# Patient Record
Sex: FEMALE | Race: WHITE | NOT HISPANIC OR LATINO | Employment: OTHER | URBAN - METROPOLITAN AREA
[De-identification: names, ages, dates, MRNs, and addresses within clinical notes are randomized per-mention and may not be internally consistent; named-entity substitution may affect disease eponyms.]

---

## 2018-05-25 ENCOUNTER — OFFICE VISIT (OUTPATIENT)
Dept: NEUROLOGY | Facility: CLINIC | Age: 78
End: 2018-05-25

## 2018-05-25 VITALS
HEIGHT: 62 IN | SYSTOLIC BLOOD PRESSURE: 128 MMHG | WEIGHT: 125 LBS | BODY MASS INDEX: 23 KG/M2 | DIASTOLIC BLOOD PRESSURE: 60 MMHG

## 2018-05-25 DIAGNOSIS — G30.1 LATE ONSET ALZHEIMER'S DISEASE WITHOUT BEHAVIORAL DISTURBANCE (HCC): Primary | ICD-10-CM

## 2018-05-25 DIAGNOSIS — F02.80 LATE ONSET ALZHEIMER'S DISEASE WITHOUT BEHAVIORAL DISTURBANCE (HCC): Primary | ICD-10-CM

## 2018-05-25 PROCEDURE — 99205 OFFICE O/P NEW HI 60 MIN: CPT | Performed by: PSYCHIATRY & NEUROLOGY

## 2018-05-25 RX ORDER — DONEPEZIL HYDROCHLORIDE 5 MG/1
5 TABLET, FILM COATED ORAL NIGHTLY
COMMUNITY

## 2018-05-25 RX ORDER — AMOXICILLIN 500 MG/1
CAPSULE ORAL
COMMUNITY
Start: 2018-05-20 | End: 2018-06-28

## 2018-05-25 RX ORDER — MEMANTINE HYDROCHLORIDE 5 MG-10 MG
KIT ORAL
Qty: 1 PACKAGE | Refills: 0 | Status: SHIPPED | OUTPATIENT
Start: 2018-05-25 | End: 2018-06-28

## 2018-05-25 NOTE — PROGRESS NOTES
"Subjective     Ramonita Wallace is seen today in consultation at the request of Dr. Brumfield for memory impairment.    CC: Memory Loss (NP)      History of Present Illness   Ramonita Wallace is a 78 y.o. female who comes to clinic today for evaluation of cognitive impairment. Her family  has noted symptoms since at least 2014 marked initially by forgetfulness. This has gradually worsened  over time. Additional associated symptoms have included impairments in orientation  and executive function. She has had associated  symptoms of delusions. There are no identified modifying factors.    Prior evaluation has included screening blood work  and an MRI of the brain which were unremarkable . (I reviewed her MRI images from 12/14 personally.) She is currently taking donepezil. She was intolerant of donepezil at 10 mg daily but is tolerating 5 mg well. She was reportedly intolerant of memantine previously.    I have reviewed and confirmed the past family, social and medical history as accurate on 5/25/18.     PMH: AD  FH: reviewed and non-contributory    Review of Systems   Constitutional: Negative.    Respiratory: Negative.    Cardiovascular: Negative.    Gastrointestinal: Negative.    Genitourinary: Negative.    All other systems reviewed and are negative.      Objective   General appearance today is normal.   Peripheral pulses were present and symmetric.   The ophthalmoscopic exam today is unremarkable. The discs and posterior elements are unremarkable.    /60   Ht 157.5 cm (62\")   Wt 56.7 kg (125 lb)   BMI 22.86 kg/m²     Physical Exam   Neurological: She has normal strength. She has a normal Finger-Nose-Finger Test. Gait normal.   Reflex Scores:       Tricep reflexes are 2+ on the right side and 2+ on the left side.       Bicep reflexes are 2+ on the right side and 2+ on the left side.       Brachioradialis reflexes are 2+ on the right side and 2+ on the left side.       Patellar reflexes are 2+ on the right side " and 2+ on the left side.       Achilles reflexes are 2+ on the right side and 2+ on the left side.  Psychiatric: Her speech is normal.        Neurologic Exam     Mental Status   Oriented to person.   Disoriented to place.   Disoriented to time.   Registration: recalls 3 of 3 objects. Recall of objects at 5 minutes: 0/3. Follows 3 step commands.   Attention: normal.   Speech: speech is normal   Level of consciousness: alert  Knowledge: poor.   Able to name object. Able to read. Able to repeat. Able to write. Normal comprehension.     Cranial Nerves   Cranial nerves II through XII intact.     Motor Exam   Muscle bulk: normal  Overall muscle tone: normal    Strength   Strength 5/5 throughout.     Sensory Exam   Light touch normal.     Gait, Coordination, and Reflexes     Gait  Gait: normal    Coordination   Finger to nose coordination: normal    Reflexes   Right brachioradialis: 2+  Left brachioradialis: 2+  Right biceps: 2+  Left biceps: 2+  Right triceps: 2+  Left triceps: 2+  Right patellar: 2+  Left patellar: 2+  Right achilles: 2+  Left achilles: 2+      MMSE=19      Assessment/Plan   Ramonita was seen today for memory loss.    Diagnoses and all orders for this visit:    Late onset Alzheimer's disease without behavioral disturbance          DISCUSSION/SUMMARY    Ramonita Wallace comes to clinic today with a history of Alzheimer's Disease . Her history and examination, including bedside cognitive testing are consistent with this diagnosis, which was discussed. Her prior testing is complete, and so will not be repeated today. After discussing potential treatment options, it was elected to continue on  donepezil at 5 mg daily and add memantine. She will then follow up in 1 month, or sooner if needed.     As part of this visit I reviewed prior lab results, reviewed radiology results, reviewed radiology images and obtained additional history from the family which is incorporated in the HPI. Please see above for additional  details.

## 2018-06-28 ENCOUNTER — OFFICE VISIT (OUTPATIENT)
Dept: NEUROLOGY | Facility: CLINIC | Age: 78
End: 2018-06-28

## 2018-06-28 VITALS
HEIGHT: 62 IN | SYSTOLIC BLOOD PRESSURE: 114 MMHG | BODY MASS INDEX: 23 KG/M2 | DIASTOLIC BLOOD PRESSURE: 68 MMHG | WEIGHT: 125 LBS

## 2018-06-28 DIAGNOSIS — G30.1 LATE ONSET ALZHEIMER'S DISEASE WITHOUT BEHAVIORAL DISTURBANCE (HCC): Primary | ICD-10-CM

## 2018-06-28 DIAGNOSIS — F02.80 LATE ONSET ALZHEIMER'S DISEASE WITHOUT BEHAVIORAL DISTURBANCE (HCC): Primary | ICD-10-CM

## 2018-06-28 PROCEDURE — 99215 OFFICE O/P EST HI 40 MIN: CPT | Performed by: PHYSICIAN ASSISTANT

## 2018-06-28 RX ORDER — MEMANTINE HYDROCHLORIDE 21 MG/1
21 CAPSULE, EXTENDED RELEASE ORAL DAILY
Qty: 30 CAPSULE | Refills: 11 | Status: SHIPPED | OUTPATIENT
Start: 2018-06-28 | End: 2019-01-07

## 2018-06-28 NOTE — PROGRESS NOTES
"Subjective     Chief Complaint: memory loss      History of Present Illness   Ramonita Wallace is a 78 y.o. female who returns to clinic today for evaluation of cognitive impairment. Her family  has noted symptoms since at least 2014 marked initially by forgetfulness. This has gradually worsened  over time. Additional associated symptoms have included impairments in orientation  and executive function. She has had associated  symptoms of delusions. There are no identified modifying factors.     Prior evaluation has included screening blood work  and an MRI of the brain which were unremarkable. (I reviewed her MRI images from 12/14 personally.) She is currently taking donepezil. She was intolerant of donepezil at 10 mg daily but is tolerating 5 mg well. She was reportedly intolerant of memantine previously.    Today: Since her last visit in 5/25/18, she feels essentially unchanged cognitively and her family agrees. Her family noted that she began having leg cramps after increasing her Namenda XR to 28mg daily.       I have reviewed and confirmed the past family, social and medical history as accurate on 6/28/18.     Review of Systems   Constitutional: Negative.    HENT: Negative.    Eyes: Negative.    Respiratory: Negative.    Cardiovascular: Negative.    Gastrointestinal: Negative.    Endocrine: Negative.    Genitourinary: Negative.    Musculoskeletal: Negative.    Skin: Negative.    Allergic/Immunologic: Negative.    Neurological:        Memory loss      Hematological: Negative.    Psychiatric/Behavioral: Negative.        Objective     /68   Ht 157.5 cm (62.01\")   Wt 56.7 kg (125 lb)   BMI 22.86 kg/m²     General appearance today is normal.       Physical Exam   Neurological: She has normal strength. She has a normal Finger-Nose-Finger Test. Gait normal.   Psychiatric: Her speech is normal.        Neurologic Exam     Mental Status   Oriented to person.   Oriented to place.   Disoriented to time. "   Registration: recalls 3 of 3 objects. Recall at 5 minutes: recalls 1 of 3 objects. Follows 3 step commands.   Attention: normal.   Speech: speech is normal   Level of consciousness: alert  Able to name object. Able to read. Able to repeat. Able to write. Normal comprehension.     Cranial Nerves   Cranial nerves II through XII intact.     Motor Exam   Muscle bulk: normal  Overall muscle tone: normal    Strength   Strength 5/5 throughout.     Sensory Exam   Light touch normal.     Gait, Coordination, and Reflexes     Gait  Gait: normal    Coordination   Finger to nose coordination: normal    Tremor   Resting tremor: absent        Results  MMSE=22 (19 in 5/18)       Assessment/Plan   Ramonita was seen today for alzheimer's disease.    Diagnoses and all orders for this visit:    Late onset Alzheimer's disease without behavioral disturbance    Other orders  -     Memantine HCl ER (NAMENDA XR) 21 MG capsule sustained-release 24 hr; Take 21 mg by mouth Daily.          Discussion/Summary   Ramonita Wallace returns to clinic today for evaluation of Alzheimer's Disease . I again reviewed her current status and treatment options. After discussing potential treatment options, it was elected to continue on  donepezil unchanged and decrease her Namenda XR to 21mg daily due to possible side effects on 28mg daily. We also discussed cognitive rehabilitation, which she will consider. Additionally, I encouraged the family to contact Catherine Paez  as needed for potential resources and support. She will then follow up in 6 months, or sooner if needed.   I spent 30 minutes out of 40 minutes face to face with the patient and family and discussing evaluation, current status, treatment options and management as discussed above.       As part of this visit I obtained additional history from the family which is incorporated in the HPI.      Ryann Pham PA-C

## 2018-07-20 ENCOUNTER — TELEPHONE (OUTPATIENT)
Dept: NEUROLOGY | Facility: CLINIC | Age: 78
End: 2018-07-20

## 2018-07-20 NOTE — TELEPHONE ENCOUNTER
Left message with patient for  to call so I can introduce myself and assess social support needs. Mrs. Wallace said she is doing well and doesn't have any questions for Dr. Perry.

## 2019-01-07 ENCOUNTER — OFFICE VISIT (OUTPATIENT)
Dept: NEUROLOGY | Facility: CLINIC | Age: 79
End: 2019-01-07

## 2019-01-07 VITALS
HEIGHT: 62 IN | SYSTOLIC BLOOD PRESSURE: 141 MMHG | WEIGHT: 125 LBS | DIASTOLIC BLOOD PRESSURE: 69 MMHG | BODY MASS INDEX: 23 KG/M2

## 2019-01-07 DIAGNOSIS — F02.80 LATE ONSET ALZHEIMER'S DISEASE WITHOUT BEHAVIORAL DISTURBANCE (HCC): Primary | ICD-10-CM

## 2019-01-07 DIAGNOSIS — G30.1 LATE ONSET ALZHEIMER'S DISEASE WITHOUT BEHAVIORAL DISTURBANCE (HCC): Primary | ICD-10-CM

## 2019-01-07 PROCEDURE — 99214 OFFICE O/P EST MOD 30 MIN: CPT | Performed by: PSYCHIATRY & NEUROLOGY

## 2019-01-07 RX ORDER — MEMANTINE HYDROCHLORIDE 28 MG/1
28 CAPSULE, EXTENDED RELEASE ORAL DAILY
Qty: 30 CAPSULE | Refills: 11 | Status: SHIPPED | OUTPATIENT
Start: 2019-01-07 | End: 2020-01-23

## 2019-01-07 RX ORDER — RANITIDINE 150 MG/1
TABLET ORAL
Status: ON HOLD | COMMUNITY
Start: 2018-11-15 | End: 2022-04-14

## 2019-01-07 NOTE — PROGRESS NOTES
"Subjective     Chief Complaint: memory loss      History of Present Illness   Ramonita Wallace is a 78 y.o. female who returns to clinic today with a history of Alzheimer's DIsease. Her family  has noted symptoms since at least 2014 marked initially by forgetfulness. This has gradually worsened  over time. Additional associated symptoms have included impairments in orientation  and executive function. She has had associated  symptoms of delusions.      Prior evaluation has included MRI of the brain and screening blood work which were unremarkable. She is currently taking donepezil. She was intolerant of donepezil at 10 mg daily but is tolerating 5 mg well. She is also taking Namenda XR (but had cramps at 28 mg daily).    Since her last visit on 6/28/18, her family has noted worsening memory, along with delusions and increased aggression. She has become confused about her family and home.    I have reviewed and confirmed the past family, social and medical history as accurate on 1/7/19.     Review of Systems   Constitutional: Negative.    Respiratory: Negative.    Cardiovascular: Negative.    Gastrointestinal: Negative.    Genitourinary: Negative.    Psychiatric/Behavioral: Negative.        Objective     /69   Ht 157.5 cm (62.01\")   Wt 56.7 kg (125 lb)   BMI 22.86 kg/m²     General appearance today is normal.       Physical Exam   Neurological: She has normal strength.   Psychiatric: Her speech is normal.        Neurologic Exam     Mental Status   Oriented to person.   Disoriented to place.   Disoriented to time.   Registration: recalls 3 of 3 objects. Recall of objects at 5 minutes: 0/3. Follows 3 step commands.   Attention: normal.   Speech: speech is normal   Level of consciousness: alert  Able to name object. Able to read. Able to repeat. Able to write. Normal comprehension.     Cranial Nerves   Cranial nerves II through XII intact.     Motor Exam   Muscle bulk: normal  Overall muscle tone: " normal    Strength   Strength 5/5 throughout.         Results  MMSE=23 (19 in 5/18)       Assessment/Plan   Ramonita was seen today for alzheimer's disease.    Diagnoses and all orders for this visit:    Late onset Alzheimer's disease without behavioral disturbance    Other orders  -     memantine (NAMENDA XR) 28 MG capsule sustained-release 24 hr extended release capsule; Take 1 capsule by mouth Daily.          Discussion/Summary   Ramonita Wallace returns to clinic today with a history of Alzheimer's Disease . I again reviewed her current status and treatment options. After discussing potential treatment options, it was elected to continue on  donepezil and Namenda XR unchanged. I discussed multiple treatment options including switching to the Exelon patch, cognitive rehabilitation, clinical trials and a trial of sertraline. Her  also raised the possibility of increasing her Namenda XR again to 28 mg. For now it was elected to increase her Namenda XR to 28 mg daily. She will then follow up in 6 months, or sooner if needed.     I spent 30  minutes face to face with the patient and family. I spent 20 minutes counseling and discussing current status, treatment options, management and clinical trials.    As part of this visit I obtained additional history from the family which is incorporated in the HPI.      Pierre Perry MD

## 2019-01-08 ENCOUNTER — TELEPHONE (OUTPATIENT)
Dept: NEUROLOGY | Facility: CLINIC | Age: 79
End: 2019-01-08

## 2019-01-08 NOTE — TELEPHONE ENCOUNTER
----- Message from Pierre Perry MD sent at 1/7/2019  3:30 PM EST -----  Please call Mr. Wallace when you are able to discuss potential future plans. By exam his wife has mild AD, though by history it is more consistent with moderate AD. Thanks.

## 2019-01-08 NOTE — TELEPHONE ENCOUNTER
Tried calling , no answer. I will send email to the email provided in Epic to contact me via phone to assess social support needs.

## 2019-07-08 ENCOUNTER — OFFICE VISIT (OUTPATIENT)
Dept: NEUROLOGY | Facility: CLINIC | Age: 79
End: 2019-07-08

## 2019-07-08 VITALS
WEIGHT: 125 LBS | DIASTOLIC BLOOD PRESSURE: 64 MMHG | SYSTOLIC BLOOD PRESSURE: 134 MMHG | BODY MASS INDEX: 23 KG/M2 | HEIGHT: 62 IN

## 2019-07-08 DIAGNOSIS — F02.80 LATE ONSET ALZHEIMER'S DISEASE WITHOUT BEHAVIORAL DISTURBANCE (HCC): Primary | ICD-10-CM

## 2019-07-08 DIAGNOSIS — G30.1 LATE ONSET ALZHEIMER'S DISEASE WITHOUT BEHAVIORAL DISTURBANCE (HCC): Primary | ICD-10-CM

## 2019-07-08 PROCEDURE — 99214 OFFICE O/P EST MOD 30 MIN: CPT | Performed by: PHYSICIAN ASSISTANT

## 2019-07-08 RX ORDER — SERTRALINE HYDROCHLORIDE 25 MG/1
25 TABLET, FILM COATED ORAL DAILY
Qty: 30 TABLET | Refills: 11 | Status: SHIPPED | OUTPATIENT
Start: 2019-07-08 | End: 2019-10-11 | Stop reason: SDUPTHER

## 2019-07-08 NOTE — PROGRESS NOTES
"Subjective     Chief Complaint: memory loss      History of Present Illness   Ramonita Wallace is a 79 y.o. female who returns to clinic today with a history of Alzheimer's DIsease. Her family  has noted symptoms since at least 2014 marked initially by forgetfulness. This has gradually worsened  over time. Additional associated symptoms have included impairments in orientation  and executive function. She has had associated  symptoms of delusions.      Prior evaluation has included MRI of the brain and screening blood work which were unremarkable. She is currently taking donepezil. She was intolerant of donepezil at 10 mg daily but is tolerating 5 mg well. She is also taking Namenda XR (but had cramps at 28 mg daily).     Since her last visit on 1/19, she feels unchanged. Her family has noted worsening memory, along with delusions and increased anxiety. She has become confused about her family and home.      I have reviewed and confirmed the past family, social and medical history as accurate on 7/8/19.     Review of Systems   Constitutional: Negative.    HENT: Negative.    Eyes: Negative.    Respiratory: Negative.    Cardiovascular: Negative.    Gastrointestinal: Negative.    Endocrine: Negative.    Genitourinary: Negative.    Musculoskeletal: Negative.    Skin: Negative.    Allergic/Immunologic: Negative.    Neurological:        Memory loss     Hematological: Negative.    Psychiatric/Behavioral: The patient is nervous/anxious.        Objective     /64   Ht 157.5 cm (62.01\")   Wt 56.7 kg (125 lb)   BMI 22.86 kg/m²     General appearance today is normal.       Physical Exam   Neurological: She has normal strength. She has a normal Finger-Nose-Finger Test. Gait normal.   Psychiatric: Her speech is normal.        Neurologic Exam     Mental Status   Oriented to person.   Oriented to place.   Disoriented to time. Oriented to season.   Registration: recalls 3 of 3 objects. Follows 3 step commands.   Attention: " normal.   Speech: speech is normal   Level of consciousness: alert  Able to name object. Able to read. Able to repeat. Able to write. Normal comprehension.     Cranial Nerves   Cranial nerves II through XII intact.     Motor Exam   Muscle bulk: normal  Overall muscle tone: normal    Strength   Strength 5/5 throughout.     Sensory Exam   Light touch normal.     Gait, Coordination, and Reflexes     Gait  Gait: normal    Coordination   Finger to nose coordination: normal    Tremor   Resting tremor: absent        Results  MMSE=23      Assessment/Plan   Ramonita was seen today for memory loss.    Diagnoses and all orders for this visit:    Late onset Alzheimer's disease without behavioral disturbance  -     SLP Consult: Eval & Treat    Other orders  -     sertraline (ZOLOFT) 25 MG tablet; Take 1 tablet by mouth Daily.          Discussion/Summary   Ramonita Wallace returns to clinic today for evaluation of Alzheimer's Disease . I again reviewed her current status and treatment options. After discussing potential treatment options, it was elected to add sertraline in hopes of helping with her anxiety and continue on donepezil and Namenda XR unchanged. I have also made a referral to SLP for cognitive rehabilitation. She will then follow up in 3 months, or sooner if needed.   I spent 25 minutes face to face with the patient and family with 20 minutes spent on discussing diagnosis, evaluation, current status, treatment options and management as discussed above.       As part of this visit I obtained additional history from the family which is incorporated in the HPI.      Ryann Pham PA-C

## 2019-10-11 ENCOUNTER — OFFICE VISIT (OUTPATIENT)
Dept: NEUROLOGY | Facility: CLINIC | Age: 79
End: 2019-10-11

## 2019-10-11 VITALS — HEART RATE: 76 BPM | OXYGEN SATURATION: 96 % | HEIGHT: 62 IN | BODY MASS INDEX: 22.86 KG/M2

## 2019-10-11 DIAGNOSIS — F02.80 LATE ONSET ALZHEIMER'S DISEASE WITHOUT BEHAVIORAL DISTURBANCE (HCC): Primary | ICD-10-CM

## 2019-10-11 DIAGNOSIS — G30.1 LATE ONSET ALZHEIMER'S DISEASE WITHOUT BEHAVIORAL DISTURBANCE (HCC): Primary | ICD-10-CM

## 2019-10-11 PROCEDURE — 99214 OFFICE O/P EST MOD 30 MIN: CPT | Performed by: PSYCHIATRY & NEUROLOGY

## 2019-10-11 RX ORDER — METHYLPREDNISOLONE 4 MG/1
TABLET ORAL
Status: ON HOLD | COMMUNITY
Start: 2019-07-29 | End: 2022-04-14

## 2019-10-11 RX ORDER — FOLIC ACID 1 MG/1
TABLET ORAL
Refills: 3 | Status: ON HOLD | COMMUNITY
Start: 2019-08-29 | End: 2022-04-14

## 2019-10-11 RX ORDER — FOLIC ACID 1 MG/1
TABLET ORAL
Refills: 2 | Status: ON HOLD | COMMUNITY
Start: 2019-10-04 | End: 2022-04-14

## 2019-10-11 RX ORDER — TRIAMCINOLONE ACETONIDE 1 MG/G
CREAM TOPICAL SEE ADMIN INSTRUCTIONS
Refills: 3 | COMMUNITY
Start: 2019-08-22

## 2019-10-11 RX ORDER — PREDNISONE 10 MG/1
TABLET ORAL
Refills: 0 | Status: ON HOLD | COMMUNITY
Start: 2019-08-16 | End: 2022-04-14

## 2019-10-11 NOTE — PROGRESS NOTES
"Subjective     Chief Complaint: memory loss      History of Present Illness   Ramonita Wallace is a 79 y.o. female who returns to clinic today with a history of Alzheimer's DIsease. Her family  has noted symptoms since at least 2014 marked initially by forgetfulness. This has gradually worsened  over time. Additional associated symptoms have included impairments in orientation  and executive function. She has had associated  symptoms of delusions.      Prior evaluation has included MRI of the brain and screening blood work which were unremarkable. She is currently taking donepezil. She was intolerant of donepezil at 10 mg daily but is tolerating 5 mg well. She is also taking Namenda XR (but had cramps at 28 mg daily).     Since her last visit on 7/8/19 her memory has continued to worsen. Her  has also noted that she is \"creating her own reality.\" For example, she becomes increasingly disoriented in the evening. At times she has not recognized that she is .    I have reviewed and confirmed the past family, social and medical history as accurate on 10/11/19.     Review of Systems   Constitutional: Negative.        Objective     Pulse 76   Ht 157.5 cm (62\")   SpO2 96%   BMI 22.86 kg/m²     General appearance today is normal.       Physical Exam   Neurological: She has normal strength.   Psychiatric: Her speech is normal.        Neurologic Exam     Mental Status   Oriented to person.   Disoriented to place.   Disoriented to time.   Registration: recalls 3 of 3 objects. Recall at 5 minutes: recalls 1 of 3 objects. Follows 3 step commands.   Attention: normal.   Speech: speech is normal   Level of consciousness: alert  Able to name object. Able to read. Able to repeat. Able to write. Normal comprehension.     Cranial Nerves   Cranial nerves II through XII intact.     Motor Exam   Muscle bulk: normal  Overall muscle tone: normal    Strength   Strength 5/5 throughout.         Results  MMSE=23 " (unchanged)      Assessment/Plan   Ramonita was seen today for alzheimer's disease and memory loss.    Diagnoses and all orders for this visit:    Late onset Alzheimer's disease without behavioral disturbance (CMS/HCC)    Other orders  -     sertraline (ZOLOFT) 50 MG tablet; Take 1 tablet by mouth Daily.          Discussion/Summary   Ramonita Wallace returns to clinic today for evaluation of Alzheimer's Disease . I again reviewed her current status and treatment options. After discussing potential treatment options, it was elected to increase sertraline to 50 mg daily in hopes of helping with her anxiety and continue on donepezil and Namenda XR unchanged. I also discussed potential increases in sertraline or the addition of mirtazapine in the future. She will then follow up in 3 months, or sooner if needed.     As part of this visit I obtained additional history from the family which is incorporated in the HPI.      Pierre Perry MD

## 2020-01-23 RX ORDER — MEMANTINE HYDROCHLORIDE 28 MG/1
CAPSULE, EXTENDED RELEASE ORAL
Qty: 30 CAPSULE | Refills: 0 | Status: SHIPPED | OUTPATIENT
Start: 2020-01-23 | End: 2020-01-29

## 2020-01-29 ENCOUNTER — TELEPHONE (OUTPATIENT)
Dept: NEUROLOGY | Facility: CLINIC | Age: 80
End: 2020-01-29

## 2020-01-29 RX ORDER — MEMANTINE HYDROCHLORIDE 10 MG/1
10 TABLET ORAL 2 TIMES DAILY
Qty: 60 TABLET | Refills: 11 | Status: SHIPPED | OUTPATIENT
Start: 2020-01-29 | End: 2021-01-28

## 2020-04-09 ENCOUNTER — TELEPHONE (OUTPATIENT)
Dept: NEUROLOGY | Facility: CLINIC | Age: 80
End: 2020-04-09

## 2020-04-09 NOTE — TELEPHONE ENCOUNTER
Called and spoke to pt  Horace following-up to see if pt is needing refills or have any concerns. Horace stated pt is fine and informed to please call office if anything is needed. Thanks.

## 2021-01-01 ENCOUNTER — HOSPITAL ENCOUNTER (EMERGENCY)
Facility: HOSPITAL | Age: 81
Discharge: HOME | End: 2021-01-01
Attending: EMERGENCY MEDICINE | Admitting: EMERGENCY MEDICINE

## 2021-01-01 ENCOUNTER — APPOINTMENT (OUTPATIENT)
Dept: GENERAL RADIOLOGY | Facility: HOSPITAL | Age: 81
End: 2021-01-01

## 2021-01-01 ENCOUNTER — APPOINTMENT (OUTPATIENT)
Dept: CT IMAGING | Facility: HOSPITAL | Age: 81
End: 2021-01-01

## 2021-01-01 VITALS
BODY MASS INDEX: 20.61 KG/M2 | WEIGHT: 112 LBS | OXYGEN SATURATION: 100 % | SYSTOLIC BLOOD PRESSURE: 120 MMHG | HEIGHT: 62 IN | DIASTOLIC BLOOD PRESSURE: 101 MMHG | TEMPERATURE: 97.7 F | RESPIRATION RATE: 18 BRPM | HEART RATE: 88 BPM

## 2021-01-01 DIAGNOSIS — S42.292A HUMERAL HEAD FRACTURE, LEFT, CLOSED, INITIAL ENCOUNTER: Primary | ICD-10-CM

## 2021-01-01 DIAGNOSIS — S20.212A CHEST WALL CONTUSION, LEFT, INITIAL ENCOUNTER: ICD-10-CM

## 2021-01-01 PROCEDURE — 73090 X-RAY EXAM OF FOREARM: CPT

## 2021-01-01 PROCEDURE — 71250 CT THORAX DX C-: CPT

## 2021-01-01 PROCEDURE — 73060 X-RAY EXAM OF HUMERUS: CPT

## 2021-01-01 PROCEDURE — 99283 EMERGENCY DEPT VISIT LOW MDM: CPT

## 2021-01-01 RX ORDER — HYDROCODONE BITARTRATE AND ACETAMINOPHEN 5; 325 MG/1; MG/1
0.5 TABLET ORAL ONCE
Status: DISCONTINUED | OUTPATIENT
Start: 2021-01-01 | End: 2021-01-01

## 2021-01-01 RX ORDER — HYDROCODONE BITARTRATE AND ACETAMINOPHEN 5; 325 MG/1; MG/1
1 TABLET ORAL EVERY 6 HOURS PRN
Qty: 12 TABLET | Refills: 0 | Status: ON HOLD | OUTPATIENT
Start: 2021-01-01 | End: 2022-04-14

## 2021-01-01 RX ORDER — HYDROCODONE BITARTRATE AND ACETAMINOPHEN 5; 325 MG/1; MG/1
1 TABLET ORAL ONCE
Status: COMPLETED | OUTPATIENT
Start: 2021-01-01 | End: 2021-01-01

## 2021-01-01 RX ADMIN — HYDROCODONE BITARTRATE AND ACETAMINOPHEN 1 TABLET: 5; 325 TABLET ORAL at 13:18

## 2021-01-01 NOTE — ED PROVIDER NOTES
EMERGENCY DEPARTMENT ENCOUNTER    Room Number:  30/30  Date of encounter:  1/5/2021  PCP: Justin Brumfield MD  Historian: Patient's       HPI:  Chief Complaint: Left chest breast shoulder arm forearm and hand bruising, pain to the left shoulder with swelling to proximal humerus and shoulder area.        Context: Laura Wallace is a 80 y.o. female who presents to the ED c/o unwitnessed fall 9 days ago.  Patient's  states that around 3 in the morning, patient went to the restroom.  Her  heard her fall.  She has significant bruising on her chest shoulder and left arm.  Over the past 2 days swelling had spread from the mid upper arm to involve the fingertips.  Patient has a history of dementia and is a difficult historian herself.  However her  provides detailed information about the fall.  He believes she struck her chest on the vanity in the bathroom.  Patient is also complaining of left upper chest wall pain.  No fever chills or sweats, no hemoptysis.  No shortness of breath or dyspnea on exertion.  No neck pain or head pain.  She denies other symptoms at this time.      PAST MEDICAL HISTORY  Active Ambulatory Problems     Diagnosis Date Noted   • Late onset Alzheimer's disease without behavioral disturbance (CMS/[facility]AC, located within [facility]) 05/25/2018     Resolved Ambulatory Problems     Diagnosis Date Noted   • No Resolved Ambulatory Problems     No Additional Past Medical History         PAST SURGICAL HISTORY  History reviewed. No pertinent surgical history.      FAMILY HISTORY  History reviewed. No pertinent family history.      SOCIAL HISTORY  Social History     Socioeconomic History   • Marital status:      Spouse name: Not on file   • Number of children: Not on file   • Years of education: Not on file   • Highest education level: Not on file   Tobacco Use   • Smoking status: Never Smoker   • Smokeless tobacco: Never Used   Substance and Sexual Activity   • Alcohol use: No   • Drug use: No   •  Sexual activity: Defer         ALLERGIES  Codeine        REVIEW OF SYSTEMS  Review of Systems   Constitutional: Positive for activity change. Negative for appetite change, fatigue and fever.   HENT: Negative for congestion, rhinorrhea and sore throat.    Respiratory: Negative for cough, shortness of breath and wheezing.    Cardiovascular: Negative for chest pain, palpitations and leg swelling.   Gastrointestinal: Negative for abdominal pain, nausea and vomiting.   Musculoskeletal: Positive for joint swelling and myalgias.   Skin: Positive for color change (Bruising to left upper chest breast shoulder arm forearm hand and fingers.).        All systems reviewed and negative except for those discussed in HPI.       PHYSICAL EXAM    I have reviewed the triage vital signs and nursing notes.    ED Triage Vitals [01/01/21 0942]   Temp Heart Rate Resp BP SpO2   97.7 °F (36.5 °C) 88 18 (!) 120/101 100 %      Temp src Heart Rate Source Patient Position BP Location FiO2 (%)   Temporal Monitor Sitting Left arm --       Physical Exam  GENERAL:   Awake alert and oriented not toxic appearing hemodynamically stable.   HENT: Nares patent.  EYES: No scleral icterus.  CV: Regular rhythm, regular rate.  RESPIRATORY: Normal effort.  No audible wheezes, rales or rhonchi.  ABDOMEN: Soft, nontender.  MUSCULOSKELETAL: Tenderness to proximal humerus and humeral head aspect of left shoulder with swelling and bruising from the shoulder distally including the left anterior chest and left breast.  Range of motion of the upper extremity is minimal due to pain level and guarding, with tenderness to palpation to the left upper arm and shoulder.  She has bruising from the left upper shoulder to the fingertips.  Neurovascular status is intact distally.  NEURO: Alert, moves all extremities, follows commands.  SKIN: Warm, dry, significant bruising to left upper chest breast shoulder and entire left arm.  She has swelling to the proximal humeral area  with significant bruising extending all the way down her arm to include her fingertips.      LAB RESULTS  No results found for this or any previous visit (from the past 24 hour(s)).    If labs were ordered, I independently reviewed the results.        RADIOLOGY  No Radiology Exams Resulted Within Past 24 Hours    X-ray of the left humerus reveals multipart comminuted humeral head fracture through surgical neck with what appears to be subluxation of the glenohumeral head.    Noncontrasted CT of the chest reveals no fractures, no pneumo or hemothoraces.      PROCEDURES    Procedures    No orders to display       MEDICATIONS GIVEN IN ER    Medications   HYDROcodone-acetaminophen (NORCO) 5-325 MG per tablet 1 tablet (1 tablet Oral Given 1/1/21 1318)         PROGRESS, DATA ANALYSIS, CONSULTS, AND MEDICAL DECISION MAKING    All labs have been independently reviewed by me.  All radiology studies have been reviewed by me and the radiologist dictating the report.   EKG's have been independently viewed and interpreted by me.                   Patient was placed in a shoulder immobilizer.  Discussed with Dr. Combs, on-call for orthopedics.  He will see patient in outpatient follow-up.  We will discharge to home with hydrocodone for pain.  Return if any change or worsening of symptoms.  Patient's  verbalizes understanding and is agreeable to plan.      .MADAN query complete. Treatment plan to include limited course of prescribed  controlled substance. Risks including addiction, benefits, and alternatives presented to patient.     AS OF 21:24 EST VITALS:    BP - (!) 120/101  HR - 88  TEMP - 97.7 °F (36.5 °C) (Temporal)  O2 SATS - 100%        DIAGNOSIS  Final diagnoses:   Humeral head fracture, left, closed, initial encounter   Chest wall contusion, left, initial encounter         DISPOSITION  DISCHARGE    Patient discharged in stable condition.    Reviewed implications of results, diagnosis, meds, responsibility to  follow up, warning signs and symptoms of possible worsening, potential complications and reasons to return to ER.    Patient/Family voiced understanding of above instructions.    Discussed plan for discharge, as there is no emergent indication for admission.  Pt/family is agreeable and understands need for follow up and possible repeat testing.  Pt/family is aware that discharge does not mean that nothing is wrong but that it indicates no emergency is currently present that requires admission and they must continue care with follow-up as given below or with a physician of their choice.     FOLLOW-UP  Justin Brumfield MD  [address]  [address]  [place] [id number]  [phone]    Call   Call Monday to update    Raheem Combs MD  [address]  [address]  [place] [id number]  [phone]    Call   Call Monday to schedule orthopedic evaluation.         Medication List      New Prescriptions    HYDROcodone-acetaminophen 5-325 MG per tablet  Commonly known as: NORCO  Take 1 tablet by mouth Every 6 (Six) Hours As Needed for Severe Pain .           Where to Get Your Medications      These medications were sent to [address] - [facility] - [phone]  - [phone] [facility] [address]    Phone: [phone]   · HYDROcodone-acetaminophen 5-325 MG per tablet                  Reagan Jones PA-C  01/05/21 8151

## 2021-01-01 NOTE — DISCHARGE INSTRUCTIONS
Warm compresses to bruised areas every few hours for 20 to 30 minutes.  Follow-up with Dr. Combs on Monday for orthopedic evaluation.  Return to the emergency department immediately if any change or worsening of symptoms.    CONTROLLED SUBSTANCE(S) EDUCATION  Controlled Substances have been prescribed by your provider to treat your medical condition and associated symptoms. Although Controlled Substances can be effective in relieving your pain or other symptoms, they may also cause serious adverse effects. It is important that you understand how to safely and appropriately take these medications.  Proper Use  1. Carefully following instructions for use, including timing of doses, whether to take the  medication with or without food, and any foods or other medications to avoid while taking the medication;  2. If you have low or impaired vision you should wear glasses when taking the medication and not take the medication in the dark;  3. You should read the prescription container label each time to confirm the dosage;  4. You should never use the medication after the expiration date;  5. You must never share the medication with others;  6. You must not take the medication with alcohol or other sedatives;  7. You should not take the medication to help you sleep;  8. You should never break, crush or chew the medication;  9. If you have been prescribed a skin patch (transdermal), external heat, fever and exertion can increase the absorption of these products, leading to potentially fatal overdose;  10. You should immediately contact the physician’s office to report any adverse reaction and,  11. It is illegal to share, sell or give away Controlled Substances.  Driving and Work Safety  1. Controlled Substances may cause sleepiness, clouded thinking, decreased concentration, slower reflexes, or incoordination, all of which may create a danger to you and others when driving or operating certain type of machinery;  2.  Avoid, if possible, driving or engaging in other potentially dangerous work or other activities, for a specific period of time until the initial effects of the Controlled Substances no longer create such dangers; and,  3. Ingesting other substances, such as alcohol, benzodiazepines or some cold remedies, at the same time you are taking the Controlled Substances prescribed or dispensed may increase cognitive and motor impairment.  Pregnancy  If you are pregnant or nursing a baby, avoid using Controlled Substances, or use them on a minimal basis in strict accordance with your provider’s instructions.  Potential for Overdose and Response  1. The use of Controlled Substances creates a risk of respiratory depression, which may result in serious harm or death. You and others should be watchful for the following warning signs of overmedication:  ? intoxicated behavior, such as confusion, slurred speech, or stumbling;  ? feeling dizzy or faint;  ? acting very drowsy or groggy;  ? unusual snoring, gasping, or snorting during sleep;  ? and/or difficulty waking up from sleep or difficulty in staying awake.  2. Immediately call “911” or an emergency service upon you or your caregivers observing or experiencing any of the following conditions:  ? you cannot be aroused or waken, or are unable to talk after being awakened;  ? you have shortness of breath, slow or light breathing, or stopped breathing;  ? gurgling noises coming from your mouth or throat;  ? your body is limp, seems lifeless;  ? your face is pale or clammy;  ? your fingernails or lips are turning purple or blue; and/or  ? your heartbeat is slow, unusual or stopped  Safe Storage of Controlled Substances  1. If your Controlled Substances are not stored in a safe manner there is a potential that  partners, family members or others may improperly obtain your Controlled Substances;  2. Always keep the Controlled Substances in the original container;  3. Store Controlled  Substances in a locked cabinet or other secure storage unit, that is cool, dry and out of direct sunlight, such as:  ? an existing safe;  ? a cut-proof travel bag;  ? a portable lock box designed for travel; or,  ? a locking medical box.  4. Do not store Controlled Substances in:  ? an unlocked medicine cabinet;  ? in your car; or,  ? in a refrigerator or freezer unless specifically recommended by the prescriber or  pharmacist; and  5. Immediately notify your provider if any Controlled Substances prescribed or dispensed by the provider are stolen or improperly taken by another individual.  Proper Disposal  1. It is important to safely and appropriately dispose of unused Controlled Substances that had been prescribed or dispensed by your provider;  2. Promptly dispose of unused Controlled Substances after the expiration date of the  prescription or after you no longer require the Controlled Substances to treat your medical condition;  3. In order to safely dispose of Controlled Substances, you should turn in the unused Controlled Substances as part of an approved governmental drug take-back program. The [place] Office of Drug Control Policy has a listing of [place] Permanent Drug Disposal Locations at http://www.odcp.[place].gov - click on the [place] Prescriptions Drug Drop Map and Location on the left side of the page.  4. You should not flush Controlled Substances down the toilet; and,  5. You should personally remove any identifying information, including the prescription number, from an empty Controlled Substance container and then properly dispose of the empty container.  CONSENT FOR TREATMENT WITH CONTROLLED SUBSTANCE(S)  (This is for an initial prescription)  1. Controlled Substances  Controlled Substances are prescribed to treat a variety of conditions, including the relief  of chronic pain, to provide stimulation, promote weight loss, and treat mood disorders.  Pain relief is an important medical reason to  take Controlled Substances.  Controlled Substances are drugs or chemical substances whose possession and use are  regulated under the Controlled Substances Act. The law requires that patient are informed of the risks, benefits, and alternatives of taking Controlled Substances.  2. Adverse Effects  As with any medication, there are risks and adverse effects associated with the use of  Controlled Substances. Common adverse effects of pain medicines could include, but are not limited to: sedation or sleepiness, nausea, vomiting, constipation, pruritus (itching), confusion, respiratory depression, and urinary retention. Some of these effects may make it unsafe for you to drive a vehicle, operate heavy machinery, or perform other tasks that require concentration and coordination. Excessive use of these Controlled Substances can lead to profound sedation, respiratory depression, coma, and/or death. Regarding stimulants, adverse effects could include, but are not limited to: drug dependency, neuropsychiatric symptoms such as psychosis and benji, weight loss, cardiovascular events such as heart attack and stroke, insomnia, hypertension, and agitation. Any questions you have regarding the Controlled Substance(s) should be discussed with the prescribing provider.  3. Physical Dependence, Tolerance, and Addiction  Although uncommon when used for their clinical indications, both pain relievers and  stimulants can cause physical dependence, tolerance, and/or addiction when used for a  prolonged period. Maintenance therapy with these Controlled Substances can cause  physical dependence. This means that if these medications are abruptly stopped, or  decreased significantly over a short period of time, a patient may experience withdrawal  symptoms such as: nervousness, irritability, insomnia, sweating, abdominal cramping,  nausea, vomiting, and diarrhea. Tolerance occurs when the effects of these Controlled  Substances are  decreased over a period of prolonged use making it necessary to increase the dosage. Physical dependence and tolerance are different than addiction. Addiction is a complex disease characterized by compulsive craving or seeking and use of a substance despite its extreme negatives on a person. The risk of addiction may be increased in a patient with a history of alcoholism or other addiction.  4. Alternatives  Controlled Substances are routinely prescribed to treat moderate to severe pain or other  medical conditions. Other medicines are available to treat these conditions that are not  associated with tolerance or addiction, however, are associated with a lower level of pain  relief or stimulation. It may also be an alternative to not take any medicine to treat these  conditions, or to use alternative modalities, other than medicine to treat these conditions.  I voluntarily consent to the receipt of the above-named Controlled Substance(s) as prescribed by my provider. I have been informed of the benefits, risks, and alternatives to taking these medications. I acknowledge that I have read and understood all of the information above and I have had the opportunity to ask questions and have them answered to my satisfaction.

## 2021-11-09 ENCOUNTER — APPOINTMENT (OUTPATIENT)
Dept: GENERAL RADIOLOGY | Facility: HOSPITAL | Age: 81
End: 2021-11-09

## 2021-11-09 ENCOUNTER — HOSPITAL ENCOUNTER (EMERGENCY)
Facility: HOSPITAL | Age: 81
Discharge: HOME | End: 2021-11-10
Attending: EMERGENCY MEDICINE

## 2021-11-09 DIAGNOSIS — Z87.39 HISTORY OF RHEUMATOID ARTHRITIS: ICD-10-CM

## 2021-11-09 DIAGNOSIS — S80.02XA CONTUSION OF LEFT KNEE, INITIAL ENCOUNTER: ICD-10-CM

## 2021-11-09 DIAGNOSIS — S60.051A CONTUSION OF RIGHT LITTLE FINGER WITHOUT DAMAGE TO NAIL, INITIAL ENCOUNTER: ICD-10-CM
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
## 2021-11-09 DIAGNOSIS — Z86.59 HISTORY OF DEMENTIA: ICD-10-CM

## 2021-11-09 PROCEDURE — 99283 EMERGENCY DEPT VISIT LOW MDM: CPT

## 2021-11-09 RX ORDER — ACETAMINOPHEN 325 MG/1
650 TABLET ORAL ONCE
Status: COMPLETED | OUTPATIENT
Start: 2021-11-09 | End: 2021-11-09

## 2021-11-09 RX ADMIN — ACETAMINOPHEN 650 MG: 325 TABLET, FILM COATED ORAL at 23:36

## 2021-11-10 ENCOUNTER — APPOINTMENT (OUTPATIENT)
Dept: GENERAL RADIOLOGY | Facility: HOSPITAL | Age: 81
End: 2021-11-10

## 2021-11-10 VITALS
HEART RATE: 75 BPM | DIASTOLIC BLOOD PRESSURE: 83 MMHG | TEMPERATURE: 98.2 F | BODY MASS INDEX: 19.88 KG/M2 | OXYGEN SATURATION: 98 % | RESPIRATION RATE: 16 BRPM | WEIGHT: 108 LBS | SYSTOLIC BLOOD PRESSURE: 141 MMHG | HEIGHT: 62 IN

## 2021-11-10 PROCEDURE — 73560 X-RAY EXAM OF KNEE 1 OR 2: CPT

## 2021-11-10 PROCEDURE — 73130 X-RAY EXAM OF HAND: CPT

## 2021-11-10 NOTE — DISCHARGE INSTRUCTIONS
X-rays of the right fifth finger reveal arthritic changes but no acute fracture. X-ray of the left knee reveals no acute bony abnormality. We applied a finger splint to the right fifth finger. Recommend ice or cold compresses as needed for swelling. Recommend close PCP follow-up for recheck. Tylenol every 4-6 hours as needed for pain. Continue with all other current medical management. Follow-up closely with PCP for recheck within 48 hours. Return to the ER if worsening symptoms.

## 2021-11-10 NOTE — ED PROVIDER NOTES
Subjective   This is a 81-year-old female with longstanding history of dementia.  She arrives with her  who is the primary historian.  Patient's  says that they were at home in the [place] again was starting.  Patient says that he heard a thump in the other room and he went in and patient was on the ground.  She did not strike her head and there was no loss of consciousness.  Patient has bruising and soft tissue swelling to the right fifth finger and also pain to the left knee.  She ambulated after the fall.  She denies any neck, upper back, or lower back pain.  She denies any pain to the hips or pelvis.  Patient does not take any chronic anticoagulation.  Patient has past medical history significant for dementia, rheumatoid arthritis, and vitamin B12 deficiency.  No other concerns at this time.      History provided by:  Patient  Fall  Mechanism of injury: fall    Injury location:  Finger and leg  Finger injury location:  R little finger  Leg injury location:  L knee  Incident location:  Home  Time since incident:  1 hour  Arrived directly from scene: yes    Fall:     Fall occurred:  Standing (Fall was unwitnessed.  Patient has dementia and her  heard a thump.)    Impact surface:  Hard floor    Point of impact:  Hands and knees  Suspicion of alcohol use: no    Suspicion of drug use: no    Tetanus status:  Unknown  Prior to arrival data:     Bystander interventions:  None    Blood loss:  None    Responsiveness at scene:  Alert    Orientation at scene:  Person    Loss of consciousness: no      Amnesic to event: yes (History of dementia)    Associated symptoms: no abdominal pain, no back pain, no chest pain, no difficulty breathing, no headaches, no loss of consciousness, no nausea, no neck pain and no vomiting    Risk factors: no anticoagulation therapy        Review of Systems   Constitutional: Negative.  Negative for activity change, appetite change, chills, diaphoresis, fatigue and fever.   HENT:  Negative.  Negative for congestion, postnasal drip, sinus pressure, sinus pain and sore throat.    Respiratory: Negative.  Negative for cough and shortness of breath.    Cardiovascular: Negative.  Negative for chest pain, palpitations and leg swelling.   Gastrointestinal: Negative.  Negative for abdominal pain, constipation, diarrhea, nausea and vomiting.   Genitourinary: Negative.  Negative for dysuria, flank pain, frequency and urgency.   Musculoskeletal: Positive for arthralgias (Right fifth finger pain, as well as pain to the left knee.) and joint swelling (Swelling with bruising to the right fifth finger). Negative for back pain, gait problem and neck pain.        History of rheumatoid arthritis.   Skin: Positive for color change (Bruising with soft tissue swelling to the right fifth finger.  Small amount of bruising to the left knee.).   Neurological: Negative.  Negative for loss of consciousness, syncope, facial asymmetry, speech difficulty, weakness and headaches.        Longstanding history of dementia.   All other systems reviewed and are negative.      History reviewed. No pertinent past medical history.    Allergies   Allergen Reactions   • Codeine Nausea And Vomiting       History reviewed. No pertinent surgical history.    History reviewed. No pertinent family history.    Social History     Socioeconomic History   • Marital status:    Tobacco Use   • Smoking status: Never Smoker   • Smokeless tobacco: Never Used   Substance and Sexual Activity   • Alcohol use: No   • Drug use: No   • Sexual activity: Defer           Objective   Physical Exam  Vitals and nursing note reviewed.   Constitutional:       Appearance: Normal appearance.   HENT:      Head: Normocephalic and atraumatic. No abrasion, contusion or laceration.      Comments: No sign of injury to the scalp.  No bruising or evidence of scalp hematoma.     Right Ear: Tympanic membrane normal.      Left Ear: Tympanic membrane normal.      Nose:  Nose normal.      Mouth/Throat:      Mouth: Mucous membranes are moist.      Pharynx: Oropharynx is clear.   Eyes:      Extraocular Movements: Extraocular movements intact.      Right eye: Normal extraocular motion and no nystagmus.      Left eye: Normal extraocular motion and no nystagmus.      Conjunctiva/sclera: Conjunctivae normal.      Pupils: Pupils are equal, round, and reactive to light.   Neck:      Comments: No C-spine tenderness.  Full range of motion.  Cardiovascular:      Rate and Rhythm: Normal rate and regular rhythm.  No extrasystoles are present.     Pulses: Normal pulses.      Heart sounds: Normal heart sounds.      Comments: Regular rate and rhythm.  No ectopy.  No pedal edema to lower extremities.  Pulmonary:      Effort: Pulmonary effort is normal.      Breath sounds: Normal breath sounds.      Comments: Lungs are clear to auscultation bilaterally.  Chest:      Chest wall: No swelling, tenderness or crepitus.      Comments: No chest wall tenderness.  No bruising or sign of injury.  Abdominal:      General: Bowel sounds are normal. There is no distension.      Palpations: Abdomen is soft.      Tenderness: There is no abdominal tenderness. There is no right CVA tenderness, left CVA tenderness, guarding or rebound.      Comments: Abdomen soft and nontender.  No flank or CVA tenderness.   Musculoskeletal:         General: Normal range of motion.      Cervical back: Normal range of motion and neck supple. No spinous process tenderness or muscular tenderness.      Right lower leg: No edema.      Left lower leg: No edema.      Comments: No C, T, or LS spinal tenderness.  No pelvic or hip tenderness.  Point tenderness to the right fifth finger along the PIP joint.  Mild soft tissue swelling with bruising.  No deformity.  Fair range of motion, but pain elicited.  Patient also has point tenderness to the left knee with small amount of bruising to the lateral left knee.  No joint effusion.  Fair range of  motion.  Patient is ambulating without limp.  No other bony tenderness appreciated.   Skin:     General: Skin is warm and dry.      Findings: Bruising present.      Comments: Patient has some chronic skin pigmentation changes to the face.  She has bruising with mild soft tissue swelling to the right fifth finger at the PIP joint.  Patient also has small amount of bruising to the left lateral knee.  No joint effusion or deformity.   Neurological:      General: No focal deficit present.      Mental Status: She is alert. Mental status is at baseline. She is confused.      Cranial Nerves: Cranial nerves are intact.      Sensory: Sensation is intact.      Motor: Motor function is intact.      Coordination: Coordination is intact.      Comments: Pleasantly demented.  Confused at baseline.  No focal deficits.   Psychiatric:         Mood and Affect: Mood is anxious.         Speech: Speech normal.         Behavior: Behavior normal. Behavior is cooperative.         Cognition and Memory: Memory is impaired. She exhibits impaired recent memory and impaired remote memory.      Comments: Patient is anxious.  Impaired memory secondary to dementia.  Cooperative.         Procedures           ED Course  ED Course as of 11/10/21 0157   Wed Nov 10, 2021   0154 X-ray of the left knee reveal no acute bony abnormality. X-ray of the right  reveals subluxation of the fifth PIP joint that appears chronic. No acute fracture noted. Osteoarthritis seen at multiple joints. Patient has history of rheumatoid arthritis and takes methotrexate. Patient does have some soft tissue swelling and bruising to the right fifth finger. Recommend cool compresses as needed and we will apply a finger splint to help with stabilization in comfort. Recommend Tylenol every 4-6 hours as needed for pain. Recommend close PCP follow-up for recheck. Patient ready for discharge to home. [FC]      ED Course User Index  [FC] Yenny Sanders, BETHEL           No  "results found for this or any previous visit (from the past 24 hour(s)).  Note: In addition to lab results from this visit, the labs listed above may include labs taken at another facility or during a different encounter within the last 24 hours. Please correlate lab times with ED admission and discharge times for further clarification of the services performed during this visit.    XR Knee 1 or 2 View Left   Final Result   Negative left knee.      Signer Name: Graeme Tamayo MD    Signed: 11/10/2021 1:32 AM    Workstation Name: [facility]     Radiology [facility]      XR Hand 3+ View Right   Final Result   Subluxation of the fifth PIP joint that appears chronic. No acute fractures are noted. Osteoarthritis is seen at multiple joints.      Signer Name: Graeme Tamayo MD    Signed: 11/10/2021 1:28 AM    Workstation Name: [facility]     Radiology [facility]        Vitals:    11/09/21 2235   BP: (!) 169/134   BP Location: Right arm   Patient Position: Sitting   Pulse: 80   Resp: 18   Temp: 98.2 °F (36.8 °C)   TempSrc: Oral   SpO2: 98%   Weight: 49 kg (108 lb)   Height: 157.5 cm (62\")     Medications   acetaminophen (TYLENOL) tablet 650 mg (650 mg Oral Given 11/9/21 2336)     ECG/EMG Results (last 24 hours)     ** No results found for the last 24 hours. **        No orders to display                                  MDM    Final diagnoses:   Fall, initial encounter   Contusion of right little finger without damage to nail, initial encounter   Contusion of left knee, initial encounter   History of dementia   History of rheumatoid arthritis       ED Disposition  ED Disposition     ED Disposition Condition Comment    Discharge Stable           Justin Brumfield MD  [address] [id number] [phone]    Schedule an appointment as soon as possible for a visit in 2 days  Close PCP follow-up for [facility]  [address] [facility] [address] [phone]    If symptoms worsen         Medication List      No changes were made to your prescriptions during this visit.          Yenny Sanders PA-C  11/10/21 0157

## 2021-12-01 ENCOUNTER — TRANSCRIBE ORDERS (OUTPATIENT)
Dept: ADMINISTRATIVE | Facility: HOSPITAL | Age: 81
End: 2021-12-01

## 2021-12-01 ENCOUNTER — HOSPITAL ENCOUNTER (OUTPATIENT)
Dept: GENERAL RADIOLOGY | Facility: HOSPITAL | Age: 81
Discharge: HOME | End: 2021-12-01
Admitting: FAMILY MEDICINE

## 2021-12-01 DIAGNOSIS — S60.151A CONTUSION OF RIGHT LITTLE FINGER WITH DAMAGE TO NAIL, INITIAL ENCOUNTER: Primary | ICD-10-CM

## 2021-12-01 PROCEDURE — 73140 X-RAY EXAM OF FINGER(S): CPT

## 2022-04-13 ENCOUNTER — APPOINTMENT (OUTPATIENT)
Dept: CT IMAGING | Facility: HOSPITAL | Age: 82
End: 2022-04-13

## 2022-04-13 ENCOUNTER — HOSPITAL ENCOUNTER (INPATIENT)
Facility: HOSPITAL | Age: 82
LOS: 7 days | Discharge: NURSING, REHAB OR LONG-TERM CARE | End: 2022-04-23
Attending: EMERGENCY MEDICINE | Admitting: INTERNAL MEDICINE

## 2022-04-13 DIAGNOSIS — S32.82XA MULTIPLE CLOSED FRACTURES OF PELVIS WITHOUT DISRUPTION OF PELVIC RING, INITIAL ENCOUNTER: Primary | ICD-10-CM
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
## 2022-04-13 LAB
HOLD SPECIMEN: NORMAL
HOLD SPECIMEN: NORMAL
WHOLE BLOOD HOLD SPECIMEN: NORMAL
WHOLE BLOOD HOLD SPECIMEN: NORMAL

## 2022-04-13 PROCEDURE — 25010000002 ONDANSETRON PER 1 MG: Performed by: EMERGENCY MEDICINE

## 2022-04-13 PROCEDURE — 72192 CT PELVIS W/O DYE: CPT

## 2022-04-13 PROCEDURE — 80053 COMPREHEN METABOLIC PANEL: CPT | Performed by: EMERGENCY MEDICINE

## 2022-04-13 PROCEDURE — 85025 COMPLETE CBC W/AUTO DIFF WBC: CPT | Performed by: EMERGENCY MEDICINE

## 2022-04-13 PROCEDURE — 99284 EMERGENCY DEPT VISIT MOD MDM: CPT

## 2022-04-13 PROCEDURE — 70450 CT HEAD/BRAIN W/O DYE: CPT

## 2022-04-13 RX ORDER — SODIUM CHLORIDE 0.9 % (FLUSH) 0.9 %
10 SYRINGE (ML) INJECTION AS NEEDED
Status: DISCONTINUED | OUTPATIENT
Start: 2022-04-13 | End: 2022-04-23 | Stop reason: HOSPADM

## 2022-04-13 RX ORDER — ONDANSETRON 2 MG/ML
4 INJECTION INTRAMUSCULAR; INTRAVENOUS ONCE
Status: COMPLETED | OUTPATIENT
Start: 2022-04-13 | End: 2022-04-13

## 2022-04-13 RX ADMIN — ONDANSETRON 4 MG: 2 INJECTION INTRAMUSCULAR; INTRAVENOUS at 22:16

## 2022-04-14 ENCOUNTER — APPOINTMENT (OUTPATIENT)
Dept: GENERAL RADIOLOGY | Facility: HOSPITAL | Age: 82
End: 2022-04-14

## 2022-04-14 PROBLEM — S32.82XA MULTIPLE CLOSED FRACTURES OF PELVIS WITHOUT DISRUPTION OF PELVIC RING, INITIAL ENCOUNTER (HCC): Status: ACTIVE | Noted: 2022-04-14

## 2022-04-14 LAB
ALBUMIN SERPL-MCNC: 4.2 G/DL (ref 3.5–5.2)
ALBUMIN/GLOB SERPL: 1.6 G/DL
ALP SERPL-CCNC: 73 U/L (ref 39–117)
ALT SERPL W P-5'-P-CCNC: 23 U/L (ref 1–33)
ANION GAP SERPL CALCULATED.3IONS-SCNC: 11 MMOL/L (ref 5–15)
ANION GAP SERPL CALCULATED.3IONS-SCNC: 16 MMOL/L (ref 5–15)
AST SERPL-CCNC: 30 U/L (ref 1–32)
BACTERIA UR QL AUTO: NORMAL /HPF
BASOPHILS # BLD AUTO: 0.02 10*3/MM3 (ref 0–0.2)
BASOPHILS NFR BLD AUTO: 0.1 % (ref 0–1.5)
BILIRUB SERPL-MCNC: 0.4 MG/DL (ref 0–1.2)
BILIRUB UR QL STRIP: NEGATIVE
BUN BLDA-MCNC: 14 MG/DL (ref 8–26)
BUN SERPL-MCNC: 11 MG/DL (ref 8–23)
BUN SERPL-MCNC: 13 MG/DL (ref 8–23)
BUN/CREAT SERPL: 11.1 (ref 7–25)
BUN/CREAT SERPL: 13.3 (ref 7–25)
CA-I BLDA-SCNC: 1.19 MMOL/L (ref 1.2–1.32)
CALCIUM SPEC-SCNC: 8.7 MG/DL (ref 8.6–10.5)
CALCIUM SPEC-SCNC: 9.1 MG/DL (ref 8.6–10.5)
CHLORIDE BLDA-SCNC: 103 MMOL/L (ref 98–109)
CHLORIDE SERPL-SCNC: 104 MMOL/L (ref 98–107)
CHLORIDE SERPL-SCNC: 98 MMOL/L (ref 98–107)
CK SERPL-CCNC: 99 U/L (ref 20–180)
CLARITY UR: CLEAR
CO2 BLDA-SCNC: 23 MMOL/L (ref 24–29)
CO2 SERPL-SCNC: 22 MMOL/L (ref 22–29)
CO2 SERPL-SCNC: 24 MMOL/L (ref 22–29)
COLOR UR: YELLOW
CREAT BLDA-MCNC: 0.9 MG/DL (ref 0.6–1.3)
CREAT SERPL-MCNC: 0.98 MG/DL (ref 0.57–1)
CREAT SERPL-MCNC: 0.99 MG/DL (ref 0.57–1)
DEPRECATED RDW RBC AUTO: 41.3 FL (ref 37–54)
DEPRECATED RDW RBC AUTO: 42.1 FL (ref 37–54)
EGFRCR SERPLBLD CKD-EPI 2021: 57.8 ML/MIN/1.73
EGFRCR SERPLBLD CKD-EPI 2021: 58.5 ML/MIN/1.73
EGFRCR SERPLBLD CKD-EPI 2021: 64.8 ML/MIN/1.73
EOSINOPHIL # BLD AUTO: 0.04 10*3/MM3 (ref 0–0.4)
EOSINOPHIL NFR BLD AUTO: 0.3 % (ref 0.3–6.2)
ERYTHROCYTE [DISTWIDTH] IN BLOOD BY AUTOMATED COUNT: 12.7 % (ref 12.3–15.4)
ERYTHROCYTE [DISTWIDTH] IN BLOOD BY AUTOMATED COUNT: 12.8 % (ref 12.3–15.4)
FLUAV RNA RESP QL NAA+PROBE: NOT DETECTED
FLUBV RNA RESP QL NAA+PROBE: NOT DETECTED
GLOBULIN UR ELPH-MCNC: 2.6 GM/DL
GLUCOSE BLDC GLUCOMTR-MCNC: 123 MG/DL (ref 70–130)
GLUCOSE SERPL-MCNC: 115 MG/DL (ref 65–99)
GLUCOSE SERPL-MCNC: 119 MG/DL (ref 65–99)
GLUCOSE UR STRIP-MCNC: NEGATIVE MG/DL
HCT VFR BLD AUTO: 35.7 % (ref 34–46.6)
HCT VFR BLD AUTO: 35.8 % (ref 34–46.6)
HCT VFR BLDA CALC: 36 % (ref 38–51)
HGB BLD-MCNC: 12.1 G/DL (ref 12–15.9)
HGB BLD-MCNC: 12.6 G/DL (ref 12–15.9)
HGB BLDA-MCNC: 12.2 G/DL (ref 12–17)
HGB UR QL STRIP.AUTO: ABNORMAL
HOLD SPECIMEN: NORMAL
HYALINE CASTS UR QL AUTO: NORMAL /LPF
IMM GRANULOCYTES # BLD AUTO: 0.13 10*3/MM3 (ref 0–0.05)
IMM GRANULOCYTES NFR BLD AUTO: 0.8 % (ref 0–0.5)
KETONES UR QL STRIP: NEGATIVE
LEUKOCYTE ESTERASE UR QL STRIP.AUTO: NEGATIVE
LYMPHOCYTES # BLD AUTO: 1.14 10*3/MM3 (ref 0.7–3.1)
LYMPHOCYTES NFR BLD AUTO: 7.2 % (ref 19.6–45.3)
MCH RBC QN AUTO: 30.4 PG (ref 26.6–33)
MCH RBC QN AUTO: 31.4 PG (ref 26.6–33)
MCHC RBC AUTO-ENTMCNC: 33.8 G/DL (ref 31.5–35.7)
MCHC RBC AUTO-ENTMCNC: 35.3 G/DL (ref 31.5–35.7)
MCV RBC AUTO: 89 FL (ref 79–97)
MCV RBC AUTO: 89.9 FL (ref 79–97)
MONOCYTES # BLD AUTO: 0.65 10*3/MM3 (ref 0.1–0.9)
MONOCYTES NFR BLD AUTO: 4.1 % (ref 5–12)
NEUTROPHILS NFR BLD AUTO: 13.83 10*3/MM3 (ref 1.7–7)
NEUTROPHILS NFR BLD AUTO: 87.5 % (ref 42.7–76)
NITRITE UR QL STRIP: NEGATIVE
NRBC BLD AUTO-RTO: 0 /100 WBC (ref 0–0.2)
PH UR STRIP.AUTO: >=9 [PH] (ref 5–8)
PLATELET # BLD AUTO: 191 10*3/MM3 (ref 140–450)
PLATELET # BLD AUTO: 222 10*3/MM3 (ref 140–450)
PMV BLD AUTO: 9.3 FL (ref 6–12)
PMV BLD AUTO: 9.5 FL (ref 6–12)
POTASSIUM BLDA-SCNC: 3.2 MMOL/L (ref 3.5–4.9)
POTASSIUM SERPL-SCNC: 3.6 MMOL/L (ref 3.5–5.2)
POTASSIUM SERPL-SCNC: 3.8 MMOL/L (ref 3.5–5.2)
PROT SERPL-MCNC: 6.8 G/DL (ref 6–8.5)
PROT UR QL STRIP: NEGATIVE
RBC # BLD AUTO: 3.98 10*6/MM3 (ref 3.77–5.28)
RBC # BLD AUTO: 4.01 10*6/MM3 (ref 3.77–5.28)
RBC # UR STRIP: NORMAL /HPF
REF LAB TEST METHOD: NORMAL
SARS-COV-2 RNA RESP QL NAA+PROBE: NOT DETECTED
SODIUM BLD-SCNC: 139 MMOL/L (ref 138–146)
SODIUM SERPL-SCNC: 136 MMOL/L (ref 136–145)
SODIUM SERPL-SCNC: 139 MMOL/L (ref 136–145)
SP GR UR STRIP: 1.01 (ref 1–1.03)
SQUAMOUS #/AREA URNS HPF: NORMAL /HPF
UROBILINOGEN UR QL STRIP: ABNORMAL
WBC # UR STRIP: NORMAL /HPF
WBC NRBC COR # BLD: 10.25 10*3/MM3 (ref 3.4–10.8)
WBC NRBC COR # BLD: 15.81 10*3/MM3 (ref 3.4–10.8)

## 2022-04-14 PROCEDURE — 85014 HEMATOCRIT: CPT

## 2022-04-14 PROCEDURE — 80047 BASIC METABLC PNL IONIZED CA: CPT

## 2022-04-14 PROCEDURE — 85027 COMPLETE CBC AUTOMATED: CPT | Performed by: INTERNAL MEDICINE

## 2022-04-14 PROCEDURE — 80048 BASIC METABOLIC PNL TOTAL CA: CPT | Performed by: INTERNAL MEDICINE

## 2022-04-14 PROCEDURE — 71045 X-RAY EXAM CHEST 1 VIEW: CPT

## 2022-04-14 PROCEDURE — 82550 ASSAY OF CK (CPK): CPT | Performed by: INTERNAL MEDICINE

## 2022-04-14 PROCEDURE — 25010000002 HYDROMORPHONE PER 4 MG: Performed by: EMERGENCY MEDICINE

## 2022-04-14 PROCEDURE — 25010000002 ONDANSETRON PER 1 MG: Performed by: EMERGENCY MEDICINE

## 2022-04-14 PROCEDURE — 99223 1ST HOSP IP/OBS HIGH 75: CPT | Performed by: INTERNAL MEDICINE

## 2022-04-14 PROCEDURE — 87636 SARSCOV2 & INF A&B AMP PRB: CPT | Performed by: EMERGENCY MEDICINE

## 2022-04-14 PROCEDURE — 25010000002 HYDROMORPHONE PER 4 MG: Performed by: INTERNAL MEDICINE

## 2022-04-14 PROCEDURE — G0378 HOSPITAL OBSERVATION PER HR: HCPCS

## 2022-04-14 PROCEDURE — 81001 URINALYSIS AUTO W/SCOPE: CPT | Performed by: INTERNAL MEDICINE

## 2022-04-14 PROCEDURE — 25010000002 ONDANSETRON PER 1 MG: Performed by: INTERNAL MEDICINE

## 2022-04-14 RX ORDER — FOLIC ACID 1 MG/1
1 TABLET ORAL DAILY
Status: DISCONTINUED | OUTPATIENT
Start: 2022-04-14 | End: 2022-04-23 | Stop reason: HOSPADM

## 2022-04-14 RX ORDER — ONDANSETRON 2 MG/ML
4 INJECTION INTRAMUSCULAR; INTRAVENOUS ONCE
Status: COMPLETED | OUTPATIENT
Start: 2022-04-14 | End: 2022-04-14

## 2022-04-14 RX ORDER — NALOXONE HCL 0.4 MG/ML
0.4 VIAL (ML) INJECTION
Status: DISCONTINUED | OUTPATIENT
Start: 2022-04-14 | End: 2022-04-23 | Stop reason: HOSPADM

## 2022-04-14 RX ORDER — SODIUM CHLORIDE 0.9 % (FLUSH) 0.9 %
10 SYRINGE (ML) INJECTION AS NEEDED
Status: DISCONTINUED | OUTPATIENT
Start: 2022-04-14 | End: 2022-04-23 | Stop reason: HOSPADM

## 2022-04-14 RX ORDER — CHOLECALCIFEROL (VITAMIN D3) 125 MCG
5 CAPSULE ORAL NIGHTLY
COMMUNITY

## 2022-04-14 RX ORDER — FAMOTIDINE 20 MG/1
20 TABLET, FILM COATED ORAL 2 TIMES DAILY
COMMUNITY

## 2022-04-14 RX ORDER — ACETAMINOPHEN 325 MG/1
650 TABLET ORAL EVERY 4 HOURS PRN
Status: DISCONTINUED | OUTPATIENT
Start: 2022-04-14 | End: 2022-04-23 | Stop reason: HOSPADM

## 2022-04-14 RX ORDER — POLYETHYLENE GLYCOL 3350 17 G/17G
17 POWDER, FOR SOLUTION ORAL DAILY
Status: DISCONTINUED | OUTPATIENT
Start: 2022-04-14 | End: 2022-04-23 | Stop reason: HOSPADM

## 2022-04-14 RX ORDER — LORAZEPAM 0.5 MG/1
0.5 TABLET ORAL 2 TIMES DAILY
Status: DISCONTINUED | OUTPATIENT
Start: 2022-04-14 | End: 2022-04-14

## 2022-04-14 RX ORDER — LORAZEPAM 0.5 MG/1
0.25 TABLET ORAL 2 TIMES DAILY
Status: DISCONTINUED | OUTPATIENT
Start: 2022-04-14 | End: 2022-04-17

## 2022-04-14 RX ORDER — FLUOXETINE HYDROCHLORIDE 20 MG/1
40 CAPSULE ORAL DAILY
COMMUNITY

## 2022-04-14 RX ORDER — FAMOTIDINE 20 MG/1
20 TABLET, FILM COATED ORAL DAILY
Status: DISCONTINUED | OUTPATIENT
Start: 2022-04-14 | End: 2022-04-14

## 2022-04-14 RX ORDER — LORAZEPAM 0.5 MG/1
0.5 TABLET ORAL NIGHTLY
Status: DISCONTINUED | OUTPATIENT
Start: 2022-04-14 | End: 2022-04-14

## 2022-04-14 RX ORDER — SODIUM CHLORIDE 0.9 % (FLUSH) 0.9 %
10 SYRINGE (ML) INJECTION EVERY 12 HOURS SCHEDULED
Status: DISCONTINUED | OUTPATIENT
Start: 2022-04-14 | End: 2022-04-23 | Stop reason: HOSPADM

## 2022-04-14 RX ORDER — MULTIPLE VITAMINS W/ MINERALS TAB 9MG-400MCG
1 TAB ORAL DAILY
COMMUNITY

## 2022-04-14 RX ORDER — HYDROMORPHONE HYDROCHLORIDE 1 MG/ML
0.25 INJECTION, SOLUTION INTRAMUSCULAR; INTRAVENOUS; SUBCUTANEOUS ONCE
Status: COMPLETED | OUTPATIENT
Start: 2022-04-14 | End: 2022-04-14

## 2022-04-14 RX ORDER — ACETAMINOPHEN 650 MG/1
650 SUPPOSITORY RECTAL EVERY 4 HOURS PRN
Status: DISCONTINUED | OUTPATIENT
Start: 2022-04-14 | End: 2022-04-23 | Stop reason: HOSPADM

## 2022-04-14 RX ORDER — ONDANSETRON 4 MG/1
4 TABLET, FILM COATED ORAL EVERY 6 HOURS PRN
Status: DISCONTINUED | OUTPATIENT
Start: 2022-04-14 | End: 2022-04-23 | Stop reason: HOSPADM

## 2022-04-14 RX ORDER — HYDROMORPHONE HYDROCHLORIDE 1 MG/ML
0.5 INJECTION, SOLUTION INTRAMUSCULAR; INTRAVENOUS; SUBCUTANEOUS
Status: DISCONTINUED | OUTPATIENT
Start: 2022-04-14 | End: 2022-04-16

## 2022-04-14 RX ORDER — MULTIPLE VITAMINS W/ MINERALS TAB 9MG-400MCG
1 TAB ORAL DAILY
Status: DISCONTINUED | OUTPATIENT
Start: 2022-04-14 | End: 2022-04-23 | Stop reason: HOSPADM

## 2022-04-14 RX ORDER — ACETAMINOPHEN 160 MG/5ML
650 SOLUTION ORAL EVERY 4 HOURS PRN
Status: DISCONTINUED | OUTPATIENT
Start: 2022-04-14 | End: 2022-04-23 | Stop reason: HOSPADM

## 2022-04-14 RX ORDER — LORAZEPAM 0.5 MG/1
0.5 TABLET ORAL 2 TIMES DAILY
Status: ON HOLD | COMMUNITY
End: 2022-04-23 | Stop reason: SDUPTHER

## 2022-04-14 RX ORDER — CHOLECALCIFEROL (VITAMIN D3) 125 MCG
5 CAPSULE ORAL NIGHTLY
Status: DISCONTINUED | OUTPATIENT
Start: 2022-04-14 | End: 2022-04-23 | Stop reason: HOSPADM

## 2022-04-14 RX ORDER — ONDANSETRON 2 MG/ML
4 INJECTION INTRAMUSCULAR; INTRAVENOUS EVERY 6 HOURS PRN
Status: DISCONTINUED | OUTPATIENT
Start: 2022-04-14 | End: 2022-04-23 | Stop reason: HOSPADM

## 2022-04-14 RX ORDER — FAMOTIDINE 20 MG/1
20 TABLET, FILM COATED ORAL
Status: DISCONTINUED | OUTPATIENT
Start: 2022-04-14 | End: 2022-04-14

## 2022-04-14 RX ORDER — DONEPEZIL HYDROCHLORIDE 5 MG/1
5 TABLET, FILM COATED ORAL NIGHTLY
Status: DISCONTINUED | OUTPATIENT
Start: 2022-04-14 | End: 2022-04-23 | Stop reason: HOSPADM

## 2022-04-14 RX ORDER — FAMOTIDINE 20 MG/1
20 TABLET, FILM COATED ORAL 2 TIMES DAILY
Status: DISCONTINUED | OUTPATIENT
Start: 2022-04-14 | End: 2022-04-16

## 2022-04-14 RX ORDER — FLUOXETINE HYDROCHLORIDE 20 MG/1
40 CAPSULE ORAL DAILY
Status: DISCONTINUED | OUTPATIENT
Start: 2022-04-14 | End: 2022-04-23 | Stop reason: HOSPADM

## 2022-04-14 RX ADMIN — Medication 10 ML: at 09:32

## 2022-04-14 RX ADMIN — HYDROMORPHONE HYDROCHLORIDE 0.25 MG: 1 INJECTION, SOLUTION INTRAMUSCULAR; INTRAVENOUS; SUBCUTANEOUS at 01:51

## 2022-04-14 RX ADMIN — HYDROMORPHONE HYDROCHLORIDE 0.5 MG: 1 INJECTION, SOLUTION INTRAMUSCULAR; INTRAVENOUS; SUBCUTANEOUS at 23:27

## 2022-04-14 RX ADMIN — DONEPEZIL HYDROCHLORIDE 5 MG: 5 TABLET, FILM COATED ORAL at 20:04

## 2022-04-14 RX ADMIN — ONDANSETRON 4 MG: 2 INJECTION INTRAMUSCULAR; INTRAVENOUS at 23:21

## 2022-04-14 RX ADMIN — FAMOTIDINE 20 MG: 20 TABLET ORAL at 09:32

## 2022-04-14 RX ADMIN — Medication 5 MG: at 20:04

## 2022-04-14 RX ADMIN — HYDROMORPHONE HYDROCHLORIDE 0.5 MG: 1 INJECTION, SOLUTION INTRAMUSCULAR; INTRAVENOUS; SUBCUTANEOUS at 14:07

## 2022-04-14 RX ADMIN — Medication 1 TABLET: at 13:27

## 2022-04-14 RX ADMIN — ONDANSETRON 4 MG: 2 INJECTION INTRAMUSCULAR; INTRAVENOUS at 01:52

## 2022-04-14 RX ADMIN — FOLIC ACID 1 MG: 1 TABLET ORAL at 09:32

## 2022-04-14 RX ADMIN — FAMOTIDINE 20 MG: 20 TABLET ORAL at 09:33

## 2022-04-14 RX ADMIN — LORAZEPAM 0.5 MG: 0.5 TABLET ORAL at 01:52

## 2022-04-14 RX ADMIN — HYDROMORPHONE HYDROCHLORIDE 0.5 MG: 1 INJECTION, SOLUTION INTRAMUSCULAR; INTRAVENOUS; SUBCUTANEOUS at 03:39

## 2022-04-14 RX ADMIN — FLUOXETINE HYDROCHLORIDE 40 MG: 20 CAPSULE ORAL at 13:27

## 2022-04-14 RX ADMIN — Medication 10 ML: at 02:52

## 2022-04-14 RX ADMIN — LORAZEPAM 0.25 MG: 0.5 TABLET ORAL at 13:27

## 2022-04-14 RX ADMIN — Medication 10 ML: at 20:04

## 2022-04-14 RX ADMIN — HYDROMORPHONE HYDROCHLORIDE 0.5 MG: 1 INJECTION, SOLUTION INTRAMUSCULAR; INTRAVENOUS; SUBCUTANEOUS at 17:18

## 2022-04-14 RX ADMIN — LORAZEPAM 0.25 MG: 0.5 TABLET ORAL at 20:04

## 2022-04-14 RX ADMIN — FAMOTIDINE 20 MG: 20 TABLET ORAL at 20:04

## 2022-04-15 ENCOUNTER — APPOINTMENT (OUTPATIENT)
Dept: CARDIOLOGY | Facility: HOSPITAL | Age: 82
End: 2022-04-15

## 2022-04-15 LAB
ANION GAP SERPL CALCULATED.3IONS-SCNC: 10 MMOL/L (ref 5–15)
BH CV ECHO MEAS - AO MAX PG (FULL): 1.5 MMHG
BH CV ECHO MEAS - AO MAX PG: 5.7 MMHG
BH CV ECHO MEAS - AO MEAN PG (FULL): 0.63 MMHG
BH CV ECHO MEAS - AO MEAN PG: 2.7 MMHG
BH CV ECHO MEAS - AO ROOT AREA (BSA CORRECTED): 1.7
BH CV ECHO MEAS - AO ROOT AREA: 5.3 CM^2
BH CV ECHO MEAS - AO ROOT DIAM: 2.6 CM
BH CV ECHO MEAS - AO V2 MAX: 119.5 CM/SEC
BH CV ECHO MEAS - AO V2 MEAN: 74.6 CM/SEC
BH CV ECHO MEAS - AO V2 VTI: 23.1 CM
BH CV ECHO MEAS - ASC AORTA: 2.6 CM
BH CV ECHO MEAS - AVA(I,A): 2.1 CM^2
BH CV ECHO MEAS - AVA(I,D): 2.1 CM^2
BH CV ECHO MEAS - AVA(V,A): 2.2 CM^2
BH CV ECHO MEAS - AVA(V,D): 2.2 CM^2
BH CV ECHO MEAS - BSA(HAYCOCK): 1.5 M^2
BH CV ECHO MEAS - BSA: 1.5 M^2
BH CV ECHO MEAS - BZI_BMI: 22.3 KILOGRAMS/M^2
BH CV ECHO MEAS - BZI_METRIC_HEIGHT: 154.9 CM
BH CV ECHO MEAS - BZI_METRIC_WEIGHT: 53.5 KG
BH CV ECHO MEAS - EDV(CUBED): 40.8 ML
BH CV ECHO MEAS - EDV(TEICH): 48.9 ML
BH CV ECHO MEAS - EF(CUBED): 62.8 %
BH CV ECHO MEAS - EF(TEICH): 55.4 %
BH CV ECHO MEAS - ESV(CUBED): 15.2 ML
BH CV ECHO MEAS - ESV(TEICH): 21.8 ML
BH CV ECHO MEAS - FS: 28.1 %
BH CV ECHO MEAS - IVS/LVPW: 1
BH CV ECHO MEAS - IVSD: 1 CM
BH CV ECHO MEAS - LA DIMENSION: 3 CM
BH CV ECHO MEAS - LA/AO: 1.2
BH CV ECHO MEAS - LAT PEAK E' VEL: 7.7 CM/SEC
BH CV ECHO MEAS - LV IVRT: 0.08 SEC
BH CV ECHO MEAS - LV MASS(C)D: 100.1 GRAMS
BH CV ECHO MEAS - LV MASS(C)DI: 66.3 GRAMS/M^2
BH CV ECHO MEAS - LV MAX PG: 4.2 MMHG
BH CV ECHO MEAS - LV MEAN PG: 2.1 MMHG
BH CV ECHO MEAS - LV V1 MAX: 102.2 CM/SEC
BH CV ECHO MEAS - LV V1 MEAN: 65.3 CM/SEC
BH CV ECHO MEAS - LV V1 VTI: 19.3 CM
BH CV ECHO MEAS - LVIDD: 3.4 CM
BH CV ECHO MEAS - LVIDS: 2.5 CM
BH CV ECHO MEAS - LVOT AREA (M): 2.5 CM^2
BH CV ECHO MEAS - LVOT AREA: 2.5 CM^2
BH CV ECHO MEAS - LVOT DIAM: 1.8 CM
BH CV ECHO MEAS - LVPWD: 0.99 CM
BH CV ECHO MEAS - MED PEAK E' VEL: 7.3 CM/SEC
BH CV ECHO MEAS - MV MAX PG: 6.1 MMHG
BH CV ECHO MEAS - MV MEAN PG: 2.8 MMHG
BH CV ECHO MEAS - MV V2 MAX: 123.8 CM/SEC
BH CV ECHO MEAS - MV V2 MEAN: 78.9 CM/SEC
BH CV ECHO MEAS - MV V2 VTI: 37.8 CM
BH CV ECHO MEAS - MVA(VTI): 1.3 CM^2
BH CV ECHO MEAS - PA ACC SLOPE: 417.3 CM/SEC^2
BH CV ECHO MEAS - PA ACC TIME: 0.15 SEC
BH CV ECHO MEAS - PA MAX PG: 2.2 MMHG
BH CV ECHO MEAS - PA PR(ACCEL): 10.9 MMHG
BH CV ECHO MEAS - PA V2 MAX: 73.8 CM/SEC
BH CV ECHO MEAS - RAP SYSTOLE: 3 MMHG
BH CV ECHO MEAS - RVSP: 47 MMHG
BH CV ECHO MEAS - SI(AO): 80.6 ML/M^2
BH CV ECHO MEAS - SI(CUBED): 17 ML/M^2
BH CV ECHO MEAS - SI(LVOT): 32.2 ML/M^2
BH CV ECHO MEAS - SI(TEICH): 17.9 ML/M^2
BH CV ECHO MEAS - SV(AO): 121.6 ML
BH CV ECHO MEAS - SV(CUBED): 25.6 ML
BH CV ECHO MEAS - SV(LVOT): 48.6 ML
BH CV ECHO MEAS - SV(TEICH): 27.1 ML
BH CV ECHO MEAS - TR MAX PG: 44 MMHG
BH CV ECHO MEAS - TR MAX VEL: 328 CM/SEC
BH CV VAS BP LEFT ARM: NORMAL MMHG
BUN SERPL-MCNC: 14 MG/DL (ref 8–23)
BUN/CREAT SERPL: 15.6 (ref 7–25)
CALCIUM SPEC-SCNC: 8.7 MG/DL (ref 8.6–10.5)
CHLORIDE SERPL-SCNC: 101 MMOL/L (ref 98–107)
CO2 SERPL-SCNC: 22 MMOL/L (ref 22–29)
CREAT SERPL-MCNC: 0.9 MG/DL (ref 0.57–1)
EGFRCR SERPLBLD CKD-EPI 2021: 64.8 ML/MIN/1.73
GLUCOSE BLDC GLUCOMTR-MCNC: 146 MG/DL (ref 70–130)
GLUCOSE SERPL-MCNC: 137 MG/DL (ref 65–99)
LV EF 2D ECHO EST: 65 %
MAGNESIUM SERPL-MCNC: 1.9 MG/DL (ref 1.6–2.4)
MAXIMAL PREDICTED HEART RATE: 140 BPM
NT-PROBNP SERPL-MCNC: 1308 PG/ML (ref 0–1800)
POTASSIUM SERPL-SCNC: 3.9 MMOL/L (ref 3.5–5.2)
SODIUM SERPL-SCNC: 133 MMOL/L (ref 136–145)
STRESS TARGET HR: 119 BPM
TROPONIN T SERPL-MCNC: <0.01 NG/ML (ref 0–0.03)

## 2022-04-15 PROCEDURE — 25010000002 HYDROMORPHONE PER 4 MG: Performed by: INTERNAL MEDICINE

## 2022-04-15 PROCEDURE — 99222 1ST HOSP IP/OBS MODERATE 55: CPT | Performed by: INTERNAL MEDICINE

## 2022-04-15 PROCEDURE — 84484 ASSAY OF TROPONIN QUANT: CPT | Performed by: INTERNAL MEDICINE

## 2022-04-15 PROCEDURE — 93005 ELECTROCARDIOGRAM TRACING: CPT | Performed by: INTERNAL MEDICINE

## 2022-04-15 PROCEDURE — 93005 ELECTROCARDIOGRAM TRACING: CPT | Performed by: NURSE PRACTITIONER

## 2022-04-15 PROCEDURE — G0378 HOSPITAL OBSERVATION PER HR: HCPCS

## 2022-04-15 PROCEDURE — 83880 ASSAY OF NATRIURETIC PEPTIDE: CPT | Performed by: INTERNAL MEDICINE

## 2022-04-15 PROCEDURE — 93010 ELECTROCARDIOGRAM REPORT: CPT | Performed by: INTERNAL MEDICINE

## 2022-04-15 PROCEDURE — 93306 TTE W/DOPPLER COMPLETE: CPT

## 2022-04-15 PROCEDURE — 25010000002 AMIODARONE IN DEXTROSE 5% 360-4.14 MG/200ML-% SOLUTION: Performed by: INTERNAL MEDICINE

## 2022-04-15 PROCEDURE — 97530 THERAPEUTIC ACTIVITIES: CPT

## 2022-04-15 PROCEDURE — 83735 ASSAY OF MAGNESIUM: CPT | Performed by: INTERNAL MEDICINE

## 2022-04-15 PROCEDURE — 97162 PT EVAL MOD COMPLEX 30 MIN: CPT

## 2022-04-15 PROCEDURE — 80048 BASIC METABOLIC PNL TOTAL CA: CPT | Performed by: INTERNAL MEDICINE

## 2022-04-15 PROCEDURE — 97166 OT EVAL MOD COMPLEX 45 MIN: CPT

## 2022-04-15 PROCEDURE — 25010000002 ONDANSETRON PER 1 MG: Performed by: INTERNAL MEDICINE

## 2022-04-15 PROCEDURE — 25010000002 AMIODARONE IN DEXTROSE 5% 150-4.21 MG/100ML-% SOLUTION: Performed by: INTERNAL MEDICINE

## 2022-04-15 PROCEDURE — 82962 GLUCOSE BLOOD TEST: CPT

## 2022-04-15 PROCEDURE — 93306 TTE W/DOPPLER COMPLETE: CPT | Performed by: INTERNAL MEDICINE

## 2022-04-15 PROCEDURE — 99232 SBSQ HOSP IP/OBS MODERATE 35: CPT | Performed by: INTERNAL MEDICINE

## 2022-04-15 RX ORDER — METOPROLOL TARTRATE 5 MG/5ML
2.5 INJECTION INTRAVENOUS ONCE
Status: COMPLETED | OUTPATIENT
Start: 2022-04-15 | End: 2022-04-15

## 2022-04-15 RX ORDER — METOPROLOL TARTRATE 5 MG/5ML
2.5 INJECTION INTRAVENOUS ONCE
Status: DISCONTINUED | OUTPATIENT
Start: 2022-04-15 | End: 2022-04-15

## 2022-04-15 RX ORDER — SODIUM CHLORIDE 9 MG/ML
100 INJECTION, SOLUTION INTRAVENOUS CONTINUOUS
Status: ACTIVE | OUTPATIENT
Start: 2022-04-15 | End: 2022-04-15

## 2022-04-15 RX ADMIN — FAMOTIDINE 20 MG: 20 TABLET ORAL at 08:25

## 2022-04-15 RX ADMIN — HYDROMORPHONE HYDROCHLORIDE 0.5 MG: 1 INJECTION, SOLUTION INTRAMUSCULAR; INTRAVENOUS; SUBCUTANEOUS at 21:53

## 2022-04-15 RX ADMIN — AMIODARONE HYDROCHLORIDE 150 MG: 1.5 INJECTION, SOLUTION INTRAVENOUS at 11:55

## 2022-04-15 RX ADMIN — Medication 1 TABLET: at 08:25

## 2022-04-15 RX ADMIN — APIXABAN 2.5 MG: 2.5 TABLET, FILM COATED ORAL at 11:54

## 2022-04-15 RX ADMIN — HYDROMORPHONE HYDROCHLORIDE 0.5 MG: 1 INJECTION, SOLUTION INTRAMUSCULAR; INTRAVENOUS; SUBCUTANEOUS at 14:57

## 2022-04-15 RX ADMIN — FLUOXETINE HYDROCHLORIDE 40 MG: 20 CAPSULE ORAL at 08:25

## 2022-04-15 RX ADMIN — DONEPEZIL HYDROCHLORIDE 5 MG: 5 TABLET, FILM COATED ORAL at 20:16

## 2022-04-15 RX ADMIN — ONDANSETRON 4 MG: 2 INJECTION INTRAMUSCULAR; INTRAVENOUS at 11:23

## 2022-04-15 RX ADMIN — SODIUM CHLORIDE 100 ML/HR: 9 INJECTION, SOLUTION INTRAVENOUS at 11:24

## 2022-04-15 RX ADMIN — APIXABAN 2.5 MG: 2.5 TABLET, FILM COATED ORAL at 20:16

## 2022-04-15 RX ADMIN — AMIODARONE HYDROCHLORIDE 1 MG/MIN: 1.8 INJECTION, SOLUTION INTRAVENOUS at 19:06

## 2022-04-15 RX ADMIN — SODIUM CHLORIDE 500 ML: 9 INJECTION, SOLUTION INTRAVENOUS at 06:57

## 2022-04-15 RX ADMIN — METOPROLOL TARTRATE 25 MG: 25 TABLET, FILM COATED ORAL at 20:16

## 2022-04-15 RX ADMIN — Medication 10 ML: at 08:25

## 2022-04-15 RX ADMIN — ACETAMINOPHEN 650 MG: 325 TABLET ORAL at 11:54

## 2022-04-15 RX ADMIN — AMIODARONE HYDROCHLORIDE 1 MG/MIN: 1.8 INJECTION, SOLUTION INTRAVENOUS at 11:54

## 2022-04-15 RX ADMIN — POLYETHYLENE GLYCOL 3350 17 G: 17 POWDER, FOR SOLUTION ORAL at 08:25

## 2022-04-15 RX ADMIN — METOPROLOL TARTRATE 2.5 MG: 5 INJECTION INTRAVENOUS at 06:56

## 2022-04-15 RX ADMIN — METOPROLOL TARTRATE 25 MG: 25 TABLET, FILM COATED ORAL at 11:54

## 2022-04-15 RX ADMIN — Medication 5 MG: at 20:16

## 2022-04-15 RX ADMIN — FAMOTIDINE 20 MG: 20 TABLET ORAL at 20:16

## 2022-04-15 RX ADMIN — FOLIC ACID 1 MG: 1 TABLET ORAL at 08:25

## 2022-04-15 RX ADMIN — LORAZEPAM 0.25 MG: 0.5 TABLET ORAL at 20:16

## 2022-04-15 RX ADMIN — HYDROMORPHONE HYDROCHLORIDE 0.5 MG: 1 INJECTION, SOLUTION INTRAMUSCULAR; INTRAVENOUS; SUBCUTANEOUS at 06:37

## 2022-04-15 RX ADMIN — LORAZEPAM 0.25 MG: 0.5 TABLET ORAL at 08:25

## 2022-04-16 ENCOUNTER — APPOINTMENT (OUTPATIENT)
Dept: GENERAL RADIOLOGY | Facility: HOSPITAL | Age: 82
End: 2022-04-16

## 2022-04-16 LAB
ANION GAP SERPL CALCULATED.3IONS-SCNC: 11 MMOL/L (ref 5–15)
BASOPHILS # BLD AUTO: 0.01 10*3/MM3 (ref 0–0.2)
BASOPHILS NFR BLD AUTO: 0.1 % (ref 0–1.5)
BUN SERPL-MCNC: 14 MG/DL (ref 8–23)
BUN/CREAT SERPL: 18.7 (ref 7–25)
CALCIUM SPEC-SCNC: 8.8 MG/DL (ref 8.6–10.5)
CHLORIDE SERPL-SCNC: 98 MMOL/L (ref 98–107)
CO2 SERPL-SCNC: 25 MMOL/L (ref 22–29)
CREAT SERPL-MCNC: 0.75 MG/DL (ref 0.57–1)
D DIMER PPP FEU-MCNC: 1.12 MCGFEU/ML (ref 0.01–0.5)
DEPRECATED RDW RBC AUTO: 42.6 FL (ref 37–54)
EGFRCR SERPLBLD CKD-EPI 2021: 80.6 ML/MIN/1.73
EOSINOPHIL # BLD AUTO: 0.09 10*3/MM3 (ref 0–0.4)
EOSINOPHIL NFR BLD AUTO: 0.9 % (ref 0.3–6.2)
ERYTHROCYTE [DISTWIDTH] IN BLOOD BY AUTOMATED COUNT: 12.8 % (ref 12.3–15.4)
GLUCOSE SERPL-MCNC: 111 MG/DL (ref 65–99)
HCT VFR BLD AUTO: 32 % (ref 34–46.6)
HGB BLD-MCNC: 11 G/DL (ref 12–15.9)
IMM GRANULOCYTES # BLD AUTO: 0.06 10*3/MM3 (ref 0–0.05)
IMM GRANULOCYTES NFR BLD AUTO: 0.6 % (ref 0–0.5)
LYMPHOCYTES # BLD AUTO: 0.74 10*3/MM3 (ref 0.7–3.1)
LYMPHOCYTES NFR BLD AUTO: 7 % (ref 19.6–45.3)
MAGNESIUM SERPL-MCNC: 2 MG/DL (ref 1.6–2.4)
MCH RBC QN AUTO: 31.2 PG (ref 26.6–33)
MCHC RBC AUTO-ENTMCNC: 34.4 G/DL (ref 31.5–35.7)
MCV RBC AUTO: 90.7 FL (ref 79–97)
MONOCYTES # BLD AUTO: 0.95 10*3/MM3 (ref 0.1–0.9)
MONOCYTES NFR BLD AUTO: 9 % (ref 5–12)
NEUTROPHILS NFR BLD AUTO: 8.65 10*3/MM3 (ref 1.7–7)
NEUTROPHILS NFR BLD AUTO: 82.4 % (ref 42.7–76)
NRBC BLD AUTO-RTO: 0 /100 WBC (ref 0–0.2)
PLATELET # BLD AUTO: 166 10*3/MM3 (ref 140–450)
PMV BLD AUTO: 10.1 FL (ref 6–12)
POTASSIUM SERPL-SCNC: 4 MMOL/L (ref 3.5–5.2)
PROCALCITONIN SERPL-MCNC: 0.24 NG/ML (ref 0–0.25)
RBC # BLD AUTO: 3.53 10*6/MM3 (ref 3.77–5.28)
SODIUM SERPL-SCNC: 134 MMOL/L (ref 136–145)
WBC NRBC COR # BLD: 10.5 10*3/MM3 (ref 3.4–10.8)

## 2022-04-16 PROCEDURE — 99232 SBSQ HOSP IP/OBS MODERATE 35: CPT | Performed by: INTERNAL MEDICINE

## 2022-04-16 PROCEDURE — 80048 BASIC METABOLIC PNL TOTAL CA: CPT | Performed by: INTERNAL MEDICINE

## 2022-04-16 PROCEDURE — 83735 ASSAY OF MAGNESIUM: CPT | Performed by: INTERNAL MEDICINE

## 2022-04-16 PROCEDURE — 97110 THERAPEUTIC EXERCISES: CPT

## 2022-04-16 PROCEDURE — 87040 BLOOD CULTURE FOR BACTERIA: CPT | Performed by: INTERNAL MEDICINE

## 2022-04-16 PROCEDURE — 85379 FIBRIN DEGRADATION QUANT: CPT | Performed by: INTERNAL MEDICINE

## 2022-04-16 PROCEDURE — 71045 X-RAY EXAM CHEST 1 VIEW: CPT

## 2022-04-16 PROCEDURE — 93010 ELECTROCARDIOGRAM REPORT: CPT | Performed by: INTERNAL MEDICINE

## 2022-04-16 PROCEDURE — 25010000002 HYDROMORPHONE PER 4 MG: Performed by: INTERNAL MEDICINE

## 2022-04-16 PROCEDURE — 25010000002 MORPHINE PER 10 MG: Performed by: INTERNAL MEDICINE

## 2022-04-16 PROCEDURE — 93005 ELECTROCARDIOGRAM TRACING: CPT | Performed by: INTERNAL MEDICINE

## 2022-04-16 PROCEDURE — 84145 PROCALCITONIN (PCT): CPT | Performed by: INTERNAL MEDICINE

## 2022-04-16 PROCEDURE — 25010000002 CEFTRIAXONE PER 250 MG: Performed by: INTERNAL MEDICINE

## 2022-04-16 PROCEDURE — 25010000002 AMIODARONE IN DEXTROSE 5% 360-4.14 MG/200ML-% SOLUTION: Performed by: INTERNAL MEDICINE

## 2022-04-16 PROCEDURE — 85025 COMPLETE CBC W/AUTO DIFF WBC: CPT | Performed by: INTERNAL MEDICINE

## 2022-04-16 RX ORDER — HYDROCODONE BITARTRATE AND ACETAMINOPHEN 5; 325 MG/1; MG/1
1 TABLET ORAL EVERY 4 HOURS PRN
Status: DISCONTINUED | OUTPATIENT
Start: 2022-04-16 | End: 2022-04-18

## 2022-04-16 RX ORDER — AMIODARONE HYDROCHLORIDE 200 MG/1
400 TABLET ORAL EVERY 12 HOURS SCHEDULED
Status: DISCONTINUED | OUTPATIENT
Start: 2022-04-16 | End: 2022-04-17

## 2022-04-16 RX ORDER — FAMOTIDINE 20 MG/1
20 TABLET, FILM COATED ORAL DAILY
Status: DISCONTINUED | OUTPATIENT
Start: 2022-04-17 | End: 2022-04-23 | Stop reason: HOSPADM

## 2022-04-16 RX ORDER — TRAMADOL HYDROCHLORIDE 50 MG/1
25 TABLET ORAL EVERY 6 HOURS PRN
Status: DISCONTINUED | OUTPATIENT
Start: 2022-04-16 | End: 2022-04-16

## 2022-04-16 RX ORDER — DOXYCYCLINE 100 MG/1
100 CAPSULE ORAL EVERY 12 HOURS SCHEDULED
Status: COMPLETED | OUTPATIENT
Start: 2022-04-16 | End: 2022-04-20

## 2022-04-16 RX ORDER — MORPHINE SULFATE 2 MG/ML
1 INJECTION, SOLUTION INTRAMUSCULAR; INTRAVENOUS ONCE
Status: COMPLETED | OUTPATIENT
Start: 2022-04-16 | End: 2022-04-16

## 2022-04-16 RX ADMIN — HYDROMORPHONE HYDROCHLORIDE 0.5 MG: 1 INJECTION, SOLUTION INTRAMUSCULAR; INTRAVENOUS; SUBCUTANEOUS at 07:55

## 2022-04-16 RX ADMIN — LORAZEPAM 0.25 MG: 0.5 TABLET ORAL at 08:04

## 2022-04-16 RX ADMIN — HYDROMORPHONE HYDROCHLORIDE 0.5 MG: 1 INJECTION, SOLUTION INTRAMUSCULAR; INTRAVENOUS; SUBCUTANEOUS at 10:49

## 2022-04-16 RX ADMIN — FOLIC ACID 1 MG: 1 TABLET ORAL at 08:04

## 2022-04-16 RX ADMIN — MORPHINE SULFATE 1 MG: 2 INJECTION, SOLUTION INTRAMUSCULAR; INTRAVENOUS at 18:35

## 2022-04-16 RX ADMIN — DOXYCYCLINE 100 MG: 100 CAPSULE ORAL at 20:13

## 2022-04-16 RX ADMIN — SODIUM CHLORIDE 1 G: 900 INJECTION INTRAVENOUS at 15:45

## 2022-04-16 RX ADMIN — Medication 10 ML: at 08:04

## 2022-04-16 RX ADMIN — FLUOXETINE HYDROCHLORIDE 40 MG: 20 CAPSULE ORAL at 08:03

## 2022-04-16 RX ADMIN — AMIODARONE HYDROCHLORIDE 400 MG: 200 TABLET ORAL at 08:13

## 2022-04-16 RX ADMIN — POLYETHYLENE GLYCOL 3350 17 G: 17 POWDER, FOR SOLUTION ORAL at 08:04

## 2022-04-16 RX ADMIN — FAMOTIDINE 20 MG: 20 TABLET ORAL at 08:03

## 2022-04-16 RX ADMIN — DONEPEZIL HYDROCHLORIDE 5 MG: 5 TABLET, FILM COATED ORAL at 20:11

## 2022-04-16 RX ADMIN — METOPROLOL TARTRATE 25 MG: 25 TABLET, FILM COATED ORAL at 20:11

## 2022-04-16 RX ADMIN — HYDROMORPHONE HYDROCHLORIDE 0.5 MG: 1 INJECTION, SOLUTION INTRAMUSCULAR; INTRAVENOUS; SUBCUTANEOUS at 00:05

## 2022-04-16 RX ADMIN — Medication 5 MG: at 20:11

## 2022-04-16 RX ADMIN — Medication 10 ML: at 20:12

## 2022-04-16 RX ADMIN — APIXABAN 2.5 MG: 2.5 TABLET, FILM COATED ORAL at 20:11

## 2022-04-16 RX ADMIN — TRAMADOL HYDROCHLORIDE 25 MG: 50 TABLET, COATED ORAL at 14:26

## 2022-04-16 RX ADMIN — AMIODARONE HYDROCHLORIDE 1 MG/MIN: 1.8 INJECTION, SOLUTION INTRAVENOUS at 01:49

## 2022-04-16 RX ADMIN — Medication 1 TABLET: at 08:03

## 2022-04-16 RX ADMIN — HYDROCODONE BITARTRATE AND ACETAMINOPHEN 1 TABLET: 5; 325 TABLET ORAL at 20:11

## 2022-04-16 RX ADMIN — APIXABAN 2.5 MG: 2.5 TABLET, FILM COATED ORAL at 08:03

## 2022-04-16 RX ADMIN — METOPROLOL TARTRATE 25 MG: 25 TABLET, FILM COATED ORAL at 08:03

## 2022-04-16 RX ADMIN — DOXYCYCLINE 100 MG: 100 CAPSULE ORAL at 15:45

## 2022-04-16 RX ADMIN — LORAZEPAM 0.25 MG: 0.5 TABLET ORAL at 20:11

## 2022-04-16 RX ADMIN — AMIODARONE HYDROCHLORIDE 400 MG: 200 TABLET ORAL at 20:11

## 2022-04-17 LAB
ANION GAP SERPL CALCULATED.3IONS-SCNC: 9 MMOL/L (ref 5–15)
BASOPHILS # BLD AUTO: 0.01 10*3/MM3 (ref 0–0.2)
BASOPHILS NFR BLD AUTO: 0.1 % (ref 0–1.5)
BUN SERPL-MCNC: 15 MG/DL (ref 8–23)
BUN/CREAT SERPL: 20.3 (ref 7–25)
CALCIUM SPEC-SCNC: 8.6 MG/DL (ref 8.6–10.5)
CHLORIDE SERPL-SCNC: 99 MMOL/L (ref 98–107)
CO2 SERPL-SCNC: 24 MMOL/L (ref 22–29)
CREAT SERPL-MCNC: 0.74 MG/DL (ref 0.57–1)
DEPRECATED RDW RBC AUTO: 41.2 FL (ref 37–54)
EGFRCR SERPLBLD CKD-EPI 2021: 81.9 ML/MIN/1.73
EOSINOPHIL # BLD AUTO: 0.05 10*3/MM3 (ref 0–0.4)
EOSINOPHIL NFR BLD AUTO: 0.5 % (ref 0.3–6.2)
ERYTHROCYTE [DISTWIDTH] IN BLOOD BY AUTOMATED COUNT: 12.5 % (ref 12.3–15.4)
GLUCOSE SERPL-MCNC: 114 MG/DL (ref 65–99)
HCT VFR BLD AUTO: 32.1 % (ref 34–46.6)
HGB BLD-MCNC: 10.9 G/DL (ref 12–15.9)
IMM GRANULOCYTES # BLD AUTO: 0.05 10*3/MM3 (ref 0–0.05)
IMM GRANULOCYTES NFR BLD AUTO: 0.5 % (ref 0–0.5)
LYMPHOCYTES # BLD AUTO: 0.75 10*3/MM3 (ref 0.7–3.1)
LYMPHOCYTES NFR BLD AUTO: 8.2 % (ref 19.6–45.3)
MCH RBC QN AUTO: 30.4 PG (ref 26.6–33)
MCHC RBC AUTO-ENTMCNC: 34 G/DL (ref 31.5–35.7)
MCV RBC AUTO: 89.7 FL (ref 79–97)
MONOCYTES # BLD AUTO: 0.93 10*3/MM3 (ref 0.1–0.9)
MONOCYTES NFR BLD AUTO: 10.1 % (ref 5–12)
NEUTROPHILS NFR BLD AUTO: 7.41 10*3/MM3 (ref 1.7–7)
NEUTROPHILS NFR BLD AUTO: 80.6 % (ref 42.7–76)
NRBC BLD AUTO-RTO: 0 /100 WBC (ref 0–0.2)
PLATELET # BLD AUTO: 196 10*3/MM3 (ref 140–450)
PMV BLD AUTO: 9.7 FL (ref 6–12)
POTASSIUM SERPL-SCNC: 3.7 MMOL/L (ref 3.5–5.2)
RBC # BLD AUTO: 3.58 10*6/MM3 (ref 3.77–5.28)
SODIUM SERPL-SCNC: 132 MMOL/L (ref 136–145)
WBC NRBC COR # BLD: 9.2 10*3/MM3 (ref 3.4–10.8)

## 2022-04-17 PROCEDURE — 80048 BASIC METABOLIC PNL TOTAL CA: CPT | Performed by: INTERNAL MEDICINE

## 2022-04-17 PROCEDURE — 93005 ELECTROCARDIOGRAM TRACING: CPT | Performed by: INTERNAL MEDICINE

## 2022-04-17 PROCEDURE — 93010 ELECTROCARDIOGRAM REPORT: CPT | Performed by: INTERNAL MEDICINE

## 2022-04-17 PROCEDURE — 25010000002 HYDROMORPHONE PER 4 MG: Performed by: INTERNAL MEDICINE

## 2022-04-17 PROCEDURE — 25010000002 CEFTRIAXONE PER 250 MG: Performed by: INTERNAL MEDICINE

## 2022-04-17 PROCEDURE — 85025 COMPLETE CBC W/AUTO DIFF WBC: CPT | Performed by: INTERNAL MEDICINE

## 2022-04-17 PROCEDURE — 99232 SBSQ HOSP IP/OBS MODERATE 35: CPT | Performed by: INTERNAL MEDICINE

## 2022-04-17 PROCEDURE — 25010000002 LORAZEPAM PER 2 MG: Performed by: INTERNAL MEDICINE

## 2022-04-17 RX ORDER — HYDROMORPHONE HYDROCHLORIDE 1 MG/ML
0.5 INJECTION, SOLUTION INTRAMUSCULAR; INTRAVENOUS; SUBCUTANEOUS EVERY 4 HOURS PRN
Status: DISCONTINUED | OUTPATIENT
Start: 2022-04-17 | End: 2022-04-19

## 2022-04-17 RX ORDER — TRIAMCINOLONE ACETONIDE 1 MG/G
1 CREAM TOPICAL EVERY 8 HOURS PRN
Status: DISCONTINUED | OUTPATIENT
Start: 2022-04-17 | End: 2022-04-23 | Stop reason: HOSPADM

## 2022-04-17 RX ORDER — QUETIAPINE FUMARATE 25 MG/1
12.5 TABLET, FILM COATED ORAL ONCE
Status: COMPLETED | OUTPATIENT
Start: 2022-04-17 | End: 2022-04-17

## 2022-04-17 RX ORDER — METOPROLOL TARTRATE 5 MG/5ML
2.5 INJECTION INTRAVENOUS ONCE
Status: DISCONTINUED | OUTPATIENT
Start: 2022-04-17 | End: 2022-04-20

## 2022-04-17 RX ORDER — ZIPRASIDONE MESYLATE 20 MG/ML
10 INJECTION, POWDER, LYOPHILIZED, FOR SOLUTION INTRAMUSCULAR EVERY 6 HOURS PRN
Status: DISCONTINUED | OUTPATIENT
Start: 2022-04-17 | End: 2022-04-19

## 2022-04-17 RX ORDER — LORAZEPAM 2 MG/ML
0.5 INJECTION INTRAMUSCULAR EVERY 6 HOURS PRN
Status: DISCONTINUED | OUTPATIENT
Start: 2022-04-17 | End: 2022-04-21

## 2022-04-17 RX ORDER — AMIODARONE HYDROCHLORIDE 200 MG/1
200 TABLET ORAL EVERY 12 HOURS SCHEDULED
Status: DISCONTINUED | OUTPATIENT
Start: 2022-04-17 | End: 2022-04-23 | Stop reason: HOSPADM

## 2022-04-17 RX ADMIN — METOPROLOL TARTRATE 25 MG: 25 TABLET, FILM COATED ORAL at 09:19

## 2022-04-17 RX ADMIN — HYDROCODONE BITARTRATE AND ACETAMINOPHEN 1 TABLET: 5; 325 TABLET ORAL at 20:12

## 2022-04-17 RX ADMIN — APIXABAN 2.5 MG: 2.5 TABLET, FILM COATED ORAL at 09:20

## 2022-04-17 RX ADMIN — Medication 1 TABLET: at 09:20

## 2022-04-17 RX ADMIN — APIXABAN 2.5 MG: 2.5 TABLET, FILM COATED ORAL at 20:13

## 2022-04-17 RX ADMIN — AMIODARONE HYDROCHLORIDE 200 MG: 200 TABLET ORAL at 20:12

## 2022-04-17 RX ADMIN — Medication 10 ML: at 09:19

## 2022-04-17 RX ADMIN — LORAZEPAM 0.5 MG: 2 INJECTION INTRAMUSCULAR; INTRAVENOUS at 15:46

## 2022-04-17 RX ADMIN — FOLIC ACID 1 MG: 1 TABLET ORAL at 09:20

## 2022-04-17 RX ADMIN — FLUOXETINE HYDROCHLORIDE 40 MG: 20 CAPSULE ORAL at 09:19

## 2022-04-17 RX ADMIN — FAMOTIDINE 20 MG: 20 TABLET ORAL at 09:20

## 2022-04-17 RX ADMIN — Medication 10 ML: at 21:37

## 2022-04-17 RX ADMIN — SODIUM CHLORIDE 1 G: 900 INJECTION INTRAVENOUS at 12:36

## 2022-04-17 RX ADMIN — HYDROCODONE BITARTRATE AND ACETAMINOPHEN 1 TABLET: 5; 325 TABLET ORAL at 01:09

## 2022-04-17 RX ADMIN — HYDROMORPHONE HYDROCHLORIDE 0.5 MG: 1 INJECTION, SOLUTION INTRAMUSCULAR; INTRAVENOUS; SUBCUTANEOUS at 17:31

## 2022-04-17 RX ADMIN — DOXYCYCLINE 100 MG: 100 CAPSULE ORAL at 09:19

## 2022-04-17 RX ADMIN — METOPROLOL TARTRATE 25 MG: 25 TABLET, FILM COATED ORAL at 20:13

## 2022-04-17 RX ADMIN — HYDROCODONE BITARTRATE AND ACETAMINOPHEN 1 TABLET: 5; 325 TABLET ORAL at 09:19

## 2022-04-17 RX ADMIN — DONEPEZIL HYDROCHLORIDE 5 MG: 5 TABLET, FILM COATED ORAL at 20:13

## 2022-04-17 RX ADMIN — Medication 5 MG: at 20:13

## 2022-04-17 RX ADMIN — QUETIAPINE FUMARATE 12.5 MG: 25 TABLET ORAL at 05:57

## 2022-04-17 RX ADMIN — AMIODARONE HYDROCHLORIDE 200 MG: 200 TABLET ORAL at 09:20

## 2022-04-17 RX ADMIN — DOXYCYCLINE 100 MG: 100 CAPSULE ORAL at 20:13

## 2022-04-17 RX ADMIN — HYDROMORPHONE HYDROCHLORIDE 0.5 MG: 1 INJECTION, SOLUTION INTRAMUSCULAR; INTRAVENOUS; SUBCUTANEOUS at 12:50

## 2022-04-18 LAB
QT INTERVAL: 292 MS
QT INTERVAL: 370 MS
QT INTERVAL: 438 MS
QT INTERVAL: 464 MS
QT INTERVAL: 464 MS
QTC INTERVAL: 437 MS
QTC INTERVAL: 459 MS
QTC INTERVAL: 470 MS
QTC INTERVAL: 474 MS
QTC INTERVAL: 492 MS

## 2022-04-18 PROCEDURE — 99232 SBSQ HOSP IP/OBS MODERATE 35: CPT

## 2022-04-18 PROCEDURE — 93005 ELECTROCARDIOGRAM TRACING: CPT

## 2022-04-18 PROCEDURE — 25010000002 CEFTRIAXONE PER 250 MG: Performed by: INTERNAL MEDICINE

## 2022-04-18 PROCEDURE — 25010000002 LORAZEPAM PER 2 MG: Performed by: INTERNAL MEDICINE

## 2022-04-18 PROCEDURE — 93010 ELECTROCARDIOGRAM REPORT: CPT | Performed by: INTERNAL MEDICINE

## 2022-04-18 PROCEDURE — 99232 SBSQ HOSP IP/OBS MODERATE 35: CPT | Performed by: INTERNAL MEDICINE

## 2022-04-18 PROCEDURE — 25010000002 HYDROMORPHONE PER 4 MG: Performed by: INTERNAL MEDICINE

## 2022-04-18 PROCEDURE — 97530 THERAPEUTIC ACTIVITIES: CPT

## 2022-04-18 PROCEDURE — 97110 THERAPEUTIC EXERCISES: CPT

## 2022-04-18 RX ORDER — HYDROCODONE BITARTRATE AND ACETAMINOPHEN 5; 325 MG/1; MG/1
2 TABLET ORAL EVERY 4 HOURS PRN
Status: DISCONTINUED | OUTPATIENT
Start: 2022-04-18 | End: 2022-04-23 | Stop reason: HOSPADM

## 2022-04-18 RX ORDER — ASPIRIN 81 MG/1
81 TABLET ORAL DAILY
Status: DISCONTINUED | OUTPATIENT
Start: 2022-04-18 | End: 2022-04-23 | Stop reason: HOSPADM

## 2022-04-18 RX ADMIN — HYDROCODONE BITARTRATE AND ACETAMINOPHEN 2 TABLET: 5; 325 TABLET ORAL at 13:01

## 2022-04-18 RX ADMIN — DOXYCYCLINE 100 MG: 100 CAPSULE ORAL at 20:15

## 2022-04-18 RX ADMIN — FOLIC ACID 1 MG: 1 TABLET ORAL at 08:45

## 2022-04-18 RX ADMIN — Medication 10 ML: at 05:52

## 2022-04-18 RX ADMIN — APIXABAN 2.5 MG: 2.5 TABLET, FILM COATED ORAL at 08:45

## 2022-04-18 RX ADMIN — AMIODARONE HYDROCHLORIDE 200 MG: 200 TABLET ORAL at 20:15

## 2022-04-18 RX ADMIN — ASPIRIN 81 MG: 81 TABLET, COATED ORAL at 10:32

## 2022-04-18 RX ADMIN — LORAZEPAM 0.5 MG: 2 INJECTION INTRAMUSCULAR; INTRAVENOUS at 02:00

## 2022-04-18 RX ADMIN — HYDROMORPHONE HYDROCHLORIDE 0.5 MG: 1 INJECTION, SOLUTION INTRAMUSCULAR; INTRAVENOUS; SUBCUTANEOUS at 14:20

## 2022-04-18 RX ADMIN — HYDROMORPHONE HYDROCHLORIDE 0.5 MG: 1 INJECTION, SOLUTION INTRAMUSCULAR; INTRAVENOUS; SUBCUTANEOUS at 09:43

## 2022-04-18 RX ADMIN — DOXYCYCLINE 100 MG: 100 CAPSULE ORAL at 08:45

## 2022-04-18 RX ADMIN — HYDROCODONE BITARTRATE AND ACETAMINOPHEN 1 TABLET: 5; 325 TABLET ORAL at 04:54

## 2022-04-18 RX ADMIN — HYDROMORPHONE HYDROCHLORIDE 0.5 MG: 1 INJECTION, SOLUTION INTRAMUSCULAR; INTRAVENOUS; SUBCUTANEOUS at 01:02

## 2022-04-18 RX ADMIN — HYDROMORPHONE HYDROCHLORIDE 0.5 MG: 1 INJECTION, SOLUTION INTRAMUSCULAR; INTRAVENOUS; SUBCUTANEOUS at 05:52

## 2022-04-18 RX ADMIN — APIXABAN 2.5 MG: 2.5 TABLET, FILM COATED ORAL at 20:15

## 2022-04-18 RX ADMIN — POLYETHYLENE GLYCOL 3350 17 G: 17 POWDER, FOR SOLUTION ORAL at 08:45

## 2022-04-18 RX ADMIN — LORAZEPAM 0.5 MG: 2 INJECTION INTRAMUSCULAR; INTRAVENOUS at 13:10

## 2022-04-18 RX ADMIN — METOPROLOL TARTRATE 25 MG: 25 TABLET, FILM COATED ORAL at 08:45

## 2022-04-18 RX ADMIN — Medication 1 TABLET: at 08:45

## 2022-04-18 RX ADMIN — SODIUM CHLORIDE 1 G: 900 INJECTION INTRAVENOUS at 13:01

## 2022-04-18 RX ADMIN — LORAZEPAM 0.5 MG: 2 INJECTION INTRAMUSCULAR; INTRAVENOUS at 20:16

## 2022-04-18 RX ADMIN — Medication 10 ML: at 02:01

## 2022-04-18 RX ADMIN — FAMOTIDINE 20 MG: 20 TABLET ORAL at 08:45

## 2022-04-18 RX ADMIN — HYDROMORPHONE HYDROCHLORIDE 0.5 MG: 1 INJECTION, SOLUTION INTRAMUSCULAR; INTRAVENOUS; SUBCUTANEOUS at 17:59

## 2022-04-18 RX ADMIN — DONEPEZIL HYDROCHLORIDE 5 MG: 5 TABLET, FILM COATED ORAL at 20:15

## 2022-04-18 RX ADMIN — AMIODARONE HYDROCHLORIDE 200 MG: 200 TABLET ORAL at 08:45

## 2022-04-18 RX ADMIN — Medication 10 ML: at 08:46

## 2022-04-18 RX ADMIN — METOPROLOL TARTRATE 25 MG: 25 TABLET, FILM COATED ORAL at 20:15

## 2022-04-18 RX ADMIN — HYDROCODONE BITARTRATE AND ACETAMINOPHEN 2 TABLET: 5; 325 TABLET ORAL at 20:15

## 2022-04-18 RX ADMIN — Medication 5 MG: at 20:16

## 2022-04-18 RX ADMIN — FLUOXETINE HYDROCHLORIDE 40 MG: 20 CAPSULE ORAL at 08:45

## 2022-04-19 LAB
BASOPHILS # BLD AUTO: 0.02 10*3/MM3 (ref 0–0.2)
BASOPHILS NFR BLD AUTO: 0.2 % (ref 0–1.5)
DEPRECATED RDW RBC AUTO: 41.2 FL (ref 37–54)
EOSINOPHIL # BLD AUTO: 0.15 10*3/MM3 (ref 0–0.4)
EOSINOPHIL NFR BLD AUTO: 1.6 % (ref 0.3–6.2)
ERYTHROCYTE [DISTWIDTH] IN BLOOD BY AUTOMATED COUNT: 12.6 % (ref 12.3–15.4)
HCT VFR BLD AUTO: 35.7 % (ref 34–46.6)
HGB BLD-MCNC: 12.1 G/DL (ref 12–15.9)
IMM GRANULOCYTES # BLD AUTO: 0.06 10*3/MM3 (ref 0–0.05)
IMM GRANULOCYTES NFR BLD AUTO: 0.6 % (ref 0–0.5)
LYMPHOCYTES # BLD AUTO: 0.7 10*3/MM3 (ref 0.7–3.1)
LYMPHOCYTES NFR BLD AUTO: 7.6 % (ref 19.6–45.3)
MCH RBC QN AUTO: 30.3 PG (ref 26.6–33)
MCHC RBC AUTO-ENTMCNC: 33.9 G/DL (ref 31.5–35.7)
MCV RBC AUTO: 89.5 FL (ref 79–97)
MONOCYTES # BLD AUTO: 0.53 10*3/MM3 (ref 0.1–0.9)
MONOCYTES NFR BLD AUTO: 5.7 % (ref 5–12)
NEUTROPHILS NFR BLD AUTO: 7.81 10*3/MM3 (ref 1.7–7)
NEUTROPHILS NFR BLD AUTO: 84.3 % (ref 42.7–76)
NRBC BLD AUTO-RTO: 0 /100 WBC (ref 0–0.2)
PLATELET # BLD AUTO: 245 10*3/MM3 (ref 140–450)
PMV BLD AUTO: 9.1 FL (ref 6–12)
RBC # BLD AUTO: 3.99 10*6/MM3 (ref 3.77–5.28)
WBC NRBC COR # BLD: 9.27 10*3/MM3 (ref 3.4–10.8)

## 2022-04-19 PROCEDURE — 99232 SBSQ HOSP IP/OBS MODERATE 35: CPT | Performed by: INTERNAL MEDICINE

## 2022-04-19 PROCEDURE — 25010000002 ZIPRASIDONE MESYLATE PER 10 MG: Performed by: INTERNAL MEDICINE

## 2022-04-19 PROCEDURE — 25010000002 LORAZEPAM PER 2 MG: Performed by: INTERNAL MEDICINE

## 2022-04-19 PROCEDURE — 25010000002 CEFTRIAXONE PER 250 MG: Performed by: INTERNAL MEDICINE

## 2022-04-19 PROCEDURE — 97110 THERAPEUTIC EXERCISES: CPT

## 2022-04-19 PROCEDURE — 85025 COMPLETE CBC W/AUTO DIFF WBC: CPT

## 2022-04-19 PROCEDURE — 99232 SBSQ HOSP IP/OBS MODERATE 35: CPT

## 2022-04-19 PROCEDURE — 97530 THERAPEUTIC ACTIVITIES: CPT

## 2022-04-19 PROCEDURE — 25010000002 HYDROMORPHONE PER 4 MG: Performed by: INTERNAL MEDICINE

## 2022-04-19 RX ORDER — HYDROMORPHONE HYDROCHLORIDE 1 MG/ML
0.5 INJECTION, SOLUTION INTRAMUSCULAR; INTRAVENOUS; SUBCUTANEOUS
Status: DISCONTINUED | OUTPATIENT
Start: 2022-04-19 | End: 2022-04-21

## 2022-04-19 RX ORDER — ZIPRASIDONE MESYLATE 20 MG/ML
10 INJECTION, POWDER, LYOPHILIZED, FOR SOLUTION INTRAMUSCULAR EVERY 12 HOURS SCHEDULED
Status: DISCONTINUED | OUTPATIENT
Start: 2022-04-19 | End: 2022-04-20

## 2022-04-19 RX ADMIN — Medication 10 ML: at 21:31

## 2022-04-19 RX ADMIN — FLUOXETINE HYDROCHLORIDE 40 MG: 20 CAPSULE ORAL at 08:59

## 2022-04-19 RX ADMIN — SODIUM CHLORIDE 1 G: 900 INJECTION INTRAVENOUS at 13:16

## 2022-04-19 RX ADMIN — Medication 10 ML: at 08:58

## 2022-04-19 RX ADMIN — POLYETHYLENE GLYCOL 3350 17 G: 17 POWDER, FOR SOLUTION ORAL at 08:59

## 2022-04-19 RX ADMIN — DONEPEZIL HYDROCHLORIDE 5 MG: 5 TABLET, FILM COATED ORAL at 21:31

## 2022-04-19 RX ADMIN — ZIPRASIDONE MESYLATE 10 MG: 20 INJECTION, POWDER, LYOPHILIZED, FOR SOLUTION INTRAMUSCULAR at 08:58

## 2022-04-19 RX ADMIN — FAMOTIDINE 20 MG: 20 TABLET ORAL at 08:58

## 2022-04-19 RX ADMIN — LORAZEPAM 0.5 MG: 2 INJECTION INTRAMUSCULAR; INTRAVENOUS at 07:20

## 2022-04-19 RX ADMIN — HYDROMORPHONE HYDROCHLORIDE 0.5 MG: 1 INJECTION, SOLUTION INTRAMUSCULAR; INTRAVENOUS; SUBCUTANEOUS at 08:57

## 2022-04-19 RX ADMIN — ASPIRIN 81 MG: 81 TABLET, COATED ORAL at 08:58

## 2022-04-19 RX ADMIN — AMIODARONE HYDROCHLORIDE 200 MG: 200 TABLET ORAL at 21:30

## 2022-04-19 RX ADMIN — FOLIC ACID 1 MG: 1 TABLET ORAL at 08:59

## 2022-04-19 RX ADMIN — METOPROLOL TARTRATE 25 MG: 25 TABLET, FILM COATED ORAL at 21:31

## 2022-04-19 RX ADMIN — HYDROMORPHONE HYDROCHLORIDE 0.5 MG: 1 INJECTION, SOLUTION INTRAMUSCULAR; INTRAVENOUS; SUBCUTANEOUS at 01:20

## 2022-04-19 RX ADMIN — HYDROCODONE BITARTRATE AND ACETAMINOPHEN 2 TABLET: 5; 325 TABLET ORAL at 21:30

## 2022-04-19 RX ADMIN — ZIPRASIDONE MESYLATE 10 MG: 20 INJECTION, POWDER, LYOPHILIZED, FOR SOLUTION INTRAMUSCULAR at 21:27

## 2022-04-19 RX ADMIN — APIXABAN 2.5 MG: 2.5 TABLET, FILM COATED ORAL at 08:59

## 2022-04-19 RX ADMIN — HYDROMORPHONE HYDROCHLORIDE 0.5 MG: 1 INJECTION, SOLUTION INTRAMUSCULAR; INTRAVENOUS; SUBCUTANEOUS at 04:51

## 2022-04-19 RX ADMIN — DOXYCYCLINE 100 MG: 100 CAPSULE ORAL at 21:31

## 2022-04-19 RX ADMIN — Medication 1 TABLET: at 08:58

## 2022-04-19 RX ADMIN — DOXYCYCLINE 100 MG: 100 CAPSULE ORAL at 08:58

## 2022-04-19 RX ADMIN — APIXABAN 2.5 MG: 2.5 TABLET, FILM COATED ORAL at 21:30

## 2022-04-19 RX ADMIN — Medication 5 MG: at 21:30

## 2022-04-19 RX ADMIN — HYDROMORPHONE HYDROCHLORIDE 0.5 MG: 1 INJECTION, SOLUTION INTRAMUSCULAR; INTRAVENOUS; SUBCUTANEOUS at 18:37

## 2022-04-19 RX ADMIN — AMIODARONE HYDROCHLORIDE 200 MG: 200 TABLET ORAL at 08:59

## 2022-04-19 RX ADMIN — METOPROLOL TARTRATE 25 MG: 25 TABLET, FILM COATED ORAL at 08:58

## 2022-04-19 RX ADMIN — LORAZEPAM 0.5 MG: 2 INJECTION INTRAMUSCULAR; INTRAVENOUS at 15:34

## 2022-04-19 RX ADMIN — HYDROCODONE BITARTRATE AND ACETAMINOPHEN 2 TABLET: 5; 325 TABLET ORAL at 15:34

## 2022-04-20 PROCEDURE — 99232 SBSQ HOSP IP/OBS MODERATE 35: CPT | Performed by: INTERNAL MEDICINE

## 2022-04-20 PROCEDURE — 25010000002 ZIPRASIDONE MESYLATE PER 10 MG: Performed by: INTERNAL MEDICINE

## 2022-04-20 PROCEDURE — 25010000002 LORAZEPAM PER 2 MG: Performed by: INTERNAL MEDICINE

## 2022-04-20 PROCEDURE — 25010000002 CEFTRIAXONE PER 250 MG: Performed by: INTERNAL MEDICINE

## 2022-04-20 PROCEDURE — 97535 SELF CARE MNGMENT TRAINING: CPT

## 2022-04-20 RX ORDER — ZIPRASIDONE MESYLATE 20 MG/ML
5 INJECTION, POWDER, LYOPHILIZED, FOR SOLUTION INTRAMUSCULAR EVERY 6 HOURS PRN
Status: DISCONTINUED | OUTPATIENT
Start: 2022-04-20 | End: 2022-04-21

## 2022-04-20 RX ORDER — LABETALOL HYDROCHLORIDE 5 MG/ML
10 INJECTION, SOLUTION INTRAVENOUS ONCE
Status: COMPLETED | OUTPATIENT
Start: 2022-04-20 | End: 2022-04-20

## 2022-04-20 RX ORDER — AMLODIPINE BESYLATE 5 MG/1
5 TABLET ORAL
Status: DISCONTINUED | OUTPATIENT
Start: 2022-04-20 | End: 2022-04-20

## 2022-04-20 RX ORDER — AMLODIPINE BESYLATE 10 MG/1
10 TABLET ORAL
Status: DISCONTINUED | OUTPATIENT
Start: 2022-04-20 | End: 2022-04-23 | Stop reason: HOSPADM

## 2022-04-20 RX ORDER — RISPERIDONE 0.25 MG/1
0.25 TABLET ORAL EVERY 12 HOURS SCHEDULED
Status: DISCONTINUED | OUTPATIENT
Start: 2022-04-20 | End: 2022-04-21

## 2022-04-20 RX ADMIN — Medication 5 MG: at 20:00

## 2022-04-20 RX ADMIN — ACETAMINOPHEN ORAL SOLUTION 649.6 MG: 650 SOLUTION ORAL at 16:15

## 2022-04-20 RX ADMIN — LORAZEPAM 0.5 MG: 2 INJECTION INTRAMUSCULAR; INTRAVENOUS at 23:58

## 2022-04-20 RX ADMIN — DOXYCYCLINE 100 MG: 100 CAPSULE ORAL at 08:11

## 2022-04-20 RX ADMIN — RISPERIDONE 0.25 MG: 0.25 TABLET ORAL at 20:00

## 2022-04-20 RX ADMIN — DONEPEZIL HYDROCHLORIDE 5 MG: 5 TABLET, FILM COATED ORAL at 20:00

## 2022-04-20 RX ADMIN — FOLIC ACID 1 MG: 1 TABLET ORAL at 08:11

## 2022-04-20 RX ADMIN — SODIUM CHLORIDE 1 G: 900 INJECTION INTRAVENOUS at 13:30

## 2022-04-20 RX ADMIN — HYDROCODONE BITARTRATE AND ACETAMINOPHEN 2 TABLET: 5; 325 TABLET ORAL at 01:39

## 2022-04-20 RX ADMIN — ZIPRASIDONE MESYLATE 10 MG: 20 INJECTION, POWDER, LYOPHILIZED, FOR SOLUTION INTRAMUSCULAR at 08:10

## 2022-04-20 RX ADMIN — Medication 1 TABLET: at 08:11

## 2022-04-20 RX ADMIN — APIXABAN 2.5 MG: 2.5 TABLET, FILM COATED ORAL at 20:00

## 2022-04-20 RX ADMIN — AMIODARONE HYDROCHLORIDE 200 MG: 200 TABLET ORAL at 08:11

## 2022-04-20 RX ADMIN — AMIODARONE HYDROCHLORIDE 200 MG: 200 TABLET ORAL at 20:00

## 2022-04-20 RX ADMIN — METOPROLOL TARTRATE 12.5 MG: 25 TABLET, FILM COATED ORAL at 20:00

## 2022-04-20 RX ADMIN — FAMOTIDINE 20 MG: 20 TABLET ORAL at 08:11

## 2022-04-20 RX ADMIN — DOXYCYCLINE 100 MG: 100 CAPSULE ORAL at 20:00

## 2022-04-20 RX ADMIN — ASPIRIN 81 MG: 81 TABLET, COATED ORAL at 08:11

## 2022-04-20 RX ADMIN — APIXABAN 2.5 MG: 2.5 TABLET, FILM COATED ORAL at 08:11

## 2022-04-20 RX ADMIN — LABETALOL 20 MG/4 ML (5 MG/ML) INTRAVENOUS SYRINGE 10 MG: at 02:08

## 2022-04-20 RX ADMIN — Medication 10 ML: at 08:11

## 2022-04-20 RX ADMIN — POLYETHYLENE GLYCOL 3350 17 G: 17 POWDER, FOR SOLUTION ORAL at 08:11

## 2022-04-20 RX ADMIN — METOPROLOL TARTRATE 12.5 MG: 25 TABLET, FILM COATED ORAL at 08:11

## 2022-04-20 RX ADMIN — AMLODIPINE BESYLATE 10 MG: 10 TABLET ORAL at 08:11

## 2022-04-20 RX ADMIN — FLUOXETINE HYDROCHLORIDE 40 MG: 20 CAPSULE ORAL at 08:11

## 2022-04-20 RX ADMIN — Medication 10 ML: at 20:01

## 2022-04-21 LAB
BACTERIA SPEC AEROBE CULT: NORMAL
BACTERIA SPEC AEROBE CULT: NORMAL

## 2022-04-21 PROCEDURE — 93010 ELECTROCARDIOGRAM REPORT: CPT | Performed by: INTERNAL MEDICINE

## 2022-04-21 PROCEDURE — 93005 ELECTROCARDIOGRAM TRACING: CPT | Performed by: INTERNAL MEDICINE

## 2022-04-21 PROCEDURE — 99232 SBSQ HOSP IP/OBS MODERATE 35: CPT | Performed by: INTERNAL MEDICINE

## 2022-04-21 PROCEDURE — 97530 THERAPEUTIC ACTIVITIES: CPT

## 2022-04-21 PROCEDURE — 97110 THERAPEUTIC EXERCISES: CPT

## 2022-04-21 RX ORDER — RISPERIDONE 0.25 MG/1
0.5 TABLET ORAL NIGHTLY
Status: DISCONTINUED | OUTPATIENT
Start: 2022-04-21 | End: 2022-04-23 | Stop reason: HOSPADM

## 2022-04-21 RX ORDER — RISPERIDONE 0.25 MG/1
0.25 TABLET ORAL DAILY
Status: DISCONTINUED | OUTPATIENT
Start: 2022-04-22 | End: 2022-04-23 | Stop reason: HOSPADM

## 2022-04-21 RX ORDER — METOPROLOL TARTRATE 5 MG/5ML
5 INJECTION INTRAVENOUS ONCE
Status: COMPLETED | OUTPATIENT
Start: 2022-04-21 | End: 2022-04-21

## 2022-04-21 RX ORDER — LORAZEPAM 0.5 MG/1
0.5 TABLET ORAL EVERY 6 HOURS PRN
Status: DISCONTINUED | OUTPATIENT
Start: 2022-04-21 | End: 2022-04-23 | Stop reason: HOSPADM

## 2022-04-21 RX ADMIN — Medication 5 MG: at 19:30

## 2022-04-21 RX ADMIN — APIXABAN 2.5 MG: 2.5 TABLET, FILM COATED ORAL at 19:32

## 2022-04-21 RX ADMIN — AMIODARONE HYDROCHLORIDE 200 MG: 200 TABLET ORAL at 19:32

## 2022-04-21 RX ADMIN — Medication 10 ML: at 19:34

## 2022-04-21 RX ADMIN — RISPERIDONE 0.5 MG: 0.25 TABLET ORAL at 19:31

## 2022-04-21 RX ADMIN — LORAZEPAM 0.5 MG: 0.5 TABLET ORAL at 07:40

## 2022-04-21 RX ADMIN — METOPROLOL TARTRATE 12.5 MG: 25 TABLET, FILM COATED ORAL at 07:43

## 2022-04-21 RX ADMIN — LORAZEPAM 0.5 MG: 0.5 TABLET ORAL at 17:17

## 2022-04-21 RX ADMIN — RISPERIDONE 0.25 MG: 0.25 TABLET ORAL at 07:42

## 2022-04-21 RX ADMIN — FOLIC ACID 1 MG: 1 TABLET ORAL at 07:42

## 2022-04-21 RX ADMIN — FAMOTIDINE 20 MG: 20 TABLET ORAL at 07:40

## 2022-04-21 RX ADMIN — AMIODARONE HYDROCHLORIDE 200 MG: 200 TABLET ORAL at 07:43

## 2022-04-21 RX ADMIN — DONEPEZIL HYDROCHLORIDE 5 MG: 5 TABLET, FILM COATED ORAL at 19:31

## 2022-04-21 RX ADMIN — FLUOXETINE HYDROCHLORIDE 40 MG: 20 CAPSULE ORAL at 07:42

## 2022-04-21 RX ADMIN — AMLODIPINE BESYLATE 10 MG: 10 TABLET ORAL at 07:43

## 2022-04-21 RX ADMIN — METOPROLOL TARTRATE 5 MG: 5 INJECTION INTRAVENOUS at 18:04

## 2022-04-21 RX ADMIN — ASPIRIN 81 MG: 81 TABLET, COATED ORAL at 07:43

## 2022-04-21 RX ADMIN — APIXABAN 2.5 MG: 2.5 TABLET, FILM COATED ORAL at 07:42

## 2022-04-21 RX ADMIN — METOPROLOL TARTRATE 25 MG: 25 TABLET, FILM COATED ORAL at 19:31

## 2022-04-22 LAB — SARS-COV-2 RDRP RESP QL NAA+PROBE: NORMAL

## 2022-04-22 PROCEDURE — 97110 THERAPEUTIC EXERCISES: CPT

## 2022-04-22 PROCEDURE — 97530 THERAPEUTIC ACTIVITIES: CPT

## 2022-04-22 PROCEDURE — 87635 SARS-COV-2 COVID-19 AMP PRB: CPT | Performed by: INTERNAL MEDICINE

## 2022-04-22 PROCEDURE — 99232 SBSQ HOSP IP/OBS MODERATE 35: CPT | Performed by: INTERNAL MEDICINE

## 2022-04-22 PROCEDURE — 97535 SELF CARE MNGMENT TRAINING: CPT

## 2022-04-22 RX ADMIN — APIXABAN 2.5 MG: 2.5 TABLET, FILM COATED ORAL at 20:31

## 2022-04-22 RX ADMIN — AMIODARONE HYDROCHLORIDE 200 MG: 200 TABLET ORAL at 08:05

## 2022-04-22 RX ADMIN — AMIODARONE HYDROCHLORIDE 200 MG: 200 TABLET ORAL at 20:31

## 2022-04-22 RX ADMIN — Medication 5 MG: at 20:31

## 2022-04-22 RX ADMIN — METOPROLOL TARTRATE 25 MG: 25 TABLET, FILM COATED ORAL at 20:31

## 2022-04-22 RX ADMIN — ASPIRIN 81 MG: 81 TABLET, COATED ORAL at 08:05

## 2022-04-22 RX ADMIN — ACETAMINOPHEN ORAL SOLUTION 650 MG: 650 SOLUTION ORAL at 10:56

## 2022-04-22 RX ADMIN — POLYETHYLENE GLYCOL 3350 17 G: 17 POWDER, FOR SOLUTION ORAL at 08:05

## 2022-04-22 RX ADMIN — Medication 10 ML: at 20:31

## 2022-04-22 RX ADMIN — AMLODIPINE BESYLATE 10 MG: 10 TABLET ORAL at 08:05

## 2022-04-22 RX ADMIN — FLUOXETINE HYDROCHLORIDE 40 MG: 20 CAPSULE ORAL at 08:05

## 2022-04-22 RX ADMIN — APIXABAN 2.5 MG: 2.5 TABLET, FILM COATED ORAL at 08:05

## 2022-04-22 RX ADMIN — Medication 10 ML: at 08:05

## 2022-04-22 RX ADMIN — DONEPEZIL HYDROCHLORIDE 5 MG: 5 TABLET, FILM COATED ORAL at 20:30

## 2022-04-22 RX ADMIN — Medication 1 TABLET: at 08:05

## 2022-04-22 RX ADMIN — METOPROLOL TARTRATE 25 MG: 25 TABLET, FILM COATED ORAL at 08:05

## 2022-04-22 RX ADMIN — RISPERIDONE 0.5 MG: 0.25 TABLET ORAL at 20:31

## 2022-04-22 RX ADMIN — FOLIC ACID 1 MG: 1 TABLET ORAL at 08:05

## 2022-04-22 RX ADMIN — FAMOTIDINE 20 MG: 20 TABLET ORAL at 08:05

## 2022-04-22 RX ADMIN — RISPERIDONE 0.25 MG: 0.25 TABLET ORAL at 08:05

## 2022-04-23 VITALS
BODY MASS INDEX: 22.28 KG/M2 | RESPIRATION RATE: 15 BRPM | HEIGHT: 61 IN | TEMPERATURE: 97 F | WEIGHT: 118 LBS | OXYGEN SATURATION: 94 % | SYSTOLIC BLOOD PRESSURE: 137 MMHG | HEART RATE: 62 BPM | DIASTOLIC BLOOD PRESSURE: 71 MMHG

## 2022-04-23 PROBLEM — J18.9 PNEUMONIA: Status: ACTIVE | Noted: 2022-04-23

## 2022-04-23 PROBLEM — I48.0 PAROXYSMAL ATRIAL FIBRILLATION WITH RAPID VENTRICULAR RESPONSE (HCC): Status: ACTIVE | Noted: 2022-04-23

## 2022-04-23 PROCEDURE — 99239 HOSP IP/OBS DSCHRG MGMT >30: CPT | Performed by: NURSE PRACTITIONER

## 2022-04-23 RX ORDER — ACETAMINOPHEN 325 MG/1
650 TABLET ORAL EVERY 4 HOURS PRN
Start: 2022-04-23

## 2022-04-23 RX ORDER — FOLIC ACID 1 MG/1
1 TABLET ORAL DAILY
Status: ON HOLD
Start: 2022-04-24 | End: 2022-09-18

## 2022-04-23 RX ORDER — LORAZEPAM 0.5 MG/1
0.5 TABLET ORAL 2 TIMES DAILY
Qty: 7 TABLET | Refills: 0 | Status: SHIPPED | OUTPATIENT
Start: 2022-04-23

## 2022-04-23 RX ORDER — AMIODARONE HYDROCHLORIDE 200 MG/1
200 TABLET ORAL EVERY 12 HOURS SCHEDULED
Start: 2022-04-23 | End: 2022-09-28 | Stop reason: HOSPADM

## 2022-04-23 RX ORDER — ASPIRIN 81 MG/1
81 TABLET ORAL DAILY
Status: ON HOLD
Start: 2022-04-24 | End: 2022-09-18

## 2022-04-23 RX ORDER — POLYETHYLENE GLYCOL 3350 17 G/17G
17 POWDER, FOR SOLUTION ORAL DAILY
Start: 2022-04-23 | End: 2022-04-23 | Stop reason: HOSPADM

## 2022-04-23 RX ORDER — RISPERIDONE 0.25 MG/1
0.25 TABLET ORAL DAILY
Start: 2022-04-24

## 2022-04-23 RX ORDER — RISPERIDONE 0.5 MG/1
0.5 TABLET ORAL NIGHTLY
Start: 2022-04-23

## 2022-04-23 RX ORDER — AMLODIPINE BESYLATE 10 MG/1
10 TABLET ORAL
Start: 2022-04-24

## 2022-04-23 RX ADMIN — ACETAMINOPHEN 650 MG: 325 TABLET ORAL at 01:23

## 2022-04-23 RX ADMIN — ASPIRIN 81 MG: 81 TABLET, COATED ORAL at 08:36

## 2022-04-23 RX ADMIN — Medication 1 TABLET: at 08:36

## 2022-04-23 RX ADMIN — FOLIC ACID 1 MG: 1 TABLET ORAL at 08:36

## 2022-04-23 RX ADMIN — RISPERIDONE 0.25 MG: 0.25 TABLET ORAL at 08:36

## 2022-04-23 RX ADMIN — METOPROLOL TARTRATE 25 MG: 25 TABLET, FILM COATED ORAL at 08:36

## 2022-04-23 RX ADMIN — AMIODARONE HYDROCHLORIDE 200 MG: 200 TABLET ORAL at 08:35

## 2022-04-23 RX ADMIN — FAMOTIDINE 20 MG: 20 TABLET ORAL at 08:36

## 2022-04-23 RX ADMIN — APIXABAN 2.5 MG: 2.5 TABLET, FILM COATED ORAL at 08:36

## 2022-04-23 RX ADMIN — AMLODIPINE BESYLATE 10 MG: 10 TABLET ORAL at 08:36

## 2022-04-23 RX ADMIN — FLUOXETINE HYDROCHLORIDE 40 MG: 20 CAPSULE ORAL at 08:36

## 2022-04-25 LAB
QT INTERVAL: 378 MS
QTC INTERVAL: 511 MS

## 2022-07-15 ENCOUNTER — TRANSCRIBE ORDERS (OUTPATIENT)
Dept: ADMINISTRATIVE | Facility: HOSPITAL | Age: 82
End: 2022-07-15

## 2022-07-15 ENCOUNTER — HOSPITAL ENCOUNTER (OUTPATIENT)
Dept: GENERAL RADIOLOGY | Facility: HOSPITAL | Age: 82
Discharge: HOME | End: 2022-07-15
Admitting: INTERNAL MEDICINE

## 2022-07-15 DIAGNOSIS — R22.41 HIP MASS, RIGHT: Primary | ICD-10-CM

## 2022-07-15 PROCEDURE — 73502 X-RAY EXAM HIP UNI 2-3 VIEWS: CPT

## 2022-09-06 ENCOUNTER — TRANSCRIBE ORDERS (OUTPATIENT)
Dept: ADMINISTRATIVE | Facility: HOSPITAL | Age: 82
End: 2022-09-06

## 2022-09-06 ENCOUNTER — HOSPITAL ENCOUNTER (OUTPATIENT)
Dept: CARDIOLOGY | Facility: HOSPITAL | Age: 82
Discharge: HOME | End: 2022-09-06
Admitting: FAMILY MEDICINE

## 2022-09-06 VITALS — WEIGHT: 118 LBS | HEIGHT: 61 IN | BODY MASS INDEX: 22.28 KG/M2

## 2022-09-06 DIAGNOSIS — R22.42 LOCALIZED SWELLING, MASS AND LUMP, LOWER LIMB, LEFT: ICD-10-CM

## 2022-09-06 DIAGNOSIS — R22.42 LOCALIZED SWELLING, MASS AND LUMP, LOWER LIMB, LEFT: Primary | ICD-10-CM

## 2022-09-06 LAB
BH CV LOWER VASCULAR LEFT COMMON FEMORAL AUGMENT: NORMAL
BH CV LOWER VASCULAR LEFT COMMON FEMORAL COMPRESS: NORMAL
BH CV LOWER VASCULAR LEFT COMMON FEMORAL PHASIC: NORMAL
BH CV LOWER VASCULAR LEFT COMMON FEMORAL SPONT: NORMAL
BH CV LOWER VASCULAR LEFT DISTAL FEMORAL AUGMENT: NORMAL
BH CV LOWER VASCULAR LEFT DISTAL FEMORAL COMPRESS: NORMAL
BH CV LOWER VASCULAR LEFT DISTAL FEMORAL PHASIC: NORMAL
BH CV LOWER VASCULAR LEFT DISTAL FEMORAL SPONT: NORMAL
BH CV LOWER VASCULAR LEFT GASTRONEMIUS COMPRESS: NORMAL
BH CV LOWER VASCULAR LEFT GREATER SAPH AK COMPRESS: NORMAL
BH CV LOWER VASCULAR LEFT GREATER SAPH BK COMPRESS: NORMAL
BH CV LOWER VASCULAR LEFT LESSER SAPH COMPRESS: NORMAL
BH CV LOWER VASCULAR LEFT MID FEMORAL AUGMENT: NORMAL
BH CV LOWER VASCULAR LEFT MID FEMORAL COMPRESS: NORMAL
BH CV LOWER VASCULAR LEFT MID FEMORAL PHASIC: NORMAL
BH CV LOWER VASCULAR LEFT MID FEMORAL SPONT: NORMAL
BH CV LOWER VASCULAR LEFT PERONEAL COMPRESS: NORMAL
BH CV LOWER VASCULAR LEFT POPLITEAL AUGMENT: NORMAL
BH CV LOWER VASCULAR LEFT POPLITEAL COMPRESS: NORMAL
BH CV LOWER VASCULAR LEFT POPLITEAL PHASIC: NORMAL
BH CV LOWER VASCULAR LEFT POPLITEAL SPONT: NORMAL
BH CV LOWER VASCULAR LEFT POSTERIOR TIBIAL COMPRESS: NORMAL
BH CV LOWER VASCULAR LEFT PROFUNDA FEMORAL AUGMENT: NORMAL
BH CV LOWER VASCULAR LEFT PROFUNDA FEMORAL COMPRESS: NORMAL
BH CV LOWER VASCULAR LEFT PROFUNDA FEMORAL PHASIC: NORMAL
BH CV LOWER VASCULAR LEFT PROFUNDA FEMORAL SPONT: NORMAL
BH CV LOWER VASCULAR LEFT PROXIMAL FEMORAL AUGMENT: NORMAL
BH CV LOWER VASCULAR LEFT PROXIMAL FEMORAL COMPRESS: NORMAL
BH CV LOWER VASCULAR LEFT PROXIMAL FEMORAL PHASIC: NORMAL
BH CV LOWER VASCULAR LEFT PROXIMAL FEMORAL SPONT: NORMAL
BH CV LOWER VASCULAR LEFT SAPHENOFEMORAL JUNCTION AUGMENT: NORMAL
BH CV LOWER VASCULAR LEFT SAPHENOFEMORAL JUNCTION COMPRESS: NORMAL
BH CV LOWER VASCULAR LEFT SAPHENOFEMORAL JUNCTION PHASIC: NORMAL
BH CV LOWER VASCULAR LEFT SAPHENOFEMORAL JUNCTION SPONT: NORMAL
BH CV LOWER VASCULAR RIGHT COMMON FEMORAL AUGMENT: NORMAL
BH CV LOWER VASCULAR RIGHT COMMON FEMORAL COMPRESS: NORMAL
BH CV LOWER VASCULAR RIGHT COMMON FEMORAL PHASIC: NORMAL
BH CV LOWER VASCULAR RIGHT COMMON FEMORAL SPONT: NORMAL
MAXIMAL PREDICTED HEART RATE: 140 BPM
STRESS TARGET HR: 119 BPM

## 2022-09-06 PROCEDURE — 93971 EXTREMITY STUDY: CPT | Performed by: INTERNAL MEDICINE

## 2022-09-06 PROCEDURE — 93971 EXTREMITY STUDY: CPT

## 2022-09-18 ENCOUNTER — APPOINTMENT (OUTPATIENT)
Dept: CT IMAGING | Facility: HOSPITAL | Age: 82
End: 2022-09-18

## 2022-09-18 ENCOUNTER — HOSPITAL ENCOUNTER (OUTPATIENT)
Facility: HOSPITAL | Age: 82
Setting detail: OBSERVATION
LOS: 2 days | Discharge: HOME HEALTH | End: 2022-09-20
Attending: EMERGENCY MEDICINE | Admitting: INTERNAL MEDICINE

## 2022-09-18 DIAGNOSIS — J18.9 PNEUMONIA OF LEFT UPPER LOBE DUE TO INFECTIOUS ORGANISM: ICD-10-CM

## 2022-09-18 DIAGNOSIS — R22.0 FACIAL SWELLING: ICD-10-CM

## 2022-09-18 DIAGNOSIS — E87.6 HYPOKALEMIA: ICD-10-CM

## 2022-09-18 DIAGNOSIS — J96.01 ACUTE RESPIRATORY FAILURE WITH HYPOXIA: Primary | ICD-10-CM

## 2022-09-18 DIAGNOSIS — M79.89 SWELLING IN RIGHT ARMPIT: ICD-10-CM

## 2022-09-18 LAB
ALBUMIN SERPL-MCNC: 3.4 G/DL (ref 3.5–5.2)
ALBUMIN/GLOB SERPL: 1.3 G/DL
ALP SERPL-CCNC: 104 U/L (ref 39–117)
ALT SERPL W P-5'-P-CCNC: 49 U/L (ref 1–33)
ANION GAP SERPL CALCULATED.3IONS-SCNC: 10 MMOL/L (ref 5–15)
AST SERPL-CCNC: 50 U/L (ref 1–32)
BASOPHILS # BLD AUTO: 0.01 10*3/MM3 (ref 0–0.2)
BASOPHILS NFR BLD AUTO: 0.1 % (ref 0–1.5)
BILIRUB SERPL-MCNC: 0.7 MG/DL (ref 0–1.2)
BUN SERPL-MCNC: 10 MG/DL (ref 8–23)
BUN/CREAT SERPL: 11.5 (ref 7–25)
CALCIUM SPEC-SCNC: 8.7 MG/DL (ref 8.6–10.5)
CHLORIDE SERPL-SCNC: 102 MMOL/L (ref 98–107)
CO2 SERPL-SCNC: 28 MMOL/L (ref 22–29)
CREAT SERPL-MCNC: 0.87 MG/DL (ref 0.57–1)
DEPRECATED RDW RBC AUTO: 48.7 FL (ref 37–54)
EGFRCR SERPLBLD CKD-EPI 2021: 67.4 ML/MIN/1.73
EOSINOPHIL # BLD AUTO: 0.04 10*3/MM3 (ref 0–0.4)
EOSINOPHIL NFR BLD AUTO: 0.5 % (ref 0.3–6.2)
ERYTHROCYTE [DISTWIDTH] IN BLOOD BY AUTOMATED COUNT: 14.5 % (ref 12.3–15.4)
GLOBULIN UR ELPH-MCNC: 2.7 GM/DL
GLUCOSE SERPL-MCNC: 92 MG/DL (ref 65–99)
HCT VFR BLD AUTO: 35.9 % (ref 34–46.6)
HGB BLD-MCNC: 11.6 G/DL (ref 12–15.9)
IMM GRANULOCYTES # BLD AUTO: 0.03 10*3/MM3 (ref 0–0.05)
IMM GRANULOCYTES NFR BLD AUTO: 0.4 % (ref 0–0.5)
LYMPHOCYTES # BLD AUTO: 1.02 10*3/MM3 (ref 0.7–3.1)
LYMPHOCYTES NFR BLD AUTO: 12.2 % (ref 19.6–45.3)
MCH RBC QN AUTO: 29.4 PG (ref 26.6–33)
MCHC RBC AUTO-ENTMCNC: 32.3 G/DL (ref 31.5–35.7)
MCV RBC AUTO: 90.9 FL (ref 79–97)
MONOCYTES # BLD AUTO: 0.74 10*3/MM3 (ref 0.1–0.9)
MONOCYTES NFR BLD AUTO: 8.9 % (ref 5–12)
NEUTROPHILS NFR BLD AUTO: 6.51 10*3/MM3 (ref 1.7–7)
NEUTROPHILS NFR BLD AUTO: 77.9 % (ref 42.7–76)
NRBC BLD AUTO-RTO: 0 /100 WBC (ref 0–0.2)
NT-PROBNP SERPL-MCNC: 1264 PG/ML (ref 0–1800)
PLATELET # BLD AUTO: 239 10*3/MM3 (ref 140–450)
PMV BLD AUTO: 9.1 FL (ref 6–12)
POTASSIUM SERPL-SCNC: 3.1 MMOL/L (ref 3.5–5.2)
PROCALCITONIN SERPL-MCNC: 0.07 NG/ML (ref 0–0.25)
PROT SERPL-MCNC: 6.1 G/DL (ref 6–8.5)
RBC # BLD AUTO: 3.95 10*6/MM3 (ref 3.77–5.28)
SODIUM SERPL-SCNC: 140 MMOL/L (ref 136–145)
TROPONIN T SERPL-MCNC: 0.02 NG/ML (ref 0–0.03)
TSH SERPL DL<=0.05 MIU/L-ACNC: 3.77 UIU/ML (ref 0.27–4.2)
WBC NRBC COR # BLD: 8.35 10*3/MM3 (ref 3.4–10.8)

## 2022-09-18 PROCEDURE — 84484 ASSAY OF TROPONIN QUANT: CPT | Performed by: EMERGENCY MEDICINE

## 2022-09-18 PROCEDURE — 25010000002 IOPAMIDOL 61 % SOLUTION: Performed by: EMERGENCY MEDICINE

## 2022-09-18 PROCEDURE — 80053 COMPREHEN METABOLIC PANEL: CPT | Performed by: EMERGENCY MEDICINE

## 2022-09-18 PROCEDURE — 87040 BLOOD CULTURE FOR BACTERIA: CPT | Performed by: EMERGENCY MEDICINE

## 2022-09-18 PROCEDURE — G0378 HOSPITAL OBSERVATION PER HR: HCPCS

## 2022-09-18 PROCEDURE — 99285 EMERGENCY DEPT VISIT HI MDM: CPT

## 2022-09-18 PROCEDURE — 96365 THER/PROPH/DIAG IV INF INIT: CPT

## 2022-09-18 PROCEDURE — 25010000002 VANCOMYCIN PER 500 MG: Performed by: EMERGENCY MEDICINE

## 2022-09-18 PROCEDURE — 85025 COMPLETE CBC W/AUTO DIFF WBC: CPT | Performed by: EMERGENCY MEDICINE

## 2022-09-18 PROCEDURE — 71270 CT THORAX DX C-/C+: CPT

## 2022-09-18 PROCEDURE — 96367 TX/PROPH/DG ADDL SEQ IV INF: CPT

## 2022-09-18 PROCEDURE — 0 POTASSIUM CHLORIDE 10 MEQ/100ML SOLUTION: Performed by: EMERGENCY MEDICINE

## 2022-09-18 PROCEDURE — 96366 THER/PROPH/DIAG IV INF ADDON: CPT

## 2022-09-18 PROCEDURE — 25010000002 CEFTRIAXONE PER 250 MG: Performed by: INTERNAL MEDICINE

## 2022-09-18 PROCEDURE — 36415 COLL VENOUS BLD VENIPUNCTURE: CPT

## 2022-09-18 PROCEDURE — 25010000002 PIPERACILLIN SOD-TAZOBACTAM PER 1 G: Performed by: EMERGENCY MEDICINE

## 2022-09-18 PROCEDURE — 99220 PR INITIAL OBSERVATION CARE/DAY 70 MINUTES: CPT | Performed by: INTERNAL MEDICINE

## 2022-09-18 PROCEDURE — 84145 PROCALCITONIN (PCT): CPT | Performed by: EMERGENCY MEDICINE

## 2022-09-18 PROCEDURE — 84443 ASSAY THYROID STIM HORMONE: CPT | Performed by: EMERGENCY MEDICINE

## 2022-09-18 PROCEDURE — 96368 THER/DIAG CONCURRENT INF: CPT

## 2022-09-18 PROCEDURE — 83880 ASSAY OF NATRIURETIC PEPTIDE: CPT | Performed by: EMERGENCY MEDICINE

## 2022-09-18 RX ORDER — POTASSIUM CHLORIDE 7.45 MG/ML
10 INJECTION INTRAVENOUS
Status: DISCONTINUED | OUTPATIENT
Start: 2022-09-18 | End: 2022-09-20 | Stop reason: HOSPADM

## 2022-09-18 RX ORDER — POTASSIUM CHLORIDE 750 MG/1
40 CAPSULE, EXTENDED RELEASE ORAL ONCE
Status: DISCONTINUED | OUTPATIENT
Start: 2022-09-18 | End: 2022-09-19

## 2022-09-18 RX ORDER — DONEPEZIL HYDROCHLORIDE 5 MG/1
5 TABLET, FILM COATED ORAL NIGHTLY
Status: DISCONTINUED | OUTPATIENT
Start: 2022-09-18 | End: 2022-09-20 | Stop reason: HOSPADM

## 2022-09-18 RX ORDER — VANCOMYCIN HYDROCHLORIDE 1 G/200ML
20 INJECTION, SOLUTION INTRAVENOUS ONCE
Status: COMPLETED | OUTPATIENT
Start: 2022-09-18 | End: 2022-09-18

## 2022-09-18 RX ORDER — POTASSIUM CHLORIDE 750 MG/1
20 CAPSULE, EXTENDED RELEASE ORAL DAILY
Status: DISCONTINUED | OUTPATIENT
Start: 2022-09-18 | End: 2022-09-20 | Stop reason: HOSPADM

## 2022-09-18 RX ORDER — RISPERIDONE 0.25 MG/1
0.25 TABLET ORAL NIGHTLY
Status: DISCONTINUED | OUTPATIENT
Start: 2022-09-18 | End: 2022-09-18

## 2022-09-18 RX ORDER — RISPERIDONE 1 MG/1
0.5 TABLET ORAL NIGHTLY
Status: DISCONTINUED | OUTPATIENT
Start: 2022-09-18 | End: 2022-09-20 | Stop reason: HOSPADM

## 2022-09-18 RX ORDER — LORAZEPAM 0.5 MG/1
0.5 TABLET ORAL 2 TIMES DAILY
Status: DISCONTINUED | OUTPATIENT
Start: 2022-09-18 | End: 2022-09-20 | Stop reason: HOSPADM

## 2022-09-18 RX ORDER — SODIUM CHLORIDE 0.9 % (FLUSH) 0.9 %
10 SYRINGE (ML) INJECTION AS NEEDED
Status: DISCONTINUED | OUTPATIENT
Start: 2022-09-18 | End: 2022-09-20 | Stop reason: HOSPADM

## 2022-09-18 RX ORDER — RISPERIDONE 0.25 MG/1
0.25 TABLET ORAL DAILY
Status: DISCONTINUED | OUTPATIENT
Start: 2022-09-19 | End: 2022-09-20 | Stop reason: HOSPADM

## 2022-09-18 RX ORDER — FOLIC ACID 1 MG/1
1 TABLET ORAL DAILY
Status: DISCONTINUED | OUTPATIENT
Start: 2022-09-19 | End: 2022-09-20 | Stop reason: HOSPADM

## 2022-09-18 RX ORDER — SODIUM CHLORIDE 0.9 % (FLUSH) 0.9 %
10 SYRINGE (ML) INJECTION EVERY 12 HOURS SCHEDULED
Status: DISCONTINUED | OUTPATIENT
Start: 2022-09-18 | End: 2022-09-20 | Stop reason: HOSPADM

## 2022-09-18 RX ORDER — ACETAMINOPHEN 325 MG/1
650 TABLET ORAL EVERY 6 HOURS PRN
Status: DISCONTINUED | OUTPATIENT
Start: 2022-09-18 | End: 2022-09-20 | Stop reason: HOSPADM

## 2022-09-18 RX ORDER — ASPIRIN 81 MG/1
81 TABLET ORAL DAILY
Status: DISCONTINUED | OUTPATIENT
Start: 2022-09-19 | End: 2022-09-20 | Stop reason: HOSPADM

## 2022-09-18 RX ORDER — FLUOXETINE HYDROCHLORIDE 20 MG/1
40 CAPSULE ORAL DAILY
Status: DISCONTINUED | OUTPATIENT
Start: 2022-09-19 | End: 2022-09-20 | Stop reason: HOSPADM

## 2022-09-18 RX ORDER — AMIODARONE HYDROCHLORIDE 200 MG/1
200 TABLET ORAL EVERY 12 HOURS SCHEDULED
Status: DISCONTINUED | OUTPATIENT
Start: 2022-09-18 | End: 2022-09-20 | Stop reason: HOSPADM

## 2022-09-18 RX ORDER — FAMOTIDINE 20 MG/1
20 TABLET, FILM COATED ORAL 2 TIMES DAILY
Status: DISCONTINUED | OUTPATIENT
Start: 2022-09-18 | End: 2022-09-20 | Stop reason: HOSPADM

## 2022-09-18 RX ORDER — CHOLECALCIFEROL (VITAMIN D3) 125 MCG
5 CAPSULE ORAL NIGHTLY
Status: DISCONTINUED | OUTPATIENT
Start: 2022-09-18 | End: 2022-09-20 | Stop reason: HOSPADM

## 2022-09-18 RX ADMIN — DONEPEZIL HYDROCHLORIDE 5 MG: 5 TABLET, FILM COATED ORAL at 20:10

## 2022-09-18 RX ADMIN — Medication 5 MG: at 20:10

## 2022-09-18 RX ADMIN — SODIUM CHLORIDE 1 G: 900 INJECTION INTRAVENOUS at 22:05

## 2022-09-18 RX ADMIN — FAMOTIDINE 20 MG: 20 TABLET ORAL at 20:10

## 2022-09-18 RX ADMIN — TAZOBACTAM SODIUM AND PIPERACILLIN SODIUM 3.38 G: 375; 3 INJECTION, SOLUTION INTRAVENOUS at 17:23

## 2022-09-18 RX ADMIN — LORAZEPAM 0.5 MG: 0.5 TABLET ORAL at 20:10

## 2022-09-18 RX ADMIN — DOXYCYCLINE 100 MG: 100 INJECTION, POWDER, LYOPHILIZED, FOR SOLUTION INTRAVENOUS at 20:20

## 2022-09-18 RX ADMIN — POTASSIUM CHLORIDE 10 MEQ: 7.46 INJECTION, SOLUTION INTRAVENOUS at 23:22

## 2022-09-18 RX ADMIN — POTASSIUM CHLORIDE 10 MEQ: 7.46 INJECTION, SOLUTION INTRAVENOUS at 22:05

## 2022-09-18 RX ADMIN — POTASSIUM CHLORIDE 10 MEQ: 7.46 INJECTION, SOLUTION INTRAVENOUS at 20:09

## 2022-09-18 RX ADMIN — Medication 10 ML: at 20:11

## 2022-09-18 RX ADMIN — APIXABAN 2.5 MG: 2.5 TABLET, FILM COATED ORAL at 20:10

## 2022-09-18 RX ADMIN — RISPERIDONE 0.5 MG: 1 TABLET ORAL at 20:10

## 2022-09-18 RX ADMIN — VANCOMYCIN HYDROCHLORIDE 1000 MG: 1 INJECTION, SOLUTION INTRAVENOUS at 18:49

## 2022-09-18 RX ADMIN — METOPROLOL TARTRATE 25 MG: 25 TABLET, FILM COATED ORAL at 20:10

## 2022-09-18 RX ADMIN — IOPAMIDOL 75 ML: 612 INJECTION, SOLUTION INTRAVENOUS at 15:26

## 2022-09-18 RX ADMIN — AMIODARONE HYDROCHLORIDE 200 MG: 200 TABLET ORAL at 20:10

## 2022-09-18 RX ADMIN — POTASSIUM CHLORIDE 10 MEQ: 7.46 INJECTION, SOLUTION INTRAVENOUS at 17:23

## 2022-09-18 NOTE — ED PROVIDER NOTES
[place]    EMERGENCY DEPARTMENT ENCOUNTER      Pt Name: Laura Wallace  MRN: [id number]  YOB: 1940  Date of evaluation: 9/18/2022  Provider: Jose Mccollum DO    CHIEF COMPLAINT       Chief Complaint   Patient presents with   • Facial Swelling         HISTORY OF PRESENT ILLNESS  (Location/Symptom, Timing/Onset, Context/Setting, Quality, Duration, Modifying Factors, Severity.)   Laura Wallace is a 82 y.o. female who presents to the emergency department for evaluation with her significant other secondary to concern for facial, right upper extremity swelling which he noted when she awoke this morning.  He notes that she has a history of lower extremity weakness, is wheelchair-bound, she does have advanced dementia, Alzheimer's, is very limited with her transitions.  Has issues with a recurrent bedsores, and he notes she had a new eggcrate mattress which she used for the first time yesterday she did sleep on her right side which is abnormal as the patient usually sleeps on her back or left side.  He notes her swelling was significant in her bilateral eyes, upper lip, right upper extremity after she awoke.  Has not had any respiratory distress, no history of heart failure, has been worked up for lower extremity swelling recently with no acute abnormalities or DVT.  No history of heart failure.  He notes the swelling which is much more significant earlier has continued to subside throughout the afternoon.  He notes patient is very limited in her verbal responses at baseline, appears to be acting at her normal capacity.  Denies any redness, no fevers or chills, denies any other acute systemic complaints this time.      Nursing notes were reviewed.    REVIEW OF SYSTEMS    (2-9 systems for level 4, 10 or more for level 5)   ROS:  General:  No fevers, no chills, + generalized weakness  Cardiovascular:  No chest pain, no palpitations  Respiratory:  No shortness of breath, no cough  Gastrointestinal:   No pain, no nausea, no vomiting, no diarrhea  Musculoskeletal:  No muscle pain  Skin:  No rash, positive swelling of the face, upper lip and right upper extremity  Neurologic:  No headache  Psychiatric:  No anxiety  Genitourinary:  No dysuria, no hematuria    Except as noted above the remainder of the review of systems was reviewed and negative.       PAST MEDICAL HISTORY   No past medical history on file.      SURGICAL HISTORY     No past surgical history on file.      CURRENT MEDICATIONS       Current Facility-Administered Medications:   •  piperacillin-tazobactam (ZOSYN) 3.375 g in iso-osmotic dextrose 50 ml (premix), 3.375 g, Intravenous, Once, Jose Mccollum, DO  •  potassium chloride (MICRO-K) CR capsule 40 mEq, 40 mEq, Oral, Once, Jose Mccollum DO  •  potassium chloride 10 mEq in 100 mL IVPB, 10 mEq, Intravenous, Q1H PRN, Jose Mccollum DO  •  [COMPLETED] Insert peripheral IV, , , Once **AND** sodium chloride 0.9 % flush 10 mL, 10 mL, Intravenous, PRN, Jose Mccollum DO  •  vancomycin (VANCOCIN) 1000 mg/200 mL dextrose 5% IVPB, 20 mg/kg, Intravenous, Once, Jose Mccollum, DO    Current Outpatient Medications:   •  acetaminophen (TYLENOL) 325 MG tablet, Take 2 tablets by mouth Every 4 (Four) Hours As Needed for Mild Pain ., Disp: , Rfl:   •  amiodarone (PACERONE) 200 MG tablet, Take 1 tablet by mouth Every 12 (Twelve) Hours., Disp: , Rfl:   •  amLODIPine (NORVASC) 10 MG tablet, Take 1 tablet by mouth Daily., Disp: , Rfl:   •  apixaban (ELIQUIS) 2.5 MG tablet tablet, Take 1 tablet by mouth Every 12 (Twelve) Hours. Indications: Atrial Fibrillation, Disp: 60 tablet, Rfl:   •  aspirin 81 MG EC tablet, Take 1 tablet by mouth Daily., Disp: , Rfl:   •  Cholecalciferol (VITAMIN D3) 5000 units capsule capsule, Take 5,000 Units by mouth Daily., Disp: , Rfl:   •  donepezil (ARICEPT) 5 MG tablet, Take 5 mg by mouth Every Night., Disp: , Rfl:   •  famotidine (PEPCID) 20 MG tablet,  Take 20 mg by mouth 2 (Two) Times a Day. Spouse been holding the 2nd dose unless patients stomach bothering her, Disp: , Rfl:   •  FLUoxetine (PROzac) 20 MG capsule, Take 40 mg by mouth Daily., Disp: , Rfl:   •  folic acid (FOLVITE) 1 MG tablet, Take 1 tablet by mouth Daily., Disp: , Rfl:   •  LORazepam (ATIVAN) 0.5 MG tablet, Take 1 tablet by mouth 2 (Two) Times a Day., Disp: 7 tablet, Rfl: 0  •  melatonin 5 MG tablet tablet, Take 5 mg by mouth Every Night., Disp: , Rfl:   •  Methylcellulose, Laxative, (FIBER THERAPY PO), Take 500 capsules by mouth Daily., Disp: , Rfl:   •  metoprolol tartrate (LOPRESSOR) 25 MG tablet, Take 1 tablet by mouth Every 12 (Twelve) Hours., Disp: , Rfl:   •  multivitamin with minerals (CENTRUM SILVER 50+WOMEN PO), Take 1 tablet by mouth Daily., Disp: , Rfl:   •  risperiDONE (risperDAL) 0.25 MG tablet, Take 1 tablet by mouth Daily., Disp: , Rfl:   •  risperiDONE (risperDAL) 0.5 MG tablet, Take 1 tablet by mouth Every Night., Disp: , Rfl:   •  triamcinolone (KENALOG) 0.1 % cream, Apply  topically to the appropriate area as directed See Admin Instructions. Apply topically three times daily. Been giving it as needed, Disp: , Rfl: 3    ALLERGIES     Codeine    FAMILY HISTORY     No family history on file.       SOCIAL HISTORY       Social History     Socioeconomic History   • Marital status:    Tobacco Use   • Smoking status: Never Smoker   • Smokeless tobacco: Never Used   Substance and Sexual Activity   • Alcohol use: No   • Drug use: No   • Sexual activity: Defer         PHYSICAL EXAM    (up to 7 for level 4, 8 or more for level 5)     Vitals:    09/18/22 1445 09/18/22 1500 09/18/22 1515 09/18/22 1545   BP: 140/62 156/64 148/62 158/67   BP Location:       Patient Position:       Pulse: 58  54 64   Resp:       Temp:       TempSrc:       SpO2: 98% 94% 93% 95%   Weight:       Height:           Physical Exam  General : Patient is awake,, significant dementia is appreciated.  Very limited  in her responses  HEENT: Pupils are equally round, patient does have soft tissue edema of the bilateral eyelids, ocular region, there is no lip swelling, no intraoral swelling, no tongue swelling.  Neck: Neck is supple, trachea midline  Cardiac: Heart regular rate, rhythm, no murmurs, rubs, or gallops  Lungs: Lungs are clear to auscultation, there is no wheezing, rhonchi, or rales. There is no use of accessory muscles  Chest wall: There is no tenderness to palpation over the chest wall or over ribs  Abdomen: Abdomen is soft, nontender, nondistended. There are no firm or pulsatile masses, no rebound rigidity or guarding  Musculoskeletal: There are some mild soft tissue swelling of the right upper extremity and dorsal aspect of the hand.  Patient has very thin friable skin.  5 out of 5 strength in all 4 extremities.  No focal muscle deficits are appreciated  Neuro: Patient able to voluntarily move bilateral upper extremities, severe muscle atrophy, chronic weakness is noted to bilateral lower extremities, she is wheelchair-bound.  She very limited in her responses, opens her eyes, squeezes hands on command, she is not verbally involved or conversational during my examination.  Dermatology: Very thin friable skin, superficial edema noted to bilateral eyes, upper eyelids, right upper extremity, dorsum of the hand.  Skin is warm and dry        DIAGNOSTIC RESULTS     EKG: All EKGs are interpreted by the Emergency Department Physician who either signs or Co-signs this chart in the absence of a cardiologist.    No orders to display       RADIOLOGY:   Non-plain film images such as CT, Ultrasound and MRI are read by the radiologist. Plain radiographic images are visualized and preliminarily interpreted by the emergency physician with the below findings:      [] Radiologist's Report Reviewed:  CT Chest With & Without Contrast Diagnostic   Final Result   Allowing for some venous mixing artifact, there is no definite evidence    of venous thrombosis or external compression of the superior vena cava   or neck/upper extremity veins.       Small region of groundglass in the left upper lobe which may be   infectious or inflammatory.       Small left and trace right pleural effusions. Mild interstitial edema.       Background emphysema.       Chronic fracture deformity of the left proximal humerus.       This report was finalized on 9/18/2022 4:02 PM by Demian Machado MD.                ED BEDSIDE ULTRASOUND:   Performed by ED Physician - none    LABS:    I have reviewed and interpreted all of the currently available lab results from this visit (if applicable):  Results for orders placed or performed during the hospital encounter of 09/18/22   Comprehensive Metabolic Panel    Specimen: Blood   Result Value Ref Range    Glucose 92 65 - 99 mg/dL    BUN 10 8 - 23 mg/dL    Creatinine 0.87 0.57 - 1.00 mg/dL    Sodium 140 136 - 145 mmol/L    Potassium 3.1 (L) 3.5 - 5.2 mmol/L    Chloride 102 98 - 107 mmol/L    CO2 28.0 22.0 - 29.0 mmol/L    Calcium 8.7 8.6 - 10.5 mg/dL    Total Protein 6.1 6.0 - 8.5 g/dL    Albumin 3.40 (L) 3.50 - 5.20 g/dL    ALT (SGPT) 49 (H) 1 - 33 U/L    AST (SGOT) 50 (H) 1 - 32 U/L    Alkaline Phosphatase 104 39 - 117 U/L    Total Bilirubin 0.7 0.0 - 1.2 mg/dL    Globulin 2.7 gm/dL    A/G Ratio 1.3 g/dL    BUN/Creatinine Ratio 11.5 7.0 - 25.0    Anion Gap 10.0 5.0 - 15.0 mmol/L    eGFR 67.4 >60.0 mL/min/1.73   BNP    Specimen: Blood   Result Value Ref Range    proBNP 1,264.0 0.0 - 1,800.0 pg/mL   Troponin    Specimen: Blood   Result Value Ref Range    Troponin T 0.016 0.000 - 0.030 ng/mL   TSH    Specimen: Blood   Result Value Ref Range    TSH 3.770 0.270 - 4.200 uIU/mL   CBC Auto Differential    Specimen: Blood   Result Value Ref Range    WBC 8.35 3.40 - 10.80 10*3/mm3    RBC 3.95 3.77 - 5.28 10*6/mm3    Hemoglobin 11.6 (L) 12.0 - 15.9 g/dL    Hematocrit 35.9 34.0 - 46.6 %    MCV 90.9 79.0 - 97.0 fL    MCH 29.4 26.6 - 33.0 pg     MCHC 32.3 31.5 - 35.7 g/dL    RDW 14.5 12.3 - 15.4 %    RDW-SD 48.7 37.0 - 54.0 fl    MPV 9.1 6.0 - 12.0 fL    Platelets 239 140 - 450 10*3/mm3    Neutrophil % 77.9 (H) 42.7 - 76.0 %    Lymphocyte % 12.2 (L) 19.6 - 45.3 %    Monocyte % 8.9 5.0 - 12.0 %    Eosinophil % 0.5 0.3 - 6.2 %    Basophil % 0.1 0.0 - 1.5 %    Immature Grans % 0.4 0.0 - 0.5 %    Neutrophils, Absolute 6.51 1.70 - 7.00 10*3/mm3    Lymphocytes, Absolute 1.02 0.70 - 3.10 10*3/mm3    Monocytes, Absolute 0.74 0.10 - 0.90 10*3/mm3    Eosinophils, Absolute 0.04 0.00 - 0.40 10*3/mm3    Basophils, Absolute 0.01 0.00 - 0.20 10*3/mm3    Immature Grans, Absolute 0.03 0.00 - 0.05 10*3/mm3    nRBC 0.0 0.0 - 0.2 /100 WBC        All other labs were within normal range or not returned as of this dictation.      EMERGENCY DEPARTMENT COURSE and DIFFERENTIAL DIAGNOSIS/MDM:   Vitals:    Vitals:    09/18/22 1445 09/18/22 1500 09/18/22 1515 09/18/22 1545   BP: 140/62 156/64 148/62 158/67   BP Location:       Patient Position:       Pulse: 58  54 64   Resp:       Temp:       TempSrc:       SpO2: 98% 94% 93% 95%   Weight:       Height:                Patient with a history of advanced dementia, Alzheimer's who is wheelchair-bound presents with swelling to the face, right upper extremity.  Swelling was notably in the bilateral eyes, upper lip, right upper extremity and has significantly improved after the patient awoke.  Seems a possibility could be the new mattress that she slept on her right side and could have had a lymphatic or vascular compression type syndrome overnight causing the retention and third spacing of the fluid along the head, right upper extremity.  She is in normal sinus rhythm nor my evaluation.  We discussed obtaining basic labs, imaging for further evaluation.  Results as above.  No pitting edema.  No history of heart failure.  Patient  Saturations dropped into the upper 80s, she does not wear supplemental oxygen at home, is placed on 2 L  nasal cannula which improved her oxygenation.  CT scan of the chest does not reveal any acute vascular blockage or signs of SVC syndrome, there is left upper lobe inflammatory process, could be infectious in nature, given her hypoxia will cover her for pneumonia, cultures obtained.  Denies acute process for the swelling of the face and the right upper extremity, very well could be a kink in the right thoracic lymphatic drainage system, as her symptoms of the swelling in her face and arm continue to improve with the positional change in bed.  We discussed the results, we also discussed admission to the hospital for respiratory failure, hypoxia for further work-up and evaluation.  Case discussed with Dr. Carver, hospitalist for admission.        MEDICATIONS ADMINISTERED IN ED:  Medications   sodium chloride 0.9 % flush 10 mL (has no administration in time range)   potassium chloride (MICRO-K) CR capsule 40 mEq (40 mEq Oral Not Given 9/18/22 1639)   vancomycin (VANCOCIN) 1000 mg/200 mL dextrose 5% IVPB (has no administration in time range)   piperacillin-tazobactam (ZOSYN) 3.375 g in iso-osmotic dextrose 50 ml (premix) (has no administration in time range)   potassium chloride 10 mEq in 100 mL IVPB (has no administration in time range)   iopamidol (ISOVUE-300) 61 % injection 100 mL (75 mL Intravenous Given 9/18/22 1526)       PROCEDURES:  Procedures    CRITICAL CARE TIME    Total Critical Care time was 0 minutes, excluding separately reportable procedures.   There was a high probability of clinically significant/life threatening deterioration in the patient's condition which required my urgent intervention.      FINAL IMPRESSION      1. Acute respiratory failure with hypoxia (HCC)    2. Pneumonia of left upper lobe due to infectious organism    3. Facial swelling    4. Swelling in right armpit    5. Hypokalemia          DISPOSITION/PLAN     ED Disposition     ED Disposition   Decision to Admit    Condition   --     Comment   Level of Care: Telemetry [5]   Diagnosis: Pneumonia [251460]   Certification: I Certify That Inpatient Hospital Services Are Medically Necessary For Greater Than 2 Midnights                 Comment: Please note this report has been produced using speech recognition software.      Jose Mccollum DO  Attending Emergency Physician               Jose Mccollum DO  09/18/22 8806

## 2022-09-18 NOTE — H&P
[facility] Medicine Services  HISTORY AND PHYSICAL    Patient Name: Laura Wallace  : 1940  MRN: [id number]  Primary Care Physician: Justin Brumfield MD  Date of admission: 2022      Subjective   Subjective     Chief Complaint: facial swelling    HPI:  Laura Wallace is a 82 y.o. female with history of neurological disorder (chart states Alzheimer's), nonambulatory, wheelchair use, decubiti.  She is brought to ED by her partner (who gives the history) due to facial and RUE swelling.  He noticed this this morning when she first woke up:  Face was swollen, eyes were swollen nearly shut, bridge of nose and upper lip were also swollen as was her RUE.  This has gradually improved throughout the day and is now near baseline.  She has not seemed ill, has not indicated pain or pruritus.  She did not eat any unusual foods yesterday (neither high in sodium nor anything new/possible allergens) according to her partner. She did sleep on a new eggcrate mattress topper last night for the first time and slept on her R side which is unusual for her.    Her partner has not noticed dyspnea, complaint of pain, hemoptysis, fever, or leg swelling.  The swelling was most prominent in the morning and has been subsiding during the afternoon.     Review of Systems per partner at bedside   Gen- No fevers, chills  CV- No chest pain, palpitations  Resp- No cough, dyspnea  GI- No N/V/D, abd pain.  Has not eaten all day.  However she has mentioned to him that she is hungry  musc - usually spends day in wheelchair   Neuro - she converses with him at home.  He denies a diagnosis of Parkinsons.      All other systems reviewed and are negative.     Personal History     No past medical history on file.    Pelvic fracture in 2022; has not walked since           No past surgical history on file.    Family History:  Otherwise pertinent FHx was reviewed and unremarkable.     Social History:  reports that  "she has never smoked. She has never used smokeless tobacco. She reports that she does not drink alcohol and does not use drugs.  Social History     Social History Narrative   • Not on file       Medications:  Available home medication information reviewed.  (Not in a hospital admission)      Allergies   Allergen Reactions   • Codeine Nausea And Vomiting       Objective   Objective     Vital Signs:   Temp:  [98.7 °F (37.1 °C)] 98.7 °F (37.1 °C)  Heart Rate:  [54-64] 64  Resp:  [18] 18  BP: (116-158)/(53-76) 158/67  Flow (L/min):  [2] 2       Physical Exam   Constitutional: Awake, alert, movements slow, gives occasional one-word answers but mostly looks to her partner when I ask a question.   Eyes: PER, sclerae anicteric, does have conjunctival injection bilat, no mucoid discharge or matting  HENT: NCAT, mucous membranes moist.  I did not appreciate facial swelling; perhaps some at the left jaw   Neck: Supple, , trachea midline  Respiratory: Clear to auscultation bilaterally, nonlabored respirations   Cardiovascular: RRR, no murmurs  Gastrointestinal: Positive bowel sounds, soft, nontender, nondistended  Musculoskeletal: No bilateral ankle edema, no clubbing or cyanosis to extremities; both ankles are plnatarflexed and contractured.  RUE mild edema, not tender, all joints nl ROM without apparent pain   Psychiatric: calm, cooperative.  Flat affect   Neurologic: At her partner's encouragement, she turns to me and says quite clearly, \"Hello, doctor\".  Movements are slow. Tone is high.  No tremor noted.  Facies is masklike   Skin: No rashes.        Result Review:  I have personally reviewed the results from the time of this admission to 9/18/2022 16:43 EDT and agree with these findings:  [x]  Laboratory list / accordion  [x]  Microbiology  [x]  Radiology  []  EKG/Telemetry   []  Cardiology/Vascular   []  Pathology  []  Old records  []  Other:  Most notable findings include: WBC nl, CTA no SVC, K 3.1      LAB RESULTS:    "   Lab 09/18/22  1358   WBC 8.35   HEMOGLOBIN 11.6*   HEMATOCRIT 35.9   PLATELETS 239   NEUTROS ABS 6.51   IMMATURE GRANS (ABS) 0.03   LYMPHS ABS 1.02   MONOS ABS 0.74   EOS ABS 0.04   MCV 90.9         Lab 09/18/22  1358   SODIUM 140   POTASSIUM 3.1*   CHLORIDE 102   CO2 28.0   ANION GAP 10.0   BUN 10   CREATININE 0.87   EGFR 67.4   GLUCOSE 92   CALCIUM 8.7   TSH 3.770         Lab 09/18/22  1358   TOTAL PROTEIN 6.1   ALBUMIN 3.40*   GLOBULIN 2.7   ALT (SGPT) 49*   AST (SGOT) 50*   BILIRUBIN 0.7   ALK PHOS 104         Lab 09/18/22  1358   PROBNP 1,264.0   TROPONIN T 0.016                 UA    Urinalysis 4/14/22 4/14/22    0152 0152   Squamous Epithelial Cells, UA  None Seen   Specific [place], UA 1.006    Ketones, UA Negative    Blood, UA Trace (A)    Leukocytes, UA Negative    Nitrite, UA Negative    RBC, UA  0-2   WBC, UA  None Seen   Bacteria, UA  None Seen   (A) Abnormal value              Microbiology Results (last 10 days)     ** No results found for the last 240 hours. **          CT Chest With & Without Contrast Diagnostic    Result Date: 9/18/2022  DATE OF EXAM: 9/18/2022 3:25 PM  PROCEDURE: CT CHEST W WO CONTRAST DIAGNOSTIC-  INDICATIONS: Right face, arm swelling, possible vascular lymphatic drainage, SVC syndrome?  COMPARISON: No comparisons available.  TECHNIQUE: Routine transaxial slices were obtained through the chest before and after the intravenous administration of 75 mL of Isovue 300. Reconstructed coronal and sagittal images were also obtained. Automated exposure control and iterative construction methods were used.  The radiation dose reduction device was turned on for each scan per the ALARA (As Low as Reasonably Achievable) protocol.  FINDINGS: The internal jugular veins, right and left subclavian and brachiocephalic veins, and superior vena cava appears grossly patent without discrete filling defect to suggest thrombus or evidence of external compression. There is mild atherosclerosis of the  thoracic aorta which appears normal in caliber. Unremarkable appearance of the cardiac chambers. Mitral annular calcifications are noted. Mild scattered coronary artery calcifications are seen. No bulky or enlarged mediastinal or hilar adenopathy. A 2.2 cm heterogeneously hypodense left thyroid lobe nodule is noted. Bilateral breast prostheses are noted. There is questionable vague subcutaneous fat stranding at the right shoulder. No axillary lymphadenopathy. The trachea and mainstem bronchi are patent. There is a small left pleural effusion with associated left dependent passive atelectasis. There is a trace right pleural effusion. There is moderate centrilobular emphysema worst in the upper lobes. There is mild interlobular septal thickening. There is a vague region of groundglass opacity in the left upper lobe which may be infectious or inflammatory. No pneumothorax. No acute osseous findings. Chronic fracture deformity of the proximal left humerus. There is partial visualization of posterior instrumented lumbar spinal fusion hardware.      Impression: Allowing for some venous mixing artifact, there is no definite evidence of venous thrombosis or external compression of the superior vena cava or neck/upper extremity veins.  Small region of groundglass in the left upper lobe which may be infectious or inflammatory.  Small left and trace right pleural effusions. Mild interstitial edema.  Background emphysema.  Chronic fracture deformity of the left proximal humerus.  This report was finalized on 9/18/2022 4:02 PM by Demian Machado MD.        Results for orders placed during the hospital encounter of 04/13/22    Adult Transthoracic Echo Complete W/ Cont if Necessary Per Protocol    Interpretation Summary  · Estimated right ventricular systolic pressure from tricuspid regurgitation is moderately elevated (45-55 mmHg). Calculated right ventricular systolic pressure from tricuspid regurgitation is 47 mmHg.  · Left  ventricular diastolic function is consistent with age.  · The left ventricular ejection fraction is 65% with normal wall motion  · There is heavy calcification of the mitral annulus with no evidence of mitral stenosis      Assessment & Plan   Assessment & Plan     Active Hospital Problems    Diagnosis  POA   • Pneumonia [J18.9]  Yes     82 yr old woman with dementia, brought by partner after awakening this morning with swelling of face and R arm.  Used a new mattress last night.     Face/RUE swelling   - was worst in morning, is improving throughout day  - No fever, leukocytosis, or evidence of cellulitis, or symptoms suggesting allergic rxn  - CTA without SVC compression or other vascular anomaly  - will monitor overnight, keep HOB up.     Hypoxia, mild:  Sat 88%  Mild JESSICA pneumonia, small effusion L    - Vanc/Zosyn given in ED   - will change to CTX/doxycycline and wean as indicated     Atrial fibrillation  - continue Eliquis, amiodarone, metoprolol  - Echo 4/2022:  EF 65.     Hypokalemia  - replace     Mild transaminase elevation  - higher than 4/2022  - possibly congestion; observe.    Neurologic disease, chronic, consider Parkinson's  - discuss w  whether she has/wants a neurologist        DVT prophylaxis:  eliquis      CODE STATUS:  Full, per partner.  Patient did not answer   There are no questions and answers to display.         Courtney Carver MD  09/18/22

## 2022-09-19 LAB
ALBUMIN SERPL-MCNC: 2.6 G/DL (ref 3.5–5.2)
ALBUMIN/GLOB SERPL: 1 G/DL
ALP SERPL-CCNC: 85 U/L (ref 39–117)
ALT SERPL W P-5'-P-CCNC: 42 U/L (ref 1–33)
ANION GAP SERPL CALCULATED.3IONS-SCNC: 9 MMOL/L (ref 5–15)
AST SERPL-CCNC: 41 U/L (ref 1–32)
BILIRUB SERPL-MCNC: 0.4 MG/DL (ref 0–1.2)
BUN SERPL-MCNC: 9 MG/DL (ref 8–23)
BUN/CREAT SERPL: 11.3 (ref 7–25)
CALCIUM SPEC-SCNC: 8.1 MG/DL (ref 8.6–10.5)
CHLORIDE SERPL-SCNC: 104 MMOL/L (ref 98–107)
CO2 SERPL-SCNC: 25 MMOL/L (ref 22–29)
CREAT SERPL-MCNC: 0.8 MG/DL (ref 0.57–1)
DEPRECATED RDW RBC AUTO: 49.8 FL (ref 37–54)
EGFRCR SERPLBLD CKD-EPI 2021: 74.6 ML/MIN/1.73
ERYTHROCYTE [DISTWIDTH] IN BLOOD BY AUTOMATED COUNT: 14.6 % (ref 12.3–15.4)
GLOBULIN UR ELPH-MCNC: 2.6 GM/DL
GLUCOSE SERPL-MCNC: 89 MG/DL (ref 65–99)
HCT VFR BLD AUTO: 29.4 % (ref 34–46.6)
HCT VFR BLD AUTO: 34.9 % (ref 34–46.6)
HGB BLD-MCNC: 11.3 G/DL (ref 12–15.9)
HGB BLD-MCNC: 9.6 G/DL (ref 12–15.9)
MCH RBC QN AUTO: 29.9 PG (ref 26.6–33)
MCHC RBC AUTO-ENTMCNC: 32.7 G/DL (ref 31.5–35.7)
MCV RBC AUTO: 91.6 FL (ref 79–97)
PLATELET # BLD AUTO: 204 10*3/MM3 (ref 140–450)
PMV BLD AUTO: 9.4 FL (ref 6–12)
POTASSIUM SERPL-SCNC: 3.6 MMOL/L (ref 3.5–5.2)
PROT SERPL-MCNC: 5.2 G/DL (ref 6–8.5)
RBC # BLD AUTO: 3.21 10*6/MM3 (ref 3.77–5.28)
SODIUM SERPL-SCNC: 138 MMOL/L (ref 136–145)
WBC NRBC COR # BLD: 7.28 10*3/MM3 (ref 3.4–10.8)

## 2022-09-19 PROCEDURE — 0 POTASSIUM CHLORIDE 10 MEQ/100ML SOLUTION: Performed by: EMERGENCY MEDICINE

## 2022-09-19 PROCEDURE — 85018 HEMOGLOBIN: CPT | Performed by: INTERNAL MEDICINE

## 2022-09-19 PROCEDURE — 99225 PR SBSQ OBSERVATION CARE/DAY 25 MINUTES: CPT | Performed by: INTERNAL MEDICINE

## 2022-09-19 PROCEDURE — 85027 COMPLETE CBC AUTOMATED: CPT | Performed by: INTERNAL MEDICINE

## 2022-09-19 PROCEDURE — 97162 PT EVAL MOD COMPLEX 30 MIN: CPT

## 2022-09-19 PROCEDURE — 97110 THERAPEUTIC EXERCISES: CPT

## 2022-09-19 PROCEDURE — 80053 COMPREHEN METABOLIC PANEL: CPT | Performed by: INTERNAL MEDICINE

## 2022-09-19 PROCEDURE — 96366 THER/PROPH/DIAG IV INF ADDON: CPT

## 2022-09-19 PROCEDURE — 97530 THERAPEUTIC ACTIVITIES: CPT

## 2022-09-19 PROCEDURE — 25010000002 CEFTRIAXONE PER 250 MG: Performed by: INTERNAL MEDICINE

## 2022-09-19 PROCEDURE — 85014 HEMATOCRIT: CPT | Performed by: INTERNAL MEDICINE

## 2022-09-19 RX ADMIN — DONEPEZIL HYDROCHLORIDE 5 MG: 5 TABLET, FILM COATED ORAL at 20:46

## 2022-09-19 RX ADMIN — APIXABAN 2.5 MG: 2.5 TABLET, FILM COATED ORAL at 09:26

## 2022-09-19 RX ADMIN — RISPERIDONE 0.25 MG: 0.25 TABLET ORAL at 09:24

## 2022-09-19 RX ADMIN — DOXYCYCLINE 100 MG: 100 INJECTION, POWDER, LYOPHILIZED, FOR SOLUTION INTRAVENOUS at 20:47

## 2022-09-19 RX ADMIN — Medication 5 MG: at 20:46

## 2022-09-19 RX ADMIN — DOXYCYCLINE 100 MG: 100 INJECTION, POWDER, LYOPHILIZED, FOR SOLUTION INTRAVENOUS at 09:26

## 2022-09-19 RX ADMIN — POTASSIUM CHLORIDE 20 MEQ: 750 CAPSULE, EXTENDED RELEASE ORAL at 09:25

## 2022-09-19 RX ADMIN — LORAZEPAM 0.5 MG: 0.5 TABLET ORAL at 09:37

## 2022-09-19 RX ADMIN — RISPERIDONE 0.5 MG: 1 TABLET ORAL at 20:47

## 2022-09-19 RX ADMIN — ASPIRIN 81 MG: 81 TABLET, COATED ORAL at 09:25

## 2022-09-19 RX ADMIN — METOPROLOL TARTRATE 25 MG: 25 TABLET, FILM COATED ORAL at 20:47

## 2022-09-19 RX ADMIN — FAMOTIDINE 20 MG: 20 TABLET ORAL at 09:25

## 2022-09-19 RX ADMIN — AMIODARONE HYDROCHLORIDE 200 MG: 200 TABLET ORAL at 20:47

## 2022-09-19 RX ADMIN — METOPROLOL TARTRATE 25 MG: 25 TABLET, FILM COATED ORAL at 09:25

## 2022-09-19 RX ADMIN — Medication 10 ML: at 09:26

## 2022-09-19 RX ADMIN — LORAZEPAM 0.5 MG: 0.5 TABLET ORAL at 20:47

## 2022-09-19 RX ADMIN — FOLIC ACID 1 MG: 1 TABLET ORAL at 09:25

## 2022-09-19 RX ADMIN — Medication 10 ML: at 20:48

## 2022-09-19 RX ADMIN — SODIUM CHLORIDE 1 G: 900 INJECTION INTRAVENOUS at 20:48

## 2022-09-19 RX ADMIN — FAMOTIDINE 20 MG: 20 TABLET ORAL at 20:47

## 2022-09-19 RX ADMIN — APIXABAN 2.5 MG: 2.5 TABLET, FILM COATED ORAL at 20:47

## 2022-09-19 RX ADMIN — POTASSIUM CHLORIDE 10 MEQ: 7.46 INJECTION, SOLUTION INTRAVENOUS at 01:27

## 2022-09-19 RX ADMIN — FLUOXETINE HYDROCHLORIDE 40 MG: 20 CAPSULE ORAL at 09:25

## 2022-09-19 RX ADMIN — AMIODARONE HYDROCHLORIDE 200 MG: 200 TABLET ORAL at 09:25

## 2022-09-19 RX ADMIN — POTASSIUM CHLORIDE 10 MEQ: 7.46 INJECTION, SOLUTION INTRAVENOUS at 00:25

## 2022-09-19 NOTE — PLAN OF CARE
Goal Outcome Evaluation:   SR/SB. 2L nasal cannula. Only oriented to self. Family at bedside. Pressure injuries on coccyx and right hip, barrier cream applied, WOC consult in place. Potassium replacement completed this shift, recheck in place. No complaints of pain or SOB.

## 2022-09-19 NOTE — CASE MANAGEMENT/SOCIAL WORK
Discharge Planning Assessment  [facility]     Patient Name: Laura Wallace  MRN: [id number]  Today's Date: 9/19/2022    Admit Date: 9/18/2022     Discharge Needs Assessment     Row Name 09/19/22 1429       Living Environment    People in Home spouse    Current Living Arrangements home    Primary Care Provided by other (see comments)    Provides Primary Care For no one, unable/limited ability to care for self    Family Caregiver if Needed spouse;other (see comments)    Quality of Family Relationships involved;supportive    Able to Return to Prior Arrangements yes       Resource/Environmental Concerns    Resource/Environmental Concerns none       Transition Planning    Patient/Family Anticipates Transition to home with help/services    Patient/Family Anticipated Services at Transition ;rehabilitation services    Transportation Anticipated family or friend will provide       Discharge Needs Assessment    Readmission Within the Last 30 Days no previous admission in last 30 days    Equipment Currently Used at Home wheelchair    Concerns to be Addressed discharge planning    Anticipated Changes Related to Illness none    Equipment Needed After Discharge none               Discharge Plan     Row Name 09/19/22 1429       Plan    Plan Home    Patient/Family in Agreement with Plan yes    Plan Comments Spoke with patient's  by phone to initiate discharge planning.  She lives with her  in [facility].  Prior to admission, she used a wheelchair for mobility.  She has 24/7 caregivers that assist with ADL's.  She has no other DME at home and is not current with home health.  Her PCP is Justin Brumfield.  She has an advanced directive in EPIC.  Ms. Wallace has RX coverage and has her scripts filled at [facility].  Per , goal is home at discharge.  Will await therapy recommendations to determin eproper discharge placement.  CM will continue to follow.    Final Discharge Disposition Code 01 - home  or self-care              Continued Care and Services - Admitted Since 9/18/2022    Coordination has not been started for this encounter.       Expected Discharge Date and Time     Expected Discharge Date Expected Discharge Time    Sep 21, 2022          Demographic Summary     Row Name 09/19/22 1428       General Information    Admission Type inpatient    Arrived From emergency department    Referral Source admission list    Reason for Consult discharge planning    Preferred Language English               Functional Status     Row Name 09/19/22 1429       Functional Status    Usual Activity Tolerance fair    Current Activity Tolerance fair       Functional Status, IADL    Medications completely dependent    Meal Preparation completely dependent    Housekeeping completely dependent    Laundry completely dependent    Shopping completely dependent               Psychosocial    No documentation.                Abuse/Neglect    No documentation.                Legal    No documentation.                Substance Abuse    No documentation.                Patient Forms    No documentation.                   Daria Rico RN

## 2022-09-19 NOTE — NURSING NOTE
Reason for Wound, Ostomy and Continence (WOC) Nursing Consult: coccyx, right hip    Patient out of bed to chair,  present and another staff member at bedside to assist with turning.     Skin assessed and the following noted:    1.Wound Assessment    Wound Type: Pressure Injury Deep Tissue Pressure Injury (DTPI) present on admission, per  wounds heal and then reoccur    Location:   Right greater trochanter/hip: 1.75 x 1 x 0 cm, non-blanchable, appears as a purple blood blister, scan amount of serosanguineous drainage to patient's brief at this location, no oder noted;  Right gluteal/sacum - evolving DTPI: 2 x 1 x 0 cm, eschar vs scab, dry, non-blanchable, no drainage  Wound Edges: Open  Periwound Skin: blanchable   Pain: No   Care provided: The wounds were cleansed with normal saline, patted dry, applied skin protectant to periwound skin and applied Therahoney gel to wound beds and covered with silicone foam border dressings.  Educated patient's  on Thera honey gel as he said that patient had been using Muciprocin on the wounds previously but it did not seem to be helping as wounds returned.    Wound Image:   Right gluteal/sacrum      Right greater trochanter/hip      Recommendation(s) for management of wound: see wound/skin care orders      Most recent Waldo Scale score:  Sensory Perception: 4-->no impairment  Moisture: 3-->occasionally moist  Activity: 2-->chairfast  Mobility: 2-->very limited  Nutrition: 3-->adequate  Friction and Shear: 1-->problem  Waldo Score: 15 (09/19/22 0800)      Pressure Injury Prevention Protocol (initiate for Waldo Score of 18 or less):     *Please apply and inflate waffle topper if not already done, and utilize waffle cushion when up to chair.    *Keep skin dry, turn q 2 hr, keep heels elevated and offloaded with offloading heel boots.    *Apply z-guard to bottom BID and after any incontinence episodes.    *Apply silicone foam border dressings to all bony  prominences.    *Follow C.A.R.E protocol if medical devices (Bipap, gotti, Ng tube, etc) are being used.     All skin interventions in place.  Head to toe assessment completed. Heels red, slow to citlali, intact and offloaded.  Discussed plan of care with RN.  Requested heel boots for patient.      Thank you for consulting the WOC Nurse.  WOC Team will plan to follow up. If alteration to skin integrity or change in wound bed presentation please consult WOC team.    Please note that parts of this note were completed with a voice recognition program. Review of the dictation was done, however, occasionally words are mistranscribed.

## 2022-09-19 NOTE — PLAN OF CARE
Goal Outcome Evaluation:  Plan of Care Reviewed With: patient        Progress: improving  Outcome Evaluation: PT eval completed. Presents w/ PNA/ pl.eff. & resp. insuff, Alz Dem, B foot drop, L4 comp fx s/p fall 7/'22 (w/c Bd.since), mult pelvic fxs s/p fall 4/'22, press.wds coccyx & R hip, decr strength/endurance & impaired funct mobil. Rolled L/R w/ max 2A to place Depends (incont B/B) & transf to sitting EOB, STS w/ max 2 A (BUE supp) & SPT max 2A to chair@ FOB, + performed bal.activ & ther exer w/ freq rests d/t dozing off, then pulling at IV & mask. Noted orthostat BP drop W/ mobil, desat 92% on 2 L, & HR 69 (baseline hussein). Will suggest MAFO foot-hold boots for mobil., & OT consult. Recommend home w/ spouse's & CG's assist (resume 24/7 care), & continuing w/ HHPT.

## 2022-09-19 NOTE — THERAPY EVALUATION
"Patient Name: Laura Wallace  : 1940    MRN: [id number]                              Today's Date: 2022       Admit Date: 2022    Visit Dx:     ICD-10-CM ICD-9-CM   1. Acute respiratory failure with hypoxia (HCC)  J96.01 518.81   2. Pneumonia of left upper lobe due to infectious organism  J18.9 486   3. Facial swelling  R22.0 784.2   4. Swelling in right armpit  M79.89 729.81   5. Hypokalemia  E87.6 276.8     Patient Active Problem List   Diagnosis   • Late onset Alzheimer's disease without behavioral disturbance (HCC)   • Multiple closed fractures of pelvis without disruption of pelvic ring, initial encounter ([facility])   • Pneumonia   • Paroxysmal atrial fibrillation with rapid ventricular response (HCC)   • Acute respiratory failure with hypoxia (HCC)     History reviewed. No pertinent past medical history.  History reviewed. No pertinent surgical history.   General Information     Row Name 22 0850          Physical Therapy Time and Intention    Document Type evaluation  -DM     Mode of Treatment physical therapy  -DM     Row Name 22 0850          General Information    Patient Profile Reviewed yes  -DM     Prior Level of Function max assist:;bed mobility;transfer;dependent:;gait;ADL's;home management;cooking;cleaning;driving;shopping  poor historian(Alz);per spouse,CG /,7d/wk;HHPT 1x/wk s/p fall  &L4 comp fx(see by [facility];not adm);w/c bd.since;CG transf.to w/c 11am daily,eats at table,transf to bed 11pm;stands@sink for a few exer;spouse does HM,etc  -DM     Existing Precautions/Restrictions fall;oxygen therapy device and L/min;other (see comments)  goes by\"Pat\";Alz.;PNA,AF,press.wds coccyx&Rhip;pelv.fxs s/p fall ([facility],thenSNF;WBAT;amb.135ft Rwx till fall (L4 comp.fx;[facility].did not adm.;HHPT,+CG(7d/wk);w/cBd. since;B foot drop(PF contract.);incont (per spouse,useDepends);fidgety/pulls IV  -DM     Barriers to Rehab medically complex;previous functional " deficit;cognitive status;physical barrier  -DM     Row Name 22          Living Environment    People in Home spouse  -DM     Row Name 22          Home Main Entrance    Number of Stairs, Main Entrance other (see comments)  ramp@front;3 into garage  -DM     Row Name 22          Stairs Within Home, Primary    Stairs, Within Home, Primary 1 1/2 story w/ basement;pt use gr fl B/B;Spouse installed 1 bedrail;W.I. show.w/ Ba.Bn.; comfort ht.commode  -DM     Number of Stairs, Within Home, Primary other (see comments)  doesn't access  -DM     Row Name 2250          Cognition    Orientation Status (Cognition) oriented to;person  pt can recall name & , but not spouse's (delayed response to acknowledge he was her )  -DM     Row Name 22          Safety Issues, Functional Mobility    Safety Issues Affecting Function (Mobility) ability to follow commands;awareness of need for assistance;impulsivity;insight into deficits/self-awareness;judgment;safety precaution awareness;safety precautions follow-through/compliance;sequencing abilities  -DM     Impairments Affecting Function (Mobility) balance;cognition;endurance/activity tolerance;muscle tone abnormal;pain;postural/trunk control;range of motion (ROM);strength  -DM     Cognitive Impairments, Mobility Safety/Performance attention;awareness, need for assistance;impulsivity;insight into deficits/self-awareness;judgment;safety precaution awareness;safety precaution follow-through;sequencing abilities  -DM           User Key  (r) = Recorded By, (t) = Taken By, (c) = Cosigned By    Initials Name Provider Type    DM Charlene Grant, PT Physical Therapist               Mobility     Row Name 22          Bed Mobility    Bed Mobility rolling left;rolling right;scooting/bridging;supine-sit  -DM     Rolling Left [place] (Bed Mobility) verbal cues;maximum assist (25% patient effort);2 person assist  max 2A w/ draw  sheet to roll trunk & reach R hand to L bedrail;incont BM on pad; cleaned;Depends placed  -DM     Rolling Right [place] (Bed Mobility) verbal cues;maximum assist (25% patient effort);2 person assist  reached L hand to R rail; Depends reposn.  -DM     Scooting/Bridging [place] (Bed Mobility) verbal cues;dependent (less than 25% patient effort);2 person assist  -DM     Supine-Sit [place] (Bed Mobility) verbal cues;dependent (less than 25% patient effort);1 person to manage equipment  pt pushing R & post.;resisting flex of hips/knees to come to sitting position EOB;dylan ankles PF'd & feet sliding forw; PT stabilized hips/knees in flex & blocked feet on floor to prevent LOB  -DM     Assistive Device (Bed Mobility) bed rails;draw sheet;head of bed elevated  -DM     Comment, (Bed Mobility) MD assessed while PT issued Lift sling, waff cush, ch. alarm,Depends,tele patches (2 replaced),mesh panty/pad (opted w/ brief per spouse's req.,as pt incont BM), pads, mask;spouse stating pt wears sketchers(PT placed shoes on B feet;pt w/ signif foot drop/PF contractures);has pure wick,IV, o2; Head lat flexed & sl. rot to L;Once pt @EOB,noted post. & L lat.lean,+ keeps L knee in ext.;chair placed @ FOB for SPT  -DM     Row Name 09/19/22 0850          Transfers    Comment, (Transfers) cues for HP, seq;pt fidgeting w/ IV & flexing elbow (alarmed x 3);PT secured w/ tape  -DM     Row Name 09/19/22 0850          Bed-Chair Transfer    Bed-Chair [place] (Transfers) verbal cues;maximum assist (25% patient effort);2 person assist  -DM     Comment, (Bed-Chair Transfer) Def. AD, to allow close guarding; pt init WB through LUE resting on chair armrest, then placed B hands on PT's arms for UE supp.; PCT behind pt, grasping gt belt  -DM     Row Name 09/19/22 0850          Sit-Stand Transfer    Sit-Stand [place] (Transfers) verbal cues;maximum assist (25% patient effort);2 person assist  stood EOB W/ PT stabiliz knees/ feet &  PCT stabilizing trunk & assisting w/ UE's  -DM     Row Name 09/19/22 0850          Gait/Stairs (Locomotion)    [place] Level (Gait) unable to assess  w/c bound  -DM     Comment, (Gait/Stairs) w/c bd.since fall 7/'22 & L4 comp.fx  -DM           User Key  (r) = Recorded By, (t) = Taken By, (c) = Cosigned By    Initials Name Provider Type    DM Charlene Grant, PT Physical Therapist               Obj/Interventions     Row Name 09/19/22 0850          Range of Motion Comprehensive    General Range of Motion lower extremity range of motion deficits identified  -DM     Comment, General Range of Motion B hips/knees gaming. 25-50%; B ankle DF gaming. 50-75%;BUE def.(req. OT conslt.)  -DM     Row Name 09/19/22 0850          Strength Comprehensive (MMT)    General Manual Muscle Testing (MMT) Assessment lower extremity strength deficits identified  -DM     Comment, General Manual Muscle Testing (MMT) Assessment BLE grossly 2- to 3+/5; BUE def.  -DM     Row Name 09/19/22 0850          Motor Skills    Therapeutic Exercise hip;knee;ankle  also did PROM of neck in all planes x 1-2 reps  -DM     Row Name 09/19/22 0850          Hip (Therapeutic Exercise)    Hip (Therapeutic Exercise) AROM (active range of motion);AAROM (active assistive range of motion);PROM (passive range of motion)  -DM     Hip AROM (Therapeutic Exercise) bilateral;extension;10 repetitions;sitting  grav asst ext. for sit. marches  -DM     Hip AAROM (Therapeutic Exercise) bilateral;flexion;aDduction  max A for flex & mod A for add.  -DM     Hip PROM (Therapeutic Exercise) bilateral;aBduction;external rotation;internal rotation  cues for pass. hip abd & rot.  -DM     Row Name 09/19/22 0850          Knee (Therapeutic Exercise)    Knee (Therapeutic Exercise) AROM (active range of motion);AAROM (active assistive range of motion);PROM (passive range of motion);isometric exercises  -DM     Knee AROM (Therapeutic Exercise) bilateral;flexion;extension;SAQ (short arc  quad);LAQ (long arc quad);heel slides;sitting;10 repetitions  grav asst flex for LAQ, & Ext for h.slides  -DM     Knee AAROM (Therapeutic Exercise) bilateral;flexion;extension;sitting;10 repetitions  max A for flex w/ h.slides & Mod A for ext w/ LAQ  -DM     Knee PROM (Therapeutic Exercise) bilateral;flexion;extension;sitting;10 repetitions  pass. knee ext for LAQ & SAQ, & flex for h.slides  -DM     Knee Isometrics (Therapeutic Exercise) bilateral;quad sets;sitting;10 repetitions  -DM     Row Name 09/19/22 0850          Ankle (Therapeutic Exercise)    Ankle (Therapeutic Exercise) PROM (passive range of motion)  -DM     Ankle PROM (Therapeutic Exercise) bilateral;dorsiflexion;plantarflexion;sitting;10 repetitions;other (see comments)  AC, pass HC stretches  -DM     Row Name 09/19/22 0850          Balance    Balance Assessment sitting static balance;sitting dynamic balance;standing static balance;standing dynamic balance  -DM     Static Sitting Balance verbal cues;moderate assist  maintaining midline EOB, then UIC  -DM     Dynamic Sitting Balance verbal cues;maximum assist  recip scooting;init LOB post. & to R@ EOB, then LOB to L when UIC  -DM     Position, Sitting Balance unsupported;sitting edge of bed;sitting in chair  -DM     Static Standing Balance verbal cues;maximum assist;2-person assist  -DM     Dynamic Standing Balance verbal cues;maximum assist;2-person assist  -DM     Position/Device Used, Standing Balance supported;other (see comments)  BUE supp  -DM     Balance Interventions sitting;standing;static;dynamic;weight shifting activity  -DM           User Key  (r) = Recorded By, (t) = Taken By, (c) = Cosigned By    Initials Name Provider Type    DM Charlene Grant, PT Physical Therapist               Goals/Plan     Row Name 09/19/22 0850          Bed Mobility Goal 1 (PT)    Activity/Assistive Device (Bed Mobility Goal 1, PT) bed mobility activities, all  -DM     [place] Level/Cues Needed (Bed Mobility  Goal 1, PT) maximum assist (25-49% patient effort)  -DM     Time Frame (Bed Mobility Goal 1, PT) long term goal (LTG);1 week  -DM     Row Name 09/19/22 0850          Transfer Goal 1 (PT)    Activity/Assistive Device (Transfer Goal 1, PT) sit-to-stand/stand-to-sit;bed-to-chair/chair-to-bed  -DM     [place] Level/Cues Needed (Transfer Goal 1, PT) maximum assist (25-49% patient effort)  -DM     Time Frame (Transfer Goal 1, PT) long term goal (LTG);1 week  -DM     Row Name 09/19/22 0850          Problem Specific Goal 1 (PT)    Problem Specific Goal 1 (PT) kennedy 5 min.of sitting activities EOB/UIC w/ stable VS  -DM     Time Frame (Problem Specific Goal 1, PT) long-term goal (LTG);1 week  -DM     Row Name 09/19/22 0850          Patient Education Goal (PT)    Activity (Patient Education Goal, PT) HEP exer  -DM     [place]/Cues/Accuracy (Memory Goal 2, PT) demonstrates adequately  w/ spouse's assistance  -DM     Time Frame (Patient Education Goal, PT) long term goal (LTG);1 week  -DM     Row Name 09/19/22 0850          Therapy Assessment/Plan (PT)    Planned Therapy Interventions (PT) balance training;bed mobility training;home exercise program;patient/family education;strengthening;transfer training  -DM           User Key  (r) = Recorded By, (t) = Taken By, (c) = Cosigned By    Initials Name Provider Type    DM Charlene Gratn, PT Physical Therapist               Clinical Impression     Row Name 09/19/22 0850          Pain    Additional Documentation Pain Scale: FACES Pre/Post-Treatment (Group)  -DM     Row Name 09/19/22 0850          Pain Scale: FACES Pre/Post-Treatment    Pain: FACES Scale, Pretreatment 0-->no hurt  -DM     Posttreatment Pain Rating 0-->no hurt  -DM     Row Name 09/19/22 0850          Plan of Care Review    Plan of Care Reviewed With patient  -DM     Progress improving  -DM     Outcome Evaluation PT eval completed. Presents w/ PNA/ pl.eff. & resp. insuff, Alz Dem, B foot drop, L4 comp fx s/p  fall 7/'22 (w/c Bd.since), mult pelvic fxs s/p fall 4/'22, press.wds coccyx & R hip, decr strength/endurance & impaired funct mobil. Rolled L/R w/ max 2A to place Depends (incont B/B) & transf to sitting EOB, STS w/ max 2 A (BUE supp) & SPT max 2A to chair@ FOB, + performed bal.activ & ther exer w/ freq rests d/t dozing off, then pulling at IV & mask. Noted orthostat BP drop W/ mobil, desat 92% on 2 L, & HR 69 (baseline hussein). Will suggest MAFO foot-hold boots for mobil., & OT consult. Recommend home w/ spouse's & CG's assist (resume 24/7 care), & continuing w/ HHPT.  -DM     Row Name 09/19/22 0850          Therapy Assessment/Plan (PT)    Patient/Family Therapy Goals Statement (PT) improved funct mobil  -DM     Rehab Potential (PT) good, to achieve stated therapy goals  -DM     Criteria for Skilled Interventions Met (PT) yes;meets criteria;skilled treatment is necessary  -DM     Therapy Frequency (PT) daily  -DM     Row Name 09/19/22 0850          Vital Signs    Pre Systolic BP Rehab 140  -DM     Pre Treatment Diastolic BP 64  -DM     Post Systolic BP Rehab 118  -DM     Post Treatment Diastolic BP 55  -DM     Pretreatment Heart Rate (beats/min) 58  -DM     Intratreatment Heart Rate (beats/min) 69  -DM     Posttreatment Heart Rate (beats/min) 63  -DM     Pre SpO2 (%) 94  -DM     O2 Delivery Pre Treatment nasal cannula  -DM     Intra SpO2 (%) 92  -DM     O2 Delivery Intra Treatment nasal cannula  -DM     Post SpO2 (%) 95  -DM     O2 Delivery Post Treatment nasal cannula  -DM     Pre Patient Position Supine  -DM     Intra Patient Position Standing  -DM     Post Patient Position Sitting  -DM     Row Name 09/19/22 0850          Positioning and Restraints    Pre-Treatment Position in bed  -DM     Post Treatment Position chair  -DM     In Chair notified nsg;reclined;call light within reach;encouraged to call for assist;exit alarm on;with family/caregiver;LUE elevated;waffle cushion;on mechanical lift sling;legs elevated   pillows to L lat trunk,& under LUE to maintain midline  -DM           User Key  (r) = Recorded By, (t) = Taken By, (c) = Cosigned By    Initials Name Provider Type    Charlene Hedrick, PT Physical Therapist               Outcome Measures     Row Name 09/19/22 0850          How much help from another person do you currently need...    Turning from your back to your side while in flat bed without using bedrails? 2  -DM     Moving from lying on back to sitting on the side of a flat bed without bedrails? 2  -DM     Moving to and from a bed to a chair (including a wheelchair)? 2  -DM     Standing up from a chair using your arms (e.g., wheelchair, bedside chair)? 1  -DM     Climbing 3-5 steps with a railing? 1  -DM     To walk in hospital room? 1  -DM     AM-PAC 6 Clicks Score (PT) 9  -DM     Highest level of mobility 3 --> Sat at edge of bed  -DM     Row Name 09/19/22 0850          Functional Assessment    Outcome Measure Options AM-PAC 6 Clicks Basic Mobility (PT)  -DM           User Key  (r) = Recorded By, (t) = Taken By, (c) = Cosigned By    Initials Name Provider Type    Charlene Hedrick, PT Physical Therapist                             Physical Therapy Education                 Title: PT OT SLP Therapies (In Progress)     Topic: Physical Therapy (In Progress)     Point: Mobility training (In Progress)     Learning Progress Summary           Patient Acceptance, E,D, NR by DM at 9/19/2022 1214   Significant Other Acceptance, E,D, NR by DM at 9/19/2022 1214                   Point: Home exercise program (In Progress)     Learning Progress Summary           Patient Acceptance, E,D, NR by DM at 9/19/2022 1214   Significant Other Acceptance, E,D, NR by DM at 9/19/2022 1214                   Point: Body mechanics (In Progress)     Learning Progress Summary           Patient Acceptance, E,D, NR by DM at 9/19/2022 1214   Significant Other Acceptance, E,D, NR by DM at 9/19/2022 1214                   Point: Precautions  (In Progress)     Learning Progress Summary           Patient Acceptance, E,D, NR by DM at 9/19/2022 1214   Significant Other Acceptance, E,D, NR by DM at 9/19/2022 1214                               User Key     Initials Effective Dates Name Provider Type Discipline    DM 06/16/21 -  Charlene Grant, PT Physical Therapist PT              PT Recommendation and Plan  Planned Therapy Interventions (PT): balance training, bed mobility training, home exercise program, patient/family education, strengthening, transfer training  Plan of Care Reviewed With: patient  Progress: improving  Outcome Evaluation: PT eval completed. Presents w/ PNA/ pl.eff. & resp. insuff, Alz Dem, B foot drop, L4 comp fx s/p fall 7/'22 (w/c Bd.since), mult pelvic fxs s/p fall 4/'22, press.wds coccyx & R hip, decr strength/endurance & impaired funct mobil. Rolled L/R w/ max 2A to place Depends (incont B/B) & transf to sitting EOB, STS w/ max 2 A (BUE supp) & SPT max 2A to chair@ FOB, + performed bal.activ & ther exer w/ freq rests d/t dozing off, then pulling at IV & mask. Noted orthostat BP drop W/ mobil, desat 92% on 2 L, & HR 69 (baseline hussein). Will suggest MAFO foot-hold boots for mobil., & OT consult. Recommend home w/ spouse's & CG's assist (resume 24/7 care), & continuing w/ HHPT.     Time Calculation:    PT Charges     Row Name 09/19/22 1214             Time Calculation    Start Time 0850  -DM      PT Received On 09/19/22  -DM      PT Goal Re-Cert Due Date 09/29/22  -DM              Time Calculation- PT    Total Timed Code Minutes-  minute(s)  -DM              Timed Charges    18795 - PT Therapeutic Exercise Minutes 20  -DM      92601 - PT Therapeutic Activity Minutes 31  -DM              Untimed Charges    PT Eval/Re-eval Minutes 60  -DM              Total Minutes    Timed Charges Total Minutes 51  -DM      Untimed Charges Total Minutes 60  -DM       Total Minutes 111  -DM            User Key  (r) = Recorded By, (t) = Taken By,  (c) = Cosigned By    Initials Name Provider Type    Charlene Hedrick, PT Physical Therapist              Therapy Charges for Today     Code Description Service Date Service Provider Modifiers Qty    54470631291 HC PT THER PROC EA 15 MIN 9/19/2022 Charlene Grant, PT GP 1    74735155676 HC PT THERAPEUTIC ACT EA 15 MIN 9/19/2022 Charlene Grant, PT GP 2    98982926060 HC PT EVAL MOD COMPLEXITY 4 9/19/2022 Charlene Grant, PT GP 1          PT G-Codes  Outcome Measure Options: AM-PAC 6 Clicks Basic Mobility (PT)  AM-PAC 6 Clicks Score (PT): 9    Charlene Grant, PT  9/19/2022

## 2022-09-19 NOTE — PROGRESS NOTES
[facility] Medicine Services  PROGRESS NOTE    Patient Name: Laura Wallace  : 1940  MRN: [id number]    Date of Admission: 2022  Primary Care Physician: Justin Brumfield MD    Subjective   Subjective     CC:  Facial swelling, PNA     HPI:  No acute events. States she feels ok.  at bedside and states that swelling is much improved.     ROS:  Gen- No fevers, chills  CV- No chest pain, palpitations  Resp- No cough, dyspnea  GI- No N/V/D, abd pain     Objective   Objective     Vital Signs:   Temp:  [97.5 °F (36.4 °C)-98.4 °F (36.9 °C)] 98.1 °F (36.7 °C)  Heart Rate:  [54-65] 62  Resp:  [16] 16  BP: (116-158)/(55-78) 118/55  Flow (L/min):  [2] 2     Physical Exam:  Constitutional: frail; no acute distress   HENT: NCAT, mucous membranes moist  Respiratory: Clear to auscultation bilaterally, respiratory effort normal; poor effort   Cardiovascular: RRR, no murmurs, rubs, or gallops  Gastrointestinal: Positive bowel sounds, soft, nontender, nondistended  Musculoskeletal: No bilateral ankle edema  Psychiatric: flat  Neurologic: strength symmetric in all extremities, Cranial Nerves grossly intact to confrontation, speech clear  Skin: No rashes    Results Reviewed:  LAB RESULTS:      Lab 22  1205 22  0625 22  1358   WBC  --  7.28 8.35   HEMOGLOBIN 11.3* 9.6* 11.6*   HEMATOCRIT 34.9 29.4* 35.9   PLATELETS  --  204 239   NEUTROS ABS  --   --  6.51   IMMATURE GRANS (ABS)  --   --  0.03   LYMPHS ABS  --   --  1.02   MONOS ABS  --   --  0.74   EOS ABS  --   --  0.04   MCV  --  91.6 90.9   PROCALCITONIN  --   --  0.07         Lab 22  0625 22  1358   SODIUM 138 140   POTASSIUM 3.6 3.1*   CHLORIDE 104 102   CO2 25.0 28.0   ANION GAP 9.0 10.0   BUN 9 10   CREATININE 0.80 0.87   EGFR 74.6 67.4   GLUCOSE 89 92   CALCIUM 8.1* 8.7   TSH  --  3.770         Lab 22  0625 22  1358   TOTAL PROTEIN 5.2* 6.1   ALBUMIN 2.60* 3.40*   GLOBULIN 2.6 2.7   ALT  (SGPT) 42* 49*   AST (SGOT) 41* 50*   BILIRUBIN 0.4 0.7   ALK PHOS 85 104         Lab 09/18/22  1358   PROBNP 1,264.0   TROPONIN T 0.016                 Brief Urine Lab Results  (Last result in the past 365 days)      Color   Clarity   Blood   Leuk Est   Nitrite   Protein   CREAT   Urine HCG        04/14/22 0152 Yellow   Clear   Trace   Negative   Negative   Negative                 Microbiology Results Abnormal     None          CT Chest With & Without Contrast Diagnostic    Result Date: 9/18/2022  DATE OF EXAM: 9/18/2022 3:25 PM  PROCEDURE: CT CHEST W WO CONTRAST DIAGNOSTIC-  INDICATIONS: Right face, arm swelling, possible vascular lymphatic drainage, SVC syndrome?  COMPARISON: No comparisons available.  TECHNIQUE: Routine transaxial slices were obtained through the chest before and after the intravenous administration of 75 mL of Isovue 300. Reconstructed coronal and sagittal images were also obtained. Automated exposure control and iterative construction methods were used.  The radiation dose reduction device was turned on for each scan per the ALARA (As Low as Reasonably Achievable) protocol.  FINDINGS: The internal jugular veins, right and left subclavian and brachiocephalic veins, and superior vena cava appears grossly patent without discrete filling defect to suggest thrombus or evidence of external compression. There is mild atherosclerosis of the thoracic aorta which appears normal in caliber. Unremarkable appearance of the cardiac chambers. Mitral annular calcifications are noted. Mild scattered coronary artery calcifications are seen. No bulky or enlarged mediastinal or hilar adenopathy. A 2.2 cm heterogeneously hypodense left thyroid lobe nodule is noted. Bilateral breast prostheses are noted. There is questionable vague subcutaneous fat stranding at the right shoulder. No axillary lymphadenopathy. The trachea and mainstem bronchi are patent. There is a small left pleural effusion with associated left  dependent passive atelectasis. There is a trace right pleural effusion. There is moderate centrilobular emphysema worst in the upper lobes. There is mild interlobular septal thickening. There is a vague region of groundglass opacity in the left upper lobe which may be infectious or inflammatory. No pneumothorax. No acute osseous findings. Chronic fracture deformity of the proximal left humerus. There is partial visualization of posterior instrumented lumbar spinal fusion hardware.      Impression: Allowing for some venous mixing artifact, there is no definite evidence of venous thrombosis or external compression of the superior vena cava or neck/upper extremity veins.  Small region of groundglass in the left upper lobe which may be infectious or inflammatory.  Small left and trace right pleural effusions. Mild interstitial edema.  Background emphysema.  Chronic fracture deformity of the left proximal humerus.  This report was finalized on 9/18/2022 4:02 PM by Demian Machado MD.        Results for orders placed during the hospital encounter of 04/13/22    Adult Transthoracic Echo Complete W/ Cont if Necessary Per Protocol    Interpretation Summary  · Estimated right ventricular systolic pressure from tricuspid regurgitation is moderately elevated (45-55 mmHg). Calculated right ventricular systolic pressure from tricuspid regurgitation is 47 mmHg.  · Left ventricular diastolic function is consistent with age.  · The left ventricular ejection fraction is 65% with normal wall motion  · There is heavy calcification of the mitral annulus with no evidence of mitral stenosis      I have reviewed the medications:  Scheduled Meds:amiodarone, 200 mg, Oral, Q12H  apixaban, 2.5 mg, Oral, Q12H  aspirin, 81 mg, Oral, Daily  cefTRIAXone, 1 g, Intravenous, Q24H  donepezil, 5 mg, Oral, Nightly  doxycycline, 100 mg, Intravenous, Q12H  famotidine, 20 mg, Oral, BID  FLUoxetine, 40 mg, Oral, Daily  folic acid, 1 mg, Oral,  Daily  LORazepam, 0.5 mg, Oral, BID  melatonin, 5 mg, Oral, Nightly  metoprolol tartrate, 25 mg, Oral, Q12H  potassium chloride, 20 mEq, Oral, Daily  risperiDONE, 0.25 mg, Oral, Daily  risperiDONE, 0.5 mg, Oral, Nightly  sodium chloride, 10 mL, Intravenous, Q12H      Continuous Infusions:   PRN Meds:.•  acetaminophen  •  potassium chloride  •  [COMPLETED] Insert peripheral IV **AND** sodium chloride  •  sodium chloride    Assessment & Plan   Assessment & Plan     Active Hospital Problems    Diagnosis  POA   • Acute respiratory failure with hypoxia (HCC) [J96.01]  Yes   • Pneumonia [J18.9]  Yes      Resolved Hospital Problems   No resolved problems to display.        Brief Hospital Course to date:  82 yr old woman with dementia, brought by partner after awakening this morning with swelling of face and R arm.       Face/RUE swelling   - was worst in morning, is improving throughout day  - Currently remains improved   - No fever, leukocytosis, or evidence of cellulitis, or symptoms suggesting allergic rxn  - CTA without SVC compression or other vascular anomaly     Hypoxia, mild:  Sat 88%  Mild JESSICA pneumonia, small effusion L    - Vanc/Zosyn given in ED -- continue rocephin/doxy   - BCx pending  - Wean O2 as tolerated      Atrial fibrillation  - continue Eliquis, amiodarone, metoprolol  - Echo 4/2022:  EF 65%     Hypokalemia  - replace      Mild transaminase elevation  - higher than 4/2022  - Improved      Neurologic disease, chronic, consider Parkinson's  - discuss w  whether she has/wants a neurologist      Dementia  Mood   - Continue aricept, prozac, ativan, risperdal        Expected Discharge Location and Transportation: home  Expected Discharge Date: 9/20    DVT prophylaxis:  Medical DVT prophylaxis orders are present.     AM-PAC 6 Clicks Score (PT): 9 (09/19/22 6820)    CODE STATUS:   Code Status and Medical Interventions:   Ordered at: 09/18/22 1700     Level Of Support Discussed With:    Next of Kin (If  No Surrogate)     Code Status (Patient has no pulse and is not breathing):    CPR (Attempt to Resuscitate)     Medical Interventions (Patient has pulse or is breathing):    Full Support       Norma Leger DO  09/19/22

## 2022-09-19 NOTE — PLAN OF CARE
Goal Outcome Evaluation:      Patient is Sinus Hari to NSR on the monitor.  Patient is on 2L NC.  Patient is oriented to self only, has been non verbal today.  Patient is assist times two with lift assist, w/c bound at home.  Purewick in use.  Patient takes pills crushed in pudding.  Patient up to chair with PT.  Wound Care to see patient.  Plan of care, patient to discharge back to home with .  Bed in lowest position and phone and call light in reach.

## 2022-09-20 ENCOUNTER — READMISSION MANAGEMENT (OUTPATIENT)
Dept: CALL CENTER | Facility: HOSPITAL | Age: 82
End: 2022-09-20

## 2022-09-20 ENCOUNTER — HOME HEALTH ADMISSION (OUTPATIENT)
Dept: HOME HEALTH SERVICES | Facility: HOME HEALTHCARE | Age: 82
End: 2022-09-20

## 2022-09-20 VITALS
HEIGHT: 62 IN | OXYGEN SATURATION: 92 % | SYSTOLIC BLOOD PRESSURE: 120 MMHG | TEMPERATURE: 97.9 F | BODY MASS INDEX: 20.5 KG/M2 | HEART RATE: 58 BPM | DIASTOLIC BLOOD PRESSURE: 74 MMHG | RESPIRATION RATE: 16 BRPM | WEIGHT: 111.4 LBS

## 2022-09-20 PROBLEM — I27.20 PULMONARY HYPERTENSION (HCC): Status: ACTIVE | Noted: 2022-09-20

## 2022-09-20 PROCEDURE — G0378 HOSPITAL OBSERVATION PER HR: HCPCS

## 2022-09-20 PROCEDURE — 99217 PR OBSERVATION CARE DISCHARGE MANAGEMENT: CPT | Performed by: INTERNAL MEDICINE

## 2022-09-20 RX ORDER — ASPIRIN 81 MG/1
81 TABLET ORAL DAILY
Start: 2022-09-20 | End: 2022-10-04

## 2022-09-20 RX ORDER — DOXYCYCLINE HYCLATE 100 MG/1
100 CAPSULE ORAL 2 TIMES DAILY
Qty: 8 CAPSULE | Refills: 0 | Status: SHIPPED | OUTPATIENT
Start: 2022-09-20 | End: 2022-09-28 | Stop reason: HOSPADM

## 2022-09-20 RX ORDER — CEFDINIR 300 MG/1
300 CAPSULE ORAL 2 TIMES DAILY
Qty: 8 CAPSULE | Refills: 0 | Status: SHIPPED | OUTPATIENT
Start: 2022-09-20 | End: 2022-09-28 | Stop reason: HOSPADM

## 2022-09-20 RX ORDER — FOLIC ACID 1 MG/1
1 TABLET ORAL DAILY
Start: 2022-09-20

## 2022-09-20 RX ADMIN — FOLIC ACID 1 MG: 1 TABLET ORAL at 09:29

## 2022-09-20 RX ADMIN — FLUOXETINE HYDROCHLORIDE 40 MG: 20 CAPSULE ORAL at 09:29

## 2022-09-20 RX ADMIN — RISPERIDONE 0.25 MG: 0.25 TABLET ORAL at 09:32

## 2022-09-20 RX ADMIN — METOPROLOL TARTRATE 25 MG: 25 TABLET, FILM COATED ORAL at 09:30

## 2022-09-20 RX ADMIN — FAMOTIDINE 20 MG: 20 TABLET ORAL at 09:29

## 2022-09-20 RX ADMIN — LORAZEPAM 0.5 MG: 0.5 TABLET ORAL at 09:30

## 2022-09-20 RX ADMIN — POTASSIUM CHLORIDE 20 MEQ: 750 CAPSULE, EXTENDED RELEASE ORAL at 09:29

## 2022-09-20 RX ADMIN — AMIODARONE HYDROCHLORIDE 200 MG: 200 TABLET ORAL at 09:30

## 2022-09-20 RX ADMIN — Medication 10 ML: at 09:30

## 2022-09-20 RX ADMIN — APIXABAN 2.5 MG: 2.5 TABLET, FILM COATED ORAL at 09:30

## 2022-09-20 RX ADMIN — ASPIRIN 81 MG: 81 TABLET, COATED ORAL at 09:30

## 2022-09-20 RX ADMIN — DOXYCYCLINE 100 MG: 100 INJECTION, POWDER, LYOPHILIZED, FOR SOLUTION INTRAVENOUS at 09:28

## 2022-09-20 NOTE — PROGRESS NOTES
Met with patient she is agreeable to [facility]. Verified PCP- Dr Brumfield. Spoke with Lisandra. Megan HENRY, [facility] [place]-Liaison

## 2022-09-20 NOTE — PLAN OF CARE
Goal Outcome Evaluation:      VSS. SR/SB. 2L nasal cannula. Moved from chair to bed tonight via lift. Medium size incontinent BM. Patient  and daughter have been at bedside tonight. Only oriented to self. No further complaints at this time.

## 2022-09-20 NOTE — DISCHARGE SUMMARY
[facility] Medicine Services  DISCHARGE SUMMARY    Patient Name: Laura Wallace  : 1940  MRN: [id number]    Date of Admission: 2022 12:55 PM  Date of Discharge:  22  Primary Care Physician: Justin Brumfield MD    Consults     No orders found from 2022 to 2022.          Hospital Course     Presenting Problem:   Pneumonia [J18.9]  Acute respiratory failure with hypoxia (HCC) [J96.01]    Active Hospital Problems    Diagnosis  POA   • Pulmonary hypertension (HCC) [I27.20]  Yes   • Acute respiratory failure with hypoxia (HCC) [J96.01]  Yes   • Pneumonia [J18.9]  Yes   • Paroxysmal atrial fibrillation with rapid ventricular response (HCC) [I48.0]  Yes   • Late onset Alzheimer's disease without behavioral disturbance (HCC) [G30.1, F02.80]  Yes      Resolved Hospital Problems   No resolved problems to display.          Hospital Course:  82 yr old woman with dementia, brought by  after awakening this morning with swelling of face and R arm.       Face/RUE swelling   - was worst in morning, is improving throughout day. Improved by hospital day 2.   - No fever, leukocytosis, or evidence of cellulitis, or symptoms suggesting allergic rxn  - CTA without SVC compression or other vascular anomaly     Hypoxia, mild:  Sat 88%  Mild JESSICA pneumonia, small effusion L    - She was initially given vanc/zosyn in the ER and then transitioned to rocephin/doxy. BCx NGTD. She will be discharged on omnicef and doxy to complete 7 days.      Atrial fibrillation  - continued Eliquis, amiodarone, metoprolol  - Echo 2022:  EF 65%; elevated RVSP     Hypokalemia  - improved      Mild transaminase elevation  - higher than 2022 but improved on re-check. PCP follow up      Neurologic disease, chronic, consider Parkinson's     Dementia  Mood   - Continue aricept, prozac, ativan, risperdal       Discharge Follow Up Recommendations for outpatient labs/diagnostics:  PCP Dr. Brumfield 1  week     Day of Discharge     HPI:   No acute events.  states she is a bit more confused this morning. Reviewed plans for DC home on oral antibiotics.  states they have 24 hr caregivers. States he looked back at the last few appts with PCP and her O2 is in the low 90s. Reviewed plan for DC and he is agreeable. Patient denies any current concerns. Answered all questions to the best of my ability.     Review of Systems  Gen- No fevers, chills  CV- No chest pain, palpitations  Resp- No cough, dyspnea  GI- No N/V/D, abd pain    Vital Signs:   Temp:  [97.6 °F (36.4 °C)-99.5 °F (37.5 °C)] 97.6 °F (36.4 °C)  Heart Rate:  [49-65] 49  Resp:  [16-18] 16  BP: (118-144)/(52-62) 144/57  Flow (L/min):  [2] 2      Physical Exam:  Constitutional: No acute distress, awake, alert; frail and chronically ill appearing   HENT: NCAT, mucous membranes moist  Respiratory: Clear to auscultation bilaterally, respiratory effort normal, poor effort   Cardiovascular: RRR, no murmurs, rubs, or gallops  Gastrointestinal: Positive bowel sounds, soft, nontender, nondistended  Musculoskeletal: No bilateral ankle edema  Psychiatric: flat, calm  Neurologic:strength symmetric in all extremities, Cranial Nerves grossly intact to confrontation, speech clear but soft   Skin: No rashes    Pertinent  and/or Most Recent Results     LAB RESULTS:      Lab 09/19/22  1205 09/19/22  0625 09/18/22  1358   WBC  --  7.28 8.35   HEMOGLOBIN 11.3* 9.6* 11.6*   HEMATOCRIT 34.9 29.4* 35.9   PLATELETS  --  204 239   NEUTROS ABS  --   --  6.51   IMMATURE GRANS (ABS)  --   --  0.03   LYMPHS ABS  --   --  1.02   MONOS ABS  --   --  0.74   EOS ABS  --   --  0.04   MCV  --  91.6 90.9   PROCALCITONIN  --   --  0.07         Lab 09/19/22  0625 09/18/22  1358   SODIUM 138 140   POTASSIUM 3.6 3.1*   CHLORIDE 104 102   CO2 25.0 28.0   ANION GAP 9.0 10.0   BUN 9 10   CREATININE 0.80 0.87   EGFR 74.6 67.4   GLUCOSE 89 92   CALCIUM 8.1* 8.7   TSH  --  3.770         Lab  09/19/22  0625 09/18/22  1358   TOTAL PROTEIN 5.2* 6.1   ALBUMIN 2.60* 3.40*   GLOBULIN 2.6 2.7   ALT (SGPT) 42* 49*   AST (SGOT) 41* 50*   BILIRUBIN 0.4 0.7   ALK PHOS 85 104         Lab 09/18/22  1358   PROBNP 1,264.0   TROPONIN T 0.016                 Brief Urine Lab Results  (Last result in the past 365 days)      Color   Clarity   Blood   Leuk Est   Nitrite   Protein   CREAT   Urine HCG        04/14/22 0152 Yellow   Clear   Trace   Negative   Negative   Negative               Microbiology Results (last 10 days)     Procedure Component Value - Date/Time    Blood Culture - Blood, Arm, Left [[id number]]  (Normal) Collected: 09/18/22 1709    Lab Status: Preliminary result Specimen: Blood from Arm, Left Updated: 09/19/22 1734     Blood Culture No growth at 24 hours    Blood Culture - Blood, Arm, Right [[id number]]  (Normal) Collected: 09/18/22 1709    Lab Status: Preliminary result Specimen: Blood from Arm, Right Updated: 09/19/22 1734     Blood Culture No growth at 24 hours          CT Chest With & Without Contrast Diagnostic    Result Date: 9/18/2022  DATE OF EXAM: 9/18/2022 3:25 PM  PROCEDURE: CT CHEST W WO CONTRAST DIAGNOSTIC-  INDICATIONS: Right face, arm swelling, possible vascular lymphatic drainage, SVC syndrome?  COMPARISON: No comparisons available.  TECHNIQUE: Routine transaxial slices were obtained through the chest before and after the intravenous administration of 75 mL of Isovue 300. Reconstructed coronal and sagittal images were also obtained. Automated exposure control and iterative construction methods were used.  The radiation dose reduction device was turned on for each scan per the ALARA (As Low as Reasonably Achievable) protocol.  FINDINGS: The internal jugular veins, right and left subclavian and brachiocephalic veins, and superior vena cava appears grossly patent without discrete filling defect to suggest thrombus or evidence of external compression. There is mild atherosclerosis of the  thoracic aorta which appears normal in caliber. Unremarkable appearance of the cardiac chambers. Mitral annular calcifications are noted. Mild scattered coronary artery calcifications are seen. No bulky or enlarged mediastinal or hilar adenopathy. A 2.2 cm heterogeneously hypodense left thyroid lobe nodule is noted. Bilateral breast prostheses are noted. There is questionable vague subcutaneous fat stranding at the right shoulder. No axillary lymphadenopathy. The trachea and mainstem bronchi are patent. There is a small left pleural effusion with associated left dependent passive atelectasis. There is a trace right pleural effusion. There is moderate centrilobular emphysema worst in the upper lobes. There is mild interlobular septal thickening. There is a vague region of groundglass opacity in the left upper lobe which may be infectious or inflammatory. No pneumothorax. No acute osseous findings. Chronic fracture deformity of the proximal left humerus. There is partial visualization of posterior instrumented lumbar spinal fusion hardware.      Allowing for some venous mixing artifact, there is no definite evidence of venous thrombosis or external compression of the superior vena cava or neck/upper extremity veins.  Small region of groundglass in the left upper lobe which may be infectious or inflammatory.  Small left and trace right pleural effusions. Mild interstitial edema.  Background emphysema.  Chronic fracture deformity of the left proximal humerus.  This report was finalized on 9/18/2022 4:02 PM by Demian Machado MD.        Results for orders placed during the hospital encounter of 09/06/22    Duplex venous lower extremity left CAR    Interpretation Summary  · The left lower extremity venous duplex scan is negative for DVT and SVT.      Results for orders placed during the hospital encounter of 09/06/22    Duplex venous lower extremity left CAR    Interpretation Summary  · The left lower extremity venous  duplex scan is negative for DVT and SVT.      Results for orders placed during the hospital encounter of 04/13/22    Adult Transthoracic Echo Complete W/ Cont if Necessary Per Protocol    Interpretation Summary  · Estimated right ventricular systolic pressure from tricuspid regurgitation is moderately elevated (45-55 mmHg). Calculated right ventricular systolic pressure from tricuspid regurgitation is 47 mmHg.  · Left ventricular diastolic function is consistent with age.  · The left ventricular ejection fraction is 65% with normal wall motion  · There is heavy calcification of the mitral annulus with no evidence of mitral stenosis      Plan for Follow-up of Pending Labs/Results:   Pending Labs     Order Current Status    Blood Culture - Blood, Arm, Left Preliminary result    Blood Culture - Blood, Arm, Right Preliminary result        Discharge Details        Discharge Medications      New Medications      Instructions Start Date   cefdinir 300 MG capsule  Commonly known as: OMNICEF   300 mg, Oral, 2 Times Daily      doxycycline 100 MG capsule  Commonly known as: VIBRAMYCIN   100 mg, Oral, 2 Times Daily         Continue These Medications      Instructions Start Date   acetaminophen 325 MG tablet  Commonly known as: TYLENOL   650 mg, Oral, Every 4 Hours PRN      amiodarone 200 MG tablet  Commonly known as: PACERONE   200 mg, Oral, Every 12 Hours Scheduled      amLODIPine 10 MG tablet  Commonly known as: NORVASC   10 mg, Oral, Every 24 Hours Scheduled      apixaban 2.5 MG tablet tablet  Commonly known as: ELIQUIS   2.5 mg, Oral, Every 12 Hours Scheduled      aspirin 81 MG EC tablet   81 mg, Oral, Daily      donepezil 5 MG tablet  Commonly known as: ARICEPT   5 mg, Oral, Nightly      famotidine 20 MG tablet  Commonly known as: PEPCID   20 mg, Oral, 2 Times Daily, Spouse been holding the 2nd dose unless patients stomach bothering her      FIBER THERAPY PO   500 capsules, Oral, Daily      FLUoxetine 20 MG  capsule  Commonly known as: PROzac   40 mg, Oral, Daily      folic acid 1 MG tablet  Commonly known as: FOLVITE   1 mg, Oral, Daily      LORazepam 0.5 MG tablet  Commonly known as: ATIVAN   0.5 mg, Oral, 2 Times Daily      melatonin 5 MG tablet tablet   5 mg, Oral, Nightly      metoprolol tartrate 25 MG tablet  Commonly known as: LOPRESSOR   25 mg, Oral, Every 12 Hours Scheduled      multivitamin with minerals tablet tablet   1 tablet, Oral, Daily      risperiDONE 0.5 MG tablet  Commonly known as: risperDAL   0.5 mg, Oral, Nightly      risperiDONE 0.25 MG tablet  Commonly known as: risperDAL   0.25 mg, Oral, Daily      triamcinolone 0.1 % cream  Commonly known as: KENALOG   Topical, See Admin Instructions, Apply topically three times daily. Been giving it as needed      vitamin D3 125 MCG (5000 UT) capsule capsule   5,000 Units, Oral, Daily             Allergies   Allergen Reactions   • Codeine Nausea And Vomiting         Discharge Disposition:  Home or Self Care    Diet:  Hospital:  Diet Order   Procedures   • Diet Regular; Cardiac       Activity:  Activity Instructions     Activity as Tolerated            Restrictions or Other Recommendations:         CODE STATUS:    Code Status and Medical Interventions:   Ordered at: 09/18/22 1700     Level Of Support Discussed With:    Next of Kin (If No Surrogate)     Code Status (Patient has no pulse and is not breathing):    CPR (Attempt to Resuscitate)     Medical Interventions (Patient has pulse or is breathing):    Full Support       No future appointments.    Additional Instructions for the Follow-ups that You Need to Schedule     Discharge Follow-up with PCP   As directed       Currently Documented PCP:    Justin Brumfield MD    PCP Phone Number:    [phone]     Follow Up Details: PCP Dr. Brumfield 1 week                     Norma Leger DO  09/20/22      Time Spent on Discharge:  I spent  35  minutes on this discharge activity which included: face-to-face  encounter with the patient, reviewing the data in the system, coordination of the care with the nursing staff as well as consultants, documentation, and entering orders.

## 2022-09-20 NOTE — CASE MANAGEMENT/SOCIAL WORK
Case Management Discharge Note      Final Note: Patient's plan is still to return home with  and caregivers at discharge.  Therapy recommends home health.  Referral accepted by [facility] for PT, OT and skilled nursing.  CM consulted to arrange home O2 for patient.  Referral called to Rafael with [facility].  Portable tank to be delivered to patient's room prior to discharge.  No other needs noted.         Selected Continued Care - Admitted Since 9/18/2022     Destination    No services have been selected for the patient.              Durable Medical Equipment     Service Provider Selected Services Address Phone Fax Patient Preferred    [facility] [place]  Durable Medical Equipment [address] [id number] [phone] --          Dialysis/Infusion    No services have been selected for the patient.              Home Medical Care     Service Provider Selected Services Address Phone Fax Patient Preferred    [facility]  [facility] [address] [facility] [address] [phone] --          Therapy    No services have been selected for the patient.              Community Resources    No services have been selected for the patient.              [facility]    No services have been selected for the patient.                       Final Discharge Disposition Code: 06 - home with home health care

## 2022-09-20 NOTE — PLAN OF CARE
Goal Outcome Evaluation:      Patient ready for discharge to home with home O2            Laceration Repair

## 2022-09-21 NOTE — OUTREACH NOTE
Prep Survey    Flowsheet Row Responses   [facility] discharged [facility]? [facility]   Is LACE score < 7 ? No   Emergency Room discharge w/ pulse ox? No   Eligibility Readm Mgmt   Discharge diagnosis Pneumonia    Does the patient have one of the following disease processes/diagnoses(primary or secondary)? Pneumonia   Does the patient have Home health ordered? Yes   What is the Home health agency?  [facility]   Is there a DME ordered? No   Prep survey completed? Yes          DAVID ZARATE - Registered Nurse

## 2022-09-23 ENCOUNTER — APPOINTMENT (OUTPATIENT)
Dept: GENERAL RADIOLOGY | Facility: HOSPITAL | Age: 82
End: 2022-09-23

## 2022-09-23 ENCOUNTER — HOME CARE VISIT (OUTPATIENT)
Dept: HOME HEALTH SERVICES | Facility: HOME HEALTHCARE | Age: 82
End: 2022-09-23

## 2022-09-23 ENCOUNTER — APPOINTMENT (OUTPATIENT)
Dept: CT IMAGING | Facility: HOSPITAL | Age: 82
End: 2022-09-23

## 2022-09-23 ENCOUNTER — HOSPITAL ENCOUNTER (INPATIENT)
Facility: HOSPITAL | Age: 82
LOS: 3 days | Discharge: HOME HEALTH | End: 2022-09-28
Attending: EMERGENCY MEDICINE | Admitting: INTERNAL MEDICINE

## 2022-09-23 ENCOUNTER — READMISSION MANAGEMENT (OUTPATIENT)
Dept: CALL CENTER | Facility: HOSPITAL | Age: 82
End: 2022-09-23

## 2022-09-23 DIAGNOSIS — F02.80 LATE ONSET ALZHEIMER'S DISEASE WITHOUT BEHAVIORAL DISTURBANCE: ICD-10-CM

## 2022-09-23 DIAGNOSIS — I48.0 PAROXYSMAL ATRIAL FIBRILLATION WITH RAPID VENTRICULAR RESPONSE: ICD-10-CM

## 2022-09-23 DIAGNOSIS — I27.20 PULMONARY HYPERTENSION: ICD-10-CM

## 2022-09-23 DIAGNOSIS — J90 PLEURAL EFFUSION: ICD-10-CM

## 2022-09-23 DIAGNOSIS — G30.1 LATE ONSET ALZHEIMER'S DISEASE WITHOUT BEHAVIORAL DISTURBANCE: ICD-10-CM

## 2022-09-23 DIAGNOSIS — F41.9 ANXIETY: ICD-10-CM

## 2022-09-23 DIAGNOSIS — F03.90 DEMENTIA WITHOUT BEHAVIORAL DISTURBANCE, UNSPECIFIED DEMENTIA TYPE: ICD-10-CM

## 2022-09-23 DIAGNOSIS — J18.9 PNEUMONIA OF BOTH LOWER LOBES DUE TO INFECTIOUS ORGANISM: ICD-10-CM

## 2022-09-23 DIAGNOSIS — S32.82XA MULTIPLE CLOSED FRACTURES OF PELVIS WITHOUT DISRUPTION OF PELVIC RING, INITIAL ENCOUNTER: ICD-10-CM

## 2022-09-23 DIAGNOSIS — J96.01 ACUTE RESPIRATORY FAILURE WITH HYPOXIA: Primary | ICD-10-CM

## 2022-09-23 LAB
ALBUMIN SERPL-MCNC: 3.2 G/DL (ref 3.5–5.2)
ALBUMIN/GLOB SERPL: 1.1 G/DL
ALP SERPL-CCNC: 98 U/L (ref 39–117)
ALT SERPL W P-5'-P-CCNC: 86 U/L (ref 1–33)
ANION GAP SERPL CALCULATED.3IONS-SCNC: 12 MMOL/L (ref 5–15)
ARTERIAL PATENCY WRIST A: ABNORMAL
AST SERPL-CCNC: 69 U/L (ref 1–32)
ATMOSPHERIC PRESS: ABNORMAL MM[HG]
BACTERIA SPEC AEROBE CULT: NORMAL
BACTERIA SPEC AEROBE CULT: NORMAL
BACTERIA UR QL AUTO: ABNORMAL /HPF
BASE EXCESS BLDA CALC-SCNC: 1.7 MMOL/L (ref 0–2)
BASOPHILS # BLD AUTO: 0.02 10*3/MM3 (ref 0–0.2)
BASOPHILS NFR BLD AUTO: 0.2 % (ref 0–1.5)
BDY SITE: ABNORMAL
BILIRUB SERPL-MCNC: 0.6 MG/DL (ref 0–1.2)
BILIRUB UR QL STRIP: NEGATIVE
BODY TEMPERATURE: 37 C
BUN SERPL-MCNC: 17 MG/DL (ref 8–23)
BUN/CREAT SERPL: 22.1 (ref 7–25)
CALCIUM SPEC-SCNC: 8.6 MG/DL (ref 8.6–10.5)
CHLORIDE SERPL-SCNC: 108 MMOL/L (ref 98–107)
CLARITY UR: ABNORMAL
CO2 BLDA-SCNC: 25.7 MMOL/L (ref 22–33)
CO2 SERPL-SCNC: 22 MMOL/L (ref 22–29)
COHGB MFR BLD: 1.1 % (ref 0–2)
COLOR UR: YELLOW
CREAT SERPL-MCNC: 0.77 MG/DL (ref 0.57–1)
CRP SERPL-MCNC: 10.94 MG/DL (ref 0–0.5)
D DIMER PPP FEU-MCNC: 1.25 MCGFEU/ML (ref 0.01–0.5)
D-LACTATE SERPL-SCNC: 1 MMOL/L (ref 0.5–2)
DEPRECATED RDW RBC AUTO: 49.8 FL (ref 37–54)
EGFRCR SERPLBLD CKD-EPI 2021: 78.1 ML/MIN/1.73
EOSINOPHIL # BLD AUTO: 0.07 10*3/MM3 (ref 0–0.4)
EOSINOPHIL NFR BLD AUTO: 0.7 % (ref 0.3–6.2)
EPAP: 0
ERYTHROCYTE [DISTWIDTH] IN BLOOD BY AUTOMATED COUNT: 14.7 % (ref 12.3–15.4)
ERYTHROCYTE [SEDIMENTATION RATE] IN BLOOD: 64 MM/HR (ref 0–30)
FERRITIN SERPL-MCNC: 256.9 NG/ML (ref 13–150)
FLUAV RNA RESP QL NAA+PROBE: NOT DETECTED
FLUBV RNA RESP QL NAA+PROBE: NOT DETECTED
GLOBULIN UR ELPH-MCNC: 3 GM/DL
GLUCOSE SERPL-MCNC: 104 MG/DL (ref 65–99)
GLUCOSE UR STRIP-MCNC: NEGATIVE MG/DL
HAV IGM SERPL QL IA: NORMAL
HBV CORE IGM SERPL QL IA: NORMAL
HBV SURFACE AG SERPL QL IA: NORMAL
HCO3 BLDA-SCNC: 24.7 MMOL/L (ref 20–26)
HCT VFR BLD AUTO: 35.4 % (ref 34–46.6)
HCT VFR BLD CALC: 31.5 % (ref 38–51)
HCV AB SER DONR QL: NORMAL
HGB BLD-MCNC: 11.6 G/DL (ref 12–15.9)
HGB BLDA-MCNC: 10.3 G/DL (ref 14–18)
HGB UR QL STRIP.AUTO: NEGATIVE
HOLD SPECIMEN: NORMAL
HYALINE CASTS UR QL AUTO: ABNORMAL /LPF
IMM GRANULOCYTES # BLD AUTO: 0.05 10*3/MM3 (ref 0–0.05)
IMM GRANULOCYTES NFR BLD AUTO: 0.5 % (ref 0–0.5)
INHALED O2 CONCENTRATION: 44 %
IPAP: 0
KETONES UR QL STRIP: NEGATIVE
LDH SERPL-CCNC: 327 U/L (ref 135–214)
LEUKOCYTE ESTERASE UR QL STRIP.AUTO: NEGATIVE
LYMPHOCYTES # BLD AUTO: 0.91 10*3/MM3 (ref 0.7–3.1)
LYMPHOCYTES NFR BLD AUTO: 8.5 % (ref 19.6–45.3)
MCH RBC QN AUTO: 30.3 PG (ref 26.6–33)
MCHC RBC AUTO-ENTMCNC: 32.8 G/DL (ref 31.5–35.7)
MCV RBC AUTO: 92.4 FL (ref 79–97)
METHGB BLD QL: 0.2 % (ref 0–1.5)
MODALITY: ABNORMAL
MONOCYTES # BLD AUTO: 0.76 10*3/MM3 (ref 0.1–0.9)
MONOCYTES NFR BLD AUTO: 7.1 % (ref 5–12)
NEUTROPHILS NFR BLD AUTO: 8.93 10*3/MM3 (ref 1.7–7)
NEUTROPHILS NFR BLD AUTO: 83 % (ref 42.7–76)
NITRITE UR QL STRIP: NEGATIVE
NOTE: ABNORMAL
NRBC BLD AUTO-RTO: 0 /100 WBC (ref 0–0.2)
NT-PROBNP SERPL-MCNC: 1427 PG/ML (ref 0–1800)
OXYHGB MFR BLDV: 91.6 % (ref 94–99)
PAW @ PEAK INSP FLOW SETTING VENT: 0 CMH2O
PCO2 BLDA: 32 MM HG (ref 35–45)
PCO2 TEMP ADJ BLD: 32 MM HG (ref 35–45)
PH BLDA: 7.5 PH UNITS (ref 7.35–7.45)
PH UR STRIP.AUTO: 7.5 [PH] (ref 5–8)
PH, TEMP CORRECTED: 7.5 PH UNITS
PLATELET # BLD AUTO: 268 10*3/MM3 (ref 140–450)
PMV BLD AUTO: 9.5 FL (ref 6–12)
PO2 BLDA: 58.4 MM HG (ref 83–108)
PO2 TEMP ADJ BLD: 58.4 MM HG (ref 83–108)
POTASSIUM SERPL-SCNC: 3.5 MMOL/L (ref 3.5–5.2)
PROCALCITONIN SERPL-MCNC: 0.06 NG/ML (ref 0–0.25)
PROT SERPL-MCNC: 6.2 G/DL (ref 6–8.5)
PROT UR QL STRIP: ABNORMAL
RBC # BLD AUTO: 3.83 10*6/MM3 (ref 3.77–5.28)
RBC # UR STRIP: ABNORMAL /HPF
REF LAB TEST METHOD: ABNORMAL
SARS-COV-2 RNA RESP QL NAA+PROBE: NOT DETECTED
SODIUM SERPL-SCNC: 142 MMOL/L (ref 136–145)
SP GR UR STRIP: 1.04 (ref 1–1.03)
SQUAMOUS #/AREA URNS HPF: ABNORMAL /HPF
TOTAL RATE: 0 BREATHS/MINUTE
TROPONIN T SERPL-MCNC: <0.01 NG/ML (ref 0–0.03)
UROBILINOGEN UR QL STRIP: ABNORMAL
WBC # UR STRIP: ABNORMAL /HPF
WBC NRBC COR # BLD: 10.74 10*3/MM3 (ref 3.4–10.8)
WHOLE BLOOD HOLD COAG: NORMAL
WHOLE BLOOD HOLD SPECIMEN: NORMAL

## 2022-09-23 PROCEDURE — 81001 URINALYSIS AUTO W/SCOPE: CPT | Performed by: EMERGENCY MEDICINE

## 2022-09-23 PROCEDURE — 84145 PROCALCITONIN (PCT): CPT | Performed by: INTERNAL MEDICINE

## 2022-09-23 PROCEDURE — 71275 CT ANGIOGRAPHY CHEST: CPT

## 2022-09-23 PROCEDURE — 82728 ASSAY OF FERRITIN: CPT | Performed by: INTERNAL MEDICINE

## 2022-09-23 PROCEDURE — 80074 ACUTE HEPATITIS PANEL: CPT | Performed by: PHYSICIAN ASSISTANT

## 2022-09-23 PROCEDURE — 99284 EMERGENCY DEPT VISIT MOD MDM: CPT

## 2022-09-23 PROCEDURE — 94640 AIRWAY INHALATION TREATMENT: CPT

## 2022-09-23 PROCEDURE — 85025 COMPLETE CBC W/AUTO DIFF WBC: CPT | Performed by: EMERGENCY MEDICINE

## 2022-09-23 PROCEDURE — 93005 ELECTROCARDIOGRAM TRACING: CPT

## 2022-09-23 PROCEDURE — 36600 WITHDRAWAL OF ARTERIAL BLOOD: CPT

## 2022-09-23 PROCEDURE — 87641 MR-STAPH DNA AMP PROBE: CPT | Performed by: INTERNAL MEDICINE

## 2022-09-23 PROCEDURE — 87040 BLOOD CULTURE FOR BACTERIA: CPT | Performed by: PHYSICIAN ASSISTANT

## 2022-09-23 PROCEDURE — 94664 DEMO&/EVAL PT USE INHALER: CPT

## 2022-09-23 PROCEDURE — 84443 ASSAY THYROID STIM HORMONE: CPT | Performed by: INTERNAL MEDICINE

## 2022-09-23 PROCEDURE — 86140 C-REACTIVE PROTEIN: CPT | Performed by: INTERNAL MEDICINE

## 2022-09-23 PROCEDURE — 93005 ELECTROCARDIOGRAM TRACING: CPT | Performed by: EMERGENCY MEDICINE

## 2022-09-23 PROCEDURE — 82375 ASSAY CARBOXYHB QUANT: CPT

## 2022-09-23 PROCEDURE — 83615 LACTATE (LD) (LDH) ENZYME: CPT | Performed by: INTERNAL MEDICINE

## 2022-09-23 PROCEDURE — 83050 HGB METHEMOGLOBIN QUAN: CPT

## 2022-09-23 PROCEDURE — 82805 BLOOD GASES W/O2 SATURATION: CPT

## 2022-09-23 PROCEDURE — 87636 SARSCOV2 & INF A&B AMP PRB: CPT | Performed by: PHYSICIAN ASSISTANT

## 2022-09-23 PROCEDURE — 94799 UNLISTED PULMONARY SVC/PX: CPT

## 2022-09-23 PROCEDURE — 85652 RBC SED RATE AUTOMATED: CPT | Performed by: INTERNAL MEDICINE

## 2022-09-23 PROCEDURE — 84484 ASSAY OF TROPONIN QUANT: CPT | Performed by: EMERGENCY MEDICINE

## 2022-09-23 PROCEDURE — 80053 COMPREHEN METABOLIC PANEL: CPT | Performed by: EMERGENCY MEDICINE

## 2022-09-23 PROCEDURE — 36415 COLL VENOUS BLD VENIPUNCTURE: CPT

## 2022-09-23 PROCEDURE — 25010000002 PIPERACILLIN SOD-TAZOBACTAM PER 1 G: Performed by: PHYSICIAN ASSISTANT

## 2022-09-23 PROCEDURE — 83880 ASSAY OF NATRIURETIC PEPTIDE: CPT | Performed by: EMERGENCY MEDICINE

## 2022-09-23 PROCEDURE — 99223 1ST HOSP IP/OBS HIGH 75: CPT | Performed by: INTERNAL MEDICINE

## 2022-09-23 PROCEDURE — 83605 ASSAY OF LACTIC ACID: CPT | Performed by: PHYSICIAN ASSISTANT

## 2022-09-23 PROCEDURE — 0 IOPAMIDOL PER 1 ML: Performed by: EMERGENCY MEDICINE

## 2022-09-23 PROCEDURE — 85379 FIBRIN DEGRADATION QUANT: CPT | Performed by: INTERNAL MEDICINE

## 2022-09-23 PROCEDURE — 71045 X-RAY EXAM CHEST 1 VIEW: CPT

## 2022-09-23 RX ORDER — SODIUM CHLORIDE 0.9 % (FLUSH) 0.9 %
10 SYRINGE (ML) INJECTION AS NEEDED
Status: DISCONTINUED | OUTPATIENT
Start: 2022-09-23 | End: 2022-09-28 | Stop reason: HOSPADM

## 2022-09-23 RX ORDER — FAMOTIDINE 20 MG/1
20 TABLET, FILM COATED ORAL 2 TIMES DAILY
Status: DISCONTINUED | OUTPATIENT
Start: 2022-09-24 | End: 2022-09-24

## 2022-09-23 RX ORDER — CHOLECALCIFEROL (VITAMIN D3) 125 MCG
5 CAPSULE ORAL NIGHTLY PRN
Status: DISCONTINUED | OUTPATIENT
Start: 2022-09-23 | End: 2022-09-28 | Stop reason: HOSPADM

## 2022-09-23 RX ORDER — IPRATROPIUM BROMIDE AND ALBUTEROL SULFATE 2.5; .5 MG/3ML; MG/3ML
3 SOLUTION RESPIRATORY (INHALATION) ONCE
Status: COMPLETED | OUTPATIENT
Start: 2022-09-23 | End: 2022-09-23

## 2022-09-23 RX ORDER — SODIUM CHLORIDE 0.9 % (FLUSH) 0.9 %
10 SYRINGE (ML) INJECTION EVERY 12 HOURS SCHEDULED
Status: DISCONTINUED | OUTPATIENT
Start: 2022-09-23 | End: 2022-09-28 | Stop reason: HOSPADM

## 2022-09-23 RX ORDER — IPRATROPIUM BROMIDE AND ALBUTEROL SULFATE 2.5; .5 MG/3ML; MG/3ML
3 SOLUTION RESPIRATORY (INHALATION) EVERY 4 HOURS PRN
Status: DISCONTINUED | OUTPATIENT
Start: 2022-09-23 | End: 2022-09-28 | Stop reason: HOSPADM

## 2022-09-23 RX ORDER — ONDANSETRON 2 MG/ML
4 INJECTION INTRAMUSCULAR; INTRAVENOUS EVERY 6 HOURS PRN
Status: DISCONTINUED | OUTPATIENT
Start: 2022-09-23 | End: 2022-09-28 | Stop reason: HOSPADM

## 2022-09-23 RX ORDER — DONEPEZIL HYDROCHLORIDE 5 MG/1
5 TABLET, FILM COATED ORAL NIGHTLY
Status: DISCONTINUED | OUTPATIENT
Start: 2022-09-23 | End: 2022-09-28 | Stop reason: HOSPADM

## 2022-09-23 RX ORDER — LORAZEPAM 0.5 MG/1
0.5 TABLET ORAL NIGHTLY
Status: DISCONTINUED | OUTPATIENT
Start: 2022-09-23 | End: 2022-09-28 | Stop reason: HOSPADM

## 2022-09-23 RX ORDER — AMIODARONE HYDROCHLORIDE 200 MG/1
200 TABLET ORAL EVERY 12 HOURS SCHEDULED
Status: DISCONTINUED | OUTPATIENT
Start: 2022-09-23 | End: 2022-09-24

## 2022-09-23 RX ORDER — FLUOXETINE HYDROCHLORIDE 20 MG/1
40 CAPSULE ORAL DAILY
Status: DISCONTINUED | OUTPATIENT
Start: 2022-09-24 | End: 2022-09-28 | Stop reason: HOSPADM

## 2022-09-23 RX ORDER — AMLODIPINE BESYLATE 10 MG/1
10 TABLET ORAL
Status: DISCONTINUED | OUTPATIENT
Start: 2022-09-24 | End: 2022-09-25

## 2022-09-23 RX ORDER — ASPIRIN 81 MG/1
81 TABLET ORAL DAILY
Status: DISCONTINUED | OUTPATIENT
Start: 2022-09-24 | End: 2022-09-28 | Stop reason: HOSPADM

## 2022-09-23 RX ORDER — RISPERIDONE 0.25 MG/1
0.25 TABLET ORAL DAILY
Status: DISCONTINUED | OUTPATIENT
Start: 2022-09-24 | End: 2022-09-28 | Stop reason: HOSPADM

## 2022-09-23 RX ORDER — VANCOMYCIN HYDROCHLORIDE 1 G/200ML
20 INJECTION, SOLUTION INTRAVENOUS ONCE
Status: COMPLETED | OUTPATIENT
Start: 2022-09-24 | End: 2022-09-24

## 2022-09-23 RX ORDER — ACETAMINOPHEN 325 MG/1
650 TABLET ORAL EVERY 4 HOURS PRN
Status: DISCONTINUED | OUTPATIENT
Start: 2022-09-23 | End: 2022-09-28 | Stop reason: HOSPADM

## 2022-09-23 RX ORDER — RISPERIDONE 1 MG/1
0.5 TABLET ORAL NIGHTLY
Status: DISCONTINUED | OUTPATIENT
Start: 2022-09-23 | End: 2022-09-28 | Stop reason: HOSPADM

## 2022-09-23 RX ADMIN — AMIODARONE HYDROCHLORIDE 200 MG: 200 TABLET ORAL at 22:49

## 2022-09-23 RX ADMIN — TAZOBACTAM SODIUM AND PIPERACILLIN SODIUM 3.38 G: 375; 3 INJECTION, SOLUTION INTRAVENOUS at 23:28

## 2022-09-23 RX ADMIN — APIXABAN 2.5 MG: 2.5 TABLET, FILM COATED ORAL at 22:49

## 2022-09-23 RX ADMIN — DONEPEZIL HYDROCHLORIDE 5 MG: 5 TABLET ORAL at 22:49

## 2022-09-23 RX ADMIN — VANCOMYCIN HYDROCHLORIDE 1000 MG: 1 INJECTION, SOLUTION INTRAVENOUS at 23:28

## 2022-09-23 RX ADMIN — Medication 10 ML: at 22:50

## 2022-09-23 RX ADMIN — RISPERIDONE 0.5 MG: 1 TABLET ORAL at 22:49

## 2022-09-23 RX ADMIN — LORAZEPAM 0.5 MG: 0.5 TABLET ORAL at 22:49

## 2022-09-23 RX ADMIN — IPRATROPIUM BROMIDE AND ALBUTEROL SULFATE 3 ML: .5; 3 SOLUTION RESPIRATORY (INHALATION) at 21:55

## 2022-09-23 RX ADMIN — IOPAMIDOL 65 ML: 755 INJECTION, SOLUTION INTRAVENOUS at 20:42

## 2022-09-23 RX ADMIN — METOPROLOL TARTRATE 25 MG: 25 TABLET, FILM COATED ORAL at 22:49

## 2022-09-23 NOTE — CASE COMMUNICATION
Patient not admitted to [facility] on 9.23.22 d/t being active with another  agency.

## 2022-09-23 NOTE — OUTREACH NOTE
COPD/PN Week 1 Survey    Flowsheet Row Responses   [facility] patient discharged from? [place]   Does the patient have one of the following disease processes/diagnoses(primary or secondary)? Pneumonia   Week 1 attempt successful? Yes   Call start time 1414   Call end time 1419   Discharge diagnosis Pneumonia    Person spoke with today (if not patient) and relationship --  [Horace-spouse]   Meds reviewed with patient/caregiver? Yes   Is the patient having any side effects they believe may be caused by any medication additions or changes? No   Does the patient have all medications ordered at discharge? Yes   Is the patient taking all medications as directed (includes completed medication regime)? Yes   Comments regarding appointments cards appt is on 9/26/22   Does the patient have a primary care provider?  Yes   Does the patient have an appointment with their PCP or specialist within 7 days of discharge? Yes   Comments regarding PCP 9/27/22   Has the patient kept scheduled appointments due by today? N/A   What is the Home health agency?  [facility]   Has home health visited the patient within 72 hours of discharge? Yes   Pulse Ox monitoring Intermittent   O2 Sat comments 92% on 2 L of O2   Psychosocial issues? No   Did the patient receive a copy of their discharge instructions? Yes   Nursing interventions Reviewed instructions with patient   What is the patient's perception of their health status since discharge? Same   Are the patient's immunizations up to date?  Yes   If the patient is a current smoker, are they able to teach back resources for cessation? Not a smoker  [Pt quit smoking 25-30 years]   Is the patient/caregiver able to teach back the hierarchy of who to call/visit for symptoms/problems? PCP, Specialist, Home health nurse, Urgent Care, ED, 911 Yes   Is the patient/caregiver able to teach back signs and symptoms of worsening condition: Fever/chills, Shortness of breath, Chest pain   Is  the patient/caregiver able to teach back importance of completing antibiotic course of treatment? Yes   Week 1 call completed? Yes          JOSE TORO - Registered Nurse

## 2022-09-24 ENCOUNTER — APPOINTMENT (OUTPATIENT)
Dept: CARDIOLOGY | Facility: HOSPITAL | Age: 82
End: 2022-09-24

## 2022-09-24 ENCOUNTER — READMISSION MANAGEMENT (OUTPATIENT)
Dept: CALL CENTER | Facility: HOSPITAL | Age: 82
End: 2022-09-24

## 2022-09-24 LAB
ANION GAP SERPL CALCULATED.3IONS-SCNC: 9 MMOL/L (ref 5–15)
BUN SERPL-MCNC: 17 MG/DL (ref 8–23)
BUN/CREAT SERPL: 23.3 (ref 7–25)
CALCIUM SPEC-SCNC: 8.3 MG/DL (ref 8.6–10.5)
CHLORIDE SERPL-SCNC: 108 MMOL/L (ref 98–107)
CO2 SERPL-SCNC: 23 MMOL/L (ref 22–29)
CREAT SERPL-MCNC: 0.73 MG/DL (ref 0.57–1)
DEPRECATED RDW RBC AUTO: 50.4 FL (ref 37–54)
EGFRCR SERPLBLD CKD-EPI 2021: 83.3 ML/MIN/1.73
ERYTHROCYTE [DISTWIDTH] IN BLOOD BY AUTOMATED COUNT: 14.8 % (ref 12.3–15.4)
GLUCOSE SERPL-MCNC: 119 MG/DL (ref 65–99)
HCT VFR BLD AUTO: 27.9 % (ref 34–46.6)
HGB BLD-MCNC: 9.1 G/DL (ref 12–15.9)
MCH RBC QN AUTO: 30.1 PG (ref 26.6–33)
MCHC RBC AUTO-ENTMCNC: 32.6 G/DL (ref 31.5–35.7)
MCV RBC AUTO: 92.4 FL (ref 79–97)
MRSA DNA SPEC QL NAA+PROBE: NEGATIVE
PLATELET # BLD AUTO: 230 10*3/MM3 (ref 140–450)
PMV BLD AUTO: 9.5 FL (ref 6–12)
POTASSIUM SERPL-SCNC: 3.9 MMOL/L (ref 3.5–5.2)
QT INTERVAL: 448 MS
QTC INTERVAL: 490 MS
RBC # BLD AUTO: 3.02 10*6/MM3 (ref 3.77–5.28)
SODIUM SERPL-SCNC: 140 MMOL/L (ref 136–145)
TSH SERPL DL<=0.05 MIU/L-ACNC: 2.95 UIU/ML (ref 0.27–4.2)
WBC NRBC COR # BLD: 8.82 10*3/MM3 (ref 3.4–10.8)

## 2022-09-24 PROCEDURE — 86606 ASPERGILLUS ANTIBODY: CPT | Performed by: INTERNAL MEDICINE

## 2022-09-24 PROCEDURE — 85027 COMPLETE CBC AUTOMATED: CPT | Performed by: PHYSICIAN ASSISTANT

## 2022-09-24 PROCEDURE — 86602 ANTINOMYCES ANTIBODY: CPT | Performed by: INTERNAL MEDICINE

## 2022-09-24 PROCEDURE — 80048 BASIC METABOLIC PNL TOTAL CA: CPT | Performed by: PHYSICIAN ASSISTANT

## 2022-09-24 PROCEDURE — 99232 SBSQ HOSP IP/OBS MODERATE 35: CPT | Performed by: INTERNAL MEDICINE

## 2022-09-24 PROCEDURE — 86331 IMMUNODIFFUSION OUCHTERLONY: CPT | Performed by: INTERNAL MEDICINE

## 2022-09-24 PROCEDURE — 25010000002 VANCOMYCIN PER 500 MG

## 2022-09-24 PROCEDURE — 86671 FUNGUS NES ANTIBODY: CPT | Performed by: INTERNAL MEDICINE

## 2022-09-24 PROCEDURE — 93306 TTE W/DOPPLER COMPLETE: CPT | Performed by: INTERNAL MEDICINE

## 2022-09-24 PROCEDURE — 86609 BACTERIUM ANTIBODY: CPT | Performed by: INTERNAL MEDICINE

## 2022-09-24 PROCEDURE — G0378 HOSPITAL OBSERVATION PER HR: HCPCS

## 2022-09-24 PROCEDURE — 25010000002 PIPERACILLIN SOD-TAZOBACTAM PER 1 G: Performed by: PHYSICIAN ASSISTANT

## 2022-09-24 PROCEDURE — 25010000002 METHYLPREDNISOLONE PER 125 MG: Performed by: PHYSICIAN ASSISTANT

## 2022-09-24 PROCEDURE — 93306 TTE W/DOPPLER COMPLETE: CPT

## 2022-09-24 RX ORDER — MAGNESIUM SULFATE HEPTAHYDRATE 40 MG/ML
2 INJECTION, SOLUTION INTRAVENOUS AS NEEDED
Status: DISCONTINUED | OUTPATIENT
Start: 2022-09-24 | End: 2022-09-28 | Stop reason: HOSPADM

## 2022-09-24 RX ORDER — POTASSIUM CHLORIDE 7.45 MG/ML
10 INJECTION INTRAVENOUS
Status: DISCONTINUED | OUTPATIENT
Start: 2022-09-24 | End: 2022-09-28 | Stop reason: HOSPADM

## 2022-09-24 RX ORDER — MAGNESIUM SULFATE HEPTAHYDRATE 40 MG/ML
4 INJECTION, SOLUTION INTRAVENOUS AS NEEDED
Status: DISCONTINUED | OUTPATIENT
Start: 2022-09-24 | End: 2022-09-28 | Stop reason: HOSPADM

## 2022-09-24 RX ORDER — POTASSIUM CHLORIDE 750 MG/1
40 CAPSULE, EXTENDED RELEASE ORAL AS NEEDED
Status: DISCONTINUED | OUTPATIENT
Start: 2022-09-24 | End: 2022-09-28 | Stop reason: HOSPADM

## 2022-09-24 RX ORDER — POTASSIUM CHLORIDE 1.5 G/1.77G
40 POWDER, FOR SOLUTION ORAL AS NEEDED
Status: DISCONTINUED | OUTPATIENT
Start: 2022-09-24 | End: 2022-09-28 | Stop reason: HOSPADM

## 2022-09-24 RX ORDER — FAMOTIDINE 20 MG/1
20 TABLET, FILM COATED ORAL DAILY
Status: DISCONTINUED | OUTPATIENT
Start: 2022-09-25 | End: 2022-09-28 | Stop reason: HOSPADM

## 2022-09-24 RX ORDER — METHYLPREDNISOLONE SODIUM SUCCINATE 125 MG/2ML
60 INJECTION, POWDER, LYOPHILIZED, FOR SOLUTION INTRAMUSCULAR; INTRAVENOUS EVERY 8 HOURS SCHEDULED
Status: DISCONTINUED | OUTPATIENT
Start: 2022-09-24 | End: 2022-09-25

## 2022-09-24 RX ADMIN — METHYLPREDNISOLONE SODIUM SUCCINATE 60 MG: 125 INJECTION, POWDER, FOR SOLUTION INTRAMUSCULAR; INTRAVENOUS at 16:42

## 2022-09-24 RX ADMIN — FAMOTIDINE 20 MG: 20 TABLET ORAL at 11:46

## 2022-09-24 RX ADMIN — TAZOBACTAM SODIUM AND PIPERACILLIN SODIUM 3.38 G: 375; 3 INJECTION, SOLUTION INTRAVENOUS at 05:56

## 2022-09-24 RX ADMIN — POTASSIUM CHLORIDE 40 MEQ: 750 CAPSULE, EXTENDED RELEASE ORAL at 01:21

## 2022-09-24 RX ADMIN — DONEPEZIL HYDROCHLORIDE 5 MG: 5 TABLET ORAL at 21:12

## 2022-09-24 RX ADMIN — Medication 10 ML: at 21:12

## 2022-09-24 RX ADMIN — APIXABAN 2.5 MG: 2.5 TABLET, FILM COATED ORAL at 21:12

## 2022-09-24 RX ADMIN — VANCOMYCIN HYDROCHLORIDE 750 MG: 750 INJECTION, SOLUTION INTRAVENOUS at 16:42

## 2022-09-24 RX ADMIN — METOPROLOL TARTRATE 25 MG: 25 TABLET, FILM COATED ORAL at 21:14

## 2022-09-24 RX ADMIN — APIXABAN 2.5 MG: 2.5 TABLET, FILM COATED ORAL at 11:46

## 2022-09-24 RX ADMIN — RISPERIDONE 0.5 MG: 1 TABLET ORAL at 21:11

## 2022-09-24 RX ADMIN — LORAZEPAM 0.5 MG: 0.5 TABLET ORAL at 21:11

## 2022-09-24 RX ADMIN — METHYLPREDNISOLONE SODIUM SUCCINATE 60 MG: 125 INJECTION, POWDER, FOR SOLUTION INTRAMUSCULAR; INTRAVENOUS at 11:20

## 2022-09-24 RX ADMIN — Medication 10 ML: at 11:47

## 2022-09-24 RX ADMIN — Medication 5 MG: at 01:24

## 2022-09-24 RX ADMIN — RISPERIDONE 0.25 MG: 0.25 TABLET ORAL at 11:46

## 2022-09-24 RX ADMIN — ASPIRIN 81 MG: 81 TABLET, COATED ORAL at 11:46

## 2022-09-24 RX ADMIN — TAZOBACTAM SODIUM AND PIPERACILLIN SODIUM 3.38 G: 375; 3 INJECTION, SOLUTION INTRAVENOUS at 11:17

## 2022-09-24 RX ADMIN — AMLODIPINE BESYLATE 10 MG: 10 TABLET ORAL at 11:47

## 2022-09-24 RX ADMIN — FLUOXETINE HYDROCHLORIDE 40 MG: 20 CAPSULE ORAL at 11:46

## 2022-09-24 RX ADMIN — METOPROLOL TARTRATE 25 MG: 25 TABLET, FILM COATED ORAL at 11:46

## 2022-09-24 RX ADMIN — METHYLPREDNISOLONE SODIUM SUCCINATE 60 MG: 125 INJECTION, POWDER, FOR SOLUTION INTRAMUSCULAR; INTRAVENOUS at 01:21

## 2022-09-24 NOTE — OUTREACH NOTE
COPD/PN Week 2 Survey    Flowsheet Row Responses   [facility] patient discharged from? [place]   Does the patient have one of the following disease processes/diagnoses(primary or secondary)? Pneumonia   Week 2 attempt successful? No   Unsuccessful attempts Attempt 1   Revoke Readmitted          DAXA BRYANT - Registered Nurse

## 2022-09-25 LAB
ANION GAP SERPL CALCULATED.3IONS-SCNC: 13 MMOL/L (ref 5–15)
BASOPHILS # BLD AUTO: 0.01 10*3/MM3 (ref 0–0.2)
BASOPHILS NFR BLD AUTO: 0.1 % (ref 0–1.5)
BH CV ECHO MEAS - AO MAX PG: 10.6 MMHG
BH CV ECHO MEAS - AO MEAN PG: 4 MMHG
BH CV ECHO MEAS - AO ROOT DIAM: 2.7 CM
BH CV ECHO MEAS - AO V2 MAX: 163 CM/SEC
BH CV ECHO MEAS - AO V2 VTI: 31.4 CM
BH CV ECHO MEAS - AVA(I,D): 2.47 CM2
BH CV ECHO MEAS - EDV(CUBED): 64 ML
BH CV ECHO MEAS - EDV(MOD-SP2): 63.6 ML
BH CV ECHO MEAS - EDV(MOD-SP4): 60.4 ML
BH CV ECHO MEAS - EF(MOD-BP): 64.8 %
BH CV ECHO MEAS - EF(MOD-SP2): 70.4 %
BH CV ECHO MEAS - EF(MOD-SP4): 55.5 %
BH CV ECHO MEAS - ESV(CUBED): 24.4 ML
BH CV ECHO MEAS - ESV(MOD-SP2): 18.8 ML
BH CV ECHO MEAS - ESV(MOD-SP4): 26.9 ML
BH CV ECHO MEAS - FS: 27.5 %
BH CV ECHO MEAS - IVS/LVPW: 0.82 CM
BH CV ECHO MEAS - IVSD: 0.9 CM
BH CV ECHO MEAS - LA DIMENSION: 3.5 CM
BH CV ECHO MEAS - LAT PEAK E' VEL: 12 CM/SEC
BH CV ECHO MEAS - LV DIASTOLIC VOL/BSA (35-75): 40.6 CM2
BH CV ECHO MEAS - LV MASS(C)D: 127.1 GRAMS
BH CV ECHO MEAS - LV MAX PG: 4.8 MMHG
BH CV ECHO MEAS - LV MEAN PG: 2 MMHG
BH CV ECHO MEAS - LV SYSTOLIC VOL/BSA (12-30): 18.1 CM2
BH CV ECHO MEAS - LV V1 MAX: 110 CM/SEC
BH CV ECHO MEAS - LV V1 VTI: 24.7 CM
BH CV ECHO MEAS - LVIDD: 4 CM
BH CV ECHO MEAS - LVIDS: 2.9 CM
BH CV ECHO MEAS - LVOT AREA: 3.1 CM2
BH CV ECHO MEAS - LVOT DIAM: 2 CM
BH CV ECHO MEAS - LVPWD: 1 CM
BH CV ECHO MEAS - MED PEAK E' VEL: 8.5 CM/SEC
BH CV ECHO MEAS - MV A MAX VEL: 113 CM/SEC
BH CV ECHO MEAS - MV DEC SLOPE: 343 CM/SEC2
BH CV ECHO MEAS - MV DEC TIME: 0.37 MSEC
BH CV ECHO MEAS - MV E MAX VEL: 129 CM/SEC
BH CV ECHO MEAS - MV E/A: 1.14
BH CV ECHO MEAS - MV MAX PG: 9.9 MMHG
BH CV ECHO MEAS - MV MEAN PG: 3 MMHG
BH CV ECHO MEAS - MV P1/2T: 132.4 MSEC
BH CV ECHO MEAS - MV V2 VTI: 57 CM
BH CV ECHO MEAS - MVA(P1/2T): 1.66 CM2
BH CV ECHO MEAS - MVA(VTI): 1.36 CM2
BH CV ECHO MEAS - PA ACC TIME: 0.26 SEC
BH CV ECHO MEAS - PA PR(ACCEL): -36.7 MMHG
BH CV ECHO MEAS - PA V2 MAX: 114 CM/SEC
BH CV ECHO MEAS - RAP SYSTOLE: 8 MMHG
BH CV ECHO MEAS - RVSP: 38 MMHG
BH CV ECHO MEAS - SI(MOD-SP2): 30.1 ML/M2
BH CV ECHO MEAS - SI(MOD-SP4): 22.5 ML/M2
BH CV ECHO MEAS - SV(LVOT): 77.6 ML
BH CV ECHO MEAS - SV(MOD-SP2): 44.8 ML
BH CV ECHO MEAS - SV(MOD-SP4): 33.5 ML
BH CV ECHO MEAS - TAPSE (>1.6): 2 CM
BH CV ECHO MEAS - TR MAX PG: 30 MMHG
BH CV ECHO MEAS - TR MAX VEL: 292.5 CM/SEC
BH CV ECHO MEASUREMENTS AVERAGE E/E' RATIO: 12.59
BH CV VAS BP LEFT ARM: NORMAL MMHG
BH CV XLRA - RV BASE: 3 CM
BH CV XLRA - RV LENGTH: 5.1 CM
BH CV XLRA - RV MID: 2.1 CM
BH CV XLRA - TDI S': 8.2 CM/SEC
BUN SERPL-MCNC: 29 MG/DL (ref 8–23)
BUN/CREAT SERPL: 33 (ref 7–25)
CALCIUM SPEC-SCNC: 9 MG/DL (ref 8.6–10.5)
CHLORIDE SERPL-SCNC: 109 MMOL/L (ref 98–107)
CO2 SERPL-SCNC: 20 MMOL/L (ref 22–29)
CREAT SERPL-MCNC: 0.88 MG/DL (ref 0.57–1)
DEPRECATED RDW RBC AUTO: 54 FL (ref 37–54)
EGFRCR SERPLBLD CKD-EPI 2021: 66.5 ML/MIN/1.73
EOSINOPHIL # BLD AUTO: 0 10*3/MM3 (ref 0–0.4)
EOSINOPHIL NFR BLD AUTO: 0 % (ref 0.3–6.2)
ERYTHROCYTE [DISTWIDTH] IN BLOOD BY AUTOMATED COUNT: 15.1 % (ref 12.3–15.4)
GLUCOSE SERPL-MCNC: 144 MG/DL (ref 65–99)
HCT VFR BLD AUTO: 30.1 % (ref 34–46.6)
HGB BLD-MCNC: 9.3 G/DL (ref 12–15.9)
IMM GRANULOCYTES # BLD AUTO: 0.07 10*3/MM3 (ref 0–0.05)
IMM GRANULOCYTES NFR BLD AUTO: 0.7 % (ref 0–0.5)
LEFT ATRIUM VOLUME INDEX: 29.5 ML/M2
LV EF 2D ECHO EST: 60 %
LYMPHOCYTES # BLD AUTO: 0.5 10*3/MM3 (ref 0.7–3.1)
LYMPHOCYTES NFR BLD AUTO: 4.7 % (ref 19.6–45.3)
MAXIMAL PREDICTED HEART RATE: 140 BPM
MCH RBC QN AUTO: 30 PG (ref 26.6–33)
MCHC RBC AUTO-ENTMCNC: 30.9 G/DL (ref 31.5–35.7)
MCV RBC AUTO: 97.1 FL (ref 79–97)
MONOCYTES # BLD AUTO: 0.32 10*3/MM3 (ref 0.1–0.9)
MONOCYTES NFR BLD AUTO: 3 % (ref 5–12)
NEUTROPHILS NFR BLD AUTO: 9.77 10*3/MM3 (ref 1.7–7)
NEUTROPHILS NFR BLD AUTO: 91.5 % (ref 42.7–76)
NRBC BLD AUTO-RTO: 0 /100 WBC (ref 0–0.2)
PLATELET # BLD AUTO: 219 10*3/MM3 (ref 140–450)
PMV BLD AUTO: 10.2 FL (ref 6–12)
POTASSIUM SERPL-SCNC: 4.7 MMOL/L (ref 3.5–5.2)
RBC # BLD AUTO: 3.1 10*6/MM3 (ref 3.77–5.28)
SODIUM SERPL-SCNC: 142 MMOL/L (ref 136–145)
STRESS TARGET HR: 119 BPM
WBC NRBC COR # BLD: 10.67 10*3/MM3 (ref 3.4–10.8)

## 2022-09-25 PROCEDURE — 80048 BASIC METABOLIC PNL TOTAL CA: CPT | Performed by: INTERNAL MEDICINE

## 2022-09-25 PROCEDURE — 94799 UNLISTED PULMONARY SVC/PX: CPT

## 2022-09-25 PROCEDURE — 97166 OT EVAL MOD COMPLEX 45 MIN: CPT

## 2022-09-25 PROCEDURE — 99232 SBSQ HOSP IP/OBS MODERATE 35: CPT | Performed by: INTERNAL MEDICINE

## 2022-09-25 PROCEDURE — 25010000002 METHYLPREDNISOLONE PER 125 MG: Performed by: PHYSICIAN ASSISTANT

## 2022-09-25 PROCEDURE — 85025 COMPLETE CBC W/AUTO DIFF WBC: CPT | Performed by: INTERNAL MEDICINE

## 2022-09-25 PROCEDURE — 97535 SELF CARE MNGMENT TRAINING: CPT

## 2022-09-25 PROCEDURE — G0378 HOSPITAL OBSERVATION PER HR: HCPCS

## 2022-09-25 PROCEDURE — 25010000002 FUROSEMIDE PER 20 MG: Performed by: INTERNAL MEDICINE

## 2022-09-25 PROCEDURE — 97162 PT EVAL MOD COMPLEX 30 MIN: CPT

## 2022-09-25 PROCEDURE — 25010000002 PIPERACILLIN SOD-TAZOBACTAM PER 1 G: Performed by: PHYSICIAN ASSISTANT

## 2022-09-25 RX ORDER — AMLODIPINE BESYLATE 10 MG/1
10 TABLET ORAL
Status: DISCONTINUED | OUTPATIENT
Start: 2022-09-26 | End: 2022-09-28 | Stop reason: HOSPADM

## 2022-09-25 RX ORDER — FUROSEMIDE 10 MG/ML
40 INJECTION INTRAMUSCULAR; INTRAVENOUS ONCE
Status: COMPLETED | OUTPATIENT
Start: 2022-09-25 | End: 2022-09-25

## 2022-09-25 RX ORDER — PREDNISONE 20 MG/1
40 TABLET ORAL
Status: DISCONTINUED | OUTPATIENT
Start: 2022-09-26 | End: 2022-09-28 | Stop reason: HOSPADM

## 2022-09-25 RX ORDER — CASTOR OIL AND BALSAM, PERU 788; 87 MG/G; MG/G
1 OINTMENT TOPICAL EVERY 12 HOURS SCHEDULED
Status: DISCONTINUED | OUTPATIENT
Start: 2022-09-25 | End: 2022-09-28 | Stop reason: HOSPADM

## 2022-09-25 RX ADMIN — CASTOR OIL AND BALSAM, PERU 1 APPLICATION: 788; 87 OINTMENT TOPICAL at 21:51

## 2022-09-25 RX ADMIN — Medication 10 ML: at 08:38

## 2022-09-25 RX ADMIN — ASPIRIN 81 MG: 81 TABLET, COATED ORAL at 08:37

## 2022-09-25 RX ADMIN — APIXABAN 2.5 MG: 2.5 TABLET, FILM COATED ORAL at 08:38

## 2022-09-25 RX ADMIN — METHYLPREDNISOLONE SODIUM SUCCINATE 60 MG: 125 INJECTION, POWDER, FOR SOLUTION INTRAMUSCULAR; INTRAVENOUS at 00:12

## 2022-09-25 RX ADMIN — FAMOTIDINE 20 MG: 20 TABLET ORAL at 08:37

## 2022-09-25 RX ADMIN — TAZOBACTAM SODIUM AND PIPERACILLIN SODIUM 3.38 G: 375; 3 INJECTION, SOLUTION INTRAVENOUS at 00:12

## 2022-09-25 RX ADMIN — RISPERIDONE 0.5 MG: 1 TABLET ORAL at 21:50

## 2022-09-25 RX ADMIN — FUROSEMIDE 40 MG: 10 INJECTION, SOLUTION INTRAMUSCULAR; INTRAVENOUS at 08:38

## 2022-09-25 RX ADMIN — METOPROLOL TARTRATE 25 MG: 25 TABLET, FILM COATED ORAL at 08:38

## 2022-09-25 RX ADMIN — RISPERIDONE 0.25 MG: 0.25 TABLET ORAL at 08:37

## 2022-09-25 RX ADMIN — ACETAMINOPHEN 650 MG: 325 TABLET, FILM COATED ORAL at 00:12

## 2022-09-25 RX ADMIN — TAZOBACTAM SODIUM AND PIPERACILLIN SODIUM 3.38 G: 375; 3 INJECTION, SOLUTION INTRAVENOUS at 17:12

## 2022-09-25 RX ADMIN — FLUOXETINE HYDROCHLORIDE 40 MG: 20 CAPSULE ORAL at 08:38

## 2022-09-25 RX ADMIN — Medication 10 ML: at 21:51

## 2022-09-25 RX ADMIN — METOPROLOL TARTRATE 25 MG: 25 TABLET, FILM COATED ORAL at 21:50

## 2022-09-25 RX ADMIN — TAZOBACTAM SODIUM AND PIPERACILLIN SODIUM 3.38 G: 375; 3 INJECTION, SOLUTION INTRAVENOUS at 08:38

## 2022-09-25 RX ADMIN — LORAZEPAM 0.5 MG: 0.5 TABLET ORAL at 21:51

## 2022-09-25 RX ADMIN — DONEPEZIL HYDROCHLORIDE 5 MG: 5 TABLET ORAL at 21:51

## 2022-09-25 RX ADMIN — APIXABAN 2.5 MG: 2.5 TABLET, FILM COATED ORAL at 21:50

## 2022-09-26 PROCEDURE — 25010000002 PIPERACILLIN SOD-TAZOBACTAM PER 1 G: Performed by: PHYSICIAN ASSISTANT

## 2022-09-26 PROCEDURE — 99232 SBSQ HOSP IP/OBS MODERATE 35: CPT | Performed by: INTERNAL MEDICINE

## 2022-09-26 PROCEDURE — 63710000001 PREDNISONE PER 1 MG: Performed by: INTERNAL MEDICINE

## 2022-09-26 PROCEDURE — 25010000002 FUROSEMIDE PER 20 MG: Performed by: INTERNAL MEDICINE

## 2022-09-26 RX ORDER — FUROSEMIDE 10 MG/ML
40 INJECTION INTRAMUSCULAR; INTRAVENOUS ONCE
Status: COMPLETED | OUTPATIENT
Start: 2022-09-26 | End: 2022-09-26

## 2022-09-26 RX ORDER — DOXYCYCLINE 100 MG/1
100 CAPSULE ORAL EVERY 12 HOURS SCHEDULED
Status: DISCONTINUED | OUTPATIENT
Start: 2022-09-26 | End: 2022-09-28 | Stop reason: HOSPADM

## 2022-09-26 RX ADMIN — ASPIRIN 81 MG: 81 TABLET, COATED ORAL at 09:48

## 2022-09-26 RX ADMIN — DOXYCYCLINE 100 MG: 100 CAPSULE ORAL at 09:48

## 2022-09-26 RX ADMIN — APIXABAN 2.5 MG: 2.5 TABLET, FILM COATED ORAL at 09:48

## 2022-09-26 RX ADMIN — Medication 10 ML: at 20:57

## 2022-09-26 RX ADMIN — ACETAMINOPHEN 650 MG: 325 TABLET, FILM COATED ORAL at 04:07

## 2022-09-26 RX ADMIN — TAZOBACTAM SODIUM AND PIPERACILLIN SODIUM 3.38 G: 375; 3 INJECTION, SOLUTION INTRAVENOUS at 09:48

## 2022-09-26 RX ADMIN — CASTOR OIL AND BALSAM, PERU 1 APPLICATION: 788; 87 OINTMENT TOPICAL at 09:49

## 2022-09-26 RX ADMIN — METOPROLOL TARTRATE 25 MG: 25 TABLET, FILM COATED ORAL at 09:48

## 2022-09-26 RX ADMIN — DONEPEZIL HYDROCHLORIDE 5 MG: 5 TABLET ORAL at 20:57

## 2022-09-26 RX ADMIN — METOPROLOL TARTRATE 25 MG: 25 TABLET, FILM COATED ORAL at 20:56

## 2022-09-26 RX ADMIN — CASTOR OIL AND BALSAM, PERU 1 APPLICATION: 788; 87 OINTMENT TOPICAL at 20:57

## 2022-09-26 RX ADMIN — RISPERIDONE 0.5 MG: 1 TABLET ORAL at 20:56

## 2022-09-26 RX ADMIN — TAZOBACTAM SODIUM AND PIPERACILLIN SODIUM 3.38 G: 375; 3 INJECTION, SOLUTION INTRAVENOUS at 00:06

## 2022-09-26 RX ADMIN — FUROSEMIDE 40 MG: 10 INJECTION, SOLUTION INTRAMUSCULAR; INTRAVENOUS at 10:08

## 2022-09-26 RX ADMIN — DOXYCYCLINE 100 MG: 100 CAPSULE ORAL at 20:56

## 2022-09-26 RX ADMIN — APIXABAN 2.5 MG: 2.5 TABLET, FILM COATED ORAL at 20:57

## 2022-09-26 RX ADMIN — PREDNISONE 40 MG: 20 TABLET ORAL at 09:48

## 2022-09-26 RX ADMIN — LORAZEPAM 0.5 MG: 0.5 TABLET ORAL at 20:57

## 2022-09-26 RX ADMIN — Medication 10 ML: at 09:49

## 2022-09-26 RX ADMIN — RISPERIDONE 0.25 MG: 0.25 TABLET ORAL at 09:48

## 2022-09-26 RX ADMIN — Medication 5 MG: at 20:56

## 2022-09-26 RX ADMIN — FLUOXETINE HYDROCHLORIDE 40 MG: 20 CAPSULE ORAL at 09:48

## 2022-09-26 RX ADMIN — TAZOBACTAM SODIUM AND PIPERACILLIN SODIUM 3.38 G: 375; 3 INJECTION, SOLUTION INTRAVENOUS at 23:44

## 2022-09-26 RX ADMIN — TAZOBACTAM SODIUM AND PIPERACILLIN SODIUM 3.38 G: 375; 3 INJECTION, SOLUTION INTRAVENOUS at 17:10

## 2022-09-26 RX ADMIN — AMLODIPINE BESYLATE 10 MG: 10 TABLET ORAL at 09:48

## 2022-09-26 RX ADMIN — FAMOTIDINE 20 MG: 20 TABLET ORAL at 09:48

## 2022-09-27 LAB
ANION GAP SERPL CALCULATED.3IONS-SCNC: 11 MMOL/L (ref 5–15)
BUN SERPL-MCNC: 21 MG/DL (ref 8–23)
BUN/CREAT SERPL: 24.4 (ref 7–25)
CALCIUM SPEC-SCNC: 8.6 MG/DL (ref 8.6–10.5)
CHLORIDE SERPL-SCNC: 102 MMOL/L (ref 98–107)
CO2 SERPL-SCNC: 27 MMOL/L (ref 22–29)
CREAT SERPL-MCNC: 0.86 MG/DL (ref 0.57–1)
EGFRCR SERPLBLD CKD-EPI 2021: 68.4 ML/MIN/1.73
GLUCOSE SERPL-MCNC: 85 MG/DL (ref 65–99)
POTASSIUM SERPL-SCNC: 3.5 MMOL/L (ref 3.5–5.2)
SODIUM SERPL-SCNC: 140 MMOL/L (ref 136–145)

## 2022-09-27 PROCEDURE — 99232 SBSQ HOSP IP/OBS MODERATE 35: CPT | Performed by: INTERNAL MEDICINE

## 2022-09-27 PROCEDURE — 25010000002 PIPERACILLIN SOD-TAZOBACTAM PER 1 G: Performed by: PHYSICIAN ASSISTANT

## 2022-09-27 PROCEDURE — 80048 BASIC METABOLIC PNL TOTAL CA: CPT | Performed by: INTERNAL MEDICINE

## 2022-09-27 PROCEDURE — 63710000001 PREDNISONE PER 1 MG: Performed by: INTERNAL MEDICINE

## 2022-09-27 RX ADMIN — ASPIRIN 81 MG: 81 TABLET, COATED ORAL at 09:32

## 2022-09-27 RX ADMIN — Medication 10 ML: at 21:36

## 2022-09-27 RX ADMIN — DOXYCYCLINE 100 MG: 100 CAPSULE ORAL at 21:37

## 2022-09-27 RX ADMIN — LORAZEPAM 0.5 MG: 0.5 TABLET ORAL at 21:36

## 2022-09-27 RX ADMIN — AMLODIPINE BESYLATE 10 MG: 10 TABLET ORAL at 09:32

## 2022-09-27 RX ADMIN — Medication 10 ML: at 09:33

## 2022-09-27 RX ADMIN — FLUOXETINE HYDROCHLORIDE 40 MG: 20 CAPSULE ORAL at 09:30

## 2022-09-27 RX ADMIN — TAZOBACTAM SODIUM AND PIPERACILLIN SODIUM 3.38 G: 375; 3 INJECTION, SOLUTION INTRAVENOUS at 16:13

## 2022-09-27 RX ADMIN — APIXABAN 2.5 MG: 2.5 TABLET, FILM COATED ORAL at 09:32

## 2022-09-27 RX ADMIN — TAZOBACTAM SODIUM AND PIPERACILLIN SODIUM 3.38 G: 375; 3 INJECTION, SOLUTION INTRAVENOUS at 09:33

## 2022-09-27 RX ADMIN — DOXYCYCLINE 100 MG: 100 CAPSULE ORAL at 09:31

## 2022-09-27 RX ADMIN — FAMOTIDINE 20 MG: 20 TABLET ORAL at 09:32

## 2022-09-27 RX ADMIN — DONEPEZIL HYDROCHLORIDE 5 MG: 5 TABLET ORAL at 21:36

## 2022-09-27 RX ADMIN — APIXABAN 2.5 MG: 2.5 TABLET, FILM COATED ORAL at 21:37

## 2022-09-27 RX ADMIN — CASTOR OIL AND BALSAM, PERU 1 APPLICATION: 788; 87 OINTMENT TOPICAL at 21:37

## 2022-09-27 RX ADMIN — RISPERIDONE 0.5 MG: 1 TABLET ORAL at 21:36

## 2022-09-27 RX ADMIN — TAZOBACTAM SODIUM AND PIPERACILLIN SODIUM 3.38 G: 375; 3 INJECTION, SOLUTION INTRAVENOUS at 23:39

## 2022-09-27 RX ADMIN — METOPROLOL TARTRATE 25 MG: 25 TABLET, FILM COATED ORAL at 21:36

## 2022-09-27 RX ADMIN — CASTOR OIL AND BALSAM, PERU 1 APPLICATION: 788; 87 OINTMENT TOPICAL at 09:33

## 2022-09-27 RX ADMIN — PREDNISONE 40 MG: 20 TABLET ORAL at 09:32

## 2022-09-27 RX ADMIN — RISPERIDONE 0.25 MG: 0.25 TABLET ORAL at 09:32

## 2022-09-27 RX ADMIN — Medication 5 MG: at 21:36

## 2022-09-28 ENCOUNTER — READMISSION MANAGEMENT (OUTPATIENT)
Dept: CALL CENTER | Facility: HOSPITAL | Age: 82
End: 2022-09-28

## 2022-09-28 VITALS
TEMPERATURE: 97.5 F | SYSTOLIC BLOOD PRESSURE: 131 MMHG | RESPIRATION RATE: 16 BRPM | HEIGHT: 62 IN | DIASTOLIC BLOOD PRESSURE: 57 MMHG | WEIGHT: 112.6 LBS | HEART RATE: 79 BPM | BODY MASS INDEX: 20.72 KG/M2 | OXYGEN SATURATION: 98 %

## 2022-09-28 LAB
BACTERIA SPEC AEROBE CULT: NORMAL
BACTERIA SPEC AEROBE CULT: NORMAL

## 2022-09-28 PROCEDURE — 63710000001 PREDNISONE PER 1 MG: Performed by: INTERNAL MEDICINE

## 2022-09-28 PROCEDURE — 25010000002 PIPERACILLIN SOD-TAZOBACTAM PER 1 G: Performed by: PHYSICIAN ASSISTANT

## 2022-09-28 PROCEDURE — 99239 HOSP IP/OBS DSCHRG MGMT >30: CPT | Performed by: NURSE PRACTITIONER

## 2022-09-28 RX ORDER — DOXYCYCLINE 100 MG/1
100 CAPSULE ORAL EVERY 12 HOURS SCHEDULED
Qty: 5 CAPSULE | Refills: 0 | Status: SHIPPED | OUTPATIENT
Start: 2022-09-28 | End: 2022-10-01

## 2022-09-28 RX ADMIN — FAMOTIDINE 20 MG: 20 TABLET ORAL at 08:04

## 2022-09-28 RX ADMIN — RISPERIDONE 0.25 MG: 0.25 TABLET ORAL at 08:04

## 2022-09-28 RX ADMIN — PREDNISONE 40 MG: 20 TABLET ORAL at 08:05

## 2022-09-28 RX ADMIN — Medication 10 ML: at 08:05

## 2022-09-28 RX ADMIN — AMLODIPINE BESYLATE 10 MG: 10 TABLET ORAL at 08:04

## 2022-09-28 RX ADMIN — DOXYCYCLINE 100 MG: 100 CAPSULE ORAL at 08:04

## 2022-09-28 RX ADMIN — CASTOR OIL AND BALSAM, PERU 1 APPLICATION: 788; 87 OINTMENT TOPICAL at 08:05

## 2022-09-28 RX ADMIN — APIXABAN 2.5 MG: 2.5 TABLET, FILM COATED ORAL at 08:05

## 2022-09-28 RX ADMIN — ASPIRIN 81 MG: 81 TABLET, COATED ORAL at 08:04

## 2022-09-28 RX ADMIN — FLUOXETINE HYDROCHLORIDE 40 MG: 20 CAPSULE ORAL at 08:04

## 2022-09-28 RX ADMIN — TAZOBACTAM SODIUM AND PIPERACILLIN SODIUM 3.38 G: 375; 3 INJECTION, SOLUTION INTRAVENOUS at 08:05

## 2022-09-29 NOTE — OUTREACH NOTE
Prep Survey    Flowsheet Row Responses   [facility] facility patient discharged from? [place]   Is LACE score < 7 ? No   Emergency Room discharge w/ pulse ox? No   Eligibility Readm Mgmt   Discharge diagnosis Acute Respiratory failure with Hypoxia,    Pneumonia/Pneumonitis   Does the patient have one of the following disease processes/diagnoses(primary or secondary)? Pneumonia   Does the patient have Home health ordered? Yes   What is the Home health agency?  [facility]   Is there a DME ordered? No   Prep survey completed? Yes          DEVON MELO - Registered Nurse

## 2022-10-03 LAB
A FUMIGATUS IGG SER QL: NEGATIVE
A PULLULANS IGG SER QL: NEGATIVE
LACEYELLA SACCHARI AB SER QL ID: NEGATIVE
PIGEON SERUM IGG QL: NEGATIVE
S RECTIVIRGULA IGG SER QL ID: NEGATIVE
T VULGARIS IGG SER QL: NEGATIVE

## 2022-10-04 ENCOUNTER — HOSPITAL ENCOUNTER (OUTPATIENT)
Dept: CARDIOLOGY | Facility: HOSPITAL | Age: 82
Discharge: HOME | End: 2022-10-04

## 2022-10-04 ENCOUNTER — OFFICE VISIT (OUTPATIENT)
Dept: CARDIOLOGY | Facility: HOSPITAL | Age: 82
End: 2022-10-04

## 2022-10-04 VITALS
OXYGEN SATURATION: 96 % | DIASTOLIC BLOOD PRESSURE: 62 MMHG | HEART RATE: 64 BPM | BODY MASS INDEX: 20.43 KG/M2 | SYSTOLIC BLOOD PRESSURE: 128 MMHG | WEIGHT: 111 LBS | RESPIRATION RATE: 16 BRPM | HEIGHT: 62 IN | TEMPERATURE: 95.9 F

## 2022-10-04 DIAGNOSIS — G30.1 LATE ONSET ALZHEIMER'S DISEASE WITHOUT BEHAVIORAL DISTURBANCE: ICD-10-CM

## 2022-10-04 DIAGNOSIS — I48.0 PAF (PAROXYSMAL ATRIAL FIBRILLATION): ICD-10-CM

## 2022-10-04 DIAGNOSIS — F02.80 LATE ONSET ALZHEIMER'S DISEASE WITHOUT BEHAVIORAL DISTURBANCE: ICD-10-CM

## 2022-10-04 DIAGNOSIS — I27.20 PULMONARY HYPERTENSION: ICD-10-CM

## 2022-10-04 DIAGNOSIS — I48.0 PAF (PAROXYSMAL ATRIAL FIBRILLATION): Primary | ICD-10-CM

## 2022-10-04 PROCEDURE — 93005 ELECTROCARDIOGRAM TRACING: CPT | Performed by: NURSE PRACTITIONER

## 2022-10-04 PROCEDURE — 93010 ELECTROCARDIOGRAM REPORT: CPT | Performed by: INTERNAL MEDICINE

## 2022-10-04 PROCEDURE — 99214 OFFICE O/P EST MOD 30 MIN: CPT | Performed by: NURSE PRACTITIONER

## 2022-10-05 NOTE — PROGRESS NOTES
"Chief Complaint  Establish Care, Hospital Follow Up Visit, and Atrial Fibrillation    Subjective    History of Present Illness {CC  Problem List  Visit  Diagnosis   Encounters  Notes  Medications  Labs  Result Review Imaging  Media :23}       History of Present Illness   82-year-old female presents the office today for ongoing evaluation of her PAF.  Recently hospitalized with acute respiratory failure.  First known diagnosis of A. fib in April 2022 during hospital stay.  Patient was initiated on Eliquis and metoprolol at that time.  Patient has a history of anxiety, pulmonary hypertension, late onset Alzheimer's disease without behavioral disturbance.  Patient's  is present in the room and reports that they do check her heart rate and pulse ox at home.  She does have oxygen to use when her pulse ox dips below 88%.  He reports he usually notes that at night when she is in a deep sleep.  Objective     Vital Signs:   Vitals:    10/04/22 1341 10/04/22 1344   BP: 139/61 128/62   BP Location: Right arm Left arm   Patient Position: Sitting Sitting   Cuff Size: Adult Adult   Pulse: 66 64   Resp: 16    Temp: 95.9 °F (35.5 °C)    TempSrc: Temporal    SpO2: 96%    Weight: 50.3 kg (111 lb)    Height: 157.5 cm (62\")      Body mass index is 20.3 kg/m².  Physical Exam  Vitals and nursing note reviewed.   Constitutional:       Appearance: Normal appearance.   HENT:      Head: Normocephalic.   Eyes:      Pupils: Pupils are equal, round, and reactive to light.   Cardiovascular:      Rate and Rhythm: Normal rate and regular rhythm.      Pulses: Normal pulses.      Heart sounds: Normal heart sounds. No murmur heard.  Pulmonary:      Effort: Pulmonary effort is normal.      Breath sounds: Normal breath sounds.   Abdominal:      General: Bowel sounds are normal.      Palpations: Abdomen is soft.   Musculoskeletal:         General: Normal range of motion.      Cervical back: Normal range of motion.      Right lower leg: " No edema.      Left lower leg: No edema.   Skin:     General: Skin is warm and dry.      Capillary Refill: Capillary refill takes less than 2 seconds.   Neurological:      Mental Status: She is alert and oriented to person, place, and time.   Psychiatric:         Mood and Affect: Mood normal.         Thought Content: Thought content normal.              Result Review  Data Reviewed:{ Labs  Result Review  Imaging  Med Tab  Media :23}   Echo: 9/22  · Estimated left ventricular EF = 60% Left ventricular systolic function is normal.  · Severe mitral annular calcification without significant stenosis.  · Trace mitral regurgitation.  · Mild tricuspid regurgitation.  · Calculated right ventricular systolic pressure from tricuspid regurgitation is 38 mmHg.     Test Reason : paf  Blood Pressure :   */*   mmHG  Vent. Rate :  74 BPM     Atrial Rate :  74 BPM     P-R Int : 142 ms          QRS Dur :  64 ms      QT Int : 440 ms       P-R-T Axes :  80   6  71 degrees     QTc Int : 488 ms     Normal sinus rhythm  Septal infarct , age undetermined  Abnormal ECG  When compared with ECG of 23-SEP-2022 18:30,  Septal infarct is now present  ST now depressed in Anterior leads           Assessment and Plan {CC Problem List  Visit Diagnosis  ROS  Review (Popup)  Health Maintenance  Quality  BestPractice  Medications  SmartSets  SnapShot Encounters  Media :23}   1. PAF (paroxysmal atrial fibrillation) (HCC)  Maintaining nsr on metoprolol  CHADS-VASc Risk Assessment            3 Total Score    2 Age >/= 75    1 Sex: Female        Criteria that do not apply:    CHF    Hypertension    DM    PRIOR STROKE/TIA/THROMBO    Vascular Disease    Age 65-74        Anticoagulated with eliquis and denies any s/s of bleeding   - ECG 12 Lead; Future  AFIB education provided today including: s/s, use of anticoagulation, treatment of atrial fibrillation, Chads Vasc and the role of the afib center. Discussed stroke prevention and causes of  afib, triggers of afib and medication management.   2. Pulmonary hypertension (HCC)  Oxygen as needed     3. Late onset Alzheimer's disease without behavioral disturbance (HCC)  Stable   Continue aricept           Follow Up {Instructions Charge Capture  Follow-up Communications :23}   Return if symptoms worsen or fail to improve.    Patient was given instructions and counseling regarding her condition or for health maintenance advice. Please see specific information pulled into the AVS if appropriate.  Patient was instructed to call the [facility] with any questions, concerns, or worsening symptoms.

## 2022-10-10 LAB
QT INTERVAL: 440 MS
QTC INTERVAL: 488 MS

## 2022-10-17 ENCOUNTER — READMISSION MANAGEMENT (OUTPATIENT)
Dept: CALL CENTER | Facility: HOSPITAL | Age: 82
End: 2022-10-17

## 2022-10-17 NOTE — OUTREACH NOTE
COPD/PN Week 3 Survey    Flowsheet Row Responses   [facility] patient discharged from? [place]   Does the patient have one of the following disease processes/diagnoses(primary or secondary)? Pneumonia   Week 3 attempt successful? Yes   Call start time 1023   Call end time 1025   Discharge diagnosis Acute Respiratory failure with Hypoxia,    Pneumonia/Pneumonitis   Person spoke with today (if not patient) and relationship Horace-spouse   Meds reviewed with patient/caregiver? Yes   Is the patient having any side effects they believe may be caused by any medication additions or changes? No   Is the patient taking all medications as directed (includes completed medication regime)? Yes   Does the patient have a primary care provider?  Yes   Has the patient kept scheduled appointments due by today? Yes   What is the Home health agency?  [facility]   Has home health visited the patient within 72 hours of discharge? Yes   DME comments home oxygen prior this admission, wears prn   Pulse Ox monitoring Intermittent   Pulse Ox device source Patient   O2 Sat comments 91% on RA   O2 Sat: education provided Sat levels, Monitoring frequency   Psychosocial issues? No   What is the patient's perception of their health status since discharge? Improving   Nursing Interventions Nurse provided patient education   Is the patient/caregiver able to teach back the hierarchy of who to call/visit for symptoms/problems? PCP, Specialist, Home health nurse, Urgent Care, ED, 911 Yes   Is the patient/caregiver able to teach back signs and symptoms of worsening condition: Fever/chills, Shortness of breath, Chest pain   Is the patient/caregiver able to teach back importance of completing antibiotic course of treatment? Yes   Week 3 call completed? Yes   Wrap up additional comments Spouse reports pt is doing well, had pcp f/u appt, no questions/concerns at this time          STACY ZARATE - Registered Nurse

## 2022-12-09 ENCOUNTER — TRANSCRIBE ORDERS (OUTPATIENT)
Dept: PHYSICAL THERAPY | Facility: HOSPITAL | Age: 82
End: 2022-12-09

## 2022-12-09 DIAGNOSIS — T14.8XXA WOUND OF SKIN: Primary | ICD-10-CM

## 2022-12-16 ENCOUNTER — HOSPITAL ENCOUNTER (OUTPATIENT)
Dept: PHYSICAL THERAPY | Facility: HOSPITAL | Age: 82
Setting detail: THERAPIES SERIES
Discharge: HOME | End: 2022-12-16

## 2022-12-16 DIAGNOSIS — L89.150 PRESSURE ULCER OF SACRAL REGION, UNSTAGEABLE: Primary | ICD-10-CM

## 2022-12-16 DIAGNOSIS — T14.8XXA WOUND INFECTION: ICD-10-CM

## 2022-12-16 DIAGNOSIS — L08.9 WOUND INFECTION: ICD-10-CM

## 2022-12-16 PROCEDURE — 97162 PT EVAL MOD COMPLEX 30 MIN: CPT

## 2022-12-16 NOTE — THERAPY DISCHARGE NOTE
Outpatient Rehabilitation - Wound/Debridement Initial Eval &  Discharge Summary  [facility]     Patient Name: Laura Wallace  : 1940  MRN: [id number]  Today's Date: 2022                   Admit Date: 2022    Visit Dx:    ICD-10-CM ICD-9-CM   1. Pressure ulcer of sacral region, unstageable (HCC)  L89.150 707.03     707.25   2. Wound infection  T14.8XXA 958.3    L08.9        Patient Active Problem List   Diagnosis   • Late onset Alzheimer's disease without behavioral disturbance (HCC)   • Multiple closed fractures of pelvis without disruption of pelvic ring, initial encounter ([facility])   • Pneumonia   • Paroxysmal atrial fibrillation with rapid ventricular response ([facility])   • Acute respiratory failure with hypoxia ([facility])   • Pulmonary hypertension ([facility])   • Anxiety        Past Medical History:   Diagnosis Date   • Anxiety 2022   • Late onset Alzheimer's disease without behavioral disturbance (HCC) 2018   • Paroxysmal atrial fibrillation with rapid ventricular response (HCC) 2022   • Pulmonary hypertension (HCC) 2022        Past Surgical History:   Procedure Laterality Date   • APPENDECTOMY     • VERTEBROPLASTY      L123, fusion        Patient History     Row Name 22 1400             History    Chief Complaint Ulcer, wound or other skin conditions  -      Brief Description of Current Complaint Pt has been mostly immobile since  of this year, and as a result has developed multiple pressure injuries. The PI to her R hip appears to be healed, but spouse reports the PI to her sacrum continues to worsen despite weekly assessments by HH RN and daily dressing changes by  caregivers with santyl and saline-moist gauze packing. Pt has one remaining dose of an oral abx. Pt presents here for assessment, but spouse did not realize she cannot receive both HH and OP wound care services, so he would like to be transitioned to a traditional C after today.  -       Patient/Caregiver Goals Heal wound;Know what to do to help the symptoms  -      Patient seeing anyone else for problem(s)? PCP  -      Related/Recent Hospitalizations No  -MC      Are you or can you be pregnant No  -MC         Pain     Pain Location Sacrum  -MC      Guevara-Sky FACES Pain Rating  6  -MC         Services    Are you currently receiving Home Health services Yes  -MC         Daily Activities    Primary Language English  -      Barriers to learning Cognitive  -      Action taken for identified issues Education provided to spouse  -      Recommended Referrals Physician  Infectious Disease specialist  -      Pt Participated in POC and Goals Yes  -MC            User Key  (r) = Recorded By, (t) = Taken By, (c) = Cosigned By    Initials Name Provider Type    Carmen Mari PT Physical Therapist                EVALUATION   PT Ortho     Row Name 12/16/22 1400       Subjective Pain    Able to rate subjective pain? yes  -MC    Pre-Treatment Pain Level 6  -MC    Post-Treatment Pain Level 6  -MC    Subjective Pain Comment FACES  -       Transfers    Bed-Chair Waller (Transfers) dependent (less than 25% patient effort)  -MC    Chair-Bed Waller (Transfers) dependent (less than 25% patient effort)  -    Comment, (Transfers) spouse assist for t/f. Seen LSL on hi/low table for tx  -          User Key  (r) = Recorded By, (t) = Taken By, (c) = Cosigned By    Initials Name Provider Type    Carmen Mari PT Physical Therapist                 LDA Wound     Row Name 12/16/22 1400             Wound 12/16/22 1300 Right sacral spine Pressure Injury    Wound - Properties Group Placement Date: 12/16/22  - Placement Time: 1300  -MC Present on Hospital Admission: Y  - Side: Right  - Location: sacral spine  - Primary Wound Type: Pressure inj  -    Pressure Injury Stage U  -MC      Dressing Appearance moist drainage;intact  -MC      Base necrotic;black  "eschar;gray;yellow;white;subcutaneous;pink;red;moist  -      Periwound redness;yeast  rash, potential yeast  -      Periwound Temperature warm  -      Periwound Skin Turgor soft  -      Edges irregular;open  -      Wound Length (cm) 2.7 cm  -      Wound Width (cm) 2 cm  -      Wound Depth (cm) --  obscured, at least 2 cm  -      Wound Surface Area (cm^2) 5.4 cm^2  -      Undermining [Depth (cm)/Location] circumferential, deepest area about 2.5 cm  -      Drainage Characteristics/Odor malodorous;green;yellow  bright blue/green drainage, very foul odor  -      Drainage Amount moderate  -      Care, Wound cleansed with;wound cleanser;debrided  -      Dressing Care dressing applied;antimicrobial agent applied;foam;low-adherent;border dressing  HFBt, 4\" optifoam  -      Periwound Care cleansed with pH balanced cleanser;barrier ointment applied;topical treatment applied  zguard, antifungal mixed  -      Retired Wound - Properties Group Placement Date: 12/16/22  - Placement Time: 1300  - Present on Hospital Admission: Y  - Side: Right  - Location: sacral spine  - Primary Wound Type: Pressure inj  -    Retired Wound - Properties Group Date first assessed: 12/16/22  - Time first assessed: 1300  - Present on Hospital Admission: Y  - Side: Right  - Location: sacral spine  - Primary Wound Type: Pressure inj  -          User Key  (r) = Recorded By, (t) = Taken By, (c) = Cosigned By    Initials Name Provider Type    Carmen Mari, PT Physical Therapist                    WOUND DEBRIDEMENT  Total area of Debridement: 2 cm2  Debridement Site 1  Location- Site 1: sacrum  Selective Debridement- Site 1: Wound Surface <20cmsq  Instruments- Site 1: #15, scapel, tweezers  Excised Tissue Description- Site 1: scant, slough, necrotic  Bleeding- Site 1: scant, held pressure, 1 minute              Therapy Education     Row Name 12/16/22 1400             Therapy Education    " Education Details Extra time in education with spouse today. Explained that pt cannot receive both OP and HH wound care services, but may be assessed at a traditional [facility] and still receive HH services. PT will call and ask PCP [facility]/sx of advanced infection present today. PT recommends packing either daily with HFBt or twice/day with Dakins moist gauze until able to be assessed at [facility] or [facility]. Discussed importance of offloading, including using pillows to offset slouching posture that applies pressure to the upper portion of the sacrum. Also recommended cushion for her w/c.  -MC      Given Symptoms/condition management;Bandaging/dressing change  -      Program New  -MC      How Provided Verbal;Demonstration  -MC      Provided to Patient;Caregiver  -MC      Level of Understanding Verbalized  -            User Key  (r) = Recorded By, (t) = Taken By, (c) = Cosigned By    Initials Name Provider Type    Carmen Mari, PT Physical Therapist                Recommendation and Plan   PT Assessment/Plan     Row Name 12/16/22 1400          PT Assessment    Functional Limitations Other (comment)  wound mgmt  -     Impairments Integumentary integrity;Cognition;Impaired postural alignment;Motor function  -     Assessment Comments Pt presents with severe, unstageable PI to the sacrum with strong s/sx of infection present today, including blue/green drainage and very foul odor. The base of the wound is almost fully necrotic and black/grey. No overt communication to bone or fascia noted today, but given the extensive necrosis, communication is likely to be present or to develop soon. Of note, pt has been using santyl for about a month with no obvious success with enzymatic debridement. PT recommends assessment at [facility] for infection and [facility] for ongoing debridement and wound assessment, as her spouse would like to keep their HH RN services. Pt's healing prognosis is guarded  given her overall mobility level and body habitus. Pt will benefit from skilled wound care, but will be discharged from our services per spouse request.  -     Rehab Potential Poor  -     Patient/caregiver participated in establishment of treatment plan and goals Yes  -MC        PT Plan    PT Frequency One time visit  -     Planned CPT's? PT EVAL MOD COMPLELITY: [id number]  -     PT Plan Comments D/C  -           User Key  (r) = Recorded By, (t) = Taken By, (c) = Cosigned By    Initials Name Provider Type    Carmen Mari, PT Physical Therapist                Goals      Time Calculation: Start Time: 1300  Untimed Charges  PT Eval/Re-eval Minutes: 60  Total Minutes  Untimed Charges Total Minutes: 60   Total Minutes: 60    Therapy Charges for Today     Code Description Service Date Service Provider Modifiers Qty    [id number] HC PT EVAL MOD COMPLEXITY 4 12/16/2022 Carmen Fernando, PT GP 1                 OP Discharge Summary     Row Name 12/16/22 1419             OP PT Discharge Summary    Date of Discharge 12/16/22  -      Reason for Discharge Transfer to other facility/level of care;Patient/Caregiver request  -      Outcomes Achieved Discharge from facility occurred on same date as evluation  -      Discharge Destination Home with home program;Home with caregiver assist;Home health services  -            User Key  (r) = Recorded By, (t) = Taken By, (c) = Cosigned By    Initials Name Provider Type    Carmen Mari, PT Physical Therapist                Carmen Fernando PT  12/16/2022

## 2023-05-07 ENCOUNTER — HOSPITAL ENCOUNTER (INPATIENT)
Facility: HOSPITAL | Age: 83
LOS: 3 days | Discharge: HOME | DRG: 871 | End: 2023-05-12
Attending: EMERGENCY MEDICINE | Admitting: FAMILY MEDICINE
Payer: MEDICARE

## 2023-05-07 ENCOUNTER — APPOINTMENT (OUTPATIENT)
Dept: GENERAL RADIOLOGY | Facility: HOSPITAL | Age: 83
DRG: 871 | End: 2023-05-07
Payer: MEDICARE

## 2023-05-07 DIAGNOSIS — R13.10 DYSPHAGIA, UNSPECIFIED TYPE: ICD-10-CM

## 2023-05-07 DIAGNOSIS — R77.8 ELEVATED TROPONIN: ICD-10-CM

## 2023-05-07 DIAGNOSIS — E07.89 THYROID MASS OF UNCLEAR ETIOLOGY: ICD-10-CM

## 2023-05-07 DIAGNOSIS — T14.8XXA WOUND WITH TUNNELING: ICD-10-CM

## 2023-05-07 DIAGNOSIS — I95.9 HYPOTENSION, UNSPECIFIED HYPOTENSION TYPE: ICD-10-CM

## 2023-05-07 DIAGNOSIS — R41.82 ALTERED MENTAL STATUS, UNSPECIFIED ALTERED MENTAL STATUS TYPE: ICD-10-CM

## 2023-05-07 DIAGNOSIS — R50.9 FEVER, UNKNOWN ORIGIN: ICD-10-CM

## 2023-05-07 DIAGNOSIS — F02.80 LATE ONSET ALZHEIMER'S DISEASE WITHOUT BEHAVIORAL DISTURBANCE: ICD-10-CM

## 2023-05-07 DIAGNOSIS — N17.9 ACUTE KIDNEY INJURY: ICD-10-CM

## 2023-05-07 DIAGNOSIS — N39.0 ACUTE UTI (URINARY TRACT INFECTION): ICD-10-CM

## 2023-05-07 DIAGNOSIS — J18.9 PNEUMONIA DUE TO INFECTIOUS ORGANISM, UNSPECIFIED LATERALITY, UNSPECIFIED PART OF LUNG: ICD-10-CM

## 2023-05-07 DIAGNOSIS — G30.1 LATE ONSET ALZHEIMER'S DISEASE WITHOUT BEHAVIORAL DISTURBANCE: ICD-10-CM

## 2023-05-07 DIAGNOSIS — K56.41 FECAL IMPACTION: ICD-10-CM

## 2023-05-07 DIAGNOSIS — I48.0 PAF (PAROXYSMAL ATRIAL FIBRILLATION): ICD-10-CM

## 2023-05-07 DIAGNOSIS — E87.20 LACTIC ACIDOSIS: ICD-10-CM

## 2023-05-07 DIAGNOSIS — F09 MILD COGNITIVE DISORDER: ICD-10-CM

## 2023-05-07 DIAGNOSIS — F41.9 ANXIETY: ICD-10-CM

## 2023-05-07 DIAGNOSIS — E43 SEVERE MALNUTRITION: ICD-10-CM

## 2023-05-07 DIAGNOSIS — R50.9 ACUTE FEBRILE ILLNESS: Primary | ICD-10-CM

## 2023-05-07 DIAGNOSIS — J90 PLEURAL EFFUSION: ICD-10-CM

## 2023-05-07 DIAGNOSIS — R91.1 PULMONARY NODULE: ICD-10-CM

## 2023-05-07 DIAGNOSIS — D64.9 ACUTE ON CHRONIC ANEMIA: ICD-10-CM

## 2023-05-07 DIAGNOSIS — S32.82XA MULTIPLE CLOSED FRACTURES OF PELVIS WITHOUT DISRUPTION OF PELVIC RING, INITIAL ENCOUNTER: ICD-10-CM

## 2023-05-07 PROBLEM — S21.209A WOUND OF BACK: Status: ACTIVE | Noted: 2023-05-07

## 2023-05-07 PROBLEM — I10 HTN (HYPERTENSION): Status: ACTIVE | Noted: 2023-05-07

## 2023-05-07 LAB
ALBUMIN SERPL-MCNC: 3.2 G/DL (ref 3.5–5.2)
ALBUMIN/GLOB SERPL: 1.2 G/DL
ALP SERPL-CCNC: 98 U/L (ref 39–117)
ALT SERPL W P-5'-P-CCNC: 21 U/L (ref 1–33)
ANION GAP SERPL CALCULATED.3IONS-SCNC: 12 MMOL/L (ref 5–15)
AST SERPL-CCNC: 40 U/L (ref 1–32)
B PARAPERT DNA SPEC QL NAA+PROBE: NOT DETECTED
B PERT DNA SPEC QL NAA+PROBE: NOT DETECTED
BACTERIA UR QL AUTO: ABNORMAL /HPF
BACTERIA UR QL AUTO: ABNORMAL /HPF
BASOPHILS # BLD AUTO: 0.02 10*3/MM3 (ref 0–0.2)
BASOPHILS NFR BLD AUTO: 0.2 % (ref 0–1.5)
BILIRUB SERPL-MCNC: 0.3 MG/DL (ref 0–1.2)
BILIRUB UR QL STRIP: NEGATIVE
BILIRUB UR QL STRIP: NEGATIVE
BUN SERPL-MCNC: 21 MG/DL (ref 8–23)
BUN/CREAT SERPL: 19.4 (ref 7–25)
C PNEUM DNA NPH QL NAA+NON-PROBE: NOT DETECTED
CALCIUM SPEC-SCNC: 8.5 MG/DL (ref 8.6–10.5)
CHLORIDE SERPL-SCNC: 99 MMOL/L (ref 98–107)
CK SERPL-CCNC: 446 U/L (ref 20–180)
CLARITY UR: ABNORMAL
CLARITY UR: ABNORMAL
CO2 SERPL-SCNC: 22 MMOL/L (ref 22–29)
COLOR UR: YELLOW
COLOR UR: YELLOW
CREAT SERPL-MCNC: 1.08 MG/DL (ref 0.57–1)
D-LACTATE SERPL-SCNC: 1.2 MMOL/L (ref 0.5–2)
D-LACTATE SERPL-SCNC: 2.2 MMOL/L (ref 0.5–2)
DEPRECATED RDW RBC AUTO: 45.1 FL (ref 37–54)
EGFRCR SERPLBLD CKD-EPI 2021: 51.7 ML/MIN/1.73
EOSINOPHIL # BLD AUTO: 0.02 10*3/MM3 (ref 0–0.4)
EOSINOPHIL NFR BLD AUTO: 0.2 % (ref 0.3–6.2)
ERYTHROCYTE [DISTWIDTH] IN BLOOD BY AUTOMATED COUNT: 14.7 % (ref 12.3–15.4)
FERRITIN SERPL-MCNC: 26.34 NG/ML (ref 13–150)
FLUAV SUBTYP SPEC NAA+PROBE: NOT DETECTED
FLUBV RNA ISLT QL NAA+PROBE: NOT DETECTED
GLOBULIN UR ELPH-MCNC: 2.7 GM/DL
GLUCOSE SERPL-MCNC: 106 MG/DL (ref 65–99)
GLUCOSE UR STRIP-MCNC: NEGATIVE MG/DL
GLUCOSE UR STRIP-MCNC: NEGATIVE MG/DL
HADV DNA SPEC NAA+PROBE: NOT DETECTED
HCOV 229E RNA SPEC QL NAA+PROBE: NOT DETECTED
HCOV HKU1 RNA SPEC QL NAA+PROBE: NOT DETECTED
HCOV NL63 RNA SPEC QL NAA+PROBE: NOT DETECTED
HCOV OC43 RNA SPEC QL NAA+PROBE: NOT DETECTED
HCT VFR BLD AUTO: 26.4 % (ref 34–46.6)
HGB BLD-MCNC: 8.3 G/DL (ref 12–15.9)
HGB UR QL STRIP.AUTO: ABNORMAL
HGB UR QL STRIP.AUTO: NEGATIVE
HMPV RNA NPH QL NAA+NON-PROBE: NOT DETECTED
HOLD SPECIMEN: NORMAL
HPIV1 RNA ISLT QL NAA+PROBE: NOT DETECTED
HPIV2 RNA SPEC QL NAA+PROBE: NOT DETECTED
HPIV3 RNA NPH QL NAA+PROBE: NOT DETECTED
HPIV4 P GENE NPH QL NAA+PROBE: NOT DETECTED
HYALINE CASTS UR QL AUTO: ABNORMAL /LPF
HYALINE CASTS UR QL AUTO: ABNORMAL /LPF
IMM GRANULOCYTES # BLD AUTO: 0.04 10*3/MM3 (ref 0–0.05)
IMM GRANULOCYTES NFR BLD AUTO: 0.4 % (ref 0–0.5)
IRON 24H UR-MRATE: 25 MCG/DL (ref 37–145)
IRON 24H UR-MRATE: 25 MCG/DL (ref 37–145)
IRON SATN MFR SERPL: 6 % (ref 20–50)
KETONES UR QL STRIP: NEGATIVE
KETONES UR QL STRIP: NEGATIVE
LEUKOCYTE ESTERASE UR QL STRIP.AUTO: ABNORMAL
LEUKOCYTE ESTERASE UR QL STRIP.AUTO: NEGATIVE
LYMPHOCYTES # BLD AUTO: 1.22 10*3/MM3 (ref 0.7–3.1)
LYMPHOCYTES NFR BLD AUTO: 12.5 % (ref 19.6–45.3)
M PNEUMO IGG SER IA-ACNC: NOT DETECTED
MAGNESIUM SERPL-MCNC: 2.1 MG/DL (ref 1.6–2.4)
MCH RBC QN AUTO: 26.3 PG (ref 26.6–33)
MCHC RBC AUTO-ENTMCNC: 31.4 G/DL (ref 31.5–35.7)
MCV RBC AUTO: 83.8 FL (ref 79–97)
MONOCYTES # BLD AUTO: 0.98 10*3/MM3 (ref 0.1–0.9)
MONOCYTES NFR BLD AUTO: 10 % (ref 5–12)
NEUTROPHILS NFR BLD AUTO: 7.49 10*3/MM3 (ref 1.7–7)
NEUTROPHILS NFR BLD AUTO: 76.7 % (ref 42.7–76)
NITRITE UR QL STRIP: POSITIVE
NITRITE UR QL STRIP: POSITIVE
NRBC BLD AUTO-RTO: 0 /100 WBC (ref 0–0.2)
PH UR STRIP.AUTO: 5.5 [PH] (ref 5–8)
PH UR STRIP.AUTO: 6 [PH] (ref 5–8)
PLATELET # BLD AUTO: 256 10*3/MM3 (ref 140–450)
PMV BLD AUTO: 8.4 FL (ref 6–12)
POTASSIUM SERPL-SCNC: 3.5 MMOL/L (ref 3.5–5.2)
PROCALCITONIN SERPL-MCNC: 0.37 NG/ML (ref 0–0.25)
PROT SERPL-MCNC: 5.9 G/DL (ref 6–8.5)
PROT UR QL STRIP: ABNORMAL
PROT UR QL STRIP: NEGATIVE
QT INTERVAL: 428 MS
QTC INTERVAL: 509 MS
RBC # BLD AUTO: 3.15 10*6/MM3 (ref 3.77–5.28)
RBC # UR STRIP: ABNORMAL /HPF
RBC # UR STRIP: ABNORMAL /HPF
REF LAB TEST METHOD: ABNORMAL
REF LAB TEST METHOD: ABNORMAL
RETICS # AUTO: 0.04 10*6/MM3 (ref 0.02–0.13)
RETICS/RBC NFR AUTO: 1.32 % (ref 0.7–1.9)
RHINOVIRUS RNA SPEC NAA+PROBE: NOT DETECTED
RSV RNA NPH QL NAA+NON-PROBE: NOT DETECTED
SARS-COV-2 RNA NPH QL NAA+NON-PROBE: NOT DETECTED
SODIUM SERPL-SCNC: 133 MMOL/L (ref 136–145)
SP GR UR STRIP: 1.01 (ref 1–1.03)
SP GR UR STRIP: 1.02 (ref 1–1.03)
SQUAMOUS #/AREA URNS HPF: ABNORMAL /HPF
SQUAMOUS #/AREA URNS HPF: ABNORMAL /HPF
TIBC SERPL-MCNC: 417 MCG/DL (ref 298–536)
TRANSFERRIN SERPL-MCNC: 280 MG/DL (ref 200–360)
TROPONIN T SERPL HS-MCNC: 66 NG/L
TSH SERPL DL<=0.05 MIU/L-ACNC: 4.46 UIU/ML (ref 0.27–4.2)
UROBILINOGEN UR QL STRIP: ABNORMAL
UROBILINOGEN UR QL STRIP: ABNORMAL
WBC # UR STRIP: ABNORMAL /HPF
WBC # UR STRIP: ABNORMAL /HPF
WBC NRBC COR # BLD: 9.77 10*3/MM3 (ref 3.4–10.8)
WHOLE BLOOD HOLD COAG: NORMAL
WHOLE BLOOD HOLD SPECIMEN: NORMAL

## 2023-05-07 PROCEDURE — G0378 HOSPITAL OBSERVATION PER HR: HCPCS

## 2023-05-07 PROCEDURE — 87150 DNA/RNA AMPLIFIED PROBE: CPT | Performed by: EMERGENCY MEDICINE

## 2023-05-07 PROCEDURE — 25010000002 PIPERACILLIN SOD-TAZOBACTAM PER 1 G: Performed by: EMERGENCY MEDICINE

## 2023-05-07 PROCEDURE — 99285 EMERGENCY DEPT VISIT HI MDM: CPT

## 2023-05-07 PROCEDURE — 82728 ASSAY OF FERRITIN: CPT | Performed by: NURSE PRACTITIONER

## 2023-05-07 PROCEDURE — 83735 ASSAY OF MAGNESIUM: CPT | Performed by: EMERGENCY MEDICINE

## 2023-05-07 PROCEDURE — 81001 URINALYSIS AUTO W/SCOPE: CPT | Performed by: EMERGENCY MEDICINE

## 2023-05-07 PROCEDURE — 80053 COMPREHEN METABOLIC PANEL: CPT | Performed by: EMERGENCY MEDICINE

## 2023-05-07 PROCEDURE — 25010000002 VANCOMYCIN PER 500 MG: Performed by: EMERGENCY MEDICINE

## 2023-05-07 PROCEDURE — 84145 PROCALCITONIN (PCT): CPT | Performed by: FAMILY MEDICINE

## 2023-05-07 PROCEDURE — 82550 ASSAY OF CK (CPK): CPT | Performed by: EMERGENCY MEDICINE

## 2023-05-07 PROCEDURE — 71045 X-RAY EXAM CHEST 1 VIEW: CPT

## 2023-05-07 PROCEDURE — 86901 BLOOD TYPING SEROLOGIC RH(D): CPT

## 2023-05-07 PROCEDURE — P9612 CATHETERIZE FOR URINE SPEC: HCPCS

## 2023-05-07 PROCEDURE — 85045 AUTOMATED RETICULOCYTE COUNT: CPT | Performed by: NURSE PRACTITIONER

## 2023-05-07 PROCEDURE — 36415 COLL VENOUS BLD VENIPUNCTURE: CPT

## 2023-05-07 PROCEDURE — 87077 CULTURE AEROBIC IDENTIFY: CPT | Performed by: NURSE PRACTITIONER

## 2023-05-07 PROCEDURE — 86900 BLOOD TYPING SEROLOGIC ABO: CPT

## 2023-05-07 PROCEDURE — 82607 VITAMIN B-12: CPT | Performed by: NURSE PRACTITIONER

## 2023-05-07 PROCEDURE — 81001 URINALYSIS AUTO W/SCOPE: CPT | Performed by: NURSE PRACTITIONER

## 2023-05-07 PROCEDURE — 84443 ASSAY THYROID STIM HORMONE: CPT | Performed by: EMERGENCY MEDICINE

## 2023-05-07 PROCEDURE — 83540 ASSAY OF IRON: CPT | Performed by: NURSE PRACTITIONER

## 2023-05-07 PROCEDURE — 84466 ASSAY OF TRANSFERRIN: CPT | Performed by: NURSE PRACTITIONER

## 2023-05-07 PROCEDURE — 83605 ASSAY OF LACTIC ACID: CPT | Performed by: EMERGENCY MEDICINE

## 2023-05-07 PROCEDURE — 99223 1ST HOSP IP/OBS HIGH 75: CPT | Performed by: NURSE PRACTITIONER

## 2023-05-07 PROCEDURE — 87040 BLOOD CULTURE FOR BACTERIA: CPT | Performed by: EMERGENCY MEDICINE

## 2023-05-07 PROCEDURE — 84484 ASSAY OF TROPONIN QUANT: CPT | Performed by: EMERGENCY MEDICINE

## 2023-05-07 PROCEDURE — 0202U NFCT DS 22 TRGT SARS-COV-2: CPT | Performed by: EMERGENCY MEDICINE

## 2023-05-07 PROCEDURE — 85025 COMPLETE CBC W/AUTO DIFF WBC: CPT | Performed by: EMERGENCY MEDICINE

## 2023-05-07 PROCEDURE — 87086 URINE CULTURE/COLONY COUNT: CPT | Performed by: NURSE PRACTITIONER

## 2023-05-07 PROCEDURE — 82746 ASSAY OF FOLIC ACID SERUM: CPT | Performed by: NURSE PRACTITIONER

## 2023-05-07 PROCEDURE — 93005 ELECTROCARDIOGRAM TRACING: CPT | Performed by: EMERGENCY MEDICINE

## 2023-05-07 PROCEDURE — 87186 SC STD MICRODIL/AGAR DIL: CPT | Performed by: NURSE PRACTITIONER

## 2023-05-07 RX ORDER — RISPERIDONE 1 MG/1
0.5 TABLET ORAL NIGHTLY
Status: DISCONTINUED | OUTPATIENT
Start: 2023-05-07 | End: 2023-05-12 | Stop reason: HOSPADM

## 2023-05-07 RX ORDER — ACETAMINOPHEN 325 MG/1
650 TABLET ORAL EVERY 4 HOURS PRN
Status: DISCONTINUED | OUTPATIENT
Start: 2023-05-07 | End: 2023-05-12 | Stop reason: HOSPADM

## 2023-05-07 RX ORDER — RISPERIDONE 0.25 MG/1
0.25 TABLET ORAL DAILY
Status: DISCONTINUED | OUTPATIENT
Start: 2023-05-08 | End: 2023-05-12 | Stop reason: HOSPADM

## 2023-05-07 RX ORDER — SODIUM CHLORIDE 9 MG/ML
40 INJECTION, SOLUTION INTRAVENOUS AS NEEDED
Status: DISCONTINUED | OUTPATIENT
Start: 2023-05-07 | End: 2023-05-12 | Stop reason: HOSPADM

## 2023-05-07 RX ORDER — VANCOMYCIN HYDROCHLORIDE 1 G/200ML
20 INJECTION, SOLUTION INTRAVENOUS ONCE
Status: COMPLETED | OUTPATIENT
Start: 2023-05-07 | End: 2023-05-07

## 2023-05-07 RX ORDER — FOLIC ACID 1 MG/1
1 TABLET ORAL DAILY
Status: DISCONTINUED | OUTPATIENT
Start: 2023-05-08 | End: 2023-05-12 | Stop reason: HOSPADM

## 2023-05-07 RX ORDER — SODIUM CHLORIDE 0.9 % (FLUSH) 0.9 %
10 SYRINGE (ML) INJECTION EVERY 12 HOURS SCHEDULED
Status: DISCONTINUED | OUTPATIENT
Start: 2023-05-07 | End: 2023-05-12 | Stop reason: HOSPADM

## 2023-05-07 RX ORDER — SODIUM CHLORIDE 0.9 % (FLUSH) 0.9 %
10 SYRINGE (ML) INJECTION AS NEEDED
Status: DISCONTINUED | OUTPATIENT
Start: 2023-05-07 | End: 2023-05-12 | Stop reason: HOSPADM

## 2023-05-07 RX ORDER — SODIUM CHLORIDE, SODIUM LACTATE, POTASSIUM CHLORIDE, CALCIUM CHLORIDE 600; 310; 30; 20 MG/100ML; MG/100ML; MG/100ML; MG/100ML
100 INJECTION, SOLUTION INTRAVENOUS CONTINUOUS
Status: DISCONTINUED | OUTPATIENT
Start: 2023-05-07 | End: 2023-05-11

## 2023-05-07 RX ORDER — FLUOXETINE HYDROCHLORIDE 20 MG/1
40 CAPSULE ORAL DAILY
Status: DISCONTINUED | OUTPATIENT
Start: 2023-05-08 | End: 2023-05-12 | Stop reason: HOSPADM

## 2023-05-07 RX ORDER — DONEPEZIL HYDROCHLORIDE 5 MG/1
5 TABLET, FILM COATED ORAL NIGHTLY
Status: DISCONTINUED | OUTPATIENT
Start: 2023-05-07 | End: 2023-05-12 | Stop reason: HOSPADM

## 2023-05-07 RX ADMIN — Medication 10 ML: at 23:38

## 2023-05-07 RX ADMIN — DONEPEZIL HYDROCHLORIDE 5 MG: 5 TABLET, FILM COATED ORAL at 23:37

## 2023-05-07 RX ADMIN — RISPERIDONE 0.5 MG: 0.25 TABLET, FILM COATED ORAL at 23:37

## 2023-05-07 RX ADMIN — APIXABAN 2.5 MG: 2.5 TABLET, FILM COATED ORAL at 23:37

## 2023-05-07 RX ADMIN — SODIUM CHLORIDE, POTASSIUM CHLORIDE, SODIUM LACTATE AND CALCIUM CHLORIDE 500 ML: 600; 310; 30; 20 INJECTION, SOLUTION INTRAVENOUS at 19:50

## 2023-05-07 RX ADMIN — TAZOBACTAM SODIUM AND PIPERACILLIN SODIUM 3.38 G: 375; 3 INJECTION, SOLUTION INTRAVENOUS at 20:02

## 2023-05-07 RX ADMIN — SODIUM CHLORIDE 1000 ML: 9 INJECTION, SOLUTION INTRAVENOUS at 19:51

## 2023-05-07 RX ADMIN — VANCOMYCIN HYDROCHLORIDE 1000 MG: 1 INJECTION, SOLUTION INTRAVENOUS at 20:37

## 2023-05-07 RX ADMIN — SODIUM CHLORIDE, POTASSIUM CHLORIDE, SODIUM LACTATE AND CALCIUM CHLORIDE 100 ML/HR: 600; 310; 30; 20 INJECTION, SOLUTION INTRAVENOUS at 23:03

## 2023-05-07 NOTE — ED PROVIDER NOTES
EMERGENCY DEPARTMENT ENCOUNTER    Pt Name: Laura Wallace  MRN: [id number]  Pt :   1940  Room Number:    Date of encounter:  2023  PCP: Justin Brumfield MD  ED Provider: Chapincito Gauthier MD    Historian: Paramedics, patient's       HPI:  Chief Complaint: Altered mental status, febrile illness        Context: Laura Wallace is a 83 y.o. female who presents to the ED via EMS transfer.  The patient lives at home and has home health care.  The patient's home health care representative felt that the patient was worsening this morning but significantly worse this evening.  She seemed less responsive than at baseline.  She apparently was quite sweaty.  911 was called.  Paramedics found her oxygen saturation on room air to be 88%.  Her systolic blood pressure dropped as low as 82/62 and paramedics initiated a lactated Ringer's bolus of 500 cc which was completed by the time they arrived in our emergency department.  Patient's temperature was 101.9 axillary reported by EMS.  The patient's  reports that her urine has been smelling abnormal.  She normally wears a pure wick.  She is contractured and bedbound.  Per EMS the patient is close to her baseline at this point, answering questions only on occasion and being relatively poorly responsive.  EMS reports that the patient will have staring spells lasting 10 seconds or so but that the patient's  reports this is her baseline.   arrives and notes that sweating episodes started one month ago, but severe and long lasting today.  He notes some increase in confusion but notes significant altered mental status at baseline.  No dark or bloody stools.  Per  she has had a pressure ulcer on low back.  Going to  wound care, unknown topical medication.  Was off it for 4 days but just refilled today, not restarted.      PAST MEDICAL HISTORY  Past Medical History:   Diagnosis Date   • Anxiety 2022   • Late onset Alzheimer's  disease without behavioral disturbance 2018   • Paroxysmal atrial fibrillation with rapid ventricular response 2022   • Pulmonary hypertension 2022         PAST SURGICAL HISTORY  Past Surgical History:   Procedure Laterality Date   • APPENDECTOMY     • VERTEBROPLASTY      L123, fusion         FAMILY HISTORY  Family History   Problem Relation Age of Onset   • Heart attack Father    • Colon cancer Brother          SOCIAL HISTORY  Social History     Socioeconomic History   • Marital status:    Tobacco Use   • Smoking status: Former     Packs/day: 1.00     Years: 30.00     Pack years: 30.00     Types: Cigarettes     Start date: 1960     Quit date: 1993     Years since quittin.3   • Smokeless tobacco: Never   Vaping Use   • Vaping Use: Never used   Substance and Sexual Activity   • Alcohol use: Not Currently   • Drug use: No   • Sexual activity: Defer         ALLERGIES  Codeine        REVIEW OF SYSTEMS  Review of Systems     Unable secondary to altered mental status.      PHYSICAL EXAM    I have reviewed the triage vital signs and nursing notes.    ED Triage Vitals   Temp Pulse Resp BP SpO2   -- -- -- -- --      Temp src Heart Rate Source Patient Position BP Location FiO2 (%)   -- -- -- -- --       Physical Exam  GENERAL:   Appears nontoxic but severely contractured and unable to provide significant history.  HENT: Nares patent.  Slightly dry mucous membranes  EYES: No scleral icterus.   CV: Regular rhythm, regular rate.  No murmurs gallops rubs  RESPIRATORY: Normal effort.  No audible wheezes, rales or rhonchi.  Clear to auscultation but patient is poorly compliant with instructions for deep inspiration.  ABDOMEN: Soft, nontender to deep palpation  MUSCULOSKELETAL: Contractures x4  NEURO: Awake.  Patient does not answer orientation questions.  She smiles at me when she arrives but does not answer questions.  SKIN: Warm, dry, no rash visualized.  Somewhat pale.      LAB  RESULTS  Recent Results (from the past 24 hour(s))   ECG 12 Lead Other; Altered    Collection Time: 05/07/23  5:32 PM   Result Value Ref Range    QT Interval 428 ms    QTC Interval 509 ms   Respiratory Panel PCR w/COVID-19(SARS-CoV-2) JOSE/ALEJANDRA/SAKSHI/PAD/COR/MAD/BRYN In-House, NP Swab in UTM/VTM, 3-4 HR TAT - Swab, Nasopharynx    Collection Time: 05/07/23  5:41 PM    Specimen: Nasopharynx; Swab   Result Value Ref Range    ADENOVIRUS, PCR Not Detected Not Detected    Coronavirus 229E Not Detected Not Detected    Coronavirus HKU1 Not Detected Not Detected    Coronavirus NL63 Not Detected Not Detected    Coronavirus OC43 Not Detected Not Detected    COVID19 Not Detected Not Detected - Ref. Range    Human Metapneumovirus Not Detected Not Detected    Human Rhinovirus/Enterovirus Not Detected Not Detected    Influenza A PCR Not Detected Not Detected    Influenza B PCR Not Detected Not Detected    Parainfluenza Virus 1 Not Detected Not Detected    Parainfluenza Virus 2 Not Detected Not Detected    Parainfluenza Virus 3 Not Detected Not Detected    Parainfluenza Virus 4 Not Detected Not Detected    RSV, PCR Not Detected Not Detected    Bordetella pertussis pcr Not Detected Not Detected    Bordetella parapertussis PCR Not Detected Not Detected    Chlamydophila pneumoniae PCR Not Detected Not Detected    Mycoplasma pneumo by PCR Not Detected Not Detected   Comprehensive Metabolic Panel    Collection Time: 05/07/23  5:44 PM    Specimen: Blood   Result Value Ref Range    Glucose 106 (H) 65 - 99 mg/dL    BUN 21 8 - 23 mg/dL    Creatinine 1.08 (H) 0.57 - 1.00 mg/dL    Sodium 133 (L) 136 - 145 mmol/L    Potassium 3.5 3.5 - 5.2 mmol/L    Chloride 99 98 - 107 mmol/L    CO2 22.0 22.0 - 29.0 mmol/L    Calcium 8.5 (L) 8.6 - 10.5 mg/dL    Total Protein 5.9 (L) 6.0 - 8.5 g/dL    Albumin 3.2 (L) 3.5 - 5.2 g/dL    ALT (SGPT) 21 1 - 33 U/L    AST (SGOT) 40 (H) 1 - 32 U/L    Alkaline Phosphatase 98 39 - 117 U/L    Total Bilirubin 0.3 0.0 - 1.2  mg/dL    Globulin 2.7 gm/dL    A/G Ratio 1.2 g/dL    BUN/Creatinine Ratio 19.4 7.0 - 25.0    Anion Gap 12.0 5.0 - 15.0 mmol/L    eGFR 51.7 (L) >60.0 mL/min/1.73   Lactic Acid, Plasma    Collection Time: 05/07/23  5:44 PM    Specimen: Blood   Result Value Ref Range    Lactate 2.2 (C) 0.5 - 2.0 mmol/L   CBC Auto Differential    Collection Time: 05/07/23  5:44 PM    Specimen: Blood   Result Value Ref Range    WBC 9.77 3.40 - 10.80 10*3/mm3    RBC 3.15 (L) 3.77 - 5.28 10*6/mm3    Hemoglobin 8.3 (L) 12.0 - 15.9 g/dL    Hematocrit 26.4 (L) 34.0 - 46.6 %    MCV 83.8 79.0 - 97.0 fL    MCH 26.3 (L) 26.6 - 33.0 pg    MCHC 31.4 (L) 31.5 - 35.7 g/dL    RDW 14.7 12.3 - 15.4 %    RDW-SD 45.1 37.0 - 54.0 fl    MPV 8.4 6.0 - 12.0 fL    Platelets 256 140 - 450 10*3/mm3    Neutrophil % 76.7 (H) 42.7 - 76.0 %    Lymphocyte % 12.5 (L) 19.6 - 45.3 %    Monocyte % 10.0 5.0 - 12.0 %    Eosinophil % 0.2 (L) 0.3 - 6.2 %    Basophil % 0.2 0.0 - 1.5 %    Immature Grans % 0.4 0.0 - 0.5 %    Neutrophils, Absolute 7.49 (H) 1.70 - 7.00 10*3/mm3    Lymphocytes, Absolute 1.22 0.70 - 3.10 10*3/mm3    Monocytes, Absolute 0.98 (H) 0.10 - 0.90 10*3/mm3    Eosinophils, Absolute 0.02 0.00 - 0.40 10*3/mm3    Basophils, Absolute 0.02 0.00 - 0.20 10*3/mm3    Immature Grans, Absolute 0.04 0.00 - 0.05 10*3/mm3    nRBC 0.0 0.0 - 0.2 /100 WBC   Green Top (Gel)    Collection Time: 05/07/23  5:44 PM   Result Value Ref Range    Extra Tube Hold for add-ons.    Lavender Top    Collection Time: 05/07/23  5:44 PM   Result Value Ref Range    Extra Tube hold for add-on    Gold Top - SST    Collection Time: 05/07/23  5:44 PM   Result Value Ref Range    Extra Tube Hold for add-ons.    Light Blue Top    Collection Time: 05/07/23  5:44 PM   Result Value Ref Range    Extra Tube Hold for add-ons.    Urinalysis With Culture If Indicated - Urine, Catheter    Collection Time: 05/07/23  7:07 PM    Specimen: Urine, Catheter   Result Value Ref Range    Color, UA Yellow Yellow,  Straw    Appearance, UA Cloudy (A) Clear    pH, UA 5.5 5.0 - 8.0    Specific Gravity, UA 1.016 1.001 - 1.030    Glucose, UA Negative Negative    Ketones, UA Negative Negative    Bilirubin, UA Negative Negative    Blood, UA Negative Negative    Protein, UA Negative Negative    Leuk Esterase, UA Negative Negative    Nitrite, UA Positive (A) Negative    Urobilinogen, UA 0.2 E.U./dL 0.2 - 1.0 E.U./dL   Urinalysis, Microscopic Only - Urine, Catheter    Collection Time: 05/07/23  7:07 PM    Specimen: Urine, Catheter   Result Value Ref Range    RBC, UA 0-2 None Seen, 0-2 /HPF    WBC, UA 3-5 (A) None Seen, 0-2 /HPF    Bacteria, UA 4+ (A) None Seen, Trace /HPF    Squamous Epithelial Cells, UA 13-20 (A) None Seen, 0-2 /HPF    Hyaline Casts, UA 0-6 0 - 6 /LPF    Methodology Automated Microscopy        If labs were ordered, I independently reviewed the results and considered them in treating the patient.        RADIOLOGY  XR Chest 1 View    Result Date: 5/7/2023  XR CHEST 1 VW Date of Exam: 5/7/2023 5:55 PM EDT Indication: Acute febrile illness with altered mental status Comparison: 9/23/2022. Findings: There is diffuse interstitial prominence in the lungs. There is no pneumothorax, pleural effusion or focal airspace consolidation. The heart size is normal. Pulmonary vasculature is largely obscured by interstitial prominence. There is an old healed proximal left humerus fracture. No acute osseous abnormalities.     Impression: 1. Chronic appearing interstitial lung disease without acute cardiopulmonary abnormality. Electronically Signed: Ethan Thorpe  5/7/2023 6:49 PM EDT  Workstation ID: [id number]      I ordered and independently reviewed the above noted radiographic studies.      I viewed images of chest x-ray which showed no active disease per my independent interpretation.    See radiologist's dictation for official interpretation.        PROCEDURES    Procedures    ECG 12 Lead Other; Altered   Final Result   Test Reason :  Other~   Blood Pressure :   */*   mmHG   Vent. Rate :  85 BPM     Atrial Rate :  85 BPM      P-R Int : 128 ms          QRS Dur :  66 ms       QT Int : 428 ms       P-R-T Axes : 103   9  14 degrees      QTc Int : 509 ms      Sinus rhythm with occasional premature ventricular complexes   Nonspecific ST and T wave abnormality   Abnormal ECG   Poor quality tracing secondary to movement artifact   Confirmed by ESME PRIDE MD ([id number]) on 5/7/2023 6:53:35 PM      Referred By: EDMD           Confirmed By: ESME PRIDE MD          MEDICATIONS GIVEN IN ER    Medications   sodium chloride 0.9 % flush 10 mL (has no administration in time range)   vancomycin (VANCOCIN) 1000 mg/200 mL dextrose 5% IVPB (has no administration in time range)   lactated ringers bolus 500 mL (0 mL Intravenous Stopped 5/7/23 2026)   piperacillin-tazobactam (ZOSYN) 3.375 g in iso-osmotic dextrose 50 ml (premix) (3.375 g Intravenous New Bag 5/7/23 2002)   sodium chloride 0.9 % bolus 1,000 mL (1,000 mL Intravenous New Bag 5/7/23 1951)         MEDICAL DECISION MAKING, PROGRESS, and CONSULTS    All labs have been independently reviewed by me.  All radiology studies have been reviewed by me and the radiologist dictating the report.  All EKG's have been independently viewed and interpreted by me.      Discussion below represents my analysis of pertinent findings related to patient's condition, differential diagnosis, treatment plan and final disposition.      Differential diagnosis:    Acute febrile illness likely indicating occult sepsis.  Consider urinary source, pneumonia, etc.      Additional sources:    - Discussed/ obtained information from independent historians: Patient's  as well as EMS and patient's home caretaker.    - External (non-ED) record review: Discharge summary for admission from 9/23/2022 through 9/28/2022 showing acute respiratory failure with hypoxia, anxiety, pulmonary hypertension, paroxysmal atrial fibrillation, late onset  Alzheimer's.    - Chronic or social conditions impacting care: Debilitated with Alzheimer's, contractures.    - Shared decision making: Patient unable to consent to treatment but does not disagree treatment.      Orders placed during this visit:  Orders Placed This Encounter   Procedures   • Blood Culture - Blood,   • Blood Culture - Blood,   • COVID PRE-OP / PRE-PROCEDURE SCREENING ORDER (NO ISOLATION) - Swab, Nasopharynx   • Respiratory Panel PCR w/COVID-19(SARS-CoV-2) JOSE/ALEJANDRA/SAKSHI/PAD/COR/MAD/BRYN In-House, NP Swab in UTM/VTM, 3-4 HR TAT - Swab, Nasopharynx   • XR Chest 1 View   • Comprehensive Metabolic Panel   • Lactic Acid, Plasma   • [place] Draw   • CBC Auto Differential   • Urinalysis With Culture If Indicated - Urine, Catheter   • STAT Lactic Acid, Reflex   • Urinalysis, Microscopic Only - Urine, Clean Catch   • High Sensitivity Troponin T   • TSH   • Magnesium   • CK   • Procalcitonin   • Undress & Gown   • Continuous Pulse Oximetry   • Vital Signs   • The lactated Ringer's 500 mL bolus can just be the continuation of the 1 L IV at the paramedics is started.  Misc Nursing Order (Specify)   • Let me know when cath UA has been obtained please.  Also please sign up for the patient as I cannot tell which nurses taking care of this 1.  Thank you  Misc Nursing Order (Specify)   • Oxygen Therapy- Nasal Cannula; 2 LPM; Titrate for SPO2: 92%, Greater Than or Equal To   • POC Occult Blood Stool   • ECG 12 Lead Other; Altered   • Insert Peripheral IV   • CBC & Differential   • Green Top (Gel)   • Lavender Top   • Gold Top - SST   • Gray Top   • Light Blue Top         Additional orders considered but not ordered:  MRI brain which would be unlikely to help with the emergency department care at this point.    ED Course:    Consultants:      ED Course as of 05/07/23 2033   Sun May 07, 2023   1815 Hemoglobin(!): 8.3 [MS]   1815 Hematocrit(!): 26.4 [MS]   1815 Per record review hemoglobins have been running between 9.1 and  11.6.  These were mostly collected roughly 7 months ago. [MS]   1816 Still awaiting catheterized urinalysis to be performed.  I feel that the patient will require broad-spectrum antibiotics since we do not have a known urinary source at this point.  I have ordered Zosyn and vancomycin. [MS]   2002 Urine culture is pending.  Broad-spectrum antibiotics should be infusing.  Have contacted Dr. Steen, hospitalist for admission. [MS]   2032 Spoke with Dr. Stone as well as with the patient's  in detail about our findings and recommendations and pending labs. [MS]   2032 Troponin initial as well as reflex at 2 hours are both pending at this stage. [MS]      ED Course User Index  [MS] Chapincito Gauthier MD                  AS OF 20:33 EDT VITALS:    BP - 105/44  HR - 61  TEMP - 100.2 °F (37.9 °C) (Axillary)  O2 SATS - 98%                  DIAGNOSIS  Final diagnoses:   Acute febrile illness   Hypotension, unspecified hypotension type   Altered mental status, unspecified altered mental status type         DISPOSITION  Admission      Please note that portions of this document were completed with voice recognition software.      Chapincito Gauthier MD  05/07/23 2033

## 2023-05-08 ENCOUNTER — APPOINTMENT (OUTPATIENT)
Dept: CT IMAGING | Facility: HOSPITAL | Age: 83
DRG: 871 | End: 2023-05-08
Payer: MEDICARE

## 2023-05-08 ENCOUNTER — APPOINTMENT (OUTPATIENT)
Dept: CARDIOLOGY | Facility: HOSPITAL | Age: 83
DRG: 871 | End: 2023-05-08
Payer: MEDICARE

## 2023-05-08 ENCOUNTER — APPOINTMENT (OUTPATIENT)
Dept: GENERAL RADIOLOGY | Facility: HOSPITAL | Age: 83
DRG: 871 | End: 2023-05-08
Payer: MEDICARE

## 2023-05-08 ENCOUNTER — APPOINTMENT (OUTPATIENT)
Dept: ULTRASOUND IMAGING | Facility: HOSPITAL | Age: 83
DRG: 871 | End: 2023-05-08
Payer: MEDICARE

## 2023-05-08 PROBLEM — A41.9 SEPSIS: Status: ACTIVE | Noted: 2023-05-08

## 2023-05-08 PROBLEM — K59.00 CONSTIPATION: Status: ACTIVE | Noted: 2023-05-08

## 2023-05-08 PROBLEM — K56.41 FECAL IMPACTION: Status: ACTIVE | Noted: 2023-05-08

## 2023-05-08 PROBLEM — E07.89 THYROID MASS OF UNCLEAR ETIOLOGY: Status: ACTIVE | Noted: 2023-05-08

## 2023-05-08 PROBLEM — J90 PLEURAL EFFUSION: Status: ACTIVE | Noted: 2023-05-08

## 2023-05-08 PROBLEM — S32.040A COMPRESSION FRACTURE OF FOURTH LUMBAR VERTEBRA: Status: ACTIVE | Noted: 2023-05-08

## 2023-05-08 PROBLEM — R91.1 PULMONARY NODULE: Status: ACTIVE | Noted: 2023-05-08

## 2023-05-08 LAB
ABO GROUP BLD: NORMAL
ABO GROUP BLD: NORMAL
ANION GAP SERPL CALCULATED.3IONS-SCNC: 12 MMOL/L (ref 5–15)
BACTERIA BLD CULT: NORMAL
BASOPHILS # BLD AUTO: 0.02 10*3/MM3 (ref 0–0.2)
BASOPHILS NFR BLD AUTO: 0.2 % (ref 0–1.5)
BH CV ECHO MEAS - AO MAX PG: 7.6 MMHG
BH CV ECHO MEAS - AO MEAN PG: 4 MMHG
BH CV ECHO MEAS - AO ROOT DIAM: 2.5 CM
BH CV ECHO MEAS - AO V2 MAX: 138 CM/SEC
BH CV ECHO MEAS - AO V2 VTI: 28.2 CM
BH CV ECHO MEAS - AVA(I,D): 2.7 CM2
BH CV ECHO MEAS - EDV(CUBED): 54.9 ML
BH CV ECHO MEAS - EDV(MOD-SP4): 83.2 ML
BH CV ECHO MEAS - EF(MOD-SP4): 58.5 %
BH CV ECHO MEAS - ESV(CUBED): 19.7 ML
BH CV ECHO MEAS - ESV(MOD-SP4): 34.5 ML
BH CV ECHO MEAS - FS: 28.9 %
BH CV ECHO MEAS - IVS/LVPW: 0.9 CM
BH CV ECHO MEAS - IVSD: 0.9 CM
BH CV ECHO MEAS - LA DIMENSION: 4.2 CM
BH CV ECHO MEAS - LAT PEAK E' VEL: 12.2 CM/SEC
BH CV ECHO MEAS - LV MASS(C)D: 109 GRAMS
BH CV ECHO MEAS - LV MAX PG: 4.3 MMHG
BH CV ECHO MEAS - LV MEAN PG: 2 MMHG
BH CV ECHO MEAS - LV V1 MAX: 104 CM/SEC
BH CV ECHO MEAS - LV V1 VTI: 24.3 CM
BH CV ECHO MEAS - LVIDD: 3.8 CM
BH CV ECHO MEAS - LVIDS: 2.7 CM
BH CV ECHO MEAS - LVOT AREA: 3.1 CM2
BH CV ECHO MEAS - LVOT DIAM: 2 CM
BH CV ECHO MEAS - LVPWD: 1 CM
BH CV ECHO MEAS - MED PEAK E' VEL: 5.3 CM/SEC
BH CV ECHO MEAS - MV A MAX VEL: 94.3 CM/SEC
BH CV ECHO MEAS - MV DEC TIME: 0.32 MSEC
BH CV ECHO MEAS - MV E MAX VEL: 130 CM/SEC
BH CV ECHO MEAS - MV E/A: 1.38
BH CV ECHO MEAS - MV MAX PG: 7.7 MMHG
BH CV ECHO MEAS - MV MEAN PG: 2.5 MMHG
BH CV ECHO MEAS - MV V2 VTI: 48.4 CM
BH CV ECHO MEAS - MVA(VTI): 1.58 CM2
BH CV ECHO MEAS - PA ACC TIME: 0.11 SEC
BH CV ECHO MEAS - PA PR(ACCEL): 31.3 MMHG
BH CV ECHO MEAS - PA V2 MAX: 106 CM/SEC
BH CV ECHO MEAS - RAP SYSTOLE: 8 MMHG
BH CV ECHO MEAS - RVSP: 41 MMHG
BH CV ECHO MEAS - SV(LVOT): 76.3 ML
BH CV ECHO MEAS - SV(MOD-SP4): 48.7 ML
BH CV ECHO MEAS - TAPSE (>1.6): 2.11 CM
BH CV ECHO MEAS - TR MAX PG: 33 MMHG
BH CV ECHO MEAS - TR MAX VEL: 287 CM/SEC
BH CV ECHO MEASUREMENTS AVERAGE E/E' RATIO: 14.86
BH CV VAS BP LEFT ARM: NORMAL MMHG
BH CV XLRA - RV BASE: 3.4 CM
BH CV XLRA - RV LENGTH: 4.9 CM
BH CV XLRA - RV MID: 2.2 CM
BH CV XLRA - TDI S': 10.7 CM/SEC
BLD GP AB SCN SERPL QL: NEGATIVE
BUN SERPL-MCNC: 15 MG/DL (ref 8–23)
BUN/CREAT SERPL: 17 (ref 7–25)
CA-I SERPL ISE-MCNC: 1.28 MMOL/L (ref 1.12–1.32)
CALCIUM SPEC-SCNC: 8.9 MG/DL (ref 8.6–10.5)
CHLORIDE SERPL-SCNC: 101 MMOL/L (ref 98–107)
CK SERPL-CCNC: 477 U/L (ref 20–180)
CO2 SERPL-SCNC: 22 MMOL/L (ref 22–29)
CREAT SERPL-MCNC: 0.88 MG/DL (ref 0.57–1)
DEPRECATED RDW RBC AUTO: 45.4 FL (ref 37–54)
EGFRCR SERPLBLD CKD-EPI 2021: 66.1 ML/MIN/1.73
EOSINOPHIL # BLD AUTO: 0.09 10*3/MM3 (ref 0–0.4)
EOSINOPHIL NFR BLD AUTO: 0.9 % (ref 0.3–6.2)
ERYTHROCYTE [DISTWIDTH] IN BLOOD BY AUTOMATED COUNT: 15 % (ref 12.3–15.4)
FOLATE SERPL-MCNC: >20 NG/ML (ref 4.78–24.2)
GEN 5 2HR TROPONIN T REFLEX: 65 NG/L
GLUCOSE SERPL-MCNC: 92 MG/DL (ref 65–99)
HCT VFR BLD AUTO: 28.6 % (ref 34–46.6)
HCT VFR BLD AUTO: 29.3 % (ref 34–46.6)
HGB BLD-MCNC: 8.9 G/DL (ref 12–15.9)
HGB BLD-MCNC: 9.1 G/DL (ref 12–15.9)
IMM GRANULOCYTES # BLD AUTO: 0.04 10*3/MM3 (ref 0–0.05)
IMM GRANULOCYTES NFR BLD AUTO: 0.4 % (ref 0–0.5)
LEFT ATRIUM VOLUME INDEX: 50.1 ML/M2
LYMPHOCYTES # BLD AUTO: 2.15 10*3/MM3 (ref 0.7–3.1)
LYMPHOCYTES NFR BLD AUTO: 21.2 % (ref 19.6–45.3)
MAXIMAL PREDICTED HEART RATE: 139 BPM
MCH RBC QN AUTO: 26.6 PG (ref 26.6–33)
MCHC RBC AUTO-ENTMCNC: 31.8 G/DL (ref 31.5–35.7)
MCV RBC AUTO: 83.6 FL (ref 79–97)
MONOCYTES # BLD AUTO: 1.22 10*3/MM3 (ref 0.1–0.9)
MONOCYTES NFR BLD AUTO: 12 % (ref 5–12)
NEUTROPHILS NFR BLD AUTO: 6.61 10*3/MM3 (ref 1.7–7)
NEUTROPHILS NFR BLD AUTO: 65.3 % (ref 42.7–76)
NRBC BLD AUTO-RTO: 0 /100 WBC (ref 0–0.2)
PLATELET # BLD AUTO: 302 10*3/MM3 (ref 140–450)
PMV BLD AUTO: 8.8 FL (ref 6–12)
POTASSIUM SERPL-SCNC: 3.9 MMOL/L (ref 3.5–5.2)
QT INTERVAL: 428 MS
QTC INTERVAL: 455 MS
RBC # BLD AUTO: 3.42 10*6/MM3 (ref 3.77–5.28)
RH BLD: POSITIVE
RH BLD: POSITIVE
SODIUM SERPL-SCNC: 135 MMOL/L (ref 136–145)
STRESS TARGET HR: 118 BPM
T&S EXPIRATION DATE: NORMAL
TROPONIN T DELTA: -2 NG/L
TROPONIN T SERPL HS-MCNC: 67 NG/L
TROPONIN T SERPL HS-MCNC: 70 NG/L
VIT B12 BLD-MCNC: >2000 PG/ML (ref 211–946)
WBC NRBC COR # BLD: 10.13 10*3/MM3 (ref 3.4–10.8)

## 2023-05-08 PROCEDURE — 25510000001 IOPAMIDOL PER 1 ML: Performed by: FAMILY MEDICINE

## 2023-05-08 PROCEDURE — 92610 EVALUATE SWALLOWING FUNCTION: CPT

## 2023-05-08 PROCEDURE — 86901 BLOOD TYPING SEROLOGIC RH(D): CPT | Performed by: NURSE PRACTITIONER

## 2023-05-08 PROCEDURE — 25010000002 VANCOMYCIN PER 500 MG

## 2023-05-08 PROCEDURE — 85018 HEMOGLOBIN: CPT | Performed by: NURSE PRACTITIONER

## 2023-05-08 PROCEDURE — 76705 ECHO EXAM OF ABDOMEN: CPT

## 2023-05-08 PROCEDURE — 93010 ELECTROCARDIOGRAM REPORT: CPT | Performed by: INTERNAL MEDICINE

## 2023-05-08 PROCEDURE — 87040 BLOOD CULTURE FOR BACTERIA: CPT | Performed by: NURSE PRACTITIONER

## 2023-05-08 PROCEDURE — 25010000002 PIPERACILLIN SOD-TAZOBACTAM PER 1 G: Performed by: NURSE PRACTITIONER

## 2023-05-08 PROCEDURE — 82747 ASSAY OF FOLIC ACID RBC: CPT | Performed by: NURSE PRACTITIONER

## 2023-05-08 PROCEDURE — G0378 HOSPITAL OBSERVATION PER HR: HCPCS

## 2023-05-08 PROCEDURE — 93306 TTE W/DOPPLER COMPLETE: CPT

## 2023-05-08 PROCEDURE — 84484 ASSAY OF TROPONIN QUANT: CPT | Performed by: NURSE PRACTITIONER

## 2023-05-08 PROCEDURE — 86850 RBC ANTIBODY SCREEN: CPT | Performed by: NURSE PRACTITIONER

## 2023-05-08 PROCEDURE — 82550 ASSAY OF CK (CPK): CPT | Performed by: NURSE PRACTITIONER

## 2023-05-08 PROCEDURE — 80048 BASIC METABOLIC PNL TOTAL CA: CPT

## 2023-05-08 PROCEDURE — 85014 HEMATOCRIT: CPT | Performed by: NURSE PRACTITIONER

## 2023-05-08 PROCEDURE — 70450 CT HEAD/BRAIN W/O DYE: CPT

## 2023-05-08 PROCEDURE — 86900 BLOOD TYPING SEROLOGIC ABO: CPT | Performed by: NURSE PRACTITIONER

## 2023-05-08 PROCEDURE — 82330 ASSAY OF CALCIUM: CPT | Performed by: NURSE PRACTITIONER

## 2023-05-08 PROCEDURE — 74174 CTA ABD&PLVS W/CONTRAST: CPT

## 2023-05-08 PROCEDURE — 93005 ELECTROCARDIOGRAM TRACING: CPT | Performed by: NURSE PRACTITIONER

## 2023-05-08 PROCEDURE — 71275 CT ANGIOGRAPHY CHEST: CPT

## 2023-05-08 PROCEDURE — 85025 COMPLETE CBC W/AUTO DIFF WBC: CPT | Performed by: NURSE PRACTITIONER

## 2023-05-08 PROCEDURE — 74018 RADEX ABDOMEN 1 VIEW: CPT

## 2023-05-08 PROCEDURE — 99232 SBSQ HOSP IP/OBS MODERATE 35: CPT | Performed by: STUDENT IN AN ORGANIZED HEALTH CARE EDUCATION/TRAINING PROGRAM

## 2023-05-08 PROCEDURE — 93306 TTE W/DOPPLER COMPLETE: CPT | Performed by: INTERNAL MEDICINE

## 2023-05-08 RX ORDER — CYCLOBENZAPRINE HCL 10 MG
5 TABLET ORAL 3 TIMES DAILY PRN
Status: DISCONTINUED | OUTPATIENT
Start: 2023-05-08 | End: 2023-05-12 | Stop reason: HOSPADM

## 2023-05-08 RX ORDER — SODIUM HYPOCHLORITE 1.25 MG/ML
SOLUTION TOPICAL EVERY 12 HOURS
Status: DISCONTINUED | OUTPATIENT
Start: 2023-05-08 | End: 2023-05-12 | Stop reason: HOSPADM

## 2023-05-08 RX ORDER — LORAZEPAM 0.5 MG/1
0.25 TABLET ORAL 2 TIMES DAILY
COMMUNITY

## 2023-05-08 RX ORDER — PANTOPRAZOLE SODIUM 40 MG/1
40 TABLET, DELAYED RELEASE ORAL
Status: DISCONTINUED | OUTPATIENT
Start: 2023-05-08 | End: 2023-05-12 | Stop reason: HOSPADM

## 2023-05-08 RX ORDER — AMOXICILLIN 250 MG
2 CAPSULE ORAL 2 TIMES DAILY
Status: DISCONTINUED | OUTPATIENT
Start: 2023-05-08 | End: 2023-05-12 | Stop reason: HOSPADM

## 2023-05-08 RX ADMIN — APIXABAN 2.5 MG: 2.5 TABLET, FILM COATED ORAL at 21:31

## 2023-05-08 RX ADMIN — PANTOPRAZOLE SODIUM 40 MG: 40 TABLET, DELAYED RELEASE ORAL at 08:33

## 2023-05-08 RX ADMIN — Medication 10 ML: at 08:46

## 2023-05-08 RX ADMIN — SODIUM HYPOCHLORITE: 1.25 SOLUTION TOPICAL at 15:03

## 2023-05-08 RX ADMIN — ACETAMINOPHEN 650 MG: 325 TABLET ORAL at 21:30

## 2023-05-08 RX ADMIN — Medication 10 ML: at 21:31

## 2023-05-08 RX ADMIN — RISPERIDONE 0.5 MG: 0.25 TABLET, FILM COATED ORAL at 21:30

## 2023-05-08 RX ADMIN — PANTOPRAZOLE SODIUM 40 MG: 40 TABLET, DELAYED RELEASE ORAL at 18:08

## 2023-05-08 RX ADMIN — SENNOSIDES AND DOCUSATE SODIUM 2 TABLET: 8.6; 5 TABLET ORAL at 21:30

## 2023-05-08 RX ADMIN — RISPERIDONE 0.25 MG: 0.25 TABLET, FILM COATED ORAL at 10:55

## 2023-05-08 RX ADMIN — SENNOSIDES AND DOCUSATE SODIUM 2 TABLET: 8.6; 5 TABLET ORAL at 08:48

## 2023-05-08 RX ADMIN — VANCOMYCIN HYDROCHLORIDE 750 MG: 750 INJECTION, SOLUTION INTRAVENOUS at 18:08

## 2023-05-08 RX ADMIN — TAZOBACTAM SODIUM AND PIPERACILLIN SODIUM 3.38 G: 375; 3 INJECTION, SOLUTION INTRAVENOUS at 02:51

## 2023-05-08 RX ADMIN — DONEPEZIL HYDROCHLORIDE 5 MG: 5 TABLET, FILM COATED ORAL at 21:31

## 2023-05-08 RX ADMIN — APIXABAN 2.5 MG: 2.5 TABLET, FILM COATED ORAL at 08:33

## 2023-05-08 RX ADMIN — FOLIC ACID 1 MG: 1 TABLET ORAL at 08:33

## 2023-05-08 RX ADMIN — FLUOXETINE 40 MG: 20 CAPSULE ORAL at 08:33

## 2023-05-08 RX ADMIN — IOPAMIDOL 100 ML: 755 INJECTION, SOLUTION INTRAVENOUS at 00:30

## 2023-05-08 RX ADMIN — TAZOBACTAM SODIUM AND PIPERACILLIN SODIUM 3.38 G: 375; 3 INJECTION, SOLUTION INTRAVENOUS at 18:08

## 2023-05-08 RX ADMIN — TAZOBACTAM SODIUM AND PIPERACILLIN SODIUM 3.38 G: 375; 3 INJECTION, SOLUTION INTRAVENOUS at 08:33

## 2023-05-08 NOTE — PLAN OF CARE
Goal Outcome Evaluation:  Plan of Care Reviewed With: patient, spouse            SLP evaluation completed. Will continue to address dysphagia. Please see note for further details and recommendations.

## 2023-05-08 NOTE — NURSING NOTE
"Reason for Wound, Ostomy and Continence (WOC) Nursing Consultation: \"wound w/ tunneling (packing at home) lower back\"    Patient mostly non-verbal, had incontinent bm after enema.  Family/support person at bedside-.      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  Wound Image:       Recommendation(s) for management of wound:   -Refer to wound care orders for specific instructions on how to treat/manage wound.  -recent culture results from  from 4/18/23 ago showing Enterobacter cloacae complex Abnormal   (seen in care everywhere)  -Practice pressure injury prevention protocol.    Most recent Waldo Scale score:  Sensory Perception: 3-->slightly limited  Moisture: 1-->constantly moist  Activity: 1-->bedfast  Mobility: 2-->very limited  Nutrition: 2-->probably inadequate  Friction and Shear: 1-->problem  Waldo Score: 10 (05/08/23 0208)   Specialty support surface: Foam Mattress with Waffle Overlay  - Will order ELIZABETH specialty bed due to high risk for breakdown     Pressure Injury Prevention Protocol (initiate for Waldo Score of 18 or less):   *Keep skin dry, turn q 2 hr, keep heels elevated and offloaded with offloading heel boots.    *Apply z-guard to bottom and bony prominences daily and as needed with incontinence episodes.  *Follow C.A.R.E protocol if medical devices (Bipap, gotti, Ng tube, etc) are being used.     WOC Team will continue to follow. "  Please re consult if the wound(s) worsens.

## 2023-05-08 NOTE — PROGRESS NOTES
[facility] Medicine Services  PROGRESS NOTE    Patient Name: Laura Wallace  : 1940  MRN: [id number]    Date of Admission: 2023  Primary Care Physician: Justin Brumfield MD    Subjective   Subjective     CC:  Follow-up fever    HPI:  Tmax 100.5 yesterday.   at bedside.  Patient unable to answer any questions other than giving her name.    ROS:  Unable to obtain given advanced dementia    Objective   Objective     Vital Signs:   Temp:  [99.3 °F (37.4 °C)-100.5 °F (38.1 °C)] 99.4 °F (37.4 °C)  Heart Rate:  [] 63  Resp:  [16-18] 18  BP: (105-165)/(44-80) 139/80     Physical Exam:  Constitutional: No acute distress, awake, alert  HENT: NCAT, mucous membranes moist  Respiratory: Clear to auscultation bilaterally, respiratory effort normal   Cardiovascular: RRR, no murmurs, rubs, or gallops  Gastrointestinal: Hypoactive bowel sounds, soft, nontender, distended  Musculoskeletal: No bilateral ankle edema  Psychiatric: Appropriate affect, cooperative  Neurologic: Oriented to first name only, does not follow commands  Skin: No rashes    Results Reviewed:  LAB RESULTS:      Lab 23  0607 23  0015 23  1744   WBC 10.13  --   --  9.77   HEMOGLOBIN 9.1* 8.9*  --  8.3*   HEMATOCRIT 28.6* 29.3*  --  26.4*   PLATELETS 302  --   --  256   NEUTROS ABS 6.61  --   --  7.49*   IMMATURE GRANS (ABS) 0.04  --   --  0.04   LYMPHS ABS 2.15  --   --  1.22   MONOS ABS 1.22*  --   --  0.98*   EOS ABS 0.09  --   --  0.02   MCV 83.6  --   --  83.8   PROCALCITONIN  --   --   --  0.37*   LACTATE  --   --  1.2 2.2*         Lab 23  0607 23  1744   SODIUM 135* 133*   POTASSIUM 3.9 3.5   CHLORIDE 101 99   CO2 22.0 22.0   ANION GAP 12.0 12.0   BUN 15 21   CREATININE 0.88 1.08*   EGFR 66.1 51.7*   GLUCOSE 92 106*   CALCIUM 8.9 8.5*   IONIZED CALCIUM 1.28  --    MAGNESIUM  --  2.1   TSH  --  4.460*         Lab 23  1744   TOTAL PROTEIN 5.9*   ALBUMIN  3.2*   GLOBULIN 2.7   ALT (SGPT) 21   AST (SGOT) 40*   BILIRUBIN 0.3   ALK PHOS 98         Lab 05/08/23  0607 05/08/23  0157 05/08/23  0015 05/07/23  1744   HSTROP T 70* 65* 67* 66*             Lab 05/08/23  0607 05/07/23  1744   IRON  --  25*  25*   IRON SATURATION  --  6*   TIBC  --  417   TRANSFERRIN  --  280   FERRITIN  --  26.34   ABO TYPING A A   RH TYPING Positive Positive   ANTIBODY SCREEN Negative  --          Brief Urine Lab Results  (Last result in the past 365 days)      Color   Clarity   Blood   Leuk Est   Nitrite   Protein   CREAT   Urine HCG        05/07/23 2218 Yellow   Turbid   Trace   Small (1+)   Positive   Trace                 Microbiology Results Abnormal     Procedure Component Value - Date/Time    COVID PRE-OP / PRE-PROCEDURE SCREENING ORDER (NO ISOLATION) - Swab, Nasopharynx [[id number]]  (Normal) Collected: 05/07/23 1741    Lab Status: Final result Specimen: Swab from Nasopharynx Updated: 05/07/23 1847    Narrative:      The following orders were created for panel order COVID PRE-OP / PRE-PROCEDURE SCREENING ORDER (NO ISOLATION) - Swab, Nasopharynx.  Procedure                               Abnormality         Status                     ---------                               -----------         ------                     Respiratory Panel PCR w/...[[id number]]  Normal              Final result                 Please view results for these tests on the individual orders.    Respiratory Panel PCR w/COVID-19(SARS-CoV-2) JOSE/ALEJANDRA/SAKSHI/PAD/COR/MAD/BRYN In-House, NP Swab in UTM/VTM, 3-4 HR TAT - Swab, Nasopharynx [[id number]]  (Normal) Collected: 05/07/23 1741    Lab Status: Final result Specimen: Swab from Nasopharynx Updated: 05/07/23 1847     ADENOVIRUS, PCR Not Detected     Coronavirus 229E Not Detected     Coronavirus HKU1 Not Detected     Coronavirus NL63 Not Detected     Coronavirus OC43 Not Detected     COVID19 Not Detected     Human Metapneumovirus Not Detected     Human  Rhinovirus/Enterovirus Not Detected     Influenza A PCR Not Detected     Influenza B PCR Not Detected     Parainfluenza Virus 1 Not Detected     Parainfluenza Virus 2 Not Detected     Parainfluenza Virus 3 Not Detected     Parainfluenza Virus 4 Not Detected     RSV, PCR Not Detected     Bordetella pertussis pcr Not Detected     Bordetella parapertussis PCR Not Detected     Chlamydophila pneumoniae PCR Not Detected     Mycoplasma pneumo by PCR Not Detected    Narrative:      In the setting of a positive respiratory panel with a viral infection PLUS a negative procalcitonin without other underlying concern for bacterial infection, consider observing off antibiotics or discontinuation of antibiotics and continue supportive care. If the respiratory panel is positive for atypical bacterial infection (Bordetella pertussis, Chlamydophila pneumoniae, or Mycoplasma pneumoniae), consider antibiotic de-escalation to target atypical bacterial infection.          CT Head Without Contrast    Result Date: 5/8/2023  EXAMINATION: CT HEAD WO CONTRAST DATE: 5/8/2023 12:27 AM  HISTORY: Dementia COMPARISON: 4/13/2022. TECHNIQUE: Thin section noncontrast axial images were obtained through the head. Coronal reformatted images were then created. CT dose lowering techniques including automated exposure control, adjustment for patient size, and/or use of iterative reconstruction were used. FINDINGS: Ventricles and Extra-axial Spaces: Moderate ventriculomegaly, disproportionate to sulcal dilatation at the vertex. Parenchyma: Confluent areas of periventricular and subcortical white matter low-attenuation are present, similar to prior.   No CT evidence of an acute large-vessel territory infarct.   No mass effect or midline shift. Intracranial Hemorrhage: None. Bones/Extracranial soft tissues: No acute calvarial fracture. 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 Visualized Sinuses/Mastoids: Clear.      Impression:  1.  No acute  intracranial abnormality. 2.  Moderate to severe ventriculomegaly, disproportionate to sulcal dilatation. Findings could be on the basis of normal pressure hydrocephalus versus central volume loss, and appear grossly similar to the prior exam. 3.  Moderate to severe white matter hypodensities, which are grossly unchanged and could represent microvascular ischemic changes and/or transependymal CSF edema.  Electronically signed by:  Tha Feliciano M.D.  5/7/2023 11:29 PM Mountain Time    CT Angiogram Abdomen Pelvis    Result Date: 5/8/2023  Exam: CTA thorax, abdomen and pelvis with and without contrast Date: May 8, 2023 History: Sepsis, fever of unknown origin, chest pain, abdominal pain Comparison: September 23, 2022 Technique: Contiguous axial CT images obtained of the thorax, abdomen and pelvis initially without contrast, then following the uneventful intravenous administration of 100 mL Isovue-370 contrast. Additionally, sagittal, coronal and 3-D reformatted images were obtained. CT dose lowering techniques were used, to include: automated exposure control, adjustment for patient size, and or use of iterative reconstruction. Findings: CT thorax: Thoracic aorta: Noncontrast imaging fails to demonstrate evidence of an intramural hematoma. There are severe atherosclerotic changes throughout the thoracic aorta. There is no aneurysm or dissection. HEART: The heart is borderline in size. There are coronary artery calcifications. Pulmonary arteries: The pulmonary arteries are suboptimally characterized secondary to respiratory motion artifact. There is no visualized pulmonary embolus. Mediastinum: There is a low-density cystic mass in the left lobe of the thyroid measuring up to 2.2 cm. There is a small cyst in the right lobe of the thyroid. There is no pathologic mediastinal adenopathy. There is a very small hiatal hernia. Lung parenchyma: There is a background of emphysema. There is a 6 mm pulmonary nodule in the  right lower lobe. There is an element of interlobular septal thickening. There are trace bilateral pleural effusions. CT ABDOMEN: Liver: The liver is mildly hypodense. Spleen: Normal. Pancreas: Normal. Adrenal glands: Normal. Gallbladder: The gallbladder is distended. There is a large amount of layering debris within the gallbladder. Kidneys: There is renal cortical thinning. There are small bilateral renal cysts. Abdominal mesentery: There is no pathologic intra-abdominal mass. Bowel loops: There is a very large amount of retained rectal fecal debris. There is edematous wall thickening involving the rectum. There is a large amount of retained colonic fecal debris consistent with constipation. The appendix is not well delineated  on this study. There are no findings to suggest acute appendicitis. There is no small bowel obstruction. There is edematous rugal wall thickening of the stomach. The stomach is not well-distended. CT PELVIS: The genitourinary structures are intact. There are multiple nonenlarged lymph nodes in the left inguinal region. Abdominal aorta: There are severe atherosclerotic changes throughout the abdominal aorta extending into the iliac vasculature. Osseous structures: The osseous structures are markedly demineralized. There are chronic deformities involving the inferior pubic rami, right acetabulum and left pubic symphysis. There are moderate to severe degenerative changes involving both hips. There is posterior transpedicular fusion hardware extending from L1-L3. There is an age indeterminant compression fracture involving L4 with approximately 80% loss of the vertebral body height. There is a chronic deformity involving the proximal left humerus. There is heterotopic ossification around the left shoulder. There is a left hip joint effusion. There are bilateral breast prostheses. There is mild body wall edema.     Impression: 1. Large amount of layering debris within the gallbladder. The  gallbladder is distended. Consider a follow-up right upper quadrant ultrasound for better characterization as determined clinically. 2. Very large amount of retained rectal fecal debris consistent with fecal impaction or obstipation. There is also edematous wall thickening involving the rectum which could represent stercoral proctitis. Close clinical follow-up is recommended. There is  also constipation. 3. Severe atherosclerotic disease. 4. Coronary artery calcifications. 5. Emphysema. 6. Trace bilateral pleural effusions. There is also mild pulmonary edema. 7. Edematous rugal wall thickening of the stomach. Correlate for symptoms of gastritis. The need for a follow-up EGD for better characterization can be determined clinically. 8. There is an age indeterminant compression fracture involving L4 with approximately 80% loss of the vertebral body height. Correlation with point tenderness is recommended. 9. There is a 6 mm pulmonary nodule in the right lower lobe. Follow-up is suggested per Fleischner guidelines below. 10. Marked osteopenia. 11. There is a low-density cystic mass in the left lobe of the thyroid measuring up to 2.2 cm. A follow-up nonemergent thyroid ultrasound is recommended for better characterization. 12. No visualized pulmonary embolus. *FLEISCHNER SOCIETY GUIDELINES: Low risk patient: <6mm - Low Risk, no further f/u >6-8mm - low dose CT 6-12 mo, then 2nd f/u CT at 18-24mo >8mm - low dose CT 3,9,24mo OR PET/CT OR Biopsy High Risk Patient: <6mm - optional CT at 12 mo >6-8mm - low dose CT 6-12 mo, then 2nd f/u CT at 18-24mo >8mm - low dose CT at 3 mo, OR PET/CT OR Biopsy MULTIPLE NODULES: Low Risk Patient: <6mm - Low Risk, no further f/u >6-8mm - low dose CT 3-6 mo, then 2nd f/u CT at 18-24mo >8mm - low dose CT 3-6 mo, then 2nd f/u CT at 18-24mo High Risk Patient: <6mm - High risk, multiple nodules, optional CT at 12 mo >6-8mm - low dose CT 3-6 mo, then 2nd f/u CT at 18-24mo >8mm - low dose CT 3-6  mo, then 2nd f/u CT at 18-24mo Electronically signed by:  Ankit Tabor M.D.  5/8/2023 12:05 AM Mountain Time    XR Chest 1 View    Result Date: 5/7/2023  XR CHEST 1 VW Date of Exam: 5/7/2023 5:55 PM EDT Indication: Acute febrile illness with altered mental status Comparison: 9/23/2022. Findings: There is diffuse interstitial prominence in the lungs. There is no pneumothorax, pleural effusion or focal airspace consolidation. The heart size is normal. Pulmonary vasculature is largely obscured by interstitial prominence. There is an old healed proximal left humerus fracture. No acute osseous abnormalities.     Impression: Impression: 1. Chronic appearing interstitial lung disease without acute cardiopulmonary abnormality. Electronically Signed: Ethan Thorpe  5/7/2023 6:49 PM EDT  Workstation ID: [id number]    CT Angiogram Chest    Result Date: 5/8/2023  Exam: CTA thorax, abdomen and pelvis with and without contrast Date: May 8, 2023 History: Sepsis, fever of unknown origin, chest pain, abdominal pain Comparison: September 23, 2022 Technique: Contiguous axial CT images obtained of the thorax, abdomen and pelvis initially without contrast, then following the uneventful intravenous administration of 100 mL Isovue-370 contrast. Additionally, sagittal, coronal and 3-D reformatted images were obtained. CT dose lowering techniques were used, to include: automated exposure control, adjustment for patient size, and or use of iterative reconstruction. Findings: CT thorax: Thoracic aorta: Noncontrast imaging fails to demonstrate evidence of an intramural hematoma. There are severe atherosclerotic changes throughout the thoracic aorta. There is no aneurysm or dissection. HEART: The heart is borderline in size. There are coronary artery calcifications. Pulmonary arteries: The pulmonary arteries are suboptimally characterized secondary to respiratory motion artifact. There is no visualized pulmonary embolus. Mediastinum: There is a  low-density cystic mass in the left lobe of the thyroid measuring up to 2.2 cm. There is a small cyst in the right lobe of the thyroid. There is no pathologic mediastinal adenopathy. There is a very small hiatal hernia. Lung parenchyma: There is a background of emphysema. There is a 6 mm pulmonary nodule in the right lower lobe. There is an element of interlobular septal thickening. There are trace bilateral pleural effusions. CT ABDOMEN: Liver: The liver is mildly hypodense. Spleen: Normal. Pancreas: Normal. Adrenal glands: Normal. Gallbladder: The gallbladder is distended. There is a large amount of layering debris within the gallbladder. Kidneys: There is renal cortical thinning. There are small bilateral renal cysts. Abdominal mesentery: There is no pathologic intra-abdominal mass. Bowel loops: There is a very large amount of retained rectal fecal debris. There is edematous wall thickening involving the rectum. There is a large amount of retained colonic fecal debris consistent with constipation. The appendix is not well delineated  on this study. There are no findings to suggest acute appendicitis. There is no small bowel obstruction. There is edematous rugal wall thickening of the stomach. The stomach is not well-distended. CT PELVIS: The genitourinary structures are intact. There are multiple nonenlarged lymph nodes in the left inguinal region. Abdominal aorta: There are severe atherosclerotic changes throughout the abdominal aorta extending into the iliac vasculature. Osseous structures: The osseous structures are markedly demineralized. There are chronic deformities involving the inferior pubic rami, right acetabulum and left pubic symphysis. There are moderate to severe degenerative changes involving both hips. There is posterior transpedicular fusion hardware extending from L1-L3. There is an age indeterminant compression fracture involving L4 with approximately 80% loss of the vertebral body height.  There is a chronic deformity involving the proximal left humerus. There is heterotopic ossification around the left shoulder. There is a left hip joint effusion. There is mild body wall edema.     Impression: 1. Large amount of layering debris within the gallbladder. The gallbladder is distended. Consider a follow-up right upper quadrant ultrasound for better characterization as determined clinically. 2. Very large amount of retained rectal fecal debris consistent with fecal impaction or obstipation. There is also edematous wall thickening involving the rectum which could represent stercoral proctitis. Close clinical follow-up is recommended. There is  also constipation. 3. Severe atherosclerotic disease. 4. Coronary artery calcifications. 5. Emphysema. 6. Trace bilateral pleural effusions. There is also mild pulmonary edema. 7. Edematous rugal wall thickening of the stomach. Correlate for symptoms of gastritis. The need for a follow-up EGD for better characterization can be determined clinically. 8. There is an age indeterminant compression fracture involving L4 with approximately 80% loss of the vertebral body height. Correlation with point tenderness is recommended. 9. There is a 6 mm pulmonary nodule in the right lower lobe. Follow-up is suggested per Fleischner guidelines below. 10. Marked osteopenia. 11. There is a low-density cystic mass in the left lobe of the thyroid measuring up to 2.2 cm. A follow-up nonemergent thyroid ultrasound is recommended for better characterization. 12. No visualized pulmonary embolus. *FLEISCHNER SOCIETY GUIDELINES: Low risk patient: <6mm - Low Risk, no further f/u >6-8mm - low dose CT 6-12 mo, then 2nd f/u CT at 18-24mo >8mm - low dose CT 3,9,24mo OR PET/CT OR Biopsy High Risk Patient: <6mm - optional CT at 12 mo >6-8mm - low dose CT 6-12 mo, then 2nd f/u CT at 18-24mo >8mm - low dose CT at 3 mo, OR PET/CT OR Biopsy MULTIPLE NODULES: Low Risk Patient: <6mm - Low Risk, no further  f/u >6-8mm - low dose CT 3-6 mo, then 2nd f/u CT at 18-24mo >8mm - low dose CT 3-6 mo, then 2nd f/u CT at 18-24mo High Risk Patient: <6mm - High risk, multiple nodules, optional CT at 12 mo >6-8mm - low dose CT 3-6 mo, then 2nd f/u CT at 18-24mo >8mm - low dose CT 3-6 mo, then 2nd f/u CT at 18-24mo Electronically signed by:  Ankit Tabor M.D.  5/7/2023 11:58 PM Mountain Time      Results for orders placed during the hospital encounter of 09/23/22    Adult Transthoracic Echo Complete W/ Cont if Necessary Per Protocol    Interpretation Summary  · Estimated left ventricular EF = 60% Left ventricular systolic function is normal.  · Severe mitral annular calcification without significant stenosis.  · Trace mitral regurgitation.  · Mild tricuspid regurgitation.  · Calculated right ventricular systolic pressure from tricuspid regurgitation is 38 mmHg.      Current medications:  Scheduled Meds:[START ON 5/9/2023] Pharmacy Consult, , Does not apply, Once  apixaban, 2.5 mg, Oral, Q12H  donepezil, 5 mg, Oral, Nightly  FLUoxetine, 40 mg, Oral, Daily  folic acid, 1 mg, Oral, Daily  pantoprazole, 40 mg, Oral, BID AC  piperacillin-tazobactam, 3.375 g, Intravenous, Q8H  polyethylene glycol, 1,000 mL, Oral, Q6H  risperiDONE, 0.25 mg, Oral, Daily  risperiDONE, 0.5 mg, Oral, Nightly  senna-docusate sodium, 2 tablet, Oral, BID  sodium chloride, 10 mL, Intravenous, Q12H  vancomycin, 750 mg, Intravenous, Q24H      Continuous Infusions:lactated ringers, 100 mL/hr, Last Rate: 100 mL/hr (05/07/23 6425)  Pharmacy to dose vancomycin,       PRN Meds:.•  acetaminophen  •  Pharmacy to dose vancomycin  •  sodium chloride  •  sodium chloride  •  sodium chloride    Assessment & Plan   Assessment & Plan     Active Hospital Problems    Diagnosis  POA   • **Fever, unknown origin [R50.9]  Yes   • Pulmonary nodule [R91.1]  Unknown   • Constipation [K59.00]  Unknown   • Pleural effusion [J90]  Unknown   • Compression fracture of fourth lumbar vertebra  [S32.040A]  Unknown   • Thyroid mass of unclear etiology [E07.89]  Unknown   • Fecal impaction [K56.41]  Unknown   • Sepsis [A41.9]  Unknown   • Elevated troponin [R77.8]  Yes   • Acute kidney injury [N17.9]  Yes   • Acute on chronic anemia [D64.9]  Yes   • Lactic acidosis [E87.20]  Yes   • Wound with tunneling [T14.8XXA]  Yes   • PAF (paroxysmal atrial fibrillation) [I48.0]  Yes   • HTN (hypertension) [I10]  Yes   • Acute UTI (urinary tract infection) [N39.0]  Yes   • Anxiety [F41.9]  Yes   • Pulmonary hypertension [I27.20]  Yes   • Late onset Alzheimer's disease without behavioral disturbance (HCC) [G30.1, F02.80]  Yes      Resolved Hospital Problems   No resolved problems to display.        Brief Hospital Course to date:  Laura Wallace is a 83 y.o. female w/ a hx of pulmonary hypertension, HTN, PAF on Eliquis, alzheimer's dementia, anxiety who presented to the ED w/ c/o diaphoresis.     This patient's problems and plans were partially entered by my partner and updated as appropriate by me 05/08/23.     **Sepsis, likely UT, stercoral proctitis  **Tunneled chronic wound to lower back, grew Enterobacter cloacae complex on 4/18/23 ()  -Fever, tachycardia, elevated lactic acid, elevated procalcitonin  reports episodes of diaphoresis x 1-2 months- worse past few days   -per EMS, axillary temp 101.9; in ED temp 100.5  -per EMS BP initially 82/62; given 500ml LR bolus en route w/ improvement   -EMS also noted room air temp of 88%; pt 97% on room air in ED  -lactic acid 2.2; repeat 1.2, procal 0.37, WBC WNL   -CT chest, abdomen and pelvis completed  -CT head without acute findings  -blood cx sent   -initial UA showed 4+ bacteria and 13-20 epi cells; repeat I/O cath specimen w/ same- 4+ bacteria and TNTC epi cells; urine culture pending   -s/p IVF  -s/p Vanc and Zosyn given in ED; continue   -symptom mgt  -WOC consulted  -ID consulted   -Constipation, stercoral proctitis noted on imaging.   -Abnormal  "gallbladder on imaging, gallbladder ultrasound negative for acute cholecystitis.    **Fecal impaction  **Stercoral proctitis  - Covering with antibiotics per above  - Soapsuds enema every 4 hours x3  - Patient unable to take GoLytely or MiraLAX; Doc senna 2 tabs twice daily  - If the above is unsuccessful, then mineral oil enema followed by lactulose enema  - If the above is unsuccessful, will consult GI  - Had very large bowel movement today, will obtain KUB    **Acute kidney injury   -previous CR ~0.8-0.7  -On admission CR 1.08 w/ eGFR 51.7  -IVF per above  -UA c/w UTI w/ TNTC epi cells  -monitor     **Dysphagia  -SLP consulted; nectar-thickened liquids     **Acute/chronic anemia   -previous H/H 9.3/30.1 in 9/2022  -current Hgb 8.3  -fecal occult pending   -anemia panel pending   -pt on Eliquis; continued for now   -type/screened     **Elevated troponin   -EKG reviewed; repeat in am   -troponin 66; trend   -consider Cardiology consult pending trend      **PAF   -continue Eliquis   -EKG reviewed  -holding BB 2/2 PTA hypotension      **Pulmonary HTN   **Bilateral pleural effusions  -last ECHO 9/2022 w/ EF 60% and normal LVSF, RVSP of 38  -TTE for a.m.     **HTN   -pt initially hypotensive per EMS w/ BP 82/62  -s/p 500ml LR bolus and NS 1 liter bolus   -BP is stable currently   -will hold routine Norvasc and lopressor for now      **Alzheimer's dementia   -continue Aricept, Risperidone  -bed alarm, fall precautions  -pt,ot and case mgt consult   -Discussed advanced dementia diagnosis with the patient's , he stated he wants her to be full code and he is not ready to discuss hospice as he does not feel she is \"there yet\"     **Anxiety   -continue routine Prozac   -listed as taking PRN Ativan BID- not reordered 2/2 increased AMS and hypotension, add if needed      **Pulmonary nodule  **Thyroid mass  - Incidental findings on imaging  - Defer to outpatient follow-up    Expected Discharge Location and " Transportation: pending above  Expected Discharge   Expected Discharge Date: 5/12/2023; Expected Discharge Time:      DVT prophylaxis:  Medical DVT prophylaxis orders are present.          CODE STATUS:   Code Status and Medical Interventions:   Ordered at: 05/07/23 9345     Level Of Support Discussed With:    Health Care Surrogate     Code Status (Patient has no pulse and is not breathing):    CPR (Attempt to Resuscitate)     Medical Interventions (Patient has pulse or is breathing):    Full Support       Shannan Martin MD  05/08/23

## 2023-05-08 NOTE — CASE MANAGEMENT/SOCIAL WORK
Discharge Planning Assessment  [facility]     Patient Name: Laura Wallace  MRN: [id number]  Today's Date: 5/8/2023    Admit Date: 5/7/2023    Plan: Home with spouse   Discharge Needs Assessment     Row Name 05/08/23 1552       Living Environment    People in Home spouse    Name(s) of People in Home Horace Wallace (Spouse)   [phone] (Mobile)    Current Living Arrangements home    Potentially Unsafe Housing Conditions none    Primary Care Provided by spouse/significant other;other (see comments)  private pay caregivers 24/7    Provides Primary Care For no one, unable/limited ability to care for self    Family Caregiver if Needed spouse    Family Caregiver Names Horcae Wallace (Spouse)   [phone] (Mobile)    Quality of Family Relationships helpful;involved    Able to Return to Prior Arrangements yes       Resource/Environmental Concerns    Resource/Environmental Concerns none    Transportation Concerns none       Food Insecurity    Within the past 12 months, you worried that your food would run out before you got the money to buy more. Never true    Within the past 12 months, the food you bought just didn't last and you didn't have money to get more. Never true       Transition Planning    Patient/Family Anticipates Transition to home with family;home with help/services    Patient/Family Anticipated Services at Transition     Transportation Anticipated family or friend will provide;other (see comments)  if spouse unable to transport, CM will need to make transport arrangements through EMS stretcher       Discharge Needs Assessment    Readmission Within the Last 30 Days no previous admission in last 30 days    Equipment Currently Used at Home hospital bed;wheelchair;wound care supplies    Concerns to be Addressed denies needs/concerns at this time    Anticipated Changes Related to Illness none    Equipment Needed After Discharge none    Current Discharge Risk dependent with mobility/activities of  daily living;physical impairment;chronically ill;terminally ill               Discharge Plan     Row Name 05/08/23 1555       Plan    Plan Home with spouse    Patient/Family in Agreement with Plan yes    Plan Comments I spoke with the patient (dementia) and her spouse at the bedside. Mr Wallace stated that they live in a house in [facility] together. Mrs. Wallace is bed bound and total care. He stated that they have 24/7 private pay caregivers and all needed DME. At baseline the patient is confused, but  states that she is worse now that normal. He confirmed her PCP and medical insurance. He plans to transport her home at discharge. If he is unable, CM offered to assist with transport home. He declined any other needs at this time. CM to follow and assist as needed.    Final Discharge Disposition Code 01 - home or self-care              Continued Care and Services - Admitted Since 5/7/2023    Coordination has not been started for this encounter.       Expected Discharge Date and Time     Expected Discharge Date Expected Discharge Time    May 12, 2023          Demographic Summary    No documentation.                Functional Status     Row Name 05/08/23 1551       Functional Status    Usual Activity Tolerance poor    Current Activity Tolerance poor       Physical Activity    On average, how many days per week do you engage in moderate to strenuous exercise (like a brisk walk)? 0 days    On average, how many minutes do you engage in exercise at this level? 0 min    Number of minutes of exercise per week 0       Assessment of Health Literacy    How often do you have someone help you read hospital materials? Always    How often do you have problems learning about your medical condition because of difficulty understanding written information? Always    How often do you have a problem understanding what is told to you about your medical condition? Always    How confident are you filling out medical forms by  yourself? Not at all    Health Literacy Excellent       Functional Status, IADL    Medications completely dependent    Meal Preparation completely dependent    Housekeeping completely dependent    Laundry completely dependent    Shopping completely dependent       Mental Status Summary    Recent Changes in Mental Status/Cognitive Functioning unable to assess               Psychosocial    No documentation.                Abuse/Neglect    No documentation.                Legal    No documentation.                Substance Abuse    No documentation.                Patient Forms    No documentation.                   Jessica Vasques RN

## 2023-05-08 NOTE — H&P
"    [facility] Medicine Services  HISTORY AND PHYSICAL    Patient Name: Laura Wallace  : 1940  MRN: [id number]  Primary Care Physician: Justin Brumfield MD  Date of admission: 2023    Subjective   Subjective     Chief Complaint:  Diaphoresis     HPI:  Laura Wallace is a 83 y.o. female w/ a hx of pulmonary hypertension, HTN, PAF on Eliquis, alzheimer's dementia, anxiety who presented to the ED w/ c/o diaphoresis.   HPI per pt's  at bedside.   Pt resides at home w/ her . She has 24 hour caregivers. At baseline, pt is verbal and able to recognize her  and say her name. Pt is non-ambulatory and uses a wheelchair. She is on a non-modified diet.   Per pt's , pt has had intermittent episodes of sweating for 1-2 months. Usually her caregivers are able to control it and the sweating resolves quickly. Usually pt w/ no fever of at most a low grade fever (99) during these episodes. Pt has had an increase in these episodes recently and last week she woke \"drenched\" w/ wet bed clothes, etc. Pt did this again this morning. Her entire bed was wet 2/2 diaphoresis and her clothes have had to be changed several times today 2/2 diaphoresis. Her  noted an increase in pt's respiratory rate earlier today. Also, pt uses an external cathter overnight. The caregiver noted a slight decreased in urine output this morning and also noted that pt's urine had an odor. Pt has been mostly non-verbal today and seems to have increased confusion. The  also notes that the pt's muscles feel \"more tense\" today than usual. Early this morning the pt did verbalize that her legs and chest felt sore. EMS was called this afternoon.   Pt without recent fever, cough, V/D, edema.  Pt evaluated in the ED. EMS noted temp of 101.9, temp in .5. EMS noted initial BP of 82/62 and room air sat of 88%. CXR with chronic findings. Lactic acid 2.2, procal 0.37. Troponin 66. UA w/ " bacteria but contaminated. Pt admitted to the hospital medicine service for further evaluation.     Review of Systems   Unable to perform ROS: Dementia      Personal History     Past Medical History:   Diagnosis Date   • Anxiety 9/23/2022   • Late onset Alzheimer's disease without behavioral disturbance 5/25/2018   • Paroxysmal atrial fibrillation with rapid ventricular response 4/23/2022   • Pulmonary hypertension 9/20/2022     Past Surgical History:   Procedure Laterality Date   • APPENDECTOMY  1960   • VERTEBROPLASTY      L123, fusion     Family History: family history includes Colon cancer in her brother; Heart attack in her father.     Social History:  reports that she quit smoking about 30 years ago. Her smoking use included cigarettes. She started smoking about 63 years ago. She has a 30.00 pack-year smoking history. She has never used smokeless tobacco. She reports that she does not currently use alcohol. She reports that she does not use drugs.  Social History     Social History Narrative   • Not on file     Medications:  FLUoxetine, LORazepam, Methylcellulose (Laxative), acetaminophen, amLODIPine, apixaban, donepezil, famotidine, folic acid, melatonin, metoprolol tartrate, multivitamin with minerals, risperiDONE, triamcinolone, and vitamin D3    Allergies   Allergen Reactions   • Codeine Nausea And Vomiting     Objective   Objective     Vital Signs:   Temp:  [99.3 °F (37.4 °C)-100.5 °F (38.1 °C)] 99.4 °F (37.4 °C)  Heart Rate:  [] 63  Resp:  [16-18] 18  BP: (105-165)/(44-80) 139/80    Physical Exam     Constitutional: Awake; ill appearing, very pale   Eyes: PERRLA, sclerae anicteric, no conjunctival injection  HENT: NCAT, mucous membranes dry/pale   Neck: Supple, no thyromegaly, no lymphadenopathy, trachea midline  Respiratory: Clear to auscultation bilaterally, nonlabored respirations   Cardiovascular: RRR, no murmurs, rubs, or gallops, no peripheral edema   Gastrointestinal: Positive bowel sounds,  soft, nontender, nondistended  Musculoskeletal: pt w/ spontaneous movement of BUE; noted to have generalized stiffness/contracted muscles  Psychiatric: demented   Neurologic: pt mostly non-verbal at time of exam, did look to  and say the word , otherwise was not able to answer questions, unable to follow commands; speech clear  Skin: No rashes; small ulceration to lower back- can visualize end of packing- scant amount of surrounding erythema and scant yellow drainage on dressing, no palpable fluid collected; no open wound/ulcerations- only able to visualize end of packing     Result Review:  I have personally reviewed the results from the time of this admission to 5/8/2023 05:44 EDT and agree with these findings:  [x]  Laboratory list / accordion  []  Microbiology  [x]  Radiology  [x]  EKG/Telemetry   []  Cardiology/Vascular   []  Pathology  [x]  Old records    LAB RESULTS:      Lab 05/08/23  0015 05/07/23 2051 05/07/23  1744   WBC  --   --  9.77   HEMOGLOBIN 8.9*  --  8.3*   HEMATOCRIT 29.3*  --  26.4*   PLATELETS  --   --  256   NEUTROS ABS  --   --  7.49*   IMMATURE GRANS (ABS)  --   --  0.04   LYMPHS ABS  --   --  1.22   MONOS ABS  --   --  0.98*   EOS ABS  --   --  0.02   MCV  --   --  83.8   PROCALCITONIN  --   --  0.37*   LACTATE  --  1.2 2.2*         Lab 05/07/23  1744   SODIUM 133*   POTASSIUM 3.5   CHLORIDE 99   CO2 22.0   ANION GAP 12.0   BUN 21   CREATININE 1.08*   EGFR 51.7*   GLUCOSE 106*   CALCIUM 8.5*   MAGNESIUM 2.1   TSH 4.460*         Lab 05/07/23  1744   TOTAL PROTEIN 5.9*   ALBUMIN 3.2*   GLOBULIN 2.7   ALT (SGPT) 21   AST (SGOT) 40*   BILIRUBIN 0.3   ALK PHOS 98         Lab 05/08/23  0157 05/08/23  0015 05/07/23  1744   HSTROP T 65* 67* 66*             Lab 05/07/23  1744   IRON 25*  25*   IRON SATURATION 6*   TIBC 417   TRANSFERRIN 280   FERRITIN 26.34         Brief Urine Lab Results  (Last result in the past 365 days)      Color   Clarity   Blood   Leuk Est   Nitrite   Protein    CREAT   Urine HCG        05/07/23 2218 Yellow   Turbid   Trace   Small (1+)   Positive   Trace               Microbiology Results (last 10 days)     Procedure Component Value - Date/Time    COVID PRE-OP / PRE-PROCEDURE SCREENING ORDER (NO ISOLATION) - Swab, Nasopharynx [[id number]]  (Normal) Collected: 05/07/23 1741    Lab Status: Final result Specimen: Swab from Nasopharynx Updated: 05/07/23 1847    Narrative:      The following orders were created for panel order COVID PRE-OP / PRE-PROCEDURE SCREENING ORDER (NO ISOLATION) - Swab, Nasopharynx.  Procedure                               Abnormality         Status                     ---------                               -----------         ------                     Respiratory Panel PCR w/...[[id number]]  Normal              Final result                 Please view results for these tests on the individual orders.    Respiratory Panel PCR w/COVID-19(SARS-CoV-2) JOSE/ALEJANDRA/SAKSHI/PAD/COR/MAD/BRYN In-House, NP Swab in UTM/VTM, 3-4 HR TAT - Swab, Nasopharynx [[id number]]  (Normal) Collected: 05/07/23 1741    Lab Status: Final result Specimen: Swab from Nasopharynx Updated: 05/07/23 1847     ADENOVIRUS, PCR Not Detected     Coronavirus 229E Not Detected     Coronavirus HKU1 Not Detected     Coronavirus NL63 Not Detected     Coronavirus OC43 Not Detected     COVID19 Not Detected     Human Metapneumovirus Not Detected     Human Rhinovirus/Enterovirus Not Detected     Influenza A PCR Not Detected     Influenza B PCR Not Detected     Parainfluenza Virus 1 Not Detected     Parainfluenza Virus 2 Not Detected     Parainfluenza Virus 3 Not Detected     Parainfluenza Virus 4 Not Detected     RSV, PCR Not Detected     Bordetella pertussis pcr Not Detected     Bordetella parapertussis PCR Not Detected     Chlamydophila pneumoniae PCR Not Detected     Mycoplasma pneumo by PCR Not Detected    Narrative:      In the setting of a positive respiratory panel with a viral infection PLUS a  negative procalcitonin without other underlying concern for bacterial infection, consider observing off antibiotics or discontinuation of antibiotics and continue supportive care. If the respiratory panel is positive for atypical bacterial infection (Bordetella pertussis, Chlamydophila pneumoniae, or Mycoplasma pneumoniae), consider antibiotic de-escalation to target atypical bacterial infection.        CT Head Without Contrast    Result Date: 5/8/2023  EXAMINATION: CT HEAD WO CONTRAST DATE: 5/8/2023 12:27 AM  HISTORY: Dementia COMPARISON: 4/13/2022. TECHNIQUE: Thin section noncontrast axial images were obtained through the head. Coronal reformatted images were then created. CT dose lowering techniques including automated exposure control, adjustment for patient size, and/or use of iterative reconstruction were used. FINDINGS: Ventricles and Extra-axial Spaces: Moderate ventriculomegaly, disproportionate to sulcal dilatation at the vertex. Parenchyma: Confluent areas of periventricular and subcortical white matter low-attenuation are present, similar to prior.   No CT evidence of an acute large-vessel territory infarct.   No mass effect or midline shift. Intracranial Hemorrhage: None. Bones/Extracranial soft tissues: No acute calvarial fracture. 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 Visualized Sinuses/Mastoids: Clear.      Impression:  1.  No acute intracranial abnormality. 2.  Moderate to severe ventriculomegaly, disproportionate to sulcal dilatation. Findings could be on the basis of normal pressure hydrocephalus versus central volume loss, and appear grossly similar to the prior exam. 3.  Moderate to severe white matter hypodensities, which are grossly unchanged and could represent microvascular ischemic changes and/or transependymal CSF edema.  Electronically signed by:  Tha Feliciano M.D.  5/7/2023 11:29 PM Mountain Time    CT Angiogram Abdomen Pelvis    Result Date: 5/8/2023  Exam: CTA thorax,  abdomen and pelvis with and without contrast Date: May 8, 2023 History: Sepsis, fever of unknown origin, chest pain, abdominal pain Comparison: September 23, 2022 Technique: Contiguous axial CT images obtained of the thorax, abdomen and pelvis initially without contrast, then following the uneventful intravenous administration of 100 mL Isovue-370 contrast. Additionally, sagittal, coronal and 3-D reformatted images were obtained. CT dose lowering techniques were used, to include: automated exposure control, adjustment for patient size, and or use of iterative reconstruction. Findings: CT thorax: Thoracic aorta: Noncontrast imaging fails to demonstrate evidence of an intramural hematoma. There are severe atherosclerotic changes throughout the thoracic aorta. There is no aneurysm or dissection. HEART: The heart is borderline in size. There are coronary artery calcifications. Pulmonary arteries: The pulmonary arteries are suboptimally characterized secondary to respiratory motion artifact. There is no visualized pulmonary embolus. Mediastinum: There is a low-density cystic mass in the left lobe of the thyroid measuring up to 2.2 cm. There is a small cyst in the right lobe of the thyroid. There is no pathologic mediastinal adenopathy. There is a very small hiatal hernia. Lung parenchyma: There is a background of emphysema. There is a 6 mm pulmonary nodule in the right lower lobe. There is an element of interlobular septal thickening. There are trace bilateral pleural effusions. CT ABDOMEN: Liver: The liver is mildly hypodense. Spleen: Normal. Pancreas: Normal. Adrenal glands: Normal. Gallbladder: The gallbladder is distended. There is a large amount of layering debris within the gallbladder. Kidneys: There is renal cortical thinning. There are small bilateral renal cysts. Abdominal mesentery: There is no pathologic intra-abdominal mass. Bowel loops: There is a very large amount of retained rectal fecal debris. There is  edematous wall thickening involving the rectum. There is a large amount of retained colonic fecal debris consistent with constipation. The appendix is not well delineated  on this study. There are no findings to suggest acute appendicitis. There is no small bowel obstruction. There is edematous rugal wall thickening of the stomach. The stomach is not well-distended. CT PELVIS: The genitourinary structures are intact. There are multiple nonenlarged lymph nodes in the left inguinal region. Abdominal aorta: There are severe atherosclerotic changes throughout the abdominal aorta extending into the iliac vasculature. Osseous structures: The osseous structures are markedly demineralized. There are chronic deformities involving the inferior pubic rami, right acetabulum and left pubic symphysis. There are moderate to severe degenerative changes involving both hips. There is posterior transpedicular fusion hardware extending from L1-L3. There is an age indeterminant compression fracture involving L4 with approximately 80% loss of the vertebral body height. There is a chronic deformity involving the proximal left humerus. There is heterotopic ossification around the left shoulder. There is a left hip joint effusion. There are bilateral breast prostheses. There is mild body wall edema.     Impression: 1. Large amount of layering debris within the gallbladder. The gallbladder is distended. Consider a follow-up right upper quadrant ultrasound for better characterization as determined clinically. 2. Very large amount of retained rectal fecal debris consistent with fecal impaction or obstipation. There is also edematous wall thickening involving the rectum which could represent stercoral proctitis. Close clinical follow-up is recommended. There is  also constipation. 3. Severe atherosclerotic disease. 4. Coronary artery calcifications. 5. Emphysema. 6. Trace bilateral pleural effusions. There is also mild pulmonary edema. 7.  Edematous rugal wall thickening of the stomach. Correlate for symptoms of gastritis. The need for a follow-up EGD for better characterization can be determined clinically. 8. There is an age indeterminant compression fracture involving L4 with approximately 80% loss of the vertebral body height. Correlation with point tenderness is recommended. 9. There is a 6 mm pulmonary nodule in the right lower lobe. Follow-up is suggested per Fleischner guidelines below. 10. Marked osteopenia. 11. There is a low-density cystic mass in the left lobe of the thyroid measuring up to 2.2 cm. A follow-up nonemergent thyroid ultrasound is recommended for better characterization. 12. No visualized pulmonary embolus. *FLEISCHNER SOCIETY GUIDELINES: Low risk patient: <6mm - Low Risk, no further f/u >6-8mm - low dose CT 6-12 mo, then 2nd f/u CT at 18-24mo >8mm - low dose CT 3,9,24mo OR PET/CT OR Biopsy High Risk Patient: <6mm - optional CT at 12 mo >6-8mm - low dose CT 6-12 mo, then 2nd f/u CT at 18-24mo >8mm - low dose CT at 3 mo, OR PET/CT OR Biopsy MULTIPLE NODULES: Low Risk Patient: <6mm - Low Risk, no further f/u >6-8mm - low dose CT 3-6 mo, then 2nd f/u CT at 18-24mo >8mm - low dose CT 3-6 mo, then 2nd f/u CT at 18-24mo High Risk Patient: <6mm - High risk, multiple nodules, optional CT at 12 mo >6-8mm - low dose CT 3-6 mo, then 2nd f/u CT at 18-24mo >8mm - low dose CT 3-6 mo, then 2nd f/u CT at 18-24mo Electronically signed by:  Ankit Tabor M.D.  5/8/2023 12:05 AM Mountain Time    XR Chest 1 View    Result Date: 5/7/2023  XR CHEST 1 VW Date of Exam: 5/7/2023 5:55 PM EDT Indication: Acute febrile illness with altered mental status Comparison: 9/23/2022. Findings: There is diffuse interstitial prominence in the lungs. There is no pneumothorax, pleural effusion or focal airspace consolidation. The heart size is normal. Pulmonary vasculature is largely obscured by interstitial prominence. There is an old healed proximal left humerus  fracture. No acute osseous abnormalities.     Impression: Impression: 1. Chronic appearing interstitial lung disease without acute cardiopulmonary abnormality. Electronically Signed: Ethan Thorpe  5/7/2023 6:49 PM EDT  Workstation ID: [id number]    CT Angiogram Chest    Result Date: 5/8/2023  Exam: CTA thorax, abdomen and pelvis with and without contrast Date: May 8, 2023 History: Sepsis, fever of unknown origin, chest pain, abdominal pain Comparison: September 23, 2022 Technique: Contiguous axial CT images obtained of the thorax, abdomen and pelvis initially without contrast, then following the uneventful intravenous administration of 100 mL Isovue-370 contrast. Additionally, sagittal, coronal and 3-D reformatted images were obtained. CT dose lowering techniques were used, to include: automated exposure control, adjustment for patient size, and or use of iterative reconstruction. Findings: CT thorax: Thoracic aorta: Noncontrast imaging fails to demonstrate evidence of an intramural hematoma. There are severe atherosclerotic changes throughout the thoracic aorta. There is no aneurysm or dissection. HEART: The heart is borderline in size. There are coronary artery calcifications. Pulmonary arteries: The pulmonary arteries are suboptimally characterized secondary to respiratory motion artifact. There is no visualized pulmonary embolus. Mediastinum: There is a low-density cystic mass in the left lobe of the thyroid measuring up to 2.2 cm. There is a small cyst in the right lobe of the thyroid. There is no pathologic mediastinal adenopathy. There is a very small hiatal hernia. Lung parenchyma: There is a background of emphysema. There is a 6 mm pulmonary nodule in the right lower lobe. There is an element of interlobular septal thickening. There are trace bilateral pleural effusions. CT ABDOMEN: Liver: The liver is mildly hypodense. Spleen: Normal. Pancreas: Normal. Adrenal glands: Normal. Gallbladder: The gallbladder is  distended. There is a large amount of layering debris within the gallbladder. Kidneys: There is renal cortical thinning. There are small bilateral renal cysts. Abdominal mesentery: There is no pathologic intra-abdominal mass. Bowel loops: There is a very large amount of retained rectal fecal debris. There is edematous wall thickening involving the rectum. There is a large amount of retained colonic fecal debris consistent with constipation. The appendix is not well delineated  on this study. There are no findings to suggest acute appendicitis. There is no small bowel obstruction. There is edematous rugal wall thickening of the stomach. The stomach is not well-distended. CT PELVIS: The genitourinary structures are intact. There are multiple nonenlarged lymph nodes in the left inguinal region. Abdominal aorta: There are severe atherosclerotic changes throughout the abdominal aorta extending into the iliac vasculature. Osseous structures: The osseous structures are markedly demineralized. There are chronic deformities involving the inferior pubic rami, right acetabulum and left pubic symphysis. There are moderate to severe degenerative changes involving both hips. There is posterior transpedicular fusion hardware extending from L1-L3. There is an age indeterminant compression fracture involving L4 with approximately 80% loss of the vertebral body height. There is a chronic deformity involving the proximal left humerus. There is heterotopic ossification around the left shoulder. There is a left hip joint effusion. There is mild body wall edema.     Impression: 1. Large amount of layering debris within the gallbladder. The gallbladder is distended. Consider a follow-up right upper quadrant ultrasound for better characterization as determined clinically. 2. Very large amount of retained rectal fecal debris consistent with fecal impaction or obstipation. There is also edematous wall thickening involving the rectum which  could represent stercoral proctitis. Close clinical follow-up is recommended. There is  also constipation. 3. Severe atherosclerotic disease. 4. Coronary artery calcifications. 5. Emphysema. 6. Trace bilateral pleural effusions. There is also mild pulmonary edema. 7. Edematous rugal wall thickening of the stomach. Correlate for symptoms of gastritis. The need for a follow-up EGD for better characterization can be determined clinically. 8. There is an age indeterminant compression fracture involving L4 with approximately 80% loss of the vertebral body height. Correlation with point tenderness is recommended. 9. There is a 6 mm pulmonary nodule in the right lower lobe. Follow-up is suggested per Fleischner guidelines below. 10. Marked osteopenia. 11. There is a low-density cystic mass in the left lobe of the thyroid measuring up to 2.2 cm. A follow-up nonemergent thyroid ultrasound is recommended for better characterization. 12. No visualized pulmonary embolus. *FLEISCHNER SOCIETY GUIDELINES: Low risk patient: <6mm - Low Risk, no further f/u >6-8mm - low dose CT 6-12 mo, then 2nd f/u CT at 18-24mo >8mm - low dose CT 3,9,24mo OR PET/CT OR Biopsy High Risk Patient: <6mm - optional CT at 12 mo >6-8mm - low dose CT 6-12 mo, then 2nd f/u CT at 18-24mo >8mm - low dose CT at 3 mo, OR PET/CT OR Biopsy MULTIPLE NODULES: Low Risk Patient: <6mm - Low Risk, no further f/u >6-8mm - low dose CT 3-6 mo, then 2nd f/u CT at 18-24mo >8mm - low dose CT 3-6 mo, then 2nd f/u CT at 18-24mo High Risk Patient: <6mm - High risk, multiple nodules, optional CT at 12 mo >6-8mm - low dose CT 3-6 mo, then 2nd f/u CT at 18-24mo >8mm - low dose CT 3-6 mo, then 2nd f/u CT at 18-24mo Electronically signed by:  Ankit Tabor M.D.  5/7/2023 11:58 PM Mountain Time    Results for orders placed during the hospital encounter of 09/23/22    Adult Transthoracic Echo Complete W/ Cont if Necessary Per Protocol    Interpretation Summary  · Estimated left  ventricular EF = 60% Left ventricular systolic function is normal.  · Severe mitral annular calcification without significant stenosis.  · Trace mitral regurgitation.  · Mild tricuspid regurgitation.  · Calculated right ventricular systolic pressure from tricuspid regurgitation is 38 mmHg.    Assessment & Plan   Assessment & Plan       Fever, unknown origin    Late onset Alzheimer's disease without behavioral disturbance (HCC)    Pulmonary hypertension    Anxiety    Elevated troponin    Acute kidney injury    Acute on chronic anemia    Lactic acidosis    Wound with tunneling    PAF (paroxysmal atrial fibrillation)    HTN (hypertension)    Acute UTI (urinary tract infection)    Pulmonary nodule    Constipation    Pleural effusion    Compression fracture of fourth lumbar vertebra    Thyroid mass of unclear etiology    Fecal impaction    Sepsis    Laura Wallace is a 83 y.o. female w/ a hx of pulmonary hypertension, HTN, PAF on Eliquis, alzheimer's dementia, anxiety who presented to the ED w/ c/o diaphoresis.     **Sepsis, likely UTI   **Tunneled chronic wound to lower back   -Fever, tachycardia, elevated lactic acid, elevated procalcitonin  reports episodes of diaphoresis x 1-2 months- worse past few days   -per EMS, axillary temp 101.9; in ED temp 100.5  -per EMS BP initially 82/62; given 500ml LR bolus en route w/ improvement   -EMS also noted room air temp of 88%; pt 97% on room air in ED  -lactic acid 2.2; repeat 1.2  -procal 0.37  -WBC WNL   -CK elevated at 446; repeat in am   -CT chest, abdomen and pelvis noted above  -CT head without acute findings  -blood cx pending   -initial UA showed 4+ bacteria and 13-20 epi cells; repeat I/O cath specimen w/ same- 4+ bacteria and TNTC epi cells; urine culture pending   -s/p 500ml LR bolus and 1 liter NS bolus   -continue LR @ 100ml/hour  -s/p Vanc and Zosyn given in ED; continue   -symptom mgt  -WOC consult in am   -Constipation, stercoral proctitis noted on  imaging.  Consider GI/CRS consult  -Abnormal gallbladder on imaging, gallbladder ultrasound pending.  Consider general surgery consult    **Acute kidney injury   -previous CR ~0.8-0.7  -current CR 1.08 w/ eGFR 51.7  -IVF per above  -UA c/w UTI w/ TNTC epi cells  -monitor     **Acute/chronic anemia   -previous H/H 9.3/30.1 in 9/2022  -current Hgb 8.3  -fecal occult pending   -anemia panel pending   -pt on Eliquis; continued for now   -type/screen in am   -repeat H/H at 1am and CBC in am     **Elevated troponin   -EKG reviewed; repeat in am   -troponin 66; trend   -consider Cardiology consult pending trend     **PAF   -continue Eliquis   -EKG reviewed  -holding BB 2/2 PTA hypotension     **Pulmonary HTN   **Bilateral pleural effusions  -last ECHO 9/2022 w/ EF 60% and normal LVSF, RVSP of 38  -TTE for a.m.    **Fecal impaction  **Stercoral proctitis  - Covering with antibiotics per above  - Enema every 4 hours x3  - Add bowel regimen when fecal impaction is resolved  - Consider GI/colorectal surgery consult    **HTN   -pt initially hypotensive per EMS w/ BP 82/62  -s/p 500ml LR bolus and NS 1 liter bolus   -BP is stable currently   -will hold routine Norvasc and lopressor for now     **Alzheimer's dementia   -continue Aricept, Risperidone  -bed alarm, fall precautions  -pt,ot and case mgt consult      **Anxiety   -continue routine Prozac   -listed as taking PRN Ativan BID- not reordered 2/2 increased AMS and hypotension, add if needed     **Pulmonary nodule  **Thyroid mass  - Incidental findings on imaging  - Defer to outpatient follow-up    DVT prophylaxis:  Eliquis     CODE STATUS:    Level Of Support Discussed With: Health Care Surrogate  Code Status (Patient has no pulse and is not breathing): CPR (Attempt to Resuscitate)  Medical Interventions (Patient has pulse or is breathing): Full Support    Expected Discharge  Expected Discharge Date: 5/12/2023; Expected Discharge Time:     This note has been completed as  part of a split-shared workflow.     Signature: Electronically signed by CARLOZ Garcia, 05/07/23, 11:07 PM EDT.    APC Time spent: 60 minutes   Attending time spent:  30min      Attending   Admission Attestation       I have performed an independent face-to-face diagnostic evaluation including performing an independent physical examination as documented here.  The documented plan of care above was reviewed and developed with the advanced practice clinician (APC).      Brief Summary Statement:   Laura Wallace is a 83 y.o. female with a PMH significant for pulmonary hypertension, HTN, paroxysmal atrial fibrillation on Eliquis, Alzheimer's dementia, anxiety who comes to the ED due to diaphoresis.  Patient with advanced dementia, HPI obtained from spouse at bedside.  Spouse reports that patient has had off-and-on spells of diaphoresis for the past 1-2 months.  Over the past week she has had drenching spells of diaphoresis, wetting her bed clothes.  This occurred again this morning, she was noted to have an increased respiratory rate, confusion, less verbal.  Caregiver reported decreased urine output, and an abnormal odor to patient's urine.    Remainder of detailed HPI is as noted by APC and has been reviewed and/or edited by me for completeness.    Attending Physical Exam:  Temp:  [99.3 °F (37.4 °C)-100.5 °F (38.1 °C)] 99.4 °F (37.4 °C)  Heart Rate:  [] 63  Resp:  [16-18] 18  BP: (105-165)/(44-80) 139/80    Constitutional: Asleep, arousable  Eyes: Eyes closed  HENT: NCAT, mucous membranes moist  Neck: Supple, no thyromegaly, no lymphadenopathy, trachea midline  Respiratory: Clear to auscultation bilaterally, nonlabored respirations   Cardiovascular: RRR, no murmurs, rubs, or gallops, palpable pedal pulses bilaterally  Gastrointestinal: Positive bowel sounds, somewhat firm, non-tender to distended, nondistended  Musculoskeletal: No bilateral ankle edema, no clubbing or cyanosis to  extremities  Psychiatric: Calm demeanor  Neurologic: Nonverbal, unable to assess  Skin: No rashes      Brief Assessment/Plan :  See detailed assessment and plan developed with APC which I have reviewed and/or edited for completeness.            Gertrude Hendrickson,   05/08/23

## 2023-05-08 NOTE — PLAN OF CARE
Problem: Adult Inpatient Plan of Care  Goal: Plan of Care Review  Outcome: Ongoing, Progressing

## 2023-05-08 NOTE — PROGRESS NOTES
"Pharmacy Consult-Vancomycin Dosing  Laura Wallace is a  83 y.o. female receiving vancomycin therapy.     Indication: sepsis  Consulting Provider: Lakisha Leslie APRN ID Consult: no    Goal AUC: 400 - 600 mg/L*hr    Current Antimicrobial Therapy  Vancomycin day 1  Zosyn 3.375gm q8h      Allergies  Allergies as of 05/07/2023 - Reviewed 05/07/2023   Allergen Reaction Noted    Codeine Nausea And Vomiting 05/25/2018       Labs    Results from last 7 days   Lab Units 05/07/23  1744   BUN mg/dL 21   CREATININE mg/dL 1.08*       Results from last 7 days   Lab Units 05/07/23  1744   WBC 10*3/mm3 9.77       Evaluation of Dosing     Last Dose Received in the ED/Outside Facility: vancomycin 1000mg x1 5/7 @2037  Is Patient on Dialysis or Renal Replacement: no    Ht - 154.9 cm (61\")  Wt - 49.9 kg (110 lb)    Estimated Creatinine Clearance: 32.2 mL/min (A) (by C-G formula based on SCr of 1.08 mg/dL (H)).    Intake & Output (last 3 days)         05/05 0701  05/06 0700 05/06 0701  05/07 0700 05/07 0701  05/08 0700    IV Piggyback   750    Total Intake(mL/kg)   750 (15)    Net   +750                   Microbiology and Radiology  Microbiology Results (last 10 days)       Procedure Component Value - Date/Time    COVID PRE-OP / PRE-PROCEDURE SCREENING ORDER (NO ISOLATION) - Swab, Nasopharynx [[id number]]  (Normal) Collected: 05/07/23 1741    Lab Status: Final result Specimen: Swab from Nasopharynx Updated: 05/07/23 1847    Narrative:      The following orders were created for panel order COVID PRE-OP / PRE-PROCEDURE SCREENING ORDER (NO ISOLATION) - Swab, Nasopharynx.  Procedure                               Abnormality         Status                     ---------                               -----------         ------                     Respiratory Panel PCR w/...[[id number]]  Normal              Final result                 Please view results for these tests on the individual orders.    Respiratory Panel PCR " w/COVID-19(SARS-CoV-2) JOSE/ALEJANDRA/SAKSHI/PAD/COR/MAD/BRYN In-House, NP Swab in UTM/VTM, 3-4 HR TAT - Swab, Nasopharynx [[id number]]  (Normal) Collected: 05/07/23 1741    Lab Status: Final result Specimen: Swab from Nasopharynx Updated: 05/07/23 1847     ADENOVIRUS, PCR Not Detected     Coronavirus 229E Not Detected     Coronavirus HKU1 Not Detected     Coronavirus NL63 Not Detected     Coronavirus OC43 Not Detected     COVID19 Not Detected     Human Metapneumovirus Not Detected     Human Rhinovirus/Enterovirus Not Detected     Influenza A PCR Not Detected     Influenza B PCR Not Detected     Parainfluenza Virus 1 Not Detected     Parainfluenza Virus 2 Not Detected     Parainfluenza Virus 3 Not Detected     Parainfluenza Virus 4 Not Detected     RSV, PCR Not Detected     Bordetella pertussis pcr Not Detected     Bordetella parapertussis PCR Not Detected     Chlamydophila pneumoniae PCR Not Detected     Mycoplasma pneumo by PCR Not Detected    Narrative:      In the setting of a positive respiratory panel with a viral infection PLUS a negative procalcitonin without other underlying concern for bacterial infection, consider observing off antibiotics or discontinuation of antibiotics and continue supportive care. If the respiratory panel is positive for atypical bacterial infection (Bordetella pertussis, Chlamydophila pneumoniae, or Mycoplasma pneumoniae), consider antibiotic de-escalation to target atypical bacterial infection.            Reported Vancomycin Levels                         InsightRX AUC Calculation:    Current AUC:   na mg/L*hr    Predicted Steady State AUC on Current Dose: 496 mg/L*hr  _________________________________    Predicted Steady State AUC on New Dose:   na mg/L*hr    Assessment/Plan:  Vancomycin dosing for sepsis  Goal AUC: 400-600 mg/L*hr  5/7 SCr - 1.08  5/7 WBC - 9.77  24hr tmax - 100.5    Initial vancomycin 1000mg x1 given 5/7 @2037  Will start vancomycin 750mg q24h 5/8  Obtain vancomycin level  5/9 prior to 1800 dose  BMP x3 days  Monitor renal function, clinical status and infusion related reactions  Follow vancomycin levels and adjust dose accordingly    Thanks  Gabino Ray [facility]  5/7/2023  23:07 EDT

## 2023-05-08 NOTE — THERAPY EVALUATION
Acute Care - Speech Language Pathology   Swallow Initial Evaluation [facility]   Clinical Swallow Evaluation     Patient Name: Laura Wallace  : 1940  MRN: [id number]  Today's Date: 2023               Admit Date: 2023    Visit Dx:     ICD-10-CM ICD-9-CM   1. Acute febrile illness  R50.9 780.60   2. Hypotension, unspecified hypotension type  I95.9 458.9   3. Altered mental status, unspecified altered mental status type  R41.82 780.97   4. Dysphagia, unspecified type  R13.10 787.20     Patient Active Problem List   Diagnosis   • Late onset Alzheimer's disease without behavioral disturbance (HCC)   • Multiple closed fractures of pelvis without disruption of pelvic ring, initial encounter   • Pneumonia   • Paroxysmal atrial fibrillation with rapid ventricular response   • Acute respiratory failure with hypoxia   • Pulmonary hypertension   • Anxiety   • Mild cognitive disorder   • Fever, unknown origin   • Elevated troponin   • Acute kidney injury   • Acute on chronic anemia   • Lactic acidosis   • Wound with tunneling   • PAF (paroxysmal atrial fibrillation)   • HTN (hypertension)   • Acute UTI (urinary tract infection)   • Pulmonary nodule   • Constipation   • Pleural effusion   • Compression fracture of fourth lumbar vertebra   • Thyroid mass of unclear etiology   • Fecal impaction   • Sepsis     Past Medical History:   Diagnosis Date   • Anxiety 2022   • Late onset Alzheimer's disease without behavioral disturbance 2018   • Paroxysmal atrial fibrillation with rapid ventricular response 2022   • Pulmonary hypertension 2022     Past Surgical History:   Procedure Laterality Date   • APPENDECTOMY     • VERTEBROPLASTY      L123, fusion       SLP Recommendation and Plan  SLP Swallowing Diagnosis: mild, oral dysphagia, suspected pharyngeal dysphagia (23 1130)  SLP Diet Recommendation: regular textures, nectar thick liquids, water between meals after oral care, with  supervision (05/08/23 1130)  Recommended Precautions and Strategies: general aspiration precautions, assist with feeding (05/08/23 1130)  SLP Rec. for Method of Medication Administration: meds crushed, with puree (05/08/23 1130)     Monitor for Signs of Aspiration: notify SLP if any concerns (05/08/23 1130)  Recommended Diagnostics: reassess via clinical swallow evaluation (05/08/23 1130)     Anticipated Discharge Disposition (SLP): home with [facility] care (05/08/23 1130)                      Plan of Care Reviewed With: patient, spouse      SWALLOW EVALUATION (last 72 hours)     SLP Adult Swallow Evaluation     Row Name 05/08/23 1130          Document Type evaluation  -SM    Subjective Information no complaints  -SM    Patient Observations alert;cooperative  -SM          Patient Profile Reviewed yes  -SM    Pertinent History Of Current Problem Adm with diaphoresis, fever. Sepsis/UTI. Dementia with 24 hour caregivers. Consulted as difficulty taking pills.  -SM    Current Method of Nutrition regular textures;thin liquids  -SM    Prior Level of Function-Swallowing safe, efficient swallowing in all situations  meds crushed in puree at baseline  -SM    Plans/Goals Discussed with patient;spouse/S.O.;agreed upon  -SM    Barriers to Rehab cognitive status;previous functional deficit  -SM    Patient's Goals for Discharge patient did not state  -SM    Family Goals for Discharge other (see comments)  balance of safety and quality  -SM          Additional Documentation Pain Scale: FACES Pre/Post-Treatment (Group)  -SM          Pain: FACES Scale, Pretreatment 0-->no hurt  -SM    Posttreatment Pain Rating 0-->no hurt  -SM          Oral Motor General Assessment generalized oral motor weakness;unable to assess  -SM          Oral Prep Phase WFL  -SM    Oral Transit WFL  -SM    Oral Residue WFL  -SM    Pharyngeal Phase suspected pharyngeal impairment  -SM          Pharyngeal Phase Concerns cough  -SM    Cough thin  -SM    Pharyngeal  Phase Concerns, Comment Inconsistent delayed cough throughout. Pt implusive and taking large sips. Also taking in lots of air with swallow with lots of subsequent burping. Pt's  reports no hx dysphagia aside from pills. Does acknowledge with current increased weakness. GOC/POC discussion with pt's . He does not wish to pursue MBS though is agreeable for temporary adjustment to nectar-thick liquids to lessen risk aspiration. Wishes to balance safety with QOL. Recommendation to reflect full discussion.  -          SLP Swallowing Diagnosis mild;oral dysphagia;suspected pharyngeal dysphagia  -    Functional Impact risk of aspiration/pneumonia;risk of dehydration  -          SLP Diet Recommendation regular textures;nectar thick liquids;water between meals after oral care, with supervision  -    Recommended Diagnostics reassess via clinical swallow evaluation  -    Recommended Precautions and Strategies general aspiration precautions;assist with feeding  -    Oral Care Recommendations Oral Care BID/PRN;Toothbrush  -    SLP Rec. for Method of Medication Administration meds crushed;with puree  -    Monitor for Signs of Aspiration notify SLP if any concerns  -    Anticipated Discharge Disposition (SLP) home with 24/7 care  -          User Key  (r) = Recorded By, (t) = Taken By, (c) = Cosigned By    Initials Name Effective Dates     Jennifer Avila MS CCC-SLP 02/03/23 -                 EDUCATION  The patient's  has been educated in the following areas:   Dysphagia (Swallowing Impairment) Modified Diet Instruction.              Time Calculation:    Time Calculation- SLP     Row Name 05/08/23 1221             Time Calculation- [facility]    SLP Start Time 1130  -      SLP Received On 05/08/23  -         Untimed Charges    15430-SE Eval Oral Pharyng Swallow Minutes 39  -         Total Minutes    Untimed Charges Total Minutes 39  -SM       Total Minutes 39  -            User Key   (r) = Recorded By, (t) = Taken By, (c) = Cosigned By    Initials Name Provider Type    Jennifer Babb, MS CCC-SLP Speech and Language Pathologist                Therapy Charges for Today     Code Description Service Date Service Provider Modifiers Qty    [id number] HC ST EVAL ORAL PHARYNG SWALLOW 3 5/8/2023 Jennifer Avila MS CCC-SLP GN 1               Jennifer Avila MS CORTNEY-SLP  5/8/2023

## 2023-05-09 ENCOUNTER — APPOINTMENT (OUTPATIENT)
Dept: GENERAL RADIOLOGY | Facility: HOSPITAL | Age: 83
DRG: 871 | End: 2023-05-09
Payer: MEDICARE

## 2023-05-09 LAB
ALBUMIN SERPL-MCNC: 3.5 G/DL (ref 3.5–5.2)
ALBUMIN/GLOB SERPL: 1.4 G/DL
ALP SERPL-CCNC: 91 U/L (ref 39–117)
ALT SERPL W P-5'-P-CCNC: 24 U/L (ref 1–33)
ANION GAP SERPL CALCULATED.3IONS-SCNC: 12 MMOL/L (ref 5–15)
AST SERPL-CCNC: 42 U/L (ref 1–32)
BACTERIA SPEC AEROBE CULT: ABNORMAL
BACTERIA SPEC AEROBE CULT: ABNORMAL
BASOPHILS # BLD AUTO: 0.01 10*3/MM3 (ref 0–0.2)
BASOPHILS NFR BLD AUTO: 0.1 % (ref 0–1.5)
BILIRUB SERPL-MCNC: 0.5 MG/DL (ref 0–1.2)
BUN SERPL-MCNC: 13 MG/DL (ref 8–23)
BUN/CREAT SERPL: 15.3 (ref 7–25)
CALCIUM SPEC-SCNC: 8.7 MG/DL (ref 8.6–10.5)
CHLORIDE SERPL-SCNC: 102 MMOL/L (ref 98–107)
CO2 SERPL-SCNC: 23 MMOL/L (ref 22–29)
CREAT SERPL-MCNC: 0.85 MG/DL (ref 0.57–1)
DEPRECATED RDW RBC AUTO: 45.4 FL (ref 37–54)
EGFRCR SERPLBLD CKD-EPI 2021: 68.9 ML/MIN/1.73
EOSINOPHIL # BLD AUTO: 0.1 10*3/MM3 (ref 0–0.4)
EOSINOPHIL NFR BLD AUTO: 1.2 % (ref 0.3–6.2)
ERYTHROCYTE [DISTWIDTH] IN BLOOD BY AUTOMATED COUNT: 14.9 % (ref 12.3–15.4)
FOLATE BLD-MCNC: >620 NG/ML
FOLATE RBC-MCNC: >2199 NG/ML
GLOBULIN UR ELPH-MCNC: 2.5 GM/DL
GLUCOSE SERPL-MCNC: 87 MG/DL (ref 65–99)
GRAM STN SPEC: ABNORMAL
HCT VFR BLD AUTO: 26.9 % (ref 34–46.6)
HCT VFR BLD AUTO: 28.2 % (ref 34–46.6)
HGB BLD-MCNC: 8.5 G/DL (ref 12–15.9)
IMM GRANULOCYTES # BLD AUTO: 0.02 10*3/MM3 (ref 0–0.05)
IMM GRANULOCYTES NFR BLD AUTO: 0.2 % (ref 0–0.5)
ISOLATED FROM: ABNORMAL
LYMPHOCYTES # BLD AUTO: 1.83 10*3/MM3 (ref 0.7–3.1)
LYMPHOCYTES NFR BLD AUTO: 21.9 % (ref 19.6–45.3)
MCH RBC QN AUTO: 26.4 PG (ref 26.6–33)
MCHC RBC AUTO-ENTMCNC: 31.6 G/DL (ref 31.5–35.7)
MCV RBC AUTO: 83.5 FL (ref 79–97)
MONOCYTES # BLD AUTO: 0.89 10*3/MM3 (ref 0.1–0.9)
MONOCYTES NFR BLD AUTO: 10.7 % (ref 5–12)
NEUTROPHILS NFR BLD AUTO: 5.49 10*3/MM3 (ref 1.7–7)
NEUTROPHILS NFR BLD AUTO: 65.9 % (ref 42.7–76)
NRBC BLD AUTO-RTO: 0 /100 WBC (ref 0–0.2)
PLATELET # BLD AUTO: 275 10*3/MM3 (ref 140–450)
PMV BLD AUTO: 9.6 FL (ref 6–12)
POTASSIUM SERPL-SCNC: 2.9 MMOL/L (ref 3.5–5.2)
POTASSIUM SERPL-SCNC: 3.2 MMOL/L (ref 3.5–5.2)
PROT SERPL-MCNC: 6 G/DL (ref 6–8.5)
RBC # BLD AUTO: 3.22 10*6/MM3 (ref 3.77–5.28)
RBC MORPH BLD: NORMAL
SMALL PLATELETS BLD QL SMEAR: ADEQUATE
SODIUM SERPL-SCNC: 137 MMOL/L (ref 136–145)
VANCOMYCIN SERPL-MCNC: 8 MCG/ML (ref 5–40)
WBC MORPH BLD: NORMAL
WBC NRBC COR # BLD: 8.34 10*3/MM3 (ref 3.4–10.8)

## 2023-05-09 PROCEDURE — 99232 SBSQ HOSP IP/OBS MODERATE 35: CPT | Performed by: STUDENT IN AN ORGANIZED HEALTH CARE EDUCATION/TRAINING PROGRAM

## 2023-05-09 PROCEDURE — 74018 RADEX ABDOMEN 1 VIEW: CPT

## 2023-05-09 PROCEDURE — 85007 BL SMEAR W/DIFF WBC COUNT: CPT | Performed by: STUDENT IN AN ORGANIZED HEALTH CARE EDUCATION/TRAINING PROGRAM

## 2023-05-09 PROCEDURE — 80202 ASSAY OF VANCOMYCIN: CPT

## 2023-05-09 PROCEDURE — 80053 COMPREHEN METABOLIC PANEL: CPT | Performed by: STUDENT IN AN ORGANIZED HEALTH CARE EDUCATION/TRAINING PROGRAM

## 2023-05-09 PROCEDURE — 97535 SELF CARE MNGMENT TRAINING: CPT

## 2023-05-09 PROCEDURE — 0 POTASSIUM CHLORIDE 10 MEQ/100ML SOLUTION: Performed by: NURSE PRACTITIONER

## 2023-05-09 PROCEDURE — 97166 OT EVAL MOD COMPLEX 45 MIN: CPT

## 2023-05-09 PROCEDURE — 25010000002 CEFTRIAXONE PER 250 MG: Performed by: INTERNAL MEDICINE

## 2023-05-09 PROCEDURE — 85025 COMPLETE CBC W/AUTO DIFF WBC: CPT | Performed by: STUDENT IN AN ORGANIZED HEALTH CARE EDUCATION/TRAINING PROGRAM

## 2023-05-09 PROCEDURE — 84132 ASSAY OF SERUM POTASSIUM: CPT | Performed by: NURSE PRACTITIONER

## 2023-05-09 PROCEDURE — 25010000002 PIPERACILLIN SOD-TAZOBACTAM PER 1 G: Performed by: NURSE PRACTITIONER

## 2023-05-09 PROCEDURE — 92610 EVALUATE SWALLOWING FUNCTION: CPT

## 2023-05-09 RX ORDER — VANCOMYCIN HYDROCHLORIDE 1 G/200ML
1000 INJECTION, SOLUTION INTRAVENOUS EVERY 24 HOURS
Status: DISCONTINUED | OUTPATIENT
Start: 2023-05-09 | End: 2023-05-09

## 2023-05-09 RX ORDER — POTASSIUM CHLORIDE 7.45 MG/ML
10 INJECTION INTRAVENOUS
Status: DISPENSED | OUTPATIENT
Start: 2023-05-09 | End: 2023-05-09

## 2023-05-09 RX ORDER — POLYETHYLENE GLYCOL 3350 17 G/17G
17 POWDER, FOR SOLUTION ORAL 3 TIMES DAILY
Status: COMPLETED | OUTPATIENT
Start: 2023-05-09 | End: 2023-05-10

## 2023-05-09 RX ORDER — POTASSIUM CHLORIDE 750 MG/1
40 CAPSULE, EXTENDED RELEASE ORAL EVERY 4 HOURS
Status: COMPLETED | OUTPATIENT
Start: 2023-05-09 | End: 2023-05-10

## 2023-05-09 RX ADMIN — RISPERIDONE 0.25 MG: 0.25 TABLET, FILM COATED ORAL at 09:39

## 2023-05-09 RX ADMIN — APIXABAN 2.5 MG: 2.5 TABLET, FILM COATED ORAL at 09:46

## 2023-05-09 RX ADMIN — POTASSIUM CHLORIDE 10 MEQ: 7.46 INJECTION, SOLUTION INTRAVENOUS at 06:25

## 2023-05-09 RX ADMIN — RISPERIDONE 0.5 MG: 0.25 TABLET, FILM COATED ORAL at 20:44

## 2023-05-09 RX ADMIN — APIXABAN 2.5 MG: 2.5 TABLET, FILM COATED ORAL at 20:44

## 2023-05-09 RX ADMIN — CEFTRIAXONE 2 G: 2 INJECTION, POWDER, FOR SOLUTION INTRAMUSCULAR; INTRAVENOUS at 18:04

## 2023-05-09 RX ADMIN — POLYETHYLENE GLYCOL 3350 17 G: 17 POWDER, FOR SOLUTION ORAL at 18:04

## 2023-05-09 RX ADMIN — POLYETHYLENE GLYCOL 3350 17 G: 17 POWDER, FOR SOLUTION ORAL at 12:22

## 2023-05-09 RX ADMIN — SENNOSIDES AND DOCUSATE SODIUM 2 TABLET: 8.6; 5 TABLET ORAL at 09:39

## 2023-05-09 RX ADMIN — SODIUM CHLORIDE, POTASSIUM CHLORIDE, SODIUM LACTATE AND CALCIUM CHLORIDE 100 ML/HR: 600; 310; 30; 20 INJECTION, SOLUTION INTRAVENOUS at 00:41

## 2023-05-09 RX ADMIN — Medication 10 ML: at 20:45

## 2023-05-09 RX ADMIN — TAZOBACTAM SODIUM AND PIPERACILLIN SODIUM 3.38 G: 375; 3 INJECTION, SOLUTION INTRAVENOUS at 00:41

## 2023-05-09 RX ADMIN — TAZOBACTAM SODIUM AND PIPERACILLIN SODIUM 3.38 G: 375; 3 INJECTION, SOLUTION INTRAVENOUS at 11:05

## 2023-05-09 RX ADMIN — SENNOSIDES AND DOCUSATE SODIUM 2 TABLET: 8.6; 5 TABLET ORAL at 20:44

## 2023-05-09 RX ADMIN — POTASSIUM CHLORIDE 10 MEQ: 7.46 INJECTION, SOLUTION INTRAVENOUS at 09:39

## 2023-05-09 RX ADMIN — FOLIC ACID 1 MG: 1 TABLET ORAL at 09:31

## 2023-05-09 RX ADMIN — POLYETHYLENE GLYCOL 3350 17 G: 17 POWDER, FOR SOLUTION ORAL at 20:44

## 2023-05-09 RX ADMIN — DONEPEZIL HYDROCHLORIDE 5 MG: 5 TABLET, FILM COATED ORAL at 20:45

## 2023-05-09 RX ADMIN — FLUOXETINE 40 MG: 20 CAPSULE ORAL at 09:31

## 2023-05-09 RX ADMIN — Medication 10 ML: at 09:48

## 2023-05-09 RX ADMIN — PANTOPRAZOLE SODIUM 40 MG: 40 TABLET, DELAYED RELEASE ORAL at 09:39

## 2023-05-09 RX ADMIN — POTASSIUM CHLORIDE 10 MEQ: 7.46 INJECTION, SOLUTION INTRAVENOUS at 11:06

## 2023-05-09 RX ADMIN — POTASSIUM CHLORIDE 40 MEQ: 10 CAPSULE, COATED, EXTENDED RELEASE ORAL at 20:44

## 2023-05-09 RX ADMIN — SODIUM HYPOCHLORITE: 1.25 SOLUTION TOPICAL at 04:54

## 2023-05-09 RX ADMIN — POTASSIUM CHLORIDE 40 MEQ: 10 CAPSULE, COATED, EXTENDED RELEASE ORAL at 23:01

## 2023-05-09 NOTE — PLAN OF CARE
Problem: Adult Inpatient Plan of Care  Goal: Plan of Care Review  Outcome: Ongoing, Progressing  Flowsheets (Taken 5/9/2023 0294)  Plan of Care Reviewed With:   patient   spouse   Goal Outcome Evaluation:  Plan of Care Reviewed With: patient, spouse         SLP re-evaluation completed. Will address dysphagia in tx. Please see note for further details and recommendations.

## 2023-05-09 NOTE — PROGRESS NOTES
"Pharmacy Consult - Vancomycin Dosing    Laura Wallace is an 83 y.o. female receiving vancomycin therapy.    Indication: Sepsis  Consulting Provider: Hospitalist  ID Consult: Yes - pending    Goal AUC: 400 - 600 mg/L*hr    Current Antimicrobial Therapy  Zosyn 5/7-now  Vancomycin 5/7-now    Allergies  Allergies as of 05/07/2023 - Reviewed 05/07/2023   Allergen Reaction Noted    Codeine Nausea And Vomiting 05/25/2018     Labs  Results from last 7 days   Lab Units 05/09/23  0402 05/08/23  0607 05/07/23  1744   BUN mg/dL 13 15 21   CREATININE mg/dL 0.85 0.88 1.08*     Results from last 7 days   Lab Units 05/09/23  0402 05/08/23  0607 05/07/23  1744   WBC 10*3/mm3 8.34 10.13 9.77     Evaluation of Dosing     Is Patient on Dialysis or Renal Replacement: no    Ht - 154.9 cm (61\")  Wt - 44.1 kg (97 lb 3.2 oz)    Estimated Creatinine Clearance: 36.1 mL/min (by C-G formula based on SCr of 0.85 mg/dL).    Intake & Output (last 3 days)         05/06 0701 05/07 0700 05/07 0701  05/08 0700 05/08 0701  05/09 0700 05/09 0701  05/10 0700    I.V. (mL/kg)  795 (18)      IV Piggyback  800 150     Total Intake(mL/kg)  1595 (36.2) 150 (3.4)     Urine (mL/kg/hr)  200      Stool  0      Total Output  200      Net  +1395 +150             Urine Unmeasured Occurrence  1 x 1 x 1 x    Stool Unmeasured Occurrence  1 x 6 x 1 x          Microbiology and Radiology  Microbiology Results (last 10 days)       Procedure Component Value - Date/Time    Urine Culture - Urine, Straight Cath [[id number]]  (Abnormal)  (Susceptibility) Collected: 05/07/23 2218    Lab Status: Preliminary result Specimen: Urine from Straight Cath Updated: 05/09/23 1021     Urine Culture >100,000 CFU/mL Escherichia coli    Narrative:      Organism under investigation.      Colonization of the urinary tract without infection is common. Treatment is discouraged unless the patient is symptomatic, pregnant, or undergoing an invasive urologic procedure.    Susceptibility        " Escherichia coli      KARYNA (Preliminary)      Amikacin Susceptible      Ampicillin Intermediate      Ampicillin + Sulbactam Susceptible      Cefazolin Susceptible      Cefepime Susceptible      Ceftazidime Susceptible      Ceftriaxone Susceptible      Gentamicin Intermediate      Levofloxacin Susceptible      Nitrofurantoin Susceptible      Piperacillin + Tazobactam Susceptible      Tobramycin Resistant      Trimethoprim + Sulfamethoxazole Susceptible                           Blood Culture - Blood, Arm, Right [[id number]]  (Normal) Collected: 05/07/23 1749    Lab Status: Preliminary result Specimen: Blood from Arm, Right Updated: 05/08/23 1831     Blood Culture No growth at 24 hours    Blood Culture - Blood, Hand, Left [[id number]]  (Abnormal) Collected: 05/07/23 1748    Lab Status: Final result Specimen: Blood from Hand, Left Updated: 05/09/23 0903     Blood Culture Bacillus species     Isolated from Aerobic Bottle     Gram Stain Aerobic Bottle Gram positive bacilli     Comment: Modified report. Previous result was Aerobic Bottle Gram negative bacilli on 5/8/2023 at 2024 EDT.       Narrative:      Probable contaminant requires clinical correlation, susceptibility not performed unless requested by physician.    Blood culture does not meet the specified criteria for PCR identification.  If pregnant, immunocompromised, or clinical concern for meningitis, call lab to run BCID for Listeria monocytogenes.    Blood Culture ID, PCR - Blood, Hand, Left [[id number]]  (Normal) Collected: 05/07/23 1748    Lab Status: Final result Specimen: Blood from Hand, Left Updated: 05/08/23 2145     BCID, PCR Negative by BCID PCR. Culture to Follow.    COVID PRE-OP / PRE-PROCEDURE SCREENING ORDER (NO ISOLATION) - Swab, Nasopharynx [[id number]]  (Normal) Collected: 05/07/23 1741    Lab Status: Final result Specimen: Swab from Nasopharynx Updated: 05/07/23 1847    Narrative:      The following orders were created for panel order COVID PRE-OP /  PRE-PROCEDURE SCREENING ORDER (NO ISOLATION) - Swab, Nasopharynx.  Procedure                               Abnormality         Status                     ---------                               -----------         ------                     Respiratory Panel PCR w/...[[id number]]  Normal              Final result                 Please view results for these tests on the individual orders.    Respiratory Panel PCR w/COVID-19(SARS-CoV-2) JOSE/ALEJANDRA/SAKSHI/PAD/COR/MAD/BRYN In-House, NP Swab in UTM/VTM, 3-4 HR TAT - Swab, Nasopharynx [[id number]]  (Normal) Collected: 05/07/23 1741    Lab Status: Final result Specimen: Swab from Nasopharynx Updated: 05/07/23 5704     ADENOVIRUS, PCR Not Detected     Coronavirus 229E Not Detected     Coronavirus HKU1 Not Detected     Coronavirus NL63 Not Detected     Coronavirus OC43 Not Detected     COVID19 Not Detected     Human Metapneumovirus Not Detected     Human Rhinovirus/Enterovirus Not Detected     Influenza A PCR Not Detected     Influenza B PCR Not Detected     Parainfluenza Virus 1 Not Detected     Parainfluenza Virus 2 Not Detected     Parainfluenza Virus 3 Not Detected     Parainfluenza Virus 4 Not Detected     RSV, PCR Not Detected     Bordetella pertussis pcr Not Detected     Bordetella parapertussis PCR Not Detected     Chlamydophila pneumoniae PCR Not Detected     Mycoplasma pneumo by PCR Not Detected    Narrative:      In the setting of a positive respiratory panel with a viral infection PLUS a negative procalcitonin without other underlying concern for bacterial infection, consider observing off antibiotics or discontinuation of antibiotics and continue supportive care. If the respiratory panel is positive for atypical bacterial infection (Bordetella pertussis, Chlamydophila pneumoniae, or Mycoplasma pneumoniae), consider antibiotic de-escalation to target atypical bacterial infection.          Reported Vancomycin Levels  Results from last 7 days   Lab Units 05/09/23  0004    VANCOMYCIN RM mcg/mL 8.00              InsightRX AUC Calculation:    Current AUC: 300 mg/L*hr    Predicted Steady State AUC on Current Dose: 368 mg/L*hr  _________________________________    Predicted Steady State AUC on New Dose: 480 mg/L*hr    Assessment/Plan:  1. Pharmacy dosing vancomycin for sepsis, goal -600.  2. Patient currently on a maintenance dose of vancomycin 750mg IV q24h. Vancomycin level today prior to the 3rd total dose was 8 mcg/mL (~20 hour level) which is associated with a subtherapeutic steady state AUC level. Renal function has improved since admission. Will increase dose to vancomycin 1000mg IV q24h starting at 1600 today. Plan to repeat level in 2-3 days.  3. Pharmacy will continue to monitor and adjust vancomycin dose as necessary based on renal function, cultures, labs, and clinical status.    Thank you,  Gutierrez العراقي, PharmD, BCPS  5/9/2023  15:14 EDT

## 2023-05-09 NOTE — PROGRESS NOTES
"    [facility] Medicine Services  PROGRESS NOTE    Patient Name: Laura Wallace  : 1940  MRN: [id number]    Date of Admission: 2023  Primary Care Physician: Justin Brumfield MD    Subjective   Subjective     CC:  Follow-up fever    HPI:  Tmax 101.1 yesterday.   at bedside.  Patient more alert today.  Upon entering the room, patient smiled and greeted me saying \"Hi, I'm Ramonita Wallace\".  Patient has had multiple bowel movements and now stools appear clear from below after enemas.    ROS:  Unable to obtain given advanced dementia other than denying pain    Objective   Objective     Vital Signs:   Temp:  [98 °F (36.7 °C)-101.1 °F (38.4 °C)] 98.4 °F (36.9 °C)  Heart Rate:  [72-83] 72  Resp:  [16-18] 16  BP: (132-161)/(72-81) 132/72     Physical Exam:  Constitutional: No acute distress, awake, alert  HENT: NCAT, mucous membranes moist  Respiratory: Clear to auscultation bilaterally, respiratory effort normal   Cardiovascular: RRR, no murmurs, rubs, or gallops  Gastrointestinal: Hypoactive bowel sounds, soft, nontender, distended  Musculoskeletal: No bilateral ankle edema  Psychiatric: Appropriate affect, cooperative  Neurologic: Oriented to first name only, does not follow commands  Skin: No rashes    Results Reviewed:  LAB RESULTS:      Lab 23  0402 23  0607 23  0015 23  1744   WBC 8.34 10.13  --   --  9.77   HEMOGLOBIN 8.5* 9.1* 8.9*  --  8.3*   HEMATOCRIT 26.9* 28.6* 29.3*  --  26.4*   PLATELETS 275 302  --   --  256   NEUTROS ABS 5.49 6.61  --   --  7.49*   IMMATURE GRANS (ABS) 0.02 0.04  --   --  0.04   LYMPHS ABS 1.83 2.15  --   --  1.22   MONOS ABS 0.89 1.22*  --   --  0.98*   EOS ABS 0.10 0.09  --   --  0.02   MCV 83.5 83.6  --   --  83.8   PROCALCITONIN  --   --   --   --  0.37*   LACTATE  --   --   --  1.2 2.2*         Lab 23  0402 23  0607 23  1744   SODIUM 137 135* 133*   POTASSIUM 3.2* 3.9 3.5   CHLORIDE 102 " 101 99   CO2 23.0 22.0 22.0   ANION GAP 12.0 12.0 12.0   BUN 13 15 21   CREATININE 0.85 0.88 1.08*   EGFR 68.9 66.1 51.7*   GLUCOSE 87 92 106*   CALCIUM 8.7 8.9 8.5*   IONIZED CALCIUM  --  1.28  --    MAGNESIUM  --   --  2.1   TSH  --   --  4.460*         Lab 05/09/23  0402 05/07/23  1744   TOTAL PROTEIN 6.0 5.9*   ALBUMIN 3.5 3.2*   GLOBULIN 2.5 2.7   ALT (SGPT) 24 21   AST (SGOT) 42* 40*   BILIRUBIN 0.5 0.3   ALK PHOS 91 98         Lab 05/08/23  0607 05/08/23  0157 05/08/23  0015 05/07/23  1744   HSTROP T 70* 65* 67* 66*             Lab 05/08/23  0607 05/07/23  2000 05/07/23  1744   IRON  --   --  25*  25*   IRON SATURATION  --   --  6*   TIBC  --   --  417   TRANSFERRIN  --   --  280   FERRITIN  --   --  26.34   FOLATE  --  >20.00  --    VITAMIN B 12  --  >2,000*  --    ABO TYPING A  --  A   RH TYPING Positive  --  Positive   ANTIBODY SCREEN Negative  --   --          Brief Urine Lab Results  (Last result in the past 365 days)      Color   Clarity   Blood   Leuk Est   Nitrite   Protein   CREAT   Urine HCG        05/07/23 2218 Yellow   Turbid   Trace   Small (1+)   Positive   Trace                 Microbiology Results Abnormal     Procedure Component Value - Date/Time    Blood Culture ID, PCR - Blood, Hand, Left [[id number]]  (Normal) Collected: 05/07/23 1748    Lab Status: Final result Specimen: Blood from Hand, Left Updated: 05/08/23 2145     BCID, PCR Negative by BCID PCR. Culture to Follow.    Blood Culture - Blood, Arm, Right [[id number]]  (Normal) Collected: 05/07/23 1749    Lab Status: Preliminary result Specimen: Blood from Arm, Right Updated: 05/08/23 1831     Blood Culture No growth at 24 hours    COVID PRE-OP / PRE-PROCEDURE SCREENING ORDER (NO ISOLATION) - Swab, Nasopharynx [[id number]]  (Normal) Collected: 05/07/23 1741    Lab Status: Final result Specimen: Swab from Nasopharynx Updated: 05/07/23 3002    Narrative:      The following orders were created for panel order COVID PRE-OP / PRE-PROCEDURE  SCREENING ORDER (NO ISOLATION) - Swab, Nasopharynx.  Procedure                               Abnormality         Status                     ---------                               -----------         ------                     Respiratory Panel PCR w/...[[id number]]  Normal              Final result                 Please view results for these tests on the individual orders.    Respiratory Panel PCR w/COVID-19(SARS-CoV-2) JOSE/ALEJANDRA/SAKSHI/PAD/COR/MAD/BRYN In-House, NP Swab in UTM/VTM, 3-4 HR TAT - Swab, Nasopharynx [[id number]]  (Normal) Collected: 05/07/23 1741    Lab Status: Final result Specimen: Swab from Nasopharynx Updated: 05/07/23 7972     ADENOVIRUS, PCR Not Detected     Coronavirus 229E Not Detected     Coronavirus HKU1 Not Detected     Coronavirus NL63 Not Detected     Coronavirus OC43 Not Detected     COVID19 Not Detected     Human Metapneumovirus Not Detected     Human Rhinovirus/Enterovirus Not Detected     Influenza A PCR Not Detected     Influenza B PCR Not Detected     Parainfluenza Virus 1 Not Detected     Parainfluenza Virus 2 Not Detected     Parainfluenza Virus 3 Not Detected     Parainfluenza Virus 4 Not Detected     RSV, PCR Not Detected     Bordetella pertussis pcr Not Detected     Bordetella parapertussis PCR Not Detected     Chlamydophila pneumoniae PCR Not Detected     Mycoplasma pneumo by PCR Not Detected    Narrative:      In the setting of a positive respiratory panel with a viral infection PLUS a negative procalcitonin without other underlying concern for bacterial infection, consider observing off antibiotics or discontinuation of antibiotics and continue supportive care. If the respiratory panel is positive for atypical bacterial infection (Bordetella pertussis, Chlamydophila pneumoniae, or Mycoplasma pneumoniae), consider antibiotic de-escalation to target atypical bacterial infection.          Adult Transthoracic Echo Complete w/ Color, Spectral and Contrast if Necessary Per  Protocol    Result Date: 5/8/2023  •  Left ventricular systolic function is normal. Left ventricular ejection fraction appears to be 61 - 65%. •  Left ventricular diastolic function is consistent with (grade II w/high LAP) pseudonormalization. •  Left atrial volume is severely increased. •  The right atrial cavity is moderately  dilated. •  There is calcification of the aortic valve. •  Moderate tricuspid valve regurgitation is present. •  Estimated right ventricular systolic pressure from tricuspid regurgitation is mildly elevated (35-45 mmHg). Calculated right ventricular systolic pressure from tricuspid regurgitation is 41 mmHg. •  Mild pulmonary hypertension is present.     CT Head Without Contrast    Result Date: 5/8/2023  EXAMINATION: CT HEAD WO CONTRAST DATE: 5/8/2023 12:27 AM  HISTORY: Dementia COMPARISON: 4/13/2022. TECHNIQUE: Thin section noncontrast axial images were obtained through the head. Coronal reformatted images were then created. CT dose lowering techniques including automated exposure control, adjustment for patient size, and/or use of iterative reconstruction were used. FINDINGS: Ventricles and Extra-axial Spaces: Moderate ventriculomegaly, disproportionate to sulcal dilatation at the vertex. Parenchyma: Confluent areas of periventricular and subcortical white matter low-attenuation are present, similar to prior.   No CT evidence of an acute large-vessel territory infarct.   No mass effect or midline shift. Intracranial Hemorrhage: None. Bones/Extracranial soft tissues: No acute calvarial fracture. 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 Visualized Sinuses/Mastoids: Clear.      Impression:  1.  No acute intracranial abnormality. 2.  Moderate to severe ventriculomegaly, disproportionate to sulcal dilatation. Findings could be on the basis of normal pressure hydrocephalus versus central volume loss, and appear grossly similar to the prior exam. 3.  Moderate to severe white matter  hypodensities, which are grossly unchanged and could represent microvascular ischemic changes and/or transependymal CSF edema.  Electronically signed by:  Tha Feliciano M.D.  5/7/2023 11:29 PM Mountain Time    CT Angiogram Abdomen Pelvis    Result Date: 5/8/2023  Exam: CTA thorax, abdomen and pelvis with and without contrast Date: May 8, 2023 History: Sepsis, fever of unknown origin, chest pain, abdominal pain Comparison: September 23, 2022 Technique: Contiguous axial CT images obtained of the thorax, abdomen and pelvis initially without contrast, then following the uneventful intravenous administration of 100 mL Isovue-370 contrast. Additionally, sagittal, coronal and 3-D reformatted images were obtained. CT dose lowering techniques were used, to include: automated exposure control, adjustment for patient size, and or use of iterative reconstruction. Findings: CT thorax: Thoracic aorta: Noncontrast imaging fails to demonstrate evidence of an intramural hematoma. There are severe atherosclerotic changes throughout the thoracic aorta. There is no aneurysm or dissection. HEART: The heart is borderline in size. There are coronary artery calcifications. Pulmonary arteries: The pulmonary arteries are suboptimally characterized secondary to respiratory motion artifact. There is no visualized pulmonary embolus. Mediastinum: There is a low-density cystic mass in the left lobe of the thyroid measuring up to 2.2 cm. There is a small cyst in the right lobe of the thyroid. There is no pathologic mediastinal adenopathy. There is a very small hiatal hernia. Lung parenchyma: There is a background of emphysema. There is a 6 mm pulmonary nodule in the right lower lobe. There is an element of interlobular septal thickening. There are trace bilateral pleural effusions. CT ABDOMEN: Liver: The liver is mildly hypodense. Spleen: Normal. Pancreas: Normal. Adrenal glands: Normal. Gallbladder: The gallbladder is distended. There is a  large amount of layering debris within the gallbladder. Kidneys: There is renal cortical thinning. There are small bilateral renal cysts. Abdominal mesentery: There is no pathologic intra-abdominal mass. Bowel loops: There is a very large amount of retained rectal fecal debris. There is edematous wall thickening involving the rectum. There is a large amount of retained colonic fecal debris consistent with constipation. The appendix is not well delineated  on this study. There are no findings to suggest acute appendicitis. There is no small bowel obstruction. There is edematous rugal wall thickening of the stomach. The stomach is not well-distended. CT PELVIS: The genitourinary structures are intact. There are multiple nonenlarged lymph nodes in the left inguinal region. Abdominal aorta: There are severe atherosclerotic changes throughout the abdominal aorta extending into the iliac vasculature. Osseous structures: The osseous structures are markedly demineralized. There are chronic deformities involving the inferior pubic rami, right acetabulum and left pubic symphysis. There are moderate to severe degenerative changes involving both hips. There is posterior transpedicular fusion hardware extending from L1-L3. There is an age indeterminant compression fracture involving L4 with approximately 80% loss of the vertebral body height. There is a chronic deformity involving the proximal left humerus. There is heterotopic ossification around the left shoulder. There is a left hip joint effusion. There are bilateral breast prostheses. There is mild body wall edema.     Impression: 1. Large amount of layering debris within the gallbladder. The gallbladder is distended. Consider a follow-up right upper quadrant ultrasound for better characterization as determined clinically. 2. Very large amount of retained rectal fecal debris consistent with fecal impaction or obstipation. There is also edematous wall thickening involving  the rectum which could represent stercoral proctitis. Close clinical follow-up is recommended. There is  also constipation. 3. Severe atherosclerotic disease. 4. Coronary artery calcifications. 5. Emphysema. 6. Trace bilateral pleural effusions. There is also mild pulmonary edema. 7. Edematous rugal wall thickening of the stomach. Correlate for symptoms of gastritis. The need for a follow-up EGD for better characterization can be determined clinically. 8. There is an age indeterminant compression fracture involving L4 with approximately 80% loss of the vertebral body height. Correlation with point tenderness is recommended. 9. There is a 6 mm pulmonary nodule in the right lower lobe. Follow-up is suggested per Fleischner guidelines below. 10. Marked osteopenia. 11. There is a low-density cystic mass in the left lobe of the thyroid measuring up to 2.2 cm. A follow-up nonemergent thyroid ultrasound is recommended for better characterization. 12. No visualized pulmonary embolus. *FLEISCHNER SOCIETY GUIDELINES: Low risk patient: <6mm - Low Risk, no further f/u >6-8mm - low dose CT 6-12 mo, then 2nd f/u CT at 18-24mo >8mm - low dose CT 3,9,24mo OR PET/CT OR Biopsy High Risk Patient: <6mm - optional CT at 12 mo >6-8mm - low dose CT 6-12 mo, then 2nd f/u CT at 18-24mo >8mm - low dose CT at 3 mo, OR PET/CT OR Biopsy MULTIPLE NODULES: Low Risk Patient: <6mm - Low Risk, no further f/u >6-8mm - low dose CT 3-6 mo, then 2nd f/u CT at 18-24mo >8mm - low dose CT 3-6 mo, then 2nd f/u CT at 18-24mo High Risk Patient: <6mm - High risk, multiple nodules, optional CT at 12 mo >6-8mm - low dose CT 3-6 mo, then 2nd f/u CT at 18-24mo >8mm - low dose CT 3-6 mo, then 2nd f/u CT at 18-24mo Electronically signed by:  Ankit Tabor M.D.  5/8/2023 12:05 AM Mountain Time    US Gallbladder    Result Date: 5/8/2023  US GALLBLADDER Date of Exam: 5/8/2023 9:40 AM EDT Indication: abnl imaging. Comparison: CT angiogram May 8, 2023 Technique:  Grayscale and color Doppler ultrasound evaluation of the right upper quadrant was performed. Findings: Exam is limited due to patient condition with limited scanning window. Assessment of the liver was limited due to scanning window and bowel gas. No focal hepatic abnormality is identified. The portal vein and hepatic veins demonstrate normal directional flow. Assessment was limited. There appear to be some echogenic material within the gallbladder lumen suggesting biliary sludge and/or gallstones. No significant focal shadowing gallstone was visualized. The gallbladder wall measures 2 mm.  No pericholecystic fluid was seen.  The common bile duct measures 4 mm diameter. The right kidney measures 9.9 x 6 x 4.3 cm. Assessment of the right kidney was also limited. No definite stone, hydronephrosis, or significant focal renal lesion was identified. Pancreas was obscured by bowel gas. There is no significant ascites within the right upper quadrant.     Impression: 1.Limited evaluation due to patient condition and limited scanning window. Assessment of the right upper quadrant structures was significantly limited on this exam. 2.Echogenic material in the gallbladder lumen suggesting biliary sludge and/or gallstones. No definite sonographic findings to suggest acute cholecystitis or biliary ductal dilatation. 3.No other gross abnormality was seen in the right upper quadrant on this limited exam. Electronically Signed: Raheem Owens  5/8/2023 10:12 AM EDT  Workstation ID: [id number]    XR Chest 1 View    Result Date: 5/7/2023  XR CHEST 1 VW Date of Exam: 5/7/2023 5:55 PM EDT Indication: Acute febrile illness with altered mental status Comparison: 9/23/2022. Findings: There is diffuse interstitial prominence in the lungs. There is no pneumothorax, pleural effusion or focal airspace consolidation. The heart size is normal. Pulmonary vasculature is largely obscured by interstitial prominence. There is an old healed proximal left  humerus fracture. No acute osseous abnormalities.     Impression: Impression: 1. Chronic appearing interstitial lung disease without acute cardiopulmonary abnormality. Electronically Signed: Ethan Ila  5/7/2023 6:49 PM EDT  Workstation ID: [id number]    CT Angiogram Chest    Result Date: 5/8/2023  Exam: CTA thorax, abdomen and pelvis with and without contrast Date: May 8, 2023 History: Sepsis, fever of unknown origin, chest pain, abdominal pain Comparison: September 23, 2022 Technique: Contiguous axial CT images obtained of the thorax, abdomen and pelvis initially without contrast, then following the uneventful intravenous administration of 100 mL Isovue-370 contrast. Additionally, sagittal, coronal and 3-D reformatted images were obtained. CT dose lowering techniques were used, to include: automated exposure control, adjustment for patient size, and or use of iterative reconstruction. Findings: CT thorax: Thoracic aorta: Noncontrast imaging fails to demonstrate evidence of an intramural hematoma. There are severe atherosclerotic changes throughout the thoracic aorta. There is no aneurysm or dissection. HEART: The heart is borderline in size. There are coronary artery calcifications. Pulmonary arteries: The pulmonary arteries are suboptimally characterized secondary to respiratory motion artifact. There is no visualized pulmonary embolus. Mediastinum: There is a low-density cystic mass in the left lobe of the thyroid measuring up to 2.2 cm. There is a small cyst in the right lobe of the thyroid. There is no pathologic mediastinal adenopathy. There is a very small hiatal hernia. Lung parenchyma: There is a background of emphysema. There is a 6 mm pulmonary nodule in the right lower lobe. There is an element of interlobular septal thickening. There are trace bilateral pleural effusions. CT ABDOMEN: Liver: The liver is mildly hypodense. Spleen: Normal. Pancreas: Normal. Adrenal glands: Normal. Gallbladder: The  gallbladder is distended. There is a large amount of layering debris within the gallbladder. Kidneys: There is renal cortical thinning. There are small bilateral renal cysts. Abdominal mesentery: There is no pathologic intra-abdominal mass. Bowel loops: There is a very large amount of retained rectal fecal debris. There is edematous wall thickening involving the rectum. There is a large amount of retained colonic fecal debris consistent with constipation. The appendix is not well delineated  on this study. There are no findings to suggest acute appendicitis. There is no small bowel obstruction. There is edematous rugal wall thickening of the stomach. The stomach is not well-distended. CT PELVIS: The genitourinary structures are intact. There are multiple nonenlarged lymph nodes in the left inguinal region. Abdominal aorta: There are severe atherosclerotic changes throughout the abdominal aorta extending into the iliac vasculature. Osseous structures: The osseous structures are markedly demineralized. There are chronic deformities involving the inferior pubic rami, right acetabulum and left pubic symphysis. There are moderate to severe degenerative changes involving both hips. There is posterior transpedicular fusion hardware extending from L1-L3. There is an age indeterminant compression fracture involving L4 with approximately 80% loss of the vertebral body height. There is a chronic deformity involving the proximal left humerus. There is heterotopic ossification around the left shoulder. There is a left hip joint effusion. There is mild body wall edema.     Impression: 1. Large amount of layering debris within the gallbladder. The gallbladder is distended. Consider a follow-up right upper quadrant ultrasound for better characterization as determined clinically. 2. Very large amount of retained rectal fecal debris consistent with fecal impaction or obstipation. There is also edematous wall thickening involving the  rectum which could represent stercoral proctitis. Close clinical follow-up is recommended. There is  also constipation. 3. Severe atherosclerotic disease. 4. Coronary artery calcifications. 5. Emphysema. 6. Trace bilateral pleural effusions. There is also mild pulmonary edema. 7. Edematous rugal wall thickening of the stomach. Correlate for symptoms of gastritis. The need for a follow-up EGD for better characterization can be determined clinically. 8. There is an age indeterminant compression fracture involving L4 with approximately 80% loss of the vertebral body height. Correlation with point tenderness is recommended. 9. There is a 6 mm pulmonary nodule in the right lower lobe. Follow-up is suggested per Fleischner guidelines below. 10. Marked osteopenia. 11. There is a low-density cystic mass in the left lobe of the thyroid measuring up to 2.2 cm. A follow-up nonemergent thyroid ultrasound is recommended for better characterization. 12. No visualized pulmonary embolus. *FLEISCHNER SOCIETY GUIDELINES: Low risk patient: <6mm - Low Risk, no further f/u >6-8mm - low dose CT 6-12 mo, then 2nd f/u CT at 18-24mo >8mm - low dose CT 3,9,24mo OR PET/CT OR Biopsy High Risk Patient: <6mm - optional CT at 12 mo >6-8mm - low dose CT 6-12 mo, then 2nd f/u CT at 18-24mo >8mm - low dose CT at 3 mo, OR PET/CT OR Biopsy MULTIPLE NODULES: Low Risk Patient: <6mm - Low Risk, no further f/u >6-8mm - low dose CT 3-6 mo, then 2nd f/u CT at 18-24mo >8mm - low dose CT 3-6 mo, then 2nd f/u CT at 18-24mo High Risk Patient: <6mm - High risk, multiple nodules, optional CT at 12 mo >6-8mm - low dose CT 3-6 mo, then 2nd f/u CT at 18-24mo >8mm - low dose CT 3-6 mo, then 2nd f/u CT at 18-24mo Electronically signed by:  Ankit Tabor M.D.  5/7/2023 11:58 PM Mountain Time    XR Abdomen KUB    Result Date: 5/8/2023  XR ABDOMEN KUB Date of Exam: 5/8/2023 4:01 PM EDT Indication: reassess fecal burden Comparison: CT angiogram of the abdomen and pelvis  May 8, 2023 Findings: There is a suspected large amount of stool in the colon and rectum, possibly mildly decreased compared to the prior CT. There does not appear to be colonic dilatation. There are some gas distended small bowel loops without definite small bowel dilatation. No ectopic bowel gas is seen on this limited supine view. Fusion hardware is seen in the upper lumbar spine. Visualized lung bases appear grossly unremarkable.     Impression: Impression: Large amount of stool in the colon and rectum, possibly mildly decreased compared to the prior CT. Electronically Signed: Raheem Roman  5/8/2023 4:36 PM EDT  Workstation ID: [id number]      Results for orders placed during the hospital encounter of 05/07/23    Adult Transthoracic Echo Complete w/ Color, Spectral and Contrast if Necessary Per Protocol    Interpretation Summary  •  Left ventricular systolic function is normal. Left ventricular ejection fraction appears to be 61 - 65%.  •  Left ventricular diastolic function is consistent with (grade II w/high LAP) pseudonormalization.  •  Left atrial volume is severely increased.  •  The right atrial cavity is moderately  dilated.  •  There is calcification of the aortic valve.  •  Moderate tricuspid valve regurgitation is present.  •  Estimated right ventricular systolic pressure from tricuspid regurgitation is mildly elevated (35-45 mmHg). Calculated right ventricular systolic pressure from tricuspid regurgitation is 41 mmHg.  •  Mild pulmonary hypertension is present.      Current medications:  Scheduled Meds:Pharmacy Consult, , Does not apply, Once  apixaban, 2.5 mg, Oral, Q12H  donepezil, 5 mg, Oral, Nightly  FLUoxetine, 40 mg, Oral, Daily  folic acid, 1 mg, Oral, Daily  pantoprazole, 40 mg, Oral, BID AC  piperacillin-tazobactam, 3.375 g, Intravenous, Q8H  potassium chloride, 10 mEq, Intravenous, Q1H  risperiDONE, 0.25 mg, Oral, Daily  risperiDONE, 0.5 mg, Oral, Nightly  senna-docusate sodium, 2 tablet,  Oral, BID  sodium chloride, 10 mL, Intravenous, Q12H  sodium hypochlorite, , Topical, Q12H  vancomycin, 750 mg, Intravenous, Q24H      Continuous Infusions:lactated ringers, 100 mL/hr, Last Rate: 100 mL/hr (05/09/23 0041)  Pharmacy to dose vancomycin,       PRN Meds:.•  acetaminophen  •  cyclobenzaprine  •  Pharmacy to dose vancomycin  •  Potassium Replacement - Follow Nurse / BPA Driven Protocol  •  sodium chloride  •  sodium chloride  •  sodium chloride    Assessment & Plan   Assessment & Plan     Active Hospital Problems    Diagnosis  POA   • **Fever, unknown origin [R50.9]  Yes   • Pulmonary nodule [R91.1]  Unknown   • Constipation [K59.00]  Unknown   • Pleural effusion [J90]  Unknown   • Compression fracture of fourth lumbar vertebra [S32.040A]  Unknown   • Thyroid mass of unclear etiology [E07.89]  Unknown   • Fecal impaction [K56.41]  Unknown   • Sepsis [A41.9]  Unknown   • Elevated troponin [R77.8]  Yes   • Acute kidney injury [N17.9]  Yes   • Acute on chronic anemia [D64.9]  Yes   • Lactic acidosis [E87.20]  Yes   • Wound with tunneling [T14.8XXA]  Yes   • PAF (paroxysmal atrial fibrillation) [I48.0]  Yes   • HTN (hypertension) [I10]  Yes   • Acute UTI (urinary tract infection) [N39.0]  Yes   • Anxiety [F41.9]  Yes   • Pulmonary hypertension [I27.20]  Yes   • Late onset Alzheimer's disease without behavioral disturbance (HCC) [G30.1, F02.80]  Yes      Resolved Hospital Problems   No resolved problems to display.        Brief Hospital Course to date:  Laura Wallace is a 83 y.o. female w/ a hx of pulmonary hypertension, HTN, PAF on Eliquis, alzheimer's dementia, anxiety who presented to the ED w/ c/o diaphoresis.     This patient's problems and plans were partially entered by my partner and updated as appropriate by me 05/09/23.     **Sepsis, likely UTI, stercoral proctitis  **Tunneled chronic wound to lower back, grew Enterobacter cloacae complex on 4/18/23 ()  -Fever, tachycardia, elevated lactic acid,  elevated procalcitonin  reports episodes of diaphoresis x 1-2 months- worse past few days, source  -lactic acid 2.2; repeat 1.2, procal 0.37, WBC WNL   -CT chest, abdomen and pelvis completed  -CT head without acute findings  -blood cx 1 of 2 positive for gram negative bacilli --> now changed to bacillus species, likely contamination   -initial UA showed 4+ bacteria and 13-20 epi cells; repeat I/O cath specimen w/ same- 4+ bacteria and TNTC epi cells; urine culture with >100k lactose   -s/p IVF  -s/p Vanc and Zosyn given in ED; continue   -ID consulted  -symptom mgt  -WOC consulted  -ID consulted   -Constipation, stercoral proctitis noted on imaging.   -Abnormal gallbladder on imaging, gallbladder ultrasound negative for acute cholecystitis.    **Fecal impaction  **Stercoral proctitis  - Covering with antibiotics per above  - S/p Soapsuds enema every 4 hours  - Patient unable to take GoLytely; will trial TID MiraLAX; Doc senna 2 tabs twice daily  - If the above is unsuccessful, then mineral oil enema followed by lactulose enema  - Having frequent BMs, repeat KUB pending    **Acute kidney injury   -previous CR ~0.8-0.7  -On admission CR 1.08 w/ eGFR 51.7  -IVF per above  -UA c/w UTI w/ TNTC epi cells  -monitor     **Dysphagia  -SLP consulted; nectar-thickened liquids     **Acute/chronic anemia   -previous H/H 9.3/30.1 in 9/2022  -current Hgb 8.3  -fecal occult pending   -anemia panel pending   -pt on Eliquis; continued for now   -type/screened     **Elevated troponin   -EKG reviewed; repeat in am   -troponin 66; trend   -consider Cardiology consult pending trend      **PAF   -continue Eliquis   -EKG reviewed  -holding BB 2/2 PTA hypotension      **Pulmonary HTN   **Bilateral pleural effusions  -last ECHO 9/2022 w/ EF 60% and normal LVSF, RVSP of 38  -TTE for a.m.     **HTN   -pt initially hypotensive per EMS w/ BP 82/62  -s/p 500ml LR bolus and NS 1 liter bolus   -will hold routine Norvasc and  "lopressor for now      **Alzheimer's dementia   -continue Aricept, Risperidone  -bed alarm, fall precautions  -pt,ot and case mgt consult   -Discussed advanced dementia diagnosis with the patient's , he stated he wants her to be full code and he is not ready to discuss hospice as he does not feel she is \"there yet\"     **Anxiety   -continue routine Prozac   -listed as taking PRN Ativan BID- not reordered 2/2 increased AMS and hypotension, add if needed      **Pulmonary nodule  **Thyroid mass  - Incidental findings on imaging  - Defer to outpatient follow-up    Expected Discharge Location and Transportation: pending above  Expected Discharge   Expected Discharge Date: 5/12/2023; Expected Discharge Time:      DVT prophylaxis:  Medical DVT prophylaxis orders are present.          CODE STATUS:   Code Status and Medical Interventions:   Ordered at: 05/07/23 2224     Level Of Support Discussed With:    Health Care Surrogate     Code Status (Patient has no pulse and is not breathing):    CPR (Attempt to Resuscitate)     Medical Interventions (Patient has pulse or is breathing):    Full Support       Shannan Mratin MD  05/09/23      "

## 2023-05-09 NOTE — PLAN OF CARE
Goal Outcome Evaluation:    Problem: Adult Inpatient Plan of Care  Goal: Plan of Care Review  Recent Flowsheet Documentation  Taken 5/9/2023 0951 by Ryann Butcher OT  Plan of Care Reviewed With:   patient   family  Outcome Evaluation: OT eval completed. Pt presents with significant weakness, decreased command following, and decreased tolerance to upright position limiting participation in ADL's Per spouse, pt below baseline for self-care and fxl mobility. IP OT services warranted to support return to PLOF. Recommend discharge to SNF.

## 2023-05-09 NOTE — CONSULTS
INFECTIOUS DISEASE CONSULT/INITIAL HOSPITAL VISIT    Laura Wallace  1940  [id number]    Date of consult: 5/9/2023    Admit date: 5/7/2023    Requesting Provider: Dr. Martin  Evaluating physician: Hamzah Charlton MD  Reason for Consultation: deep back wound infection  Chief Complaint: diaphoresis      Subjective   History of present illness:  Laura Wallace is a  83 y.o.  Yr old female with a past medical history of hypertension, paroxysmal A. Fib on Eliquis, Alzheimer's dementia, and pulmonary hypertension who presented to the ER on 5/7 with complaints of diaphoresis. She has had intermittent episodes of diaphoresis for the last 1-2 months.  Since arrival, the patient has been febrile up to 101.1°F.  SPO2 has been mostly in the mid 90s on room air. Respiratory PCR panel was negative. Pro-calcitonin was mildly elevated at 0.37.  CPK level was 446. High sensitivity troponin was 66-->70. White blood cell count has remained normal. Hemoglobin is low at 8.5. potassium is slightly low at 3.2. CTA chest/abdomen/pelvis showed a large amount of layering debris within the gallbladder with a distended gallbladder.  There is a very large amount of rectal fecal debris consistent with fecal impaction or obstipation.  There is edematous wall thickening involving the rectum which could represent stercoral proctitis.  She had edematous rugal wall thickening of the stomach which could represent gastritis.  GI compression fracture at L4.  There is a 6 mm pulmonary nodule in the right lower lobe. Trace bilateral pleural effusions.  Has some background emphysema. There is a low density cystic mass in the left lobe of the thyroid measuring 2.2 cm. Gallbladder ultrasound showed echogenic material in the gallbladder lumen suggesting biliary sludge and/or gallstones that there is no definitive findings of acute cholecystitis or biliary ductal dilatation. CT head showed no acute intracranial abnormality. 1 out of 2 blood  cultures from 5/7 has bacillus species.  Urine culture has greater than 100,000 CFU per milliliter lactose  with final result pending. Repeat blood cultures from 5/8 are in progress. The patient does have a sacral spine pressure injury. She reportedly grew Enterobacter cloaca complex on 4/18/23 from her sacral spine wound.  The patient has been on IV vancomycin and Zosyn.    Past Medical History:   Diagnosis Date   • Anxiety 9/23/2022   • Late onset Alzheimer's disease without behavioral disturbance 5/25/2018   • Paroxysmal atrial fibrillation with rapid ventricular response 4/23/2022   • Pulmonary hypertension 9/20/2022       Past Surgical History:   Procedure Laterality Date   • APPENDECTOMY  1960   • VERTEBROPLASTY      L123, fusion       Pediatric History   Patient Parents   • Not on file     Other Topics Concern   • Not on file   Social History Narrative   • Not on file       family history includes Colon cancer in her brother; Heart attack in her father.    Allergies   Allergen Reactions   • Codeine Nausea And Vomiting       Immunization History   Administered Date(s) Administered   • COVID-19 (PFIZER) Purple Cap Monovalent 01/28/2021, 02/23/2021, 12/08/2021   • Flu Vaccine Split Quad 09/14/2016   • Pneumococcal Conjugate 13-Valent (PCV13) 04/24/2017       Medication:    Current Facility-Administered Medications:   •  ! Vancomycin trough level prior to 1800 dose 5/9 . Please do not administer dose until level is evaluated for potential dose adjustments, , Does not apply, Once, Gabino Ray, [facility]  •  acetaminophen (TYLENOL) tablet 650 mg, 650 mg, Oral, Q4H PRN, Lakisha Leslie APRN, 650 mg at 05/08/23 2130  •  apixaban (ELIQUIS) tablet 2.5 mg, 2.5 mg, Oral, Q12H, Lakisha Leslie APRN, 2.5 mg at 05/08/23 2131  •  cyclobenzaprine (FLEXERIL) tablet 5 mg, 5 mg, Oral, TID PRN, Shannan Martin MD  •  donepezil (ARICEPT) tablet 5 mg, 5 mg, Oral, Nightly, Lakisha Leslie APRN, 5 mg at 05/08/23 2131  •   FLUoxetine (PROzac) capsule 40 mg, 40 mg, Oral, Daily, Lakisha Leslie APRN, 40 mg at 05/08/23 0833  •  folic acid (FOLVITE) tablet 1 mg, 1 mg, Oral, Daily, Lakisha Leslie APRN, 1 mg at 05/08/23 0833  •  lactated ringers infusion, 100 mL/hr, Intravenous, Continuous, Lakisha Leslie APRN, Last Rate: 100 mL/hr at 05/09/23 0041, 100 mL/hr at 05/09/23 0041  •  pantoprazole (PROTONIX) EC tablet 40 mg, 40 mg, Oral, BID AC, Emeka, Gertrude G, DO, 40 mg at 05/08/23 1808  •  Pharmacy to dose vancomycin, , Does not apply, Continuous PRN, Lakisha Leslie APRN  •  piperacillin-tazobactam (ZOSYN) 3.375 g in iso-osmotic dextrose 50 ml (premix), 3.375 g, Intravenous, Q8H, Lakisha Leslie APRN, Last Rate: 0 mL/hr at 05/08/23 1112, 3.375 g at 05/09/23 0041  •  potassium chloride 10 mEq in 100 mL IVPB, 10 mEq, Intravenous, Q1H, Lakisha Leslie APRN, Last Rate: 100 mL/hr at 05/09/23 0625, 10 mEq at 05/09/23 0625  •  Potassium Replacement - Follow Nurse / BPA Driven Protocol, , Does not apply, PRN, Lakisha Leslie APRN  •  risperiDONE (risperDAL) tablet 0.25 mg, 0.25 mg, Oral, Daily, Lakisha Leslie APRN, 0.25 mg at 05/08/23 1055  •  risperiDONE (risperDAL) tablet 0.5 mg, 0.5 mg, Oral, Nightly, Lakisha Leslie APRN, 0.5 mg at 05/08/23 2130  •  sennosides-docusate (PERICOLACE) 8.6-50 MG per tablet 2 tablet, 2 tablet, Oral, BID, Shannan Martin MD, 2 tablet at 05/08/23 2130  •  sodium chloride 0.9 % flush 10 mL, 10 mL, Intravenous, PRN, Chapincito Gauthier MD  •  sodium chloride 0.9 % flush 10 mL, 10 mL, Intravenous, Q12H, Lakisha Leslie APRN, 10 mL at 05/08/23 2131  •  sodium chloride 0.9 % flush 10 mL, 10 mL, Intravenous, PRN, Lakisha Leslie APRN  •  sodium chloride 0.9 % infusion 40 mL, 40 mL, Intravenous, PRN, Lakisha Leslie APRN  •  sodium hypochlorite (DAKIN'S 1/4 STRENGTH) 0.125 % topical solution 0.125% solution, , Topical, Q12H, Shannan Martin MD, Given at 05/09/23 0454  •  vancomycin in dextrose 5% 150 mL (VANCOCIN) IVPB 750 mg,  "750 mg, Intravenous, Q24H, Gabino Ray, [facility], 750 mg at 05/08/23 1808    Please refer to the medical record for a full medication list    Review of Systems:    Unable to obtain an accurate 12 point review of systems in the setting of the patient's underlying Alzheimer's dementia.    Physical Exam:   Vital Signs   Temp:  [98 °F (36.7 °C)-101.1 °F (38.4 °C)] 98 °F (36.7 °C)  Heart Rate:  [72] 72  Resp:  [16-18] 16  BP: (132-161)/(50-81) 136/50    Temp  Min: 98 °F (36.7 °C)  Max: 101.1 °F (38.4 °C)  BP  Min: 132/72  Max: 161/81  Pulse  Min: 72  Max: 72  Resp  Min: 16  Max: 18  SpO2  Min: 96 %  Max: 96 %    Blood pressure 136/50, pulse 72, temperature 98 °F (36.7 °C), temperature source Axillary, resp. rate 16, height 154.9 cm (61\"), weight 44.1 kg (97 lb 3.2 oz), SpO2 96 %.  GENERAL: Awake and alert, in no acute distress. Appears stated age.  Frail  HEENT:  Normocephalic, atraumatic.  Oropharynx without thrush. No external oral lesions noted. Ears externally normal, Nose externally normal.  EYES: pupils equal and round.  No conjunctival injection.  No scale or icterus  HEART: RRR, no murmur  LUNGS: Clear to auscultation and percussion. No respiratory distress, no use of accessory muscles.  ABDOMEN: Soft, nontender, nondistended. No appreciable HSM.  Decreased bowel sounds  GENITAL: no Contreras catheter  SKIN: no generalized rashes.  No peripheral stigmata of infective endocarditis noted. Small ~0.5 cm x 0.5 cm wound down to at least fat layer with no obvious exposed bone in wound care picture over sacral region.  Has surrounding pinkish discoloration/mild erythema.  No active drainage noted in the wound care picture.  EXT:  No cellulitic change.  NEURO: awake alert. Has baseline dementia and is a very poor historian and not oriented. Unable to answer any questions appropriately    Results Review:   I reviewed the patient's new clinical results.    Results from last 7 days   Lab Units 05/09/23  0402 05/08/23  0607 " 05/08/23  0015 05/07/23  1744   WBC 10*3/mm3 8.34 10.13  --  9.77   HEMOGLOBIN g/dL 8.5* 9.1* 8.9* 8.3*   HEMATOCRIT % 26.9* 28.6* 29.3* 26.4*   PLATELETS 10*3/mm3 275 302  --  256     Results from last 7 days   Lab Units 05/09/23  0402   SODIUM mmol/L 137   POTASSIUM mmol/L 3.2*   CHLORIDE mmol/L 102   CO2 mmol/L 23.0   BUN mg/dL 13   CREATININE mg/dL 0.85   GLUCOSE mg/dL 87   CALCIUM mg/dL 8.7     Results from last 7 days   Lab Units 05/09/23  0402   ALK PHOS U/L 91   BILIRUBIN mg/dL 0.5   ALT (SGPT) U/L 24   AST (SGOT) U/L 42*                 Results from last 7 days   Lab Units 05/07/23  2051   LACTATE mmol/L 1.2     Estimated Creatinine Clearance: 36.1 mL/min (by C-G formula based on SCr of 0.85 mg/dL).  CPK        5/8/2023    06:07   Common Labsle   Creatine Kinase 477        Procalitonin Results:      Lab 05/07/23  1744   PROCALCITONIN 0.37*      Brief Urine Lab Results  (Last result in the past 365 days)      Color   Clarity   Blood   Leuk Est   Nitrite   Protein   CREAT   Urine HCG        05/07/23 2218 Yellow   Turbid   Trace   Small (1+)   Positive   Trace                No results found for: SITE, ALLENTEST, PHART, HTG3AKU, PO2ART, VST4JRS, BASEEXCESS, G7SQEFMN, HGBBG, HCTABG, OXYHEMOGLOBI, METHHGBN, CARBOXYHGB, CO2CT, BAROMETRIC, MODALITY, FIO2     Microbiology:  Blood Culture   Date Value Ref Range Status   05/07/2023 No growth at 24 hours  Preliminary     BCID, PCR   Date Value Ref Range Status   05/07/2023 Negative by BCID PCR. Culture to Follow. Negative by BCID PCR. Culture to Follow. Final     Urine Culture   Date Value Ref Range Status   05/07/2023 >100,000 CFU/mL Lactose  (A)  Preliminary     No results found for: VIRALCULTU, WOUNDCX     Blood Culture   Date Value Ref Range Status   05/07/2023 No growth at 24 hours  Preliminary     Urine Culture   Date Value Ref Range Status   05/07/2023 >100,000 CFU/mL Lactose  (A)  Preliminary     BCID, PCR   Date Value Ref Range Status    05/07/2023 Negative by BCID PCR. Culture to Follow. Negative by BCID PCR. Culture to Follow. Final   (      Radiology:  Imaging Results (Last 72 Hours)     Procedure Component Value Units Date/Time    XR Abdomen KUB [[id number]] Collected: 05/08/23 1634     Updated: 05/08/23 1639    Narrative:      XR ABDOMEN KUB    Date of Exam: 5/8/2023 4:01 PM EDT    Indication: reassess fecal burden    Comparison: CT angiogram of the abdomen and pelvis May 8, 2023    Findings:  There is a suspected large amount of stool in the colon and rectum, possibly mildly decreased compared to the prior CT. There does not appear to be colonic dilatation. There are some gas distended small bowel loops without definite small bowel   dilatation. No ectopic bowel gas is seen on this limited supine view. Fusion hardware is seen in the upper lumbar spine. Visualized lung bases appear grossly unremarkable.      Impression:      Impression:  Large amount of stool in the colon and rectum, possibly mildly decreased compared to the prior CT.      Electronically Signed: Raheem Owens    5/8/2023 4:36 PM EDT    Workstation ID: [id number]    US Gallbladder [[id number]] Collected: 05/08/23 1008     Updated: 05/08/23 1016    Narrative:      US GALLBLADDER    Date of Exam: 5/8/2023 9:40 AM EDT    Indication: abnl imaging.    Comparison: CT angiogram May 8, 2023    Technique: Grayscale and color Doppler ultrasound evaluation of the right upper quadrant was performed.      Findings:  Exam is limited due to patient condition with limited scanning window. Assessment of the liver was limited due to scanning window and bowel gas. No focal hepatic abnormality is identified. The portal vein and hepatic veins demonstrate normal directional   flow.    Assessment was limited. There appear to be some echogenic material within the gallbladder lumen suggesting biliary sludge and/or gallstones. No significant focal shadowing gallstone was visualized. The gallbladder wall  measures 2 mm.  No pericholecystic   fluid was seen.  The common bile duct measures 4 mm diameter.    The right kidney measures 9.9 x 6 x 4.3 cm. Assessment of the right kidney was also limited. No definite stone, hydronephrosis, or significant focal renal lesion was identified.    Pancreas was obscured by bowel gas.    There is no significant ascites within the right upper quadrant.      Impression:      1.Limited evaluation due to patient condition and limited scanning window. Assessment of the right upper quadrant structures was significantly limited on this exam.  2.Echogenic material in the gallbladder lumen suggesting biliary sludge and/or gallstones. No definite sonographic findings to suggest acute cholecystitis or biliary ductal dilatation.  3.No other gross abnormality was seen in the right upper quadrant on this limited exam.      Electronically Signed: Raheem Owens    5/8/2023 10:12 AM EDT    Workstation ID: [id number]    CT Angiogram Abdomen Pelvis [[id number]] Collected: 05/08/23 0004     Updated: 05/08/23 0206    Narrative:      Exam: CTA thorax, abdomen and pelvis with and without contrast    Date: May 8, 2023    History: Sepsis, fever of unknown origin, chest pain, abdominal pain    Comparison: September 23, 2022    Technique: Contiguous axial CT images obtained of the thorax, abdomen and pelvis initially without contrast, then following the uneventful intravenous administration of 100 mL Isovue-370 contrast. Additionally, sagittal, coronal and 3-D reformatted   images were obtained. CT dose lowering techniques were used, to include: automated exposure control, adjustment for patient size, and or use of iterative reconstruction.    Findings:    CT thorax:  Thoracic aorta: Noncontrast imaging fails to demonstrate evidence of an intramural hematoma. There are severe atherosclerotic changes throughout the thoracic aorta. There is no aneurysm or dissection.    HEART: The heart is borderline in size. There  are coronary artery calcifications.    Pulmonary arteries: The pulmonary arteries are suboptimally characterized secondary to respiratory motion artifact. There is no visualized pulmonary embolus.    Mediastinum: There is a low-density cystic mass in the left lobe of the thyroid measuring up to 2.2 cm. There is a small cyst in the right lobe of the thyroid. There is no pathologic mediastinal adenopathy. There is a very small hiatal hernia.    Lung parenchyma: There is a background of emphysema. There is a 6 mm pulmonary nodule in the right lower lobe. There is an element of interlobular septal thickening. There are trace bilateral pleural effusions.    CT ABDOMEN:  Liver: The liver is mildly hypodense.    Spleen: Normal.    Pancreas: Normal.    Adrenal glands: Normal.    Gallbladder: The gallbladder is distended. There is a large amount of layering debris within the gallbladder.    Kidneys: There is renal cortical thinning. There are small bilateral renal cysts.    Abdominal mesentery: There is no pathologic intra-abdominal mass.    Bowel loops: There is a very large amount of retained rectal fecal debris. There is edematous wall thickening involving the rectum. There is a large amount of retained colonic fecal debris consistent with constipation. The appendix is not well delineated   on this study. There are no findings to suggest acute appendicitis. There is no small bowel obstruction. There is edematous rugal wall thickening of the stomach. The stomach is not well-distended.    CT PELVIS:  The genitourinary structures are intact. There are multiple nonenlarged lymph nodes in the left inguinal region.    Abdominal aorta: There are severe atherosclerotic changes throughout the abdominal aorta extending into the iliac vasculature.    Osseous structures: The osseous structures are markedly demineralized. There are chronic deformities involving the inferior pubic rami, right acetabulum and left pubic symphysis. There  are moderate to severe degenerative changes involving both hips.   There is posterior transpedicular fusion hardware extending from L1-L3. There is an age indeterminant compression fracture involving L4 with approximately 80% loss of the vertebral body height. There is a chronic deformity involving the proximal left   humerus. There is heterotopic ossification around the left shoulder. There is a left hip joint effusion. There are bilateral breast prostheses. There is mild body wall edema.      Impression:      1. Large amount of layering debris within the gallbladder. The gallbladder is distended. Consider a follow-up right upper quadrant ultrasound for better characterization as determined clinically.  2. Very large amount of retained rectal fecal debris consistent with fecal impaction or obstipation. There is also edematous wall thickening involving the rectum which could represent stercoral proctitis. Close clinical follow-up is recommended. There is   also constipation.  3. Severe atherosclerotic disease.  4. Coronary artery calcifications.  5. Emphysema.  6. Trace bilateral pleural effusions. There is also mild pulmonary edema.  7. Edematous rugal wall thickening of the stomach. Correlate for symptoms of gastritis. The need for a follow-up EGD for better characterization can be determined clinically.  8. There is an age indeterminant compression fracture involving L4 with approximately 80% loss of the vertebral body height. Correlation with point tenderness is recommended.  9. There is a 6 mm pulmonary nodule in the right lower lobe. Follow-up is suggested per Fleischner guidelines below.  10. Marked osteopenia.  11. There is a low-density cystic mass in the left lobe of the thyroid measuring up to 2.2 cm. A follow-up nonemergent thyroid ultrasound is recommended for better characterization.  12. No visualized pulmonary embolus.      *FLEISCHNER SOCIETY GUIDELINES:  Low risk patient:  <6mm - Low Risk, no  further f/u  >6-8mm - low dose CT 6-12 mo, then 2nd f/u CT at 18-24mo  >8mm - low dose CT 3,9,24mo OR PET/CT OR Biopsy    High Risk Patient:  <6mm - optional CT at 12 mo  >6-8mm - low dose CT 6-12 mo, then 2nd f/u CT at 18-24mo  >8mm - low dose CT at 3 mo, OR PET/CT OR Biopsy    MULTIPLE NODULES:  Low Risk Patient:  <6mm - Low Risk, no further f/u  >6-8mm - low dose CT 3-6 mo, then 2nd f/u CT at 18-24mo  >8mm - low dose CT 3-6 mo, then 2nd f/u CT at 18-24mo    High Risk Patient:  <6mm - High risk, multiple nodules, optional CT at 12 mo  >6-8mm - low dose CT 3-6 mo, then 2nd f/u CT at 18-24mo  >8mm - low dose CT 3-6 mo, then 2nd f/u CT at 18-24mo        Electronically signed by:  Ankit Tabor M.D.    5/8/2023 12:05 AM Mountain Time    CT Angiogram Chest [[id number]] Collected: 05/07/23 2340     Updated: 05/08/23 0159    Narrative:      Exam: CTA thorax, abdomen and pelvis with and without contrast    Date: May 8, 2023    History: Sepsis, fever of unknown origin, chest pain, abdominal pain    Comparison: September 23, 2022    Technique: Contiguous axial CT images obtained of the thorax, abdomen and pelvis initially without contrast, then following the uneventful intravenous administration of 100 mL Isovue-370 contrast. Additionally, sagittal, coronal and 3-D reformatted   images were obtained. CT dose lowering techniques were used, to include: automated exposure control, adjustment for patient size, and or use of iterative reconstruction.    Findings:    CT thorax:  Thoracic aorta: Noncontrast imaging fails to demonstrate evidence of an intramural hematoma. There are severe atherosclerotic changes throughout the thoracic aorta. There is no aneurysm or dissection.    HEART: The heart is borderline in size. There are coronary artery calcifications.    Pulmonary arteries: The pulmonary arteries are suboptimally characterized secondary to respiratory motion artifact. There is no visualized pulmonary  embolus.    Mediastinum: There is a low-density cystic mass in the left lobe of the thyroid measuring up to 2.2 cm. There is a small cyst in the right lobe of the thyroid. There is no pathologic mediastinal adenopathy. There is a very small hiatal hernia.    Lung parenchyma: There is a background of emphysema. There is a 6 mm pulmonary nodule in the right lower lobe. There is an element of interlobular septal thickening. There are trace bilateral pleural effusions.    CT ABDOMEN:  Liver: The liver is mildly hypodense.    Spleen: Normal.    Pancreas: Normal.    Adrenal glands: Normal.    Gallbladder: The gallbladder is distended. There is a large amount of layering debris within the gallbladder.    Kidneys: There is renal cortical thinning. There are small bilateral renal cysts.    Abdominal mesentery: There is no pathologic intra-abdominal mass.    Bowel loops: There is a very large amount of retained rectal fecal debris. There is edematous wall thickening involving the rectum. There is a large amount of retained colonic fecal debris consistent with constipation. The appendix is not well delineated   on this study. There are no findings to suggest acute appendicitis. There is no small bowel obstruction. There is edematous rugal wall thickening of the stomach. The stomach is not well-distended.    CT PELVIS:  The genitourinary structures are intact. There are multiple nonenlarged lymph nodes in the left inguinal region.    Abdominal aorta: There are severe atherosclerotic changes throughout the abdominal aorta extending into the iliac vasculature.    Osseous structures: The osseous structures are markedly demineralized. There are chronic deformities involving the inferior pubic rami, right acetabulum and left pubic symphysis. There are moderate to severe degenerative changes involving both hips.   There is posterior transpedicular fusion hardware extending from L1-L3. There is an age indeterminant compression  fracture involving L4 with approximately 80% loss of the vertebral body height. There is a chronic deformity involving the proximal left   humerus. There is heterotopic ossification around the left shoulder. There is a left hip joint effusion. There is mild body wall edema.      Impression:      1. Large amount of layering debris within the gallbladder. The gallbladder is distended. Consider a follow-up right upper quadrant ultrasound for better characterization as determined clinically.  2. Very large amount of retained rectal fecal debris consistent with fecal impaction or obstipation. There is also edematous wall thickening involving the rectum which could represent stercoral proctitis. Close clinical follow-up is recommended. There is   also constipation.  3. Severe atherosclerotic disease.  4. Coronary artery calcifications.  5. Emphysema.  6. Trace bilateral pleural effusions. There is also mild pulmonary edema.  7. Edematous rugal wall thickening of the stomach. Correlate for symptoms of gastritis. The need for a follow-up EGD for better characterization can be determined clinically.  8. There is an age indeterminant compression fracture involving L4 with approximately 80% loss of the vertebral body height. Correlation with point tenderness is recommended.  9. There is a 6 mm pulmonary nodule in the right lower lobe. Follow-up is suggested per Fleischner guidelines below.  10. Marked osteopenia.  11. There is a low-density cystic mass in the left lobe of the thyroid measuring up to 2.2 cm. A follow-up nonemergent thyroid ultrasound is recommended for better characterization.  12. No visualized pulmonary embolus.      *FLEISCHNER SOCIETY GUIDELINES:  Low risk patient:  <6mm - Low Risk, no further f/u  >6-8mm - low dose CT 6-12 mo, then 2nd f/u CT at 18-24mo  >8mm - low dose CT 3,9,24mo OR PET/CT OR Biopsy    High Risk Patient:  <6mm - optional CT at 12 mo  >6-8mm - low dose CT 6-12 mo, then 2nd f/u CT at  18-24mo  >8mm - low dose CT at 3 mo, OR PET/CT OR Biopsy    MULTIPLE NODULES:  Low Risk Patient:  <6mm - Low Risk, no further f/u  >6-8mm - low dose CT 3-6 mo, then 2nd f/u CT at 18-24mo  >8mm - low dose CT 3-6 mo, then 2nd f/u CT at 18-24mo    High Risk Patient:  <6mm - High risk, multiple nodules, optional CT at 12 mo  >6-8mm - low dose CT 3-6 mo, then 2nd f/u CT at 18-24mo  >8mm - low dose CT 3-6 mo, then 2nd f/u CT at 18-24mo                  Electronically signed by:  Ankit Tabor M.D.    5/7/2023 11:58 PM Mountain Time    CT Head Without Contrast [[id number]] Collected: 05/07/23 2326     Updated: 05/08/23 0131    Narrative:      EXAMINATION: CT HEAD WO CONTRAST    DATE: 5/8/2023 12:27 AM     HISTORY: Dementia     COMPARISON: 4/13/2022.    TECHNIQUE: Thin section noncontrast axial images were obtained through the head. Coronal reformatted images were then created. CT dose lowering techniques including automated exposure control, adjustment for patient size, and/or use of iterative   reconstruction were used.       FINDINGS:      Ventricles and Extra-axial Spaces: Moderate ventriculomegaly, disproportionate to sulcal dilatation at the vertex.     Parenchyma: Confluent areas of periventricular and subcortical white matter low-attenuation are present, similar to prior.   No CT evidence of an acute large-vessel territory infarct.   No mass effect or midline shift.     Intracranial Hemorrhage: None.     Bones/Extracranial soft tissues: No acute calvarial fracture. 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    Visualized Sinuses/Mastoids: Clear.          Impression:         1.  No acute intracranial abnormality.  2.  Moderate to severe ventriculomegaly, disproportionate to sulcal dilatation. Findings could be on the basis of normal pressure hydrocephalus versus central volume loss, and appear grossly similar to the prior exam.  3.  Moderate to severe white matter hypodensities, which are grossly unchanged and  could represent microvascular ischemic changes and/or transependymal CSF edema.               Electronically signed by:  Tha Feliciano M.D.    5/7/2023 11:29 PM Mountain Time    XR Chest 1 View [[id number]] Collected: 05/07/23 1848     Updated: 05/07/23 1852    Narrative:      XR CHEST 1 VW    Date of Exam: 5/7/2023 5:55 PM EDT    Indication: Acute febrile illness with altered mental status    Comparison: 9/23/2022.    Findings:  There is diffuse interstitial prominence in the lungs. There is no pneumothorax, pleural effusion or focal airspace consolidation. The heart size is normal. Pulmonary vasculature is largely obscured by interstitial prominence. There is an old healed   proximal left humerus fracture. No acute osseous abnormalities.      Impression:      Impression:    1. Chronic appearing interstitial lung disease without acute cardiopulmonary abnormality.      Electronically Signed: Ethan Ila    5/7/2023 6:49 PM EDT    Workstation ID: [id number]      I independently read the patient's CTA chest-he does have small pulmonary nodule and some underlying emphysema but no consolidation.    IMPRESSION:     Problems:  1. Fevers-likely secondary to urinary tract infection.  Sacral decubitus ulcer could be contributing. Also has a stercoral proctitis  2. Urinary tract infection-now with E. Coli that was susceptible to ceftriaxone  3. Bacillus species in 1 out of 2 blood culture sets from admission.-This represents a contaminated blood culture  4. Sacral decubitus ulcer-has a prior history of Enterobacter cloaca complex on 4/18/23 on superficial wound culture done at .  Isolate was susceptible to Zosyn, cefepime, quinolones, etc. Her ulcer does not appear significantly infected based on her wound care picture  5. Mild hypokalemia  6. Fecal impaction/stercoral proctitis  7. Mild acute kidney injury-creatinine was elevated to 1.08 on arrival but has now improved to 0.85.  Likely prerenal  8. Dysphagia  9. Acute on  chronic normocytic anemia  10. Elevated high sensitivity troponin level  11. Paroxysmal A. Fib, on Eliquis  12. Pulmonary hypertension  13. Small bilateral pleural effusions  14. Alzheimer's dementia  15. Small pulmonary nodule-needs to be followed on an outpatient basis  16. 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 Following her repeat blood cultures as well but her initial blood culture results represent a contaminanted culture.    Thank you for asking me to see Laura Wallace.  Our group would be pleased to follow this patient over the course of their hospitalization and assist with outpatient antimicrobial therapy, as indicated.  Further recommendations depend on the results of the cultures and clinical course.    Complex set of medical issues    Hamzah Charlton MD  5/9/2023

## 2023-05-09 NOTE — THERAPY EVALUATION
Patient Name: Laura Wallace  : 1940    MRN: [id number]                              Today's Date: 2023       Admit Date: 2023    Visit Dx:     ICD-10-CM ICD-9-CM   1. Acute febrile illness  R50.9 780.60   2. Hypotension, unspecified hypotension type  I95.9 458.9   3. Altered mental status, unspecified altered mental status type  R41.82 780.97   4. Dysphagia, unspecified type  R13.10 787.20     Patient Active Problem List   Diagnosis   • Late onset Alzheimer's disease without behavioral disturbance (HCC)   • Multiple closed fractures of pelvis without disruption of pelvic ring, initial encounter   • Pneumonia   • Paroxysmal atrial fibrillation with rapid ventricular response   • Acute respiratory failure with hypoxia   • Pulmonary hypertension   • Anxiety   • Mild cognitive disorder   • Fever, unknown origin   • Elevated troponin   • Acute kidney injury   • Acute on chronic anemia   • Lactic acidosis   • Wound with tunneling   • PAF (paroxysmal atrial fibrillation)   • HTN (hypertension)   • Acute UTI (urinary tract infection)   • Pulmonary nodule   • Constipation   • Pleural effusion   • Compression fracture of fourth lumbar vertebra   • Thyroid mass of unclear etiology   • Fecal impaction   • Sepsis     Past Medical History:   Diagnosis Date   • Anxiety 2022   • Late onset Alzheimer's disease without behavioral disturbance 2018   • Paroxysmal atrial fibrillation with rapid ventricular response 2022   • Pulmonary hypertension 2022     Past Surgical History:   Procedure Laterality Date   • APPENDECTOMY     • VERTEBROPLASTY      L123, fusion      General Information     Row Name 23 0932          OT Time and Intention    Document Type evaluation  -BRIANNE     Mode of Treatment occupational therapy  -BRIANNE     Row Name 23 0932          General Information    Patient Profile Reviewed yes  -BRIANNE     Prior Level of Function mod assist:;feeding;bed mobility;transfer;max  assist:;grooming;dependent:;bathing;dressing;all household mobility;w/c or scooter  Per spouse, pt transfers to w/c daily with assist of 1; incontinent of bladder/bowel  -BRIANNE     Existing Precautions/Restrictions fall;other (see comments)  hx Alzheimer's; minimally verbal  -BRIANNE     Barriers to Rehab medically complex;previous functional deficit;cognitive status;contractures  -BRIANNE     Row Name 05/09/23 0932          Occupational Profile    Environmental Supports and Barriers (Occupational Profile) Lives with spouse in multi-level home; hospital bed set up on main floor; has 24/7 caregivers; spends day out of bed in w/c or seated on sofa; has meals sitting at kitchen table  -BRIANNE     Row Name 05/09/23 0932          Living Environment    People in Home spouse;other (see comments)  24/7 caregivers  -BRIANNE     Row Name 05/09/23 0932          Home Main Entrance    Number of Stairs, Main Entrance none;other (see comments)  Ramp  -BRIANNE     Row Name 05/09/23 0932          Stairs Within Home, Primary    Stairs, Within Home, Primary all needs met on first floor  -BRIANNE     Row Name 05/09/23 0932          Cognition    Orientation Status (Cognition) oriented to;person;disoriented to;place;situation;time;other (see comments)  Responds to her name giving eye contact; minimally verbal  -BRIANNE     Row Name 05/09/23 0932          Safety Issues, Functional Mobility    Impairments Affecting Function (Mobility) balance;cognition;coordination;endurance/activity tolerance;grasp;motor control;motor planning;muscle tone abnormal;postural/trunk control;range of motion (ROM);strength  -BRIANNE     Cognitive Impairments, Mobility Safety/Performance awareness, need for assistance;insight into deficits/self-awareness;judgment;problem-solving/reasoning;safety precaution awareness;safety precaution follow-through;sequencing abilities  -BRIANNE           User Key  (r) = Recorded By, (t) = Taken By, (c) = Cosigned By    Initials Name Provider Type    Ryann Hale, OT  Occupational Therapist                 Mobility/ADL's     Row Name 05/09/23 0940          Bed Mobility    Bed Mobility rolling left;rolling right;supine-sit;sit-supine  -BRIANNE     Rolling Left [place] (Bed Mobility) dependent (less than 25% patient effort);2 person assist;verbal cues;nonverbal cues (demo/gesture)  -BRIANNE     Rolling Right [place] (Bed Mobility) dependent (less than 25% patient effort);2 person assist;verbal cues;nonverbal cues (demo/gesture)  -BRIANNE     Supine-Sit [place] (Bed Mobility) dependent (less than 25% patient effort);2 person assist;verbal cues;nonverbal cues (demo/gesture)  -BRIANNE     Sit-Supine [place] (Bed Mobility) dependent (less than 25% patient effort);2 person assist;verbal cues;nonverbal cues (demo/gesture)  -BRIANNE     Bed Mobility, Safety Issues cognitive deficits limit understanding;decreased use of arms for pushing/pulling;decreased use of legs for bridging/pushing;impaired trunk control for bed mobility  -BRIANNE     Assistive Device (Bed Mobility) bed rails;draw sheet;head of bed elevated  -BRIANNE     Comment, (Bed Mobility) Pt agreeable to attempt sitting EOB; Depx2 for sitting balance demonstrating severe posterior lean, BLE's extended, unable to maintain  bed rail to her R; returned to supine  -BRIANNE     Row Name 05/09/23 0940          Transfers    Comment, (Transfers) Transfer chair not attempted d/t bowel incontinence, post enema  -BRIANNE     Row Name 05/09/23 0940          Functional Mobility    Functional Mobility- Comment Pt is non-ambulatory at baseline  -     Row Name 05/09/23 0940          Activities of Daily Living    BADL Assessment/Intervention lower body dressing;grooming;feeding;toileting  -BRIANNE     Row Name 05/09/23 0940          Lower Body Dressing Assessment/Training    [place] Level (Lower Body Dressing) don;socks;dependent (less than 25% patient effort)  -BRIANNE     Position (Lower Body Dressing) supine  -BRIANNE     Row Name 05/09/23 0940          Grooming  Assessment/Training    [place] Level (Grooming) wash face, hands;dependent (less than 25% patient effort)  -BRIANNE     Position (Grooming) sitting up in bed  -BRIANNE     Comment, (Grooming) declined oral care d/t breakfast tray arriving  -BRIANNE     Row Name 05/09/23 0940          Self-Feeding Assessment/Training    [place] Level (Feeding) prepare tray/open items;liquids to mouth;scoop food and bring to mouth;dependent (less than 25% patient effort)  -BRIANNE     Assistive Devices (Feeding) adapted cup  -BRIANNE     Position (Self-Feeding) sitting up in bed  -BRIANNE     Comment, (Feeding) adapted cup issued and filled with thin liquid; family member educated on patient's dietary requirements  -BRIANNE     Row Name 05/09/23 0940          Toileting Assessment/Training    [place] Level (Toileting) perform perineal hygiene;dependent (less than 25% patient effort)  -BRIANNE     Position (Toileting) supine  -BRIANNE     Comment, (Toileting) following incontinent BM  -BRIANNE           User Key  (r) = Recorded By, (t) = Taken By, (c) = Cosigned By    Initials Name Provider Type    Ryann Hale OT Occupational Therapist               Obj/Interventions     Row Name 05/09/23 0947          Sensory Assessment (Somatosensory)    Sensory Assessment (Somatosensory) unable/difficult to assess  -BRIANNE     Row Name 05/09/23 0947          Vision Assessment/Intervention    Visual Impairment/Limitations unable/difficult to assess  -BRIANNE     Row Name 05/09/23 0947          Range of Motion Comprehensive    Comment, General Range of Motion To be further assessed; pt able to follow commands to elevate BUE's 90 degrees for gown change in supine  -BRIANNE     Row Name 05/09/23 0947          Strength Comprehensive (MMT)    Comment, General Manual Muscle Testing (MMT) Assessment BUE's grossly 3+/5  -BRIANNE     Row Name 05/09/23 0947          Balance    Balance Assessment sitting static balance  -BRIANNE     Static Sitting Balance dependent;2-person assist  -BRIANNE     Position, Sitting  Balance unsupported;sitting edge of bed  -BRIANNE     Balance Interventions sitting  -BRIANNE     Comment, Balance Pt unable to sit unsupported at EOB; demonstrated severe posterior lean, unable to follow commands to bend knees and maintain grasp on bed rail.  -BRIANNE           User Key  (r) = Recorded By, (t) = Taken By, (c) = Cosigned By    Initials Name Provider Type    Ryann Hale, OT Occupational Therapist               Goals/Plan     Row Name 05/09/23 1047          Bed Mobility Goal 1 (OT)    Activity/Assistive Device (Bed Mobility Goal 1, OT) supine to sit  -BRIANNE     [place] Level/Cues Needed (Bed Mobility Goal 1, OT) moderate assist (50-74% patient effort);verbal cues required;nonverbal cues (demo/gesture) required  -BRIANNE     Time Frame (Bed Mobility Goal 1, OT) long term goal (LTG);by discharge  -BRIANNE     Progress/Outcomes (Bed Mobility Goal 1, OT) new goal;goal ongoing  -BRIANNE     Row Name 05/09/23 1047          Transfer Goal 1 (OT)    Activity/Assistive Device (Transfer Goal 1, OT) wheelchair transfer  -BRIANNE     [place] Level/Cues Needed (Transfer Goal 1, OT) maximum assist (25-49% patient effort)  -BRIANNE     Time Frame (Transfer Goal 1, OT) long term goal (LTG);by discharge  -BRIANNE     Progress/Outcome (Transfer Goal 1, OT) new goal;goal ongoing  -BRIANNE     Row Name 05/09/23 1047          Self-Feeding Goal 1 (OT)    Activity/Device (Self-Feeding Goal 1, OT) adapted cup  -BRIANNE     [place] Level/Cues Needed (Self-Feeding Goal 1, OT) moderate assist (50-74% patient effort)  -BRIANNE     Time Frame (Self-Feeding Goal 1, OT) long term goal (LTG);by discharge  -BRIANNE     Progress/Outcomes (Self-Feeding Goal 1, OT) new goal;goal ongoing  -BRIANNE     Row Name 05/09/23 1047          Therapy Assessment/Plan (OT)    Planned Therapy Interventions (OT) activity tolerance training;adaptive equipment training;BADL retraining;functional balance retraining;occupation/activity based interventions;patient/caregiver education/training;ROM/therapeutic  exercise;strengthening exercise;transfer/mobility retraining  -           User Key  (r) = Recorded By, (t) = Taken By, (c) = Cosigned By    Initials Name Provider Type    Ryann Hale, SWATHI Occupational Therapist               Clinical Impression     Row Name 05/09/23 0951          Pain Scale: FACES Pre/Post-Treatment    Pain: FACES Scale, Pretreatment 0-->no hurt  -BRIANNE     Posttreatment Pain Rating 0-->no hurt  -BRIANNE     Row Name 05/09/23 0951          Plan of Care Review    Plan of Care Reviewed With patient;family  -     Outcome Evaluation OT eval completed. Pt presents with significant weakness, decreased command following, and decreased tolerance to upright position limiting participation in ADL's Per spouse, pt below baseline for self-care and fxl mobility. IP OT services warranted to support return to PLOF. Recommend discharge to SNF.  -     Row Name 05/09/23 0951          Therapy Assessment/Plan (OT)    Rehab Potential (OT) fair, will monitor progress closely  -     Criteria for Skilled Therapeutic Interventions Met (OT) yes;meets criteria;skilled treatment is necessary  -     Therapy Frequency (OT) daily  -     Row Name 05/09/23 0951          Vital Signs    Pre Systolic BP Rehab 136  -BRIANNE     Pre Treatment Diastolic BP 50  -BRIANNE     Pretreatment Heart Rate (beats/min) 62  -BRIANNE     Pre SpO2 (%) 95  -BRIANNE     O2 Delivery Pre Treatment room air  -BRIANNE     O2 Delivery Intra Treatment room air  -BRIANNE     Post SpO2 (%) 94  -BRIANNE     O2 Delivery Post Treatment room air  -BRIANNE     Pre Patient Position Supine  -BRIANNE     Intra Patient Position Sitting  -BRIANNE     Post Patient Position Supine  -BRIANNE     Row Name 05/09/23 0951          Positioning and Restraints    Pre-Treatment Position in bed  -BRIANNE     Post Treatment Position bed  -BRIANNE     In Bed notified nsg;fowlers;call light within reach;encouraged to call for assist;exit alarm on;with family/caregiver;side rails up x3  -BRIANNE           User Key  (r) = Recorded By, (t) = Taken  By, (c) = Cosigned By    Initials Name Provider Type    Ryann Hale OT Occupational Therapist               Outcome Measures     Row Name 05/09/23 1048          How much help from another is currently needed...    Putting on and taking off regular lower body clothing? 1  -BRIANNE     Bathing (including washing, rinsing, and drying) 1  -BRIANNE     Toileting (which includes using toilet bed pan or urinal) 1  -BRIANNE     Putting on and taking off regular upper body clothing 1  -BRIANNE     Taking care of personal grooming (such as brushing teeth) 1  -BRIANNE     Eating meals 1  -BRIANNE     AM-PAC 6 Clicks Score (OT) 6  -BRIANNE     Row Name 05/09/23 1048          Functional Assessment    Outcome Measure Options AM-PAC 6 Clicks Daily Activity (OT)  -BRIANNE           User Key  (r) = Recorded By, (t) = Taken By, (c) = Cosigned By    Initials Name Provider Type    Ryann Hale OT Occupational Therapist                Occupational Therapy Education     Title: PT OT SLP Therapies (In Progress)     Topic: Occupational Therapy (In Progress)     Point: ADL training (Done)     Description:   Instruct learner(s) on proper safety adaptation and remediation techniques during self care or transfers.   Instruct in proper use of assistive devices.              Learning Progress Summary           Patient Acceptance, E, VU by BRIANNE at 5/9/2023 1049    Comment: OT POC; goal setting; discharge planning   Family Acceptance, E, VU by BRIANNE at 5/9/2023 1049    Comment: OT POC; goal setting; discharge planning                   Point: Home exercise program (Not Started)     Description:   Instruct learner(s) on appropriate technique for monitoring, assisting and/or progressing therapeutic exercises/activities.              Learner Progress:  Not documented in this visit.          Point: Precautions (Done)     Description:   Instruct learner(s) on prescribed precautions during self-care and functional transfers.              Learning Progress Summary           Patient  Acceptance, E, VU by  at 5/9/2023 1049    Comment: OT POC; goal setting; discharge planning   Family Acceptance, E, VU by  at 5/9/2023 1049    Comment: OT POC; goal setting; discharge planning                   Point: Body mechanics (Not Started)     Description:   Instruct learner(s) on proper positioning and spine alignment during self-care, functional mobility activities and/or exercises.              Learner Progress:  Not documented in this visit.                      User Key     Initials Effective Dates Name Provider Type Discipline     06/16/21 -  Ryann Butcher OT Occupational Therapist OT              OT Recommendation and Plan  Planned Therapy Interventions (OT): activity tolerance training, adaptive equipment training, BADL retraining, functional balance retraining, occupation/activity based interventions, patient/caregiver education/training, ROM/therapeutic exercise, strengthening exercise, transfer/mobility retraining  Therapy Frequency (OT): daily  Plan of Care Review  Plan of Care Reviewed With: patient, family  Outcome Evaluation: OT eval completed. Pt presents with significant weakness, decreased command following, and decreased tolerance to upright position limiting participation in ADL's Per spouse, pt below baseline for self-care and fxl mobility. IP OT services warranted to support return to PLOF. Recommend discharge to SNF.     Time Calculation:    Time Calculation- OT     Row Name 05/09/23 0825             Time Calculation- OT    OT Start Time 0825  -BRIANNE      OT Received On 05/09/23  -BRIANNE      OT Goal Re-Cert Due Date 05/19/23  -BRIANNE         Timed Charges    32591 - OT Self Care/Mgmt Minutes 15  -BRIANNE         Untimed Charges    OT Eval/Re-eval Minutes 47  -BRIANNE         Total Minutes    Timed Charges Total Minutes 15  -BRIANNE      Untimed Charges Total Minutes 47  -BRIANNE       Total Minutes 62  -BRIANNE            User Key  (r) = Recorded By, (t) = Taken By, (c) = Cosigned By    Initials Name Provider Type     BRIANNE Ryann Butcher OT Occupational Therapist              Therapy Charges for Today     Code Description Service Date Service Provider Modifiers Qty    35100795818 HC OT SELF CARE/MGMT/TRAIN EA 15 MIN 5/9/2023 Ryann Butcher OT GO 1    01450901548 HC OT EVAL MOD COMPLEXITY 4 5/9/2023 Ryann Butcher OT GO 1               Ryann Butcher OT  5/9/2023

## 2023-05-09 NOTE — THERAPY RE-EVALUATION
Acute Care - Speech Language Pathology   Swallow Re-Evaluation [facility]   Clinical Swallow Evaluation       Patient Name: Laura Wallace  : 1940  MRN: [id number]  Today's Date: 2023               Admit Date: 2023    Visit Dx:     ICD-10-CM ICD-9-CM   1. Acute febrile illness  R50.9 780.60   2. Hypotension, unspecified hypotension type  I95.9 458.9   3. Altered mental status, unspecified altered mental status type  R41.82 780.97   4. Dysphagia, unspecified type  R13.10 787.20     Patient Active Problem List   Diagnosis   • Late onset Alzheimer's disease without behavioral disturbance (HCC)   • Multiple closed fractures of pelvis without disruption of pelvic ring, initial encounter   • Pneumonia   • Paroxysmal atrial fibrillation with rapid ventricular response   • Acute respiratory failure with hypoxia   • Pulmonary hypertension   • Anxiety   • Mild cognitive disorder   • Fever, unknown origin   • Elevated troponin   • Acute kidney injury   • Acute on chronic anemia   • Lactic acidosis   • Wound with tunneling   • PAF (paroxysmal atrial fibrillation)   • HTN (hypertension)   • Acute UTI (urinary tract infection)   • Pulmonary nodule   • Constipation   • Pleural effusion   • Compression fracture of fourth lumbar vertebra   • Thyroid mass of unclear etiology   • Fecal impaction   • Sepsis     Past Medical History:   Diagnosis Date   • Anxiety 2022   • Late onset Alzheimer's disease without behavioral disturbance 2018   • Paroxysmal atrial fibrillation with rapid ventricular response 2022   • Pulmonary hypertension 2022     Past Surgical History:   Procedure Laterality Date   • APPENDECTOMY  1960   • VERTEBROPLASTY      L123, fusion       SLP Recommendation and Plan  SLP Swallowing Diagnosis: mild, oral dysphagia, suspected pharyngeal dysphagia (23 1130)  SLP Diet Recommendation: regular textures, nectar thick liquids, water between meals after oral care, with supervision  (05/09/23 1130)  Recommended Precautions and Strategies: general aspiration precautions, assist with feeding (05/09/23 1130)  SLP Rec. for Method of Medication Administration: meds crushed, with puree (05/09/23 1130)     Monitor for Signs of Aspiration: notify SLP if any concerns (05/09/23 1130)     Swallow Criteria for Skilled Therapeutic Interventions Met: demonstrates skilled criteria (05/09/23 1130)  Anticipated Discharge Disposition (SLP): home with / care (05/09/23 1130)  Rehab Potential/Prognosis, Swallowing: re-evaluate goals as necessary (05/09/23 1130)  Therapy Frequency (Swallow): 5 days per week (05/09/23 1130)  Predicted Duration Therapy Intervention (Days): until discharge (05/09/23 1130)                                        Plan of Care Reviewed With: patient, spouse      SWALLOW EVALUATION (last 72 hours)     SLP Adult Swallow Evaluation     Row Name 05/09/23 1130 05/08/23 1130                Rehab Evaluation    Document Type re-evaluation  -DV evaluation  -SM       Subjective Information no complaints  -DV no complaints  -SM       Patient Observations alert;cooperative  -DV alert;cooperative  -SM       Patient/Family/Caregiver Comments/Observations  present  -DV --       Patient Effort good  -DV --       Symptoms Noted During/After Treatment none  -DV --          General Information    Patient Profile Reviewed yes  -DV yes  -SM       Pertinent History Of Current Problem see initial ev al  -DV Adm with diaphoresis, fever. Sepsis/UTI. Dementia with 24 hour caregivers. Consulted as difficulty taking pills.  -SM       Current Method of Nutrition regular textures;nectar/syrup-thick liquids  -DV regular textures;thin liquids  -SM       Precautions/Limitations, Vision WFL;for purposes of eval  -DV --       Precautions/Limitations, Hearing WFL;for purposes of eval  -DV --       Prior Level of Function-Communication cognitive-linguistic impairment;other (see comments)  hx dementia  -DV --        Prior Level of Function-Swallowing safe, efficient swallowing in all situations  meds crushed in puree at baseline  -DV safe, efficient swallowing in all situations  meds crushed in puree at baseline  -SM       Plans/Goals Discussed with patient;spouse/S.O.;agreed upon  -DV patient;spouse/S.O.;agreed upon  -SM       Barriers to Rehab cognitive status;previous functional deficit  -DV cognitive status;previous functional deficit  -SM       Patient's Goals for Discharge patient did not state  -DV patient did not state  -SM       Family Goals for Discharge family did not state  -DV other (see comments)  balance of safety and quality  -SM          Pain    Additional Documentation Pain Scale: FACES Pre/Post-Treatment (Group)  -DV Pain Scale: FACES Pre/Post-Treatment (Group)  -SM          Pain Scale: FACES Pre/Post-Treatment    Pain: FACES Scale, Pretreatment 0-->no hurt  -DV 0-->no hurt  -SM       Posttreatment Pain Rating 0-->no hurt  -DV 0-->no hurt  -SM          Oral Motor Structure and Function    Dentition Assessment natural, present and adequate  -DV --       Secretion Management WNL/WFL  -DV --       Mucosal Quality moist, healthy  -DV --          Oral Musculature and Cranial Nerve Assessment    Oral Motor General Assessment generalized oral motor weakness;unable to assess  -DV generalized oral motor weakness;unable to assess  -          General Eating/Swallowing Observations    Positioning During Eating upright 90 degree  -DV --       Utensils Used spoon;straw;cup  -DV --       Consistencies Trialed regular textures;pureed;thin liquids;nectar/syrup-thick liquids  -DV --          Respiratory    Respiratory Status WFL  -DV --          Clinical Swallow Eval    Oral Prep Phase WFL  -DV WFL  -SM       Oral Transit WFL  -DV WFL  -SM       Oral Residue WFL  -DV WFL  -SM       Pharyngeal Phase suspected pharyngeal impairment  -DV suspected pharyngeal impairment  -       Clinical Swallow Evaluation Summary Cough w/  thin liquid. No s/sx aspiration with trials of nectar-thick liquid, pudding, or regular solid.  -DV --          Pharyngeal Phase Concerns    Pharyngeal Phase Concerns cough  -DV cough  -SM       Cough thin  -DV thin  -SM       Pharyngeal Phase Concerns, Comment -- Inconsistent delayed cough throughout. Pt implusive and taking large sips. Also taking in lots of air with swallow with lots of subsequent burping. Pt's  reports no hx dysphagia aside from pills. Does acknowledge with current increased weakness. GOC/POC discussion with pt's . He does not wish to pursue MBS though is agreeable for temporary adjustment to nectar-thick liquids to lessen risk aspiration. Wishes to balance safety with QOL. Recommendation to reflect full discussion.  -          SLP Evaluation Clinical Impression    SLP Swallowing Diagnosis mild;oral dysphagia;suspected pharyngeal dysphagia  - mild;oral dysphagia;suspected pharyngeal dysphagia  -       Functional Impact risk of aspiration/pneumonia;risk of dehydration  - risk of aspiration/pneumonia;risk of dehydration  -       Rehab Potential/Prognosis, Swallowing re-evaluate goals as necessary  -DV --       Swallow Criteria for Skilled Therapeutic Interventions Met demonstrates skilled criteria  -DV --          Recommendations    Therapy Frequency (Swallow) 5 days per week  -DV --       Predicted Duration Therapy Intervention (Days) until discharge  -DV --       SLP Diet Recommendation regular textures;nectar thick liquids;water between meals after oral care, with supervision  - regular textures;nectar thick liquids;water between meals after oral care, with supervision  -       Recommended Diagnostics -- reassess via clinical swallow evaluation  -       Recommended Precautions and Strategies general aspiration precautions;assist with feeding  - general aspiration precautions;assist with feeding  -       Oral Care Recommendations Oral Care BID/PRN;Toothbrush   -DV Oral Care BID/PRN;Toothbrush  -       SLP Rec. for Method of Medication Administration meds crushed;with puree  -DV meds crushed;with puree  -SM       Monitor for Signs of Aspiration notify SLP if any concerns  -DV notify SLP if any concerns  -       Anticipated Discharge Disposition (SLP) home with 24/7 care  -DV home with 24/7 care  -SM             User Key  (r) = Recorded By, (t) = Taken By, (c) = Cosigned By    Initials Name Effective Dates    Jennifer Babb, MS CCC-SLP 02/03/23 -     DV Brittanie Grider MS CCC-SLP 06/16/21 -                 EDUCATION  The patient has been educated in the following areas:   Dysphagia (Swallowing Impairment) Modified Diet Instruction.        SLP GOALS     Row Name 05/09/23 1130             (LTG) Patient will demonstrate functional swallow for    Diet Texture (Demonstrate functional swallow) regular textures  -DV      Liquid viscosity (Demonstrate functional swallow) thin liquids  -DV      [place] (Demonstrate functional swallow) with minimal cues (75-90% accuracy)  -DV      Time Frame (Demonstrate functional swallow) by discharge  -DV      Progress/Outcomes (Demonstrate functional swallow) new goal  -DV         (STG) Patient will tolerate trials of    Consistencies Trialed (Tolerate trials) regular textures;nectar/ mildly thick liquids  -DV      Desired Outcome (Tolerate trials) without signs/symptoms of aspiration;with adequate oral prep/transit/clearance  -DV      [place] (Tolerate trials) independently (over 90% accuracy)  -DV      Time Frame (Tolerate trials) 1 week  -DV      Progress/Outcomes (Tolerate trials) new goal  -DV         (STG) Patient will tolerate therapeutic trials of    Consistencies Trialed (Tolerate therapeutic trials) thin liquids  -DV      Desired Outcome (Tolerate therapeutic trials) without signs/symptoms of aspiration  -DV      [place] (Tolerate therapeutic trials) with minimal cues (75-90% accuracy)  -DV       Progress/Outcomes (Tolerate therapeutic trials) new goal  -DV            User Key  (r) = Recorded By, (t) = Taken By, (c) = Cosigned By    Initials Name Provider Type    Brittanie Oseguera MS CCC-SLP Speech and Language Pathologist                   Time Calculation:    Time Calculation- SLP     Row Name 05/09/23 1426             Time Calculation- SLP    SLP Start Time 1130  -DV      SLP Received On 05/09/23  -DV         Untimed Charges    SLP Eval/Re-eval  ST Eval Oral Pharyng Swallow - 14581  -DV      02622-DY Eval Oral Pharyng Swallow Minutes 45  -DV         Total Minutes    Untimed Charges Total Minutes 45  -DV       Total Minutes 45  -DV            User Key  (r) = Recorded By, (t) = Taken By, (c) = Cosigned By    Initials Name Provider Type    Brittanie Oseguera MS CCC-SLP Speech and Language Pathologist                Therapy Charges for Today     Code Description Service Date Service Provider Modifiers Qty    [id number]  ST EVAL ORAL PHARYNG SWALLOW 3 5/9/2023 Brittanie Grider MS CCC-SLP GN 1               MS LIBAN Yang  5/9/2023

## 2023-05-10 ENCOUNTER — APPOINTMENT (OUTPATIENT)
Dept: GENERAL RADIOLOGY | Facility: HOSPITAL | Age: 83
DRG: 871 | End: 2023-05-10
Payer: MEDICARE

## 2023-05-10 PROBLEM — E43 SEVERE MALNUTRITION: Status: ACTIVE | Noted: 2023-05-10

## 2023-05-10 LAB
ANION GAP SERPL CALCULATED.3IONS-SCNC: 11 MMOL/L (ref 5–15)
BASOPHILS # BLD AUTO: 0.02 10*3/MM3 (ref 0–0.2)
BASOPHILS NFR BLD AUTO: 0.3 % (ref 0–1.5)
BUN SERPL-MCNC: 6 MG/DL (ref 8–23)
BUN/CREAT SERPL: 8.8 (ref 7–25)
CALCIUM SPEC-SCNC: 8.5 MG/DL (ref 8.6–10.5)
CHLORIDE SERPL-SCNC: 107 MMOL/L (ref 98–107)
CO2 SERPL-SCNC: 22 MMOL/L (ref 22–29)
CREAT SERPL-MCNC: 0.68 MG/DL (ref 0.57–1)
DEPRECATED RDW RBC AUTO: 49.1 FL (ref 37–54)
EGFRCR SERPLBLD CKD-EPI 2021: 87.6 ML/MIN/1.73
EOSINOPHIL # BLD AUTO: 0.12 10*3/MM3 (ref 0–0.4)
EOSINOPHIL NFR BLD AUTO: 1.8 % (ref 0.3–6.2)
ERYTHROCYTE [DISTWIDTH] IN BLOOD BY AUTOMATED COUNT: 15.3 % (ref 12.3–15.4)
GLUCOSE SERPL-MCNC: 82 MG/DL (ref 65–99)
HCT VFR BLD AUTO: 28 % (ref 34–46.6)
HGB BLD-MCNC: 8.4 G/DL (ref 12–15.9)
IMM GRANULOCYTES # BLD AUTO: 0.02 10*3/MM3 (ref 0–0.05)
IMM GRANULOCYTES NFR BLD AUTO: 0.3 % (ref 0–0.5)
LYMPHOCYTES # BLD AUTO: 1.47 10*3/MM3 (ref 0.7–3.1)
LYMPHOCYTES NFR BLD AUTO: 22.1 % (ref 19.6–45.3)
MCH RBC QN AUTO: 26.3 PG (ref 26.6–33)
MCHC RBC AUTO-ENTMCNC: 30 G/DL (ref 31.5–35.7)
MCV RBC AUTO: 87.8 FL (ref 79–97)
MONOCYTES # BLD AUTO: 0.76 10*3/MM3 (ref 0.1–0.9)
MONOCYTES NFR BLD AUTO: 11.4 % (ref 5–12)
NEUTROPHILS NFR BLD AUTO: 4.26 10*3/MM3 (ref 1.7–7)
NEUTROPHILS NFR BLD AUTO: 64.1 % (ref 42.7–76)
NRBC BLD AUTO-RTO: 0 /100 WBC (ref 0–0.2)
PLATELET # BLD AUTO: 226 10*3/MM3 (ref 140–450)
PMV BLD AUTO: 9 FL (ref 6–12)
POTASSIUM SERPL-SCNC: 3.9 MMOL/L (ref 3.5–5.2)
POTASSIUM SERPL-SCNC: 4.5 MMOL/L (ref 3.5–5.2)
RBC # BLD AUTO: 3.19 10*6/MM3 (ref 3.77–5.28)
SODIUM SERPL-SCNC: 140 MMOL/L (ref 136–145)
WBC NRBC COR # BLD: 6.65 10*3/MM3 (ref 3.4–10.8)

## 2023-05-10 PROCEDURE — 84132 ASSAY OF SERUM POTASSIUM: CPT | Performed by: STUDENT IN AN ORGANIZED HEALTH CARE EDUCATION/TRAINING PROGRAM

## 2023-05-10 PROCEDURE — 85025 COMPLETE CBC W/AUTO DIFF WBC: CPT | Performed by: STUDENT IN AN ORGANIZED HEALTH CARE EDUCATION/TRAINING PROGRAM

## 2023-05-10 PROCEDURE — 25010000002 CEFTRIAXONE PER 250 MG: Performed by: INTERNAL MEDICINE

## 2023-05-10 PROCEDURE — 99232 SBSQ HOSP IP/OBS MODERATE 35: CPT | Performed by: STUDENT IN AN ORGANIZED HEALTH CARE EDUCATION/TRAINING PROGRAM

## 2023-05-10 PROCEDURE — 80048 BASIC METABOLIC PNL TOTAL CA: CPT | Performed by: STUDENT IN AN ORGANIZED HEALTH CARE EDUCATION/TRAINING PROGRAM

## 2023-05-10 PROCEDURE — 74018 RADEX ABDOMEN 1 VIEW: CPT

## 2023-05-10 RX ORDER — AMLODIPINE BESYLATE 10 MG/1
10 TABLET ORAL
Status: DISCONTINUED | OUTPATIENT
Start: 2023-05-10 | End: 2023-05-12 | Stop reason: HOSPADM

## 2023-05-10 RX ORDER — LACTULOSE 10 G/15ML
300 SOLUTION ORAL ONCE
Status: COMPLETED | OUTPATIENT
Start: 2023-05-10 | End: 2023-05-10

## 2023-05-10 RX ORDER — MINERAL OIL 100 G/100G
1 OIL RECTAL ONCE
Status: COMPLETED | OUTPATIENT
Start: 2023-05-10 | End: 2023-05-10

## 2023-05-10 RX ADMIN — SENNOSIDES AND DOCUSATE SODIUM 2 TABLET: 8.6; 5 TABLET ORAL at 10:11

## 2023-05-10 RX ADMIN — METOPROLOL TARTRATE 25 MG: 25 TABLET, FILM COATED ORAL at 22:04

## 2023-05-10 RX ADMIN — SODIUM CHLORIDE, POTASSIUM CHLORIDE, SODIUM LACTATE AND CALCIUM CHLORIDE 100 ML/HR: 600; 310; 30; 20 INJECTION, SOLUTION INTRAVENOUS at 05:22

## 2023-05-10 RX ADMIN — FOLIC ACID 1 MG: 1 TABLET ORAL at 10:13

## 2023-05-10 RX ADMIN — SENNOSIDES AND DOCUSATE SODIUM 2 TABLET: 8.6; 5 TABLET ORAL at 22:05

## 2023-05-10 RX ADMIN — CEFTRIAXONE 2 G: 2 INJECTION, POWDER, FOR SOLUTION INTRAMUSCULAR; INTRAVENOUS at 17:36

## 2023-05-10 RX ADMIN — SODIUM HYPOCHLORITE: 1.25 SOLUTION TOPICAL at 14:42

## 2023-05-10 RX ADMIN — AMLODIPINE BESYLATE 10 MG: 10 TABLET ORAL at 10:17

## 2023-05-10 RX ADMIN — POLYETHYLENE GLYCOL 3350 17 G: 17 POWDER, FOR SOLUTION ORAL at 22:18

## 2023-05-10 RX ADMIN — APIXABAN 2.5 MG: 2.5 TABLET, FILM COATED ORAL at 10:13

## 2023-05-10 RX ADMIN — POLYETHYLENE GLYCOL 3350 17 G: 17 POWDER, FOR SOLUTION ORAL at 17:36

## 2023-05-10 RX ADMIN — PANTOPRAZOLE SODIUM 40 MG: 40 TABLET, DELAYED RELEASE ORAL at 17:36

## 2023-05-10 RX ADMIN — POLYETHYLENE GLYCOL 3350 17 G: 17 POWDER, FOR SOLUTION ORAL at 10:14

## 2023-05-10 RX ADMIN — SODIUM CHLORIDE, POTASSIUM CHLORIDE, SODIUM LACTATE AND CALCIUM CHLORIDE 100 ML/HR: 600; 310; 30; 20 INJECTION, SOLUTION INTRAVENOUS at 17:36

## 2023-05-10 RX ADMIN — MINERAL OIL 1 ENEMA: 100 ENEMA RECTAL at 14:42

## 2023-05-10 RX ADMIN — METOPROLOL TARTRATE 25 MG: 25 TABLET, FILM COATED ORAL at 10:17

## 2023-05-10 RX ADMIN — DONEPEZIL HYDROCHLORIDE 5 MG: 5 TABLET, FILM COATED ORAL at 22:03

## 2023-05-10 RX ADMIN — Medication 10 ML: at 22:05

## 2023-05-10 RX ADMIN — RISPERIDONE 0.5 MG: 0.25 TABLET, FILM COATED ORAL at 22:04

## 2023-05-10 RX ADMIN — LACTULOSE 300 ML: 10 SOLUTION ORAL at 14:42

## 2023-05-10 RX ADMIN — FLUOXETINE 40 MG: 20 CAPSULE ORAL at 10:11

## 2023-05-10 RX ADMIN — POTASSIUM CHLORIDE 40 MEQ: 10 CAPSULE, COATED, EXTENDED RELEASE ORAL at 03:34

## 2023-05-10 RX ADMIN — APIXABAN 2.5 MG: 2.5 TABLET, FILM COATED ORAL at 10:02

## 2023-05-10 RX ADMIN — RISPERIDONE 0.25 MG: 0.25 TABLET, FILM COATED ORAL at 10:13

## 2023-05-10 RX ADMIN — PANTOPRAZOLE SODIUM 40 MG: 40 TABLET, DELAYED RELEASE ORAL at 10:12

## 2023-05-10 NOTE — PLAN OF CARE
Goal Outcome Evaluation:  Plan of Care Reviewed With: patient        Progress: no change  Outcome Evaluation: rested comfortably; VSS, RA; afebrile overnight; turned q2h; potassium replaced per protocol; all skin care measures in place; spouse deferred coccyx dressing change until morning; no changes to report from overnight; will continue POC      Problem: Adult Inpatient Plan of Care  Goal: Absence of Hospital-Acquired Illness or Injury  Intervention: Prevent Skin Injury  Recent Flowsheet Documentation  Taken 5/10/2023 0400 by Jose Cid RN  Body Position:   turned   left  Skin Protection:   adhesive use limited   incontinence pads utilized   transparent dressing maintained   tubing/devices free from skin contact   skin-to-device areas padded   silicone foam dressing in place  Taken 5/10/2023 0200 by Jose Cid RN  Body Position:   turned   supine   neutral body alignment   neutral head position  Skin Protection:   adhesive use limited   incontinence pads utilized   transparent dressing maintained   tubing/devices free from skin contact   skin-to-device areas padded   silicone foam dressing in place  Taken 5/10/2023 0000 by Jose Cid RN  Body Position:   turned   left  Skin Protection:   adhesive use limited   incontinence pads utilized   transparent dressing maintained   tubing/devices free from skin contact   skin-to-device areas padded  Taken 5/9/2023 2148 by Jose Cid RN  Body Position:   turned   left   neutral body alignment   neutral head position   legs elevated  Skin Protection:   adhesive use limited   incontinence pads utilized   tubing/devices free from skin contact   transparent dressing maintained   skin-to-device areas padded   skin sealant/moisture barrier applied   silicone foam dressing in place   pulse oximeter probe site changed  Taken 5/9/2023 2000 by Jose Cid RN  Body Position:   turned   supine   neutral body alignment   neutral head  position  Skin Protection:   adhesive use limited   incontinence pads utilized   transparent dressing maintained   tubing/devices free from skin contact   skin-to-device areas padded   skin sealant/moisture barrier applied   silicone foam dressing in place

## 2023-05-10 NOTE — CASE MANAGEMENT/SOCIAL WORK
Continued Stay Note  [facility]     Patient Name: Laura Wallace  MRN: [id number]  Today's Date: 5/10/2023    Admit Date: 5/7/2023    Plan: Home with spouse   Discharge Plan     Row Name 05/10/23 1439       Plan    Plan Home with spouse    Patient/Family in Agreement with Plan yes    Plan Comments I spoke with the patient's spouse at the bedside. Mrs. Wallace is not ready for discharge. CM following and will assist as needed.    Final Discharge Disposition Code 01 - home or self-care               Discharge Codes    No documentation.               Expected Discharge Date and Time     Expected Discharge Date Expected Discharge Time    May 12, 2023             Jessica Vasques RN

## 2023-05-10 NOTE — PROGRESS NOTES
INFECTIOUS DISEASE progress note    Laura Wallace  1940  [id number]    Date of consult: 5/9/23  Admit date: 5/7/2023    Requesting Provider: Dr. Martin  Evaluating physician: Hamzah Charlton MD  Reason for Consultation: deep back wound infection  Chief Complaint: diaphoresis      Subjective   History of present illness:  Laura Wallace is a  83 y.o.  Yr old female with a past medical history of hypertension, paroxysmal A. Fib on Eliquis, Alzheimer's dementia, and pulmonary hypertension who presented to the ER on 5/7 with complaints of diaphoresis. She has had intermittent episodes of diaphoresis for the last 1-2 months.  Since arrival, the patient has been febrile up to 101.1°F.  SPO2 has been mostly in the mid 90s on room air. Respiratory PCR panel was negative. Pro-calcitonin was mildly elevated at 0.37.  CPK level was 446. High sensitivity troponin was 66-->70. White blood cell count has remained normal. Hemoglobin is low at 8.5. potassium is slightly low at 3.2. CTA chest/abdomen/pelvis showed a large amount of layering debris within the gallbladder with a distended gallbladder.  There is a very large amount of rectal fecal debris consistent with fecal impaction or obstipation.  There is edematous wall thickening involving the rectum which could represent stercoral proctitis.  She had edematous rugal wall thickening of the stomach which could represent gastritis.  GI compression fracture at L4.  There is a 6 mm pulmonary nodule in the right lower lobe. Trace bilateral pleural effusions.  Has some background emphysema. There is a low density cystic mass in the left lobe of the thyroid measuring 2.2 cm. Gallbladder ultrasound showed echogenic material in the gallbladder lumen suggesting biliary sludge and/or gallstones that there is no definitive findings of acute cholecystitis or biliary ductal dilatation. CT head showed no acute intracranial abnormality. 1 out of 2 blood cultures from 5/7 has  bacillus species.  Urine culture has greater than 100,000 CFU per milliliter lactose  with final result pending. Repeat blood cultures from 5/8 are in progress. The patient does have a sacral spine pressure injury. She reportedly grew Enterobacter cloaca complex on 4/18/23 from her sacral spine wound.  The patient has been on IV vancomycin and Zosyn.    Subjective:    5/10/23: The patient has no complaints today although history is limited due to her dementia.  She denies any abdominal pain.  Her  is at bedside and reports that she has received some enemas due to constipation.  No documented fevers since the evening of 5/8/23.    Past Medical History:   Diagnosis Date   • Anxiety 9/23/2022   • Late onset Alzheimer's disease without behavioral disturbance 5/25/2018   • Paroxysmal atrial fibrillation with rapid ventricular response 4/23/2022   • Pulmonary hypertension 9/20/2022       Past Surgical History:   Procedure Laterality Date   • APPENDECTOMY  1960   • VERTEBROPLASTY      L123, fusion       Pediatric History   Patient Parents   • Not on file     Other Topics Concern   • Not on file   Social History Narrative   • Not on file       family history includes Colon cancer in her brother; Heart attack in her father.    Allergies   Allergen Reactions   • Codeine Nausea And Vomiting       Immunization History   Administered Date(s) Administered   • COVID-19 (PFIZER) Purple Cap Monovalent 01/28/2021, 02/23/2021, 12/08/2021   • Flu Vaccine Split Quad 09/14/2016   • Pneumococcal Conjugate 13-Valent (PCV13) 04/24/2017       Medication:    Current Facility-Administered Medications:   •  acetaminophen (TYLENOL) tablet 650 mg, 650 mg, Oral, Q4H PRN, Lakisha Leslie APRN, 650 mg at 05/08/23 2130  •  amLODIPine (NORVASC) tablet 10 mg, 10 mg, Oral, Q24H, Shannan Martin MD, 10 mg at 05/10/23 1017  •  apixaban (ELIQUIS) tablet 2.5 mg, 2.5 mg, Oral, Q12H, Lakisha Leslie APRN, 2.5 mg at 05/10/23 1013  •  cefTRIAXone  (ROCEPHIN) 2 g/100 mL 0.9% NS IVPB (MBP), 2 g, Intravenous, Q24H, Hamzah Charlton MD, 2 g at 05/09/23 1804  •  cyclobenzaprine (FLEXERIL) tablet 5 mg, 5 mg, Oral, TID PRN, Shannan Martin MD  •  donepezil (ARICEPT) tablet 5 mg, 5 mg, Oral, Nightly, Lakisha Leslie APRN, 5 mg at 05/09/23 2045  •  FLUoxetine (PROzac) capsule 40 mg, 40 mg, Oral, Daily, Lakisha Leslie APRN, 40 mg at 05/10/23 1011  •  folic acid (FOLVITE) tablet 1 mg, 1 mg, Oral, Daily, Lakisha Leslie APRN, 1 mg at 05/10/23 1013  •  lactated ringers infusion, 100 mL/hr, Intravenous, Continuous, Lakisha Leslie APRN, Last Rate: 100 mL/hr at 05/10/23 0522, 100 mL/hr at 05/10/23 0522  •  metoprolol tartrate (LOPRESSOR) tablet 25 mg, 25 mg, Oral, Q12H, Shannan Martin MD, 25 mg at 05/10/23 1017  •  pantoprazole (PROTONIX) EC tablet 40 mg, 40 mg, Oral, BID AC, Gertrude Hendrickson, DO, 40 mg at 05/10/23 1012  •  polyethylene glycol (MIRALAX) packet 17 g, 17 g, Oral, TID, Shannan Martin MD, 17 g at 05/10/23 1014  •  Potassium Replacement - Follow Nurse / BPA Driven Protocol, , Does not apply, PRN, Lakisha Leslie APRN  •  risperiDONE (risperDAL) tablet 0.25 mg, 0.25 mg, Oral, Daily, Lakisha Leslie APRN, 0.25 mg at 05/10/23 1013  •  risperiDONE (risperDAL) tablet 0.5 mg, 0.5 mg, Oral, Nightly, Lakisha Leslie APRN, 0.5 mg at 05/09/23 2044  •  sennosides-docusate (PERICOLACE) 8.6-50 MG per tablet 2 tablet, 2 tablet, Oral, BID, Shannan Martin MD, 2 tablet at 05/10/23 1011  •  sodium chloride 0.9 % flush 10 mL, 10 mL, Intravenous, PRN, Chapincito Gauthier MD  •  sodium chloride 0.9 % flush 10 mL, 10 mL, Intravenous, Q12H, Lakisha Leslie APRN, 10 mL at 05/09/23 2045  •  sodium chloride 0.9 % flush 10 mL, 10 mL, Intravenous, PRN, Lakisha Leslie APRN  •  sodium chloride 0.9 % infusion 40 mL, 40 mL, Intravenous, PRN, Lakisha Leslie APRN  •  sodium hypochlorite (DAKIN'S 1/4 STRENGTH) 0.125 % topical solution 0.125% solution, , Topical, Q12H, Shannan Martin MD, Given at  "05/10/23 1442    Please refer to the medical record for a full medication list    Review of Systems:    Unable to obtain an accurate 12 point review of systems in the setting of the patient's underlying Alzheimer's dementia.    Physical Exam:   Vital Signs   Temp:  [98 °F (36.7 °C)-99.2 °F (37.3 °C)] (P) 98.6 °F (37 °C)  Heart Rate:  [74-91] (P) 78  Resp:  [16-18] (P) 17  BP: (139-162)/(60-80) (P) 135/65    Temp  Min: 98 °F (36.7 °C)  Max: 99.2 °F (37.3 °C)  BP  Min: 135/65  Max: 162/71  Pulse  Min: 74  Max: 91  Resp  Min: 16  Max: 18  SpO2  Min: 94 %  Max: 98 %    Blood pressure (P) 135/65, pulse (P) 78, temperature (P) 98.6 °F (37 °C), temperature source (P) Oral, resp. rate (P) 17, height 154.9 cm (61\"), weight 44.1 kg (97 lb 3.2 oz), SpO2 (P) 98 %.  GENERAL: Awake and alert, in no acute distress. Appears stated age.  Frail.  Sitting up in bed  HEENT:  Normocephalic, atraumatic.  No external oral lesions noted.  EYES: pupils equal and round.  No conjunctival injection.  No scale or icterus  HEART: RRR, no murmur  LUNGS: Clear to auscultation and percussion. No respiratory distress, no use of accessory muscles.  ABDOMEN: Soft, nontender, nondistended. No appreciable HSM.  Decreased bowel sounds  GENITAL: no Contreras catheter  SKIN: no generalized rashes.   I did not visualize her sacral decubitus ulcer today  EXT:  No cellulitic change.  NEURO: awake alert. Has baseline dementia and is a very poor historian and not oriented. Unable to answer any questions appropriately    Results Review:   I reviewed the patient's new clinical results.    Results from last 7 days   Lab Units 05/10/23  0329 05/09/23  0402 05/08/23  0607   WBC 10*3/mm3 6.65 8.34 10.13   HEMOGLOBIN g/dL 8.4* 8.5* 9.1*   HEMATOCRIT % 28.0* 26.9* 28.6*  28.2*   PLATELETS 10*3/mm3 226 275 302     Results from last 7 days   Lab Units 05/10/23  0847 05/10/23  0329   SODIUM mmol/L  --  140   POTASSIUM mmol/L 4.5 3.9   CHLORIDE mmol/L  --  107   CO2 mmol/L  " --  22.0   BUN mg/dL  --  6*   CREATININE mg/dL  --  0.68   GLUCOSE mg/dL  --  82   CALCIUM mg/dL  --  8.5*     Results from last 7 days   Lab Units 05/09/23  0402   ALK PHOS U/L 91   BILIRUBIN mg/dL 0.5   ALT (SGPT) U/L 24   AST (SGOT) U/L 42*             Results from last 7 days   Lab Units 05/09/23  1357   VANCOMYCIN RM mcg/mL 8.00     Results from last 7 days   Lab Units 05/07/23  2051   LACTATE mmol/L 1.2     Estimated Creatinine Clearance: 45.2 mL/min (by C-G formula based on SCr of 0.68 mg/dL).  CPK        5/8/2023    06:07   Common Labsle   Creatine Kinase 477        Procalitonin Results:      Lab 05/07/23  1744   PROCALCITONIN 0.37*      Brief Urine Lab Results  (Last result in the past 365 days)      Color   Clarity   Blood   Leuk Est   Nitrite   Protein   CREAT   Urine HCG        05/07/23 2218 Yellow   Turbid   Trace   Small (1+)   Positive   Trace                No results found for: SITE, ALLENTEST, PHART, FIX8JNO, PO2ART, QFR0JTT, BASEEXCESS, T1GKHUNZ, HGBBG, HCTABG, OXYHEMOGLOBI, METHHGBN, CARBOXYHGB, CO2CT, BAROMETRIC, MODALITY, FIO2     Microbiology:  Blood Culture   Date Value Ref Range Status   05/07/2023 No growth at 24 hours  Preliminary     BCID, PCR   Date Value Ref Range Status   05/07/2023 Negative by BCID PCR. Culture to Follow. Negative by BCID PCR. Culture to Follow. Final     Urine Culture   Date Value Ref Range Status   05/07/2023 >100,000 CFU/mL Lactose  (A)  Preliminary     No results found for: VIRALCULTU, WOUNDCX     Blood Culture   Date Value Ref Range Status   05/07/2023 No growth at 24 hours  Preliminary     Urine Culture   Date Value Ref Range Status   05/07/2023 >100,000 CFU/mL Lactose  (A)  Preliminary     BCID, PCR   Date Value Ref Range Status   05/07/2023 Negative by BCID PCR. Culture to Follow. Negative by BCID PCR. Culture to Follow. Final   (      Radiology:  Imaging Results (Last 72 Hours)     Procedure Component Value Units Date/Time    XR Abdomen KUB  [[id number]] Collected: 05/09/23 1626     Updated: 05/09/23 1631    Narrative:      XR ABDOMEN KUB    Date of Exam: 5/9/2023 3:42 PM EDT    Indication: assess stool burden    Comparison: 5/8/2023    Findings:  Moderate colonic stool burden with moderate rectal stool ball, slightly increased from prior. Nonobstructive bowel gas pattern. Gaseous distention of the stomach. Spinal fusion hardware is seen. Lung bases appear clear. Degenerative changes of the left   greater than right hip. Irregularity of the pubic bones may reflect sequela of old fracture no normal abdominal calcification seen.      Impression:      Impression:  Moderate colonic stool burden with moderate rectal stool ball, slightly increased from prior examination.      Electronically Signed: Keyshawn Gonsalez    5/9/2023 4:28 PM EDT    Workstation ID: [id number]    XR Abdomen KUB [[id number]] Collected: 05/08/23 1634     Updated: 05/08/23 1639    Narrative:      XR ABDOMEN KUB    Date of Exam: 5/8/2023 4:01 PM EDT    Indication: reassess fecal burden    Comparison: CT angiogram of the abdomen and pelvis May 8, 2023    Findings:  There is a suspected large amount of stool in the colon and rectum, possibly mildly decreased compared to the prior CT. There does not appear to be colonic dilatation. There are some gas distended small bowel loops without definite small bowel   dilatation. No ectopic bowel gas is seen on this limited supine view. Fusion hardware is seen in the upper lumbar spine. Visualized lung bases appear grossly unremarkable.      Impression:      Impression:  Large amount of stool in the colon and rectum, possibly mildly decreased compared to the prior CT.      Electronically Signed: Raheem Owens    5/8/2023 4:36 PM EDT    Workstation ID: [id number]    US Gallbladder [[id number]] Collected: 05/08/23 1008     Updated: 05/08/23 1016    Narrative:      US GALLBLADDER    Date of Exam: 5/8/2023 9:40 AM EDT    Indication: abnl imaging.    Comparison:  CT angiogram May 8, 2023    Technique: Grayscale and color Doppler ultrasound evaluation of the right upper quadrant was performed.      Findings:  Exam is limited due to patient condition with limited scanning window. Assessment of the liver was limited due to scanning window and bowel gas. No focal hepatic abnormality is identified. The portal vein and hepatic veins demonstrate normal directional   flow.    Assessment was limited. There appear to be some echogenic material within the gallbladder lumen suggesting biliary sludge and/or gallstones. No significant focal shadowing gallstone was visualized. The gallbladder wall measures 2 mm.  No pericholecystic   fluid was seen.  The common bile duct measures 4 mm diameter.    The right kidney measures 9.9 x 6 x 4.3 cm. Assessment of the right kidney was also limited. No definite stone, hydronephrosis, or significant focal renal lesion was identified.    Pancreas was obscured by bowel gas.    There is no significant ascites within the right upper quadrant.      Impression:      1.Limited evaluation due to patient condition and limited scanning window. Assessment of the right upper quadrant structures was significantly limited on this exam.  2.Echogenic material in the gallbladder lumen suggesting biliary sludge and/or gallstones. No definite sonographic findings to suggest acute cholecystitis or biliary ductal dilatation.  3.No other gross abnormality was seen in the right upper quadrant on this limited exam.      Electronically Signed: Raheem Owens    5/8/2023 10:12 AM EDT    Workstation ID: [id number]    CT Angiogram Abdomen Pelvis [[id number]] Collected: 05/08/23 0004     Updated: 05/08/23 0206    Narrative:      Exam: CTA thorax, abdomen and pelvis with and without contrast    Date: May 8, 2023    History: Sepsis, fever of unknown origin, chest pain, abdominal pain    Comparison: September 23, 2022    Technique: Contiguous axial CT images obtained of the thorax,  abdomen and pelvis initially without contrast, then following the uneventful intravenous administration of 100 mL Isovue-370 contrast. Additionally, sagittal, coronal and 3-D reformatted   images were obtained. CT dose lowering techniques were used, to include: automated exposure control, adjustment for patient size, and or use of iterative reconstruction.    Findings:    CT thorax:  Thoracic aorta: Noncontrast imaging fails to demonstrate evidence of an intramural hematoma. There are severe atherosclerotic changes throughout the thoracic aorta. There is no aneurysm or dissection.    HEART: The heart is borderline in size. There are coronary artery calcifications.    Pulmonary arteries: The pulmonary arteries are suboptimally characterized secondary to respiratory motion artifact. There is no visualized pulmonary embolus.    Mediastinum: There is a low-density cystic mass in the left lobe of the thyroid measuring up to 2.2 cm. There is a small cyst in the right lobe of the thyroid. There is no pathologic mediastinal adenopathy. There is a very small hiatal hernia.    Lung parenchyma: There is a background of emphysema. There is a 6 mm pulmonary nodule in the right lower lobe. There is an element of interlobular septal thickening. There are trace bilateral pleural effusions.    CT ABDOMEN:  Liver: The liver is mildly hypodense.    Spleen: Normal.    Pancreas: Normal.    Adrenal glands: Normal.    Gallbladder: The gallbladder is distended. There is a large amount of layering debris within the gallbladder.    Kidneys: There is renal cortical thinning. There are small bilateral renal cysts.    Abdominal mesentery: There is no pathologic intra-abdominal mass.    Bowel loops: There is a very large amount of retained rectal fecal debris. There is edematous wall thickening involving the rectum. There is a large amount of retained colonic fecal debris consistent with constipation. The appendix is not well delineated   on  this study. There are no findings to suggest acute appendicitis. There is no small bowel obstruction. There is edematous rugal wall thickening of the stomach. The stomach is not well-distended.    CT PELVIS:  The genitourinary structures are intact. There are multiple nonenlarged lymph nodes in the left inguinal region.    Abdominal aorta: There are severe atherosclerotic changes throughout the abdominal aorta extending into the iliac vasculature.    Osseous structures: The osseous structures are markedly demineralized. There are chronic deformities involving the inferior pubic rami, right acetabulum and left pubic symphysis. There are moderate to severe degenerative changes involving both hips.   There is posterior transpedicular fusion hardware extending from L1-L3. There is an age indeterminant compression fracture involving L4 with approximately 80% loss of the vertebral body height. There is a chronic deformity involving the proximal left   humerus. There is heterotopic ossification around the left shoulder. There is a left hip joint effusion. There are bilateral breast prostheses. There is mild body wall edema.      Impression:      1. Large amount of layering debris within the gallbladder. The gallbladder is distended. Consider a follow-up right upper quadrant ultrasound for better characterization as determined clinically.  2. Very large amount of retained rectal fecal debris consistent with fecal impaction or obstipation. There is also edematous wall thickening involving the rectum which could represent stercoral proctitis. Close clinical follow-up is recommended. There is   also constipation.  3. Severe atherosclerotic disease.  4. Coronary artery calcifications.  5. Emphysema.  6. Trace bilateral pleural effusions. There is also mild pulmonary edema.  7. Edematous rugal wall thickening of the stomach. Correlate for symptoms of gastritis. The need for a follow-up EGD for better characterization can be  determined clinically.  8. There is an age indeterminant compression fracture involving L4 with approximately 80% loss of the vertebral body height. Correlation with point tenderness is recommended.  9. There is a 6 mm pulmonary nodule in the right lower lobe. Follow-up is suggested per Fleischner guidelines below.  10. Marked osteopenia.  11. There is a low-density cystic mass in the left lobe of the thyroid measuring up to 2.2 cm. A follow-up nonemergent thyroid ultrasound is recommended for better characterization.  12. No visualized pulmonary embolus.      *FLEISCHNER SOCIETY GUIDELINES:  Low risk patient:  <6mm - Low Risk, no further f/u  >6-8mm - low dose CT 6-12 mo, then 2nd f/u CT at 18-24mo  >8mm - low dose CT 3,9,24mo OR PET/CT OR Biopsy    High Risk Patient:  <6mm - optional CT at 12 mo  >6-8mm - low dose CT 6-12 mo, then 2nd f/u CT at 18-24mo  >8mm - low dose CT at 3 mo, OR PET/CT OR Biopsy    MULTIPLE NODULES:  Low Risk Patient:  <6mm - Low Risk, no further f/u  >6-8mm - low dose CT 3-6 mo, then 2nd f/u CT at 18-24mo  >8mm - low dose CT 3-6 mo, then 2nd f/u CT at 18-24mo    High Risk Patient:  <6mm - High risk, multiple nodules, optional CT at 12 mo  >6-8mm - low dose CT 3-6 mo, then 2nd f/u CT at 18-24mo  >8mm - low dose CT 3-6 mo, then 2nd f/u CT at 18-24mo        Electronically signed by:  Ankit Tabor M.D.    5/8/2023 12:05 AM Mountain Time    CT Angiogram Chest [[id number]] Collected: 05/07/23 2340     Updated: 05/08/23 0159    Narrative:      Exam: CTA thorax, abdomen and pelvis with and without contrast    Date: May 8, 2023    History: Sepsis, fever of unknown origin, chest pain, abdominal pain    Comparison: September 23, 2022    Technique: Contiguous axial CT images obtained of the thorax, abdomen and pelvis initially without contrast, then following the uneventful intravenous administration of 100 mL Isovue-370 contrast. Additionally, sagittal, coronal and 3-D reformatted   images were  obtained. CT dose lowering techniques were used, to include: automated exposure control, adjustment for patient size, and or use of iterative reconstruction.    Findings:    CT thorax:  Thoracic aorta: Noncontrast imaging fails to demonstrate evidence of an intramural hematoma. There are severe atherosclerotic changes throughout the thoracic aorta. There is no aneurysm or dissection.    HEART: The heart is borderline in size. There are coronary artery calcifications.    Pulmonary arteries: The pulmonary arteries are suboptimally characterized secondary to respiratory motion artifact. There is no visualized pulmonary embolus.    Mediastinum: There is a low-density cystic mass in the left lobe of the thyroid measuring up to 2.2 cm. There is a small cyst in the right lobe of the thyroid. There is no pathologic mediastinal adenopathy. There is a very small hiatal hernia.    Lung parenchyma: There is a background of emphysema. There is a 6 mm pulmonary nodule in the right lower lobe. There is an element of interlobular septal thickening. There are trace bilateral pleural effusions.    CT ABDOMEN:  Liver: The liver is mildly hypodense.    Spleen: Normal.    Pancreas: Normal.    Adrenal glands: Normal.    Gallbladder: The gallbladder is distended. There is a large amount of layering debris within the gallbladder.    Kidneys: There is renal cortical thinning. There are small bilateral renal cysts.    Abdominal mesentery: There is no pathologic intra-abdominal mass.    Bowel loops: There is a very large amount of retained rectal fecal debris. There is edematous wall thickening involving the rectum. There is a large amount of retained colonic fecal debris consistent with constipation. The appendix is not well delineated   on this study. There are no findings to suggest acute appendicitis. There is no small bowel obstruction. There is edematous rugal wall thickening of the stomach. The stomach is not well-distended.    CT  PELVIS:  The genitourinary structures are intact. There are multiple nonenlarged lymph nodes in the left inguinal region.    Abdominal aorta: There are severe atherosclerotic changes throughout the abdominal aorta extending into the iliac vasculature.    Osseous structures: The osseous structures are markedly demineralized. There are chronic deformities involving the inferior pubic rami, right acetabulum and left pubic symphysis. There are moderate to severe degenerative changes involving both hips.   There is posterior transpedicular fusion hardware extending from L1-L3. There is an age indeterminant compression fracture involving L4 with approximately 80% loss of the vertebral body height. There is a chronic deformity involving the proximal left   humerus. There is heterotopic ossification around the left shoulder. There is a left hip joint effusion. There is mild body wall edema.      Impression:      1. Large amount of layering debris within the gallbladder. The gallbladder is distended. Consider a follow-up right upper quadrant ultrasound for better characterization as determined clinically.  2. Very large amount of retained rectal fecal debris consistent with fecal impaction or obstipation. There is also edematous wall thickening involving the rectum which could represent stercoral proctitis. Close clinical follow-up is recommended. There is   also constipation.  3. Severe atherosclerotic disease.  4. Coronary artery calcifications.  5. Emphysema.  6. Trace bilateral pleural effusions. There is also mild pulmonary edema.  7. Edematous rugal wall thickening of the stomach. Correlate for symptoms of gastritis. The need for a follow-up EGD for better characterization can be determined clinically.  8. There is an age indeterminant compression fracture involving L4 with approximately 80% loss of the vertebral body height. Correlation with point tenderness is recommended.  9. There is a 6 mm pulmonary nodule in the  right lower lobe. Follow-up is suggested per Fleischner guidelines below.  10. Marked osteopenia.  11. There is a low-density cystic mass in the left lobe of the thyroid measuring up to 2.2 cm. A follow-up nonemergent thyroid ultrasound is recommended for better characterization.  12. No visualized pulmonary embolus.      *FLEISCHNER SOCIETY GUIDELINES:  Low risk patient:  <6mm - Low Risk, no further f/u  >6-8mm - low dose CT 6-12 mo, then 2nd f/u CT at 18-24mo  >8mm - low dose CT 3,9,24mo OR PET/CT OR Biopsy    High Risk Patient:  <6mm - optional CT at 12 mo  >6-8mm - low dose CT 6-12 mo, then 2nd f/u CT at 18-24mo  >8mm - low dose CT at 3 mo, OR PET/CT OR Biopsy    MULTIPLE NODULES:  Low Risk Patient:  <6mm - Low Risk, no further f/u  >6-8mm - low dose CT 3-6 mo, then 2nd f/u CT at 18-24mo  >8mm - low dose CT 3-6 mo, then 2nd f/u CT at 18-24mo    High Risk Patient:  <6mm - High risk, multiple nodules, optional CT at 12 mo  >6-8mm - low dose CT 3-6 mo, then 2nd f/u CT at 18-24mo  >8mm - low dose CT 3-6 mo, then 2nd f/u CT at 18-24mo                  Electronically signed by:  Ankit Tabor M.D.    5/7/2023 11:58 PM Mountain Time    CT Head Without Contrast [[id number]] Collected: 05/07/23 2326     Updated: 05/08/23 0131    Narrative:      EXAMINATION: CT HEAD WO CONTRAST    DATE: 5/8/2023 12:27 AM     HISTORY: Dementia     COMPARISON: 4/13/2022.    TECHNIQUE: Thin section noncontrast axial images were obtained through the head. Coronal reformatted images were then created. CT dose lowering techniques including automated exposure control, adjustment for patient size, and/or use of iterative   reconstruction were used.       FINDINGS:      Ventricles and Extra-axial Spaces: Moderate ventriculomegaly, disproportionate to sulcal dilatation at the vertex.     Parenchyma: Confluent areas of periventricular and subcortical white matter low-attenuation are present, similar to prior.   No CT evidence of an acute  large-vessel territory infarct.   No mass effect or midline shift.     Intracranial Hemorrhage: None.     Bones/Extracranial soft tissues: No acute calvarial fracture. 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    Visualized Sinuses/Mastoids: Clear.          Impression:         1.  No acute intracranial abnormality.  2.  Moderate to severe ventriculomegaly, disproportionate to sulcal dilatation. Findings could be on the basis of normal pressure hydrocephalus versus central volume loss, and appear grossly similar to the prior exam.  3.  Moderate to severe white matter hypodensities, which are grossly unchanged and could represent microvascular ischemic changes and/or transependymal CSF edema.               Electronically signed by:  Tha Feliciano M.D.    5/7/2023 11:29 PM Mountain Time    XR Chest 1 View [[id number]] Collected: 05/07/23 1848     Updated: 05/07/23 1852    Narrative:      XR CHEST 1 VW    Date of Exam: 5/7/2023 5:55 PM EDT    Indication: Acute febrile illness with altered mental status    Comparison: 9/23/2022.    Findings:  There is diffuse interstitial prominence in the lungs. There is no pneumothorax, pleural effusion or focal airspace consolidation. The heart size is normal. Pulmonary vasculature is largely obscured by interstitial prominence. There is an old healed   proximal left humerus fracture. No acute osseous abnormalities.      Impression:      Impression:    1. Chronic appearing interstitial lung disease without acute cardiopulmonary abnormality.      Electronically Signed: Ethan Thorpe    5/7/2023 6:49 PM EDT    Workstation ID: [id number]          IMPRESSION:     Problems:  1. Fevers-likely secondary to urinary tract infection.  Sacral decubitus ulcer could be contributing. Also has a stercoral proctitis  2. Urinary tract infection-now with E. Coli that was susceptible to ceftriaxone  3. Bacillus species in 1 out of 2 blood culture sets from admission.-This represents a  contaminated blood culture  4. Sacral decubitus ulcer-has a prior history of Enterobacter cloaca complex on 4/18/23 on superficial wound culture done at .  Isolate was susceptible to Zosyn, cefepime, quinolones, etc. Her ulcer does not appear significantly infected based on her wound care picture  5. Mild hypokalemia  6. Fecal impaction/stercoral proctitis  7. Mild acute kidney injury-creatinine was elevated to 1.08 on arrival but has now improved to 0.85.  Likely prerenal  8. Dysphagia  9. Acute on chronic normocytic anemia  10. Elevated high sensitivity troponin level  11. Paroxysmal A. Fib, on Eliquis  12. Pulmonary hypertension  13. Small bilateral pleural effusions  14. Alzheimer's dementia  15. Small pulmonary nodule-needs to be followed on an outpatient basis  16. Cystic thyroid mass-needs to be followed up on an outpatient basis    RECOMMENDATIONS:      -follow repeat blood cultures-no growth to date  -continue ceftriaxone 2 g IV every 24 hours    UM/JAKE:  Could consider discharging her soon on Macrobid 100 mg by mouth twice a day with an anticipated stop date of 5/21/23.    I discussed in length with the patient's  bedside today.    Hamzah Charlton MD  5/10/2023

## 2023-05-10 NOTE — PROGRESS NOTES
Malnutrition Severity Assessment    Patient Name:  Laura Wallace  YOB: 1940  MRN: [id number]  Admit Date:  5/7/2023    Patient meets criteria for : Severe Malnutrition (Pt meets criteria for severe acute malnutrition based on wt loss w wasting.)    Comments:      Malnutrition Severity Assessment  Malnutrition Type: Acute Disease or Injury - Related Malnutrition  Malnutrition Type (last 8 hours)     Malnutrition Severity Assessment     Row Name 05/09/23 2250       Malnutrition Severity Assessment    Malnutrition Type Acute Disease or Injury - Related Malnutrition    Row Name 05/09/23 2250       Insufficient Energy Intake     Insufficient Energy Intake Findings --  unable to quantify    Row Name 05/09/23 2250       Unintentional Weight Loss     Unintentional Weight Loss Findings Severe  Loss of  14 lbs since 3/21/23 13% of body wt    Unintentional Weight Loss  Weight loss greater than 5% in one month    Row Name 05/09/23 2250       Muscle Loss    Loss of Muscle Mass Findings Moderate    Religion Region Moderate - slight depression    Clavicle Bone Region Severe - protruding prominent bone    Acromion Bone Region Severe - squared shoulders, bones, and acromion process protrusion prominent    Scapular Bone Region Moderate - mild depression, bones may show slightly    Dorsal Hand Region Moderate - slight depression    Patellar Region --  mild    Anterior Thigh Region --  mild    Posterior Calf Region --  unable to determine at this time    Row Name 05/09/23 2250       Fat Loss    Subcutaneous Fat Loss Findings Moderate    Orbital Region  Moderate -  somewhat hollowness, slightly dark circles    Upper Arm Region Moderate - some fat tissue, not ample    Thoracic & Lumbar Region Severe - ribs visible with prominent depressions, iliac crest very prominent    Row Name 05/09/23 2250       Criteria Met (Must meet criteria for severity in at least 2 of these categories: M Wasting, Fat Loss, Fluid, Secondary  Signs, Wt. Status, Intake)    Patient meets criteria for  Severe Malnutrition  Pt meets criteria for severe acute malnutrition based on wt loss w wasting.                Electronically signed by:  Yessi Brown RD  05/09/23 23:05 EDT

## 2023-05-10 NOTE — PROGRESS NOTES
Clinical Nutrition     Nutrition Support Assessment  Reason for Visit: Identified at risk by screening criteria, BMI, Malnutrition Severity Assessment      Patient Name: Laura Wallace  YOB: 1940  MRN: [id number]  Date of Encounter: 05/09/23 22:56 EDT  Admission date: 5/7/2023    Comments: Pt meets criteria for severe acute malnutrition based on wt loss w wasting. See MSA note.    Nutrition Assessment   Admission Diagnosis:  Fever, unknown origin [R50.9]  Sepsis [A41.9]      Problem List:    Fever, unknown origin    Late onset Alzheimer's disease without behavioral disturbance (HCC)    Pulmonary hypertension    Anxiety    Elevated troponin    Acute kidney injury    Acute on chronic anemia    Lactic acidosis    Wound with tunneling    PAF (paroxysmal atrial fibrillation)    HTN (hypertension)    Acute UTI (urinary tract infection)    Pulmonary nodule    Constipation    Pleural effusion    Compression fracture of fourth lumbar vertebra    Thyroid mass of unclear etiology    Fecal impaction    Sepsis        PMH:   She  has a past medical history of Anxiety (9/23/2022), Late onset Alzheimer's disease without behavioral disturbance (5/25/2018), Paroxysmal atrial fibrillation with rapid ventricular response (4/23/2022), and Pulmonary hypertension (9/20/2022).    PSH:  She  has a past surgical history that includes Vertebroplasty and Appendectomy (1960).      Applicable Nutrition Concerns:   Skin: Sacral PI  Oral:  GI:      Applicable Interval History:   5/8 -9 SLP rec Reg texture food Nectar thick liquid      Reported/Observed/Food/Nutrition Related History:     Pt allows wt varies bt 120 -100 lbs, usually ~110 lbs. Confirms wt loss. Does not want suppl.       Labs    Labs Reviewed: Yes   KCl given  Results from last 7 days   Lab Units 05/09/23  1612 05/09/23  0402 05/08/23  0607 05/07/23  1744   GLUCOSE mg/dL  --  87 92 106*   BUN mg/dL  --  13 15 21   CREATININE mg/dL  --  0.85 0.88  "1.08*   SODIUM mmol/L  --  137 135* 133*   CHLORIDE mmol/L  --  102 101 99   POTASSIUM mmol/L 2.9* 3.2* 3.9 3.5   MAGNESIUM mg/dL  --   --   --  2.1   ALT (SGPT) U/L  --  24  --  21       Results from last 7 days   Lab Units 05/09/23  0402 05/08/23  0607 05/07/23  1744   ALBUMIN g/dL 3.5  --  3.2*   IONIZED CALCIUM mmol/L  --  1.28  --            No results found for: HGBA1C              Medications    Medications Reviewed: Yes  Pertinent: Abx, Folic Acid, Protonix, Miralax, Pericolace  Infusion:   PRN:       Intake/Ouptut 24 hrs (0701 - 0700)   I&O's Reviewed: Yes     Intake & Output (last day)       05/09 0701  05/10 0700    IV Piggyback     Total Intake(mL/kg)     Net          Urine Unmeasured Occurrence 1 x    Stool Unmeasured Occurrence 1 x            Anthropometrics     Admission Height 154.9 cm (61\") Documented at 05/07/2023 1724   Admission Weight 49.9 kg (110 lb) Documented at 05/07/2023 1724     Height: Height: 154.9 cm (61\")  Last Filed Weight: Weight: 44.1 kg (97 lb 3.2 oz) (05/07/23 2254)  Method: Weight Method: Bed scale  BMI: BMI (Calculated): 18.4  BMI classification: Underweight:<18.5kg/m2    UBW: Per EMR wt of 111 lbs on 3/21/23  Weight change: loss of 14 lbs 13% body wt since 2 mo ago    Nutrition Focused Physical Exam     Date: 5/9    Patient meets criteria for severe acute malnutrition diagnosis, see MSA note.    Current Nutrition Prescription     PO: Diet: Regular/House Diet; Texture: Regular Texture (IDDSI 7); Fluid Consistency: [place] Thick  Oral Nutrition Supplement:   Intake: Insufficient data 75% x 1 meal recorded      Nutrition Diagnosis   Date:5/9  Updated:   Problem Malnutrition  severe acute   Etiology Likely inconsistent intake   Signs/Symptoms Wt loss w wasting   Status:     Goal:   General: Nutrition to support treatment  PO: Establish PO  EN/PN: N/A    Nutrition Intervention      Follow treatment progress, Care plan reviewed, Advise alternate selection, Menu provided, " Encourage intake, Supplement offered/refused        Monitoring/Evaluation:   Per protocol, I&O, PO intake, Pertinent labs, Weight, Skin status, Symptoms      Yessi Brown RD  Time Spent: 30 min

## 2023-05-10 NOTE — PLAN OF CARE
Problem: Adult Inpatient Plan of Care  Goal: Plan of Care Review  Outcome: Ongoing, Progressing  Goal: Patient-Specific Goal (Individualized)  Outcome: Ongoing, Progressing  Goal: Absence of Hospital-Acquired Illness or Injury  Outcome: Ongoing, Progressing  Intervention: Identify and Manage Fall Risk  Recent Flowsheet Documentation  Taken 5/10/2023 1800 by Flor Lieberman RN Extern  Safety Promotion/Fall Prevention:   activity supervised   assistive device/personal items within reach   safety round/check completed   room organization consistent   nonskid shoes/slippers when out of bed  Taken 5/10/2023 1600 by Flor Lieberman RN Extern  Safety Promotion/Fall Prevention:   activity supervised   assistive device/personal items within reach   clutter free environment maintained   nonskid shoes/slippers when out of bed   room organization consistent   safety round/check completed  Taken 5/10/2023 1400 by Flor Lieberman RN Extern  Safety Promotion/Fall Prevention:   activity supervised   assistive device/personal items within reach   safety round/check completed   room organization consistent  Taken 5/10/2023 1200 by Flor Lieberman RN Extern  Safety Promotion/Fall Prevention:   activity supervised   assistive device/personal items within reach   nonskid shoes/slippers when out of bed   room organization consistent   safety round/check completed  Taken 5/10/2023 1000 by Flor Lieberman RN Extern  Safety Promotion/Fall Prevention:   activity supervised   assistive device/personal items within reach   clutter free environment maintained   safety round/check completed   room organization consistent  Taken 5/10/2023 0800 by Flor Lieberman RN Extern  Safety Promotion/Fall Prevention:   activity supervised   assistive device/personal items within reach   clutter free environment maintained   safety round/check completed   room organization consistent  Intervention: Prevent Skin Injury  Recent Flowsheet  Documentation  Taken 5/10/2023 1800 by Flor Lieberman RN Extern  Body Position:   neutral head position   neutral body alignment  Skin Protection:   adhesive use limited   transparent dressing maintained   tubing/devices free from skin contact   skin sealant/moisture barrier applied   incontinence pads utilized  Taken 5/10/2023 1600 by Flor Lieberman RN Extern  Body Position:   left   turned  Skin Protection:   adhesive use limited   tubing/devices free from skin contact   transparent dressing maintained   incontinence pads utilized  Taken 5/10/2023 1400 by Flor Lieberman RN Extern  Skin Protection:   adhesive use limited   tubing/devices free from skin contact   transparent dressing maintained   incontinence pads utilized  Taken 5/10/2023 1200 by Flor Lieberman RN Extern  Body Position:   turned   left  Skin Protection:   tubing/devices free from skin contact   transparent dressing maintained   adhesive use limited   incontinence pads utilized  Taken 5/10/2023 1000 by Flor Lieberman RN Extern  Skin Protection:   incontinence pads utilized   transparent dressing maintained   tubing/devices free from skin contact   adhesive use limited  Taken 5/10/2023 0800 by Flor Lieberman RN Extern  Body Position:   turned   left  Skin Protection:   tubing/devices free from skin contact   transparent dressing maintained   adhesive use limited  Intervention: Prevent and Manage VTE (Venous Thromboembolism) Risk  Recent Flowsheet Documentation  Taken 5/10/2023 1800 by Flor Lieberman RN Extern  Activity Management: activity encouraged  Taken 5/10/2023 1600 by Flor Lieberman RN Extern  Activity Management: activity encouraged  Taken 5/10/2023 1400 by Flor Lieberman RN Extern  Activity Management: activity encouraged  Taken 5/10/2023 1200 by Flor Lieberman RN Extern  Activity Management: activity encouraged  Taken 5/10/2023 1000 by Flor Lieberman RN Extern  VTE Prevention/Management: sequential compression devices  on  Taken 5/10/2023 0800 by Flor Lieberman RN Extern  Activity Management: activity encouraged  Range of Motion: active ROM (range of motion) encouraged  Intervention: Prevent Infection  Recent Flowsheet Documentation  Taken 5/10/2023 1800 by Flor Lieberman RN Extern  Infection Prevention: environmental surveillance performed  Taken 5/10/2023 1400 by Flor Lieberman RN Extern  Infection Prevention:   environmental surveillance performed   hand hygiene promoted  Taken 5/10/2023 1200 by Flor Lieberman RN Extern  Infection Prevention:   environmental surveillance performed   rest/sleep promoted   hand hygiene promoted  Taken 5/10/2023 1000 by Flor Lieberman RN Extern  Infection Prevention: environmental surveillance performed  Taken 5/10/2023 0800 by Flor Lieberman RN Extern  Infection Prevention: environmental surveillance performed  Goal: Optimal Comfort and Wellbeing  Outcome: Ongoing, Progressing  Intervention: Provide Person-Centered Care  Recent Flowsheet Documentation  Taken 5/10/2023 0800 by Flor Lieberman RN Extern  Trust Relationship/Rapport:   care explained   emotional support provided   reassurance provided  Goal: Readiness for Transition of Care  Outcome: Ongoing, Progressing     Problem: Fall Injury Risk  Goal: Absence of Fall and Fall-Related Injury  Outcome: Ongoing, Progressing  Intervention: Identify and Manage Contributors  Recent Flowsheet Documentation  Taken 5/10/2023 1800 by Flor Lieberman RN Extern  Medication Review/Management: medications reviewed  Taken 5/10/2023 1600 by Flor Lieberman RN Extern  Medication Review/Management: medications reviewed  Self-Care Promotion: BADL personal objects within reach  Taken 5/10/2023 1400 by Flor Lieberman RN Extern  Medication Review/Management: medications reviewed  Self-Care Promotion: BADL personal objects within reach  Taken 5/10/2023 1200 by Flor Lieberman RN Extern  Medication Review/Management: medications reviewed  Taken  5/10/2023 1000 by Flor Lieberman RN Extern  Medication Review/Management: medications reviewed  Taken 5/10/2023 0800 by Flor Lieberman RN Extern  Medication Review/Management: medications reviewed  Self-Care Promotion: BADL personal objects within reach  Intervention: Promote Injury-Free Environment  Recent Flowsheet Documentation  Taken 5/10/2023 1800 by Flor Lieberman RN Extern  Safety Promotion/Fall Prevention:   activity supervised   assistive device/personal items within reach   safety round/check completed   room organization consistent   nonskid shoes/slippers when out of bed  Taken 5/10/2023 1600 by Flor Lieberman RN Extern  Safety Promotion/Fall Prevention:   activity supervised   assistive device/personal items within reach   clutter free environment maintained   nonskid shoes/slippers when out of bed   room organization consistent   safety round/check completed  Taken 5/10/2023 1400 by Flor Lieberman RN Extern  Safety Promotion/Fall Prevention:   activity supervised   assistive device/personal items within reach   safety round/check completed   room organization consistent  Taken 5/10/2023 1200 by Flor Lieberman RN Extern  Safety Promotion/Fall Prevention:   activity supervised   assistive device/personal items within reach   nonskid shoes/slippers when out of bed   room organization consistent   safety round/check completed  Taken 5/10/2023 1000 by Flor Lieberman RN Extern  Safety Promotion/Fall Prevention:   activity supervised   assistive device/personal items within reach   clutter free environment maintained   safety round/check completed   room organization consistent  Taken 5/10/2023 0800 by Flor Lieberman RN Extern  Safety Promotion/Fall Prevention:   activity supervised   assistive device/personal items within reach   clutter free environment maintained   safety round/check completed   room organization consistent     Problem: Skin Injury Risk Increased  Goal: Skin Health and  Integrity  Outcome: Ongoing, Progressing  Intervention: Promote and Optimize Oral Intake  Recent Flowsheet Documentation  Taken 5/10/2023 0800 by Flor Lieberman RN Extern  Oral Nutrition Promotion: medicated  Intervention: Optimize Skin Protection  Recent Flowsheet Documentation  Taken 5/10/2023 1800 by Flor Lieberman RN Extern  Pressure Reduction Techniques:   pressure points protected   weight shift assistance provided   frequent weight shift encouraged   positioned off wounds  Head of Bed (HOB) Positioning: [facility] elevated  Pressure Reduction Devices: specialty bed utilized  Skin Protection:   adhesive use limited   transparent dressing maintained   tubing/devices free from skin contact   skin sealant/moisture barrier applied   incontinence pads utilized  Taken 5/10/2023 1600 by Flor Lieberman RN Extern  Pressure Reduction Techniques:   pressure points protected   weight shift assistance provided   frequent weight shift encouraged  Head of Bed (HOB) Positioning: HOB at 20-30 degrees  Pressure Reduction Devices: specialty bed utilized  Skin Protection:   adhesive use limited   tubing/devices free from skin contact   transparent dressing maintained   incontinence pads utilized  Taken 5/10/2023 1400 by Flor Lieberman RN Extern  Pressure Reduction Techniques:   pressure points protected   weight shift assistance provided   frequent weight shift encouraged  Head of Bed (HOB) Positioning: HOB elevated  Pressure Reduction Devices: specialty bed utilized  Skin Protection:   adhesive use limited   tubing/devices free from skin contact   transparent dressing maintained   incontinence pads utilized  Taken 5/10/2023 1200 by Flor Lieberman RN Extern  Pressure Reduction Techniques:   frequent weight shift encouraged   weight shift assistance provided   pressure points protected   positioned off wounds  Head of Bed (HOB) Positioning: [facility] elevated  Pressure Reduction Devices: specialty bed utilized  Skin Protection:    tubing/devices free from skin contact   transparent dressing maintained   adhesive use limited   incontinence pads utilized  Taken 5/10/2023 1000 by Flor Lieberman RN Extern  Pressure Reduction Techniques:   pressure points protected   weight shift assistance provided   frequent weight shift encouraged  Pressure Reduction Devices:   specialty bed utilized   heel offloading device utilized  Skin Protection:   incontinence pads utilized   transparent dressing maintained   tubing/devices free from skin contact   adhesive use limited  Taken 5/10/2023 0800 by Flor Lieberman RN Extern  Pressure Reduction Techniques:   heels elevated off bed   positioned off wounds   weight shift assistance provided   frequent weight shift encouraged  Head of Bed (HOB) Positioning: HOB elevated  Pressure Reduction Devices: specialty bed utilized  Skin Protection:   tubing/devices free from skin contact   transparent dressing maintained   adhesive use limited   Goal Outcome Evaluation:

## 2023-05-10 NOTE — PROGRESS NOTES
Psychiatric Medicine Services  PROGRESS NOTE    Patient Name: Laura Wallace  : 1940  MRN: [id number]    Date of Admission: 2023  Primary Care Physician: Justin Brumfield MD    Subjective   Subjective     CC:  Follow-up fever    HPI:  Afebrile.  Not answering any questions.  Smiles when I walked into the room.   at bedside along with family friend.    ROS:  Unable to obtain given advanced dementia    Objective   Objective     Vital Signs:   Temp:  [98 °F (36.7 °C)-99.2 °F (37.3 °C)] 98.4 °F (36.9 °C)  Heart Rate:  [74-91] 85  Resp:  [16-18] 16  BP: (136-162)/(50-80) 162/71     Physical Exam:  Constitutional: No acute distress, awake, alert  HENT: NCAT, mucous membranes moist  Respiratory: Clear to auscultation bilaterally, respiratory effort normal   Cardiovascular: RRR, no murmurs, rubs, or gallops  Gastrointestinal: Hypoactive bowel sounds, soft, nontender, distended  Musculoskeletal: No bilateral ankle edema  Psychiatric: Calm  Neurologic: PERRL, not following commands  Skin: No rashes    Results Reviewed:  LAB RESULTS:      Lab 05/10/23  0329 23  0402 23  0607 23  0015 23  2051 23  1744   WBC 6.65 8.34 10.13  --   --  9.77   HEMOGLOBIN 8.4* 8.5* 9.1* 8.9*  --  8.3*   HEMATOCRIT 28.0* 26.9* 28.6*  28.2* 29.3*  --  26.4*   PLATELETS 226 275 302  --   --  256   NEUTROS ABS 4.26 5.49 6.61  --   --  7.49*   IMMATURE GRANS (ABS) 0.02 0.02 0.04  --   --  0.04   LYMPHS ABS 1.47 1.83 2.15  --   --  1.22   MONOS ABS 0.76 0.89 1.22*  --   --  0.98*   EOS ABS 0.12 0.10 0.09  --   --  0.02   MCV 87.8 83.5 83.6  --   --  83.8   PROCALCITONIN  --   --   --   --   --  0.37*   LACTATE  --   --   --   --  1.2 2.2*         Lab 05/10/23  0329 23  1612 23  0402 23  0607 23  1744   SODIUM 140  --  137 135* 133*   POTASSIUM 3.9 2.9* 3.2* 3.9 3.5   CHLORIDE 107  --  102 101 99   CO2 22.0  --  23.0 22.0 22.0   ANION GAP 11.0  --  12.0  12.0 12.0   BUN 6*  --  13 15 21   CREATININE 0.68  --  0.85 0.88 1.08*   EGFR 87.6  --  68.9 66.1 51.7*   GLUCOSE 82  --  87 92 106*   CALCIUM 8.5*  --  8.7 8.9 8.5*   IONIZED CALCIUM  --   --   --  1.28  --    MAGNESIUM  --   --   --   --  2.1   TSH  --   --   --   --  4.460*         Lab 05/09/23  0402 05/07/23 1744   TOTAL PROTEIN 6.0 5.9*   ALBUMIN 3.5 3.2*   GLOBULIN 2.5 2.7   ALT (SGPT) 24 21   AST (SGOT) 42* 40*   BILIRUBIN 0.5 0.3   ALK PHOS 91 98         Lab 05/08/23  0607 05/08/23  0157 05/08/23  0015 05/07/23 1744   HSTROP T 70* 65* 67* 66*             Lab 05/08/23  0607 05/07/23 2000 05/07/23 1744   IRON  --   --  25*  25*   IRON SATURATION  --   --  6*   TIBC  --   --  417   TRANSFERRIN  --   --  280   FERRITIN  --   --  26.34   FOLATE  --  >20.00  --    VITAMIN B 12  --  >2,000*  --    ABO TYPING A  --  A   RH TYPING Positive  --  Positive   ANTIBODY SCREEN Negative  --   --          Brief Urine Lab Results  (Last result in the past 365 days)      Color   Clarity   Blood   Leuk Est   Nitrite   Protein   CREAT   Urine HCG        05/07/23 2218 Yellow   Turbid   Trace   Small (1+)   Positive   Trace                 Microbiology Results Abnormal     Procedure Component Value - Date/Time    Blood Culture - Blood, Hand, Right [[id number]]  (Normal) Collected: 05/08/23 2104    Lab Status: Preliminary result Specimen: Blood from Hand, Right Updated: 05/09/23 2130     Blood Culture No growth at 24 hours    Blood Culture - Blood, Hand, Left [[id number]]  (Normal) Collected: 05/08/23 2104    Lab Status: Preliminary result Specimen: Blood from Hand, Left Updated: 05/09/23 2130     Blood Culture No growth at 24 hours    Blood Culture - Blood, Arm, Right [[id number]]  (Normal) Collected: 05/07/23 1749    Lab Status: Preliminary result Specimen: Blood from Arm, Right Updated: 05/09/23 1830     Blood Culture No growth at 2 days    Blood Culture ID, PCR - Blood, Hand, Left [[id number]]  (Normal) Collected: 05/07/23  1748    Lab Status: Final result Specimen: Blood from Hand, Left Updated: 05/08/23 2145     BCID, PCR Negative by BCID PCR. Culture to Follow.    COVID PRE-OP / PRE-PROCEDURE SCREENING ORDER (NO ISOLATION) - Swab, Nasopharynx [[id number]]  (Normal) Collected: 05/07/23 1741    Lab Status: Final result Specimen: Swab from Nasopharynx Updated: 05/07/23 1847    Narrative:      The following orders were created for panel order COVID PRE-OP / PRE-PROCEDURE SCREENING ORDER (NO ISOLATION) - Swab, Nasopharynx.  Procedure                               Abnormality         Status                     ---------                               -----------         ------                     Respiratory Panel PCR w/...[[id number]]  Normal              Final result                 Please view results for these tests on the individual orders.    Respiratory Panel PCR w/COVID-19(SARS-CoV-2) JOSE/ALEJANDRA/[facility]KSHI/[facility] In-House, NP Swab in UTM/VTM, 3-4 HR TAT - Swab, Nasopharynx [[id number]]  (Normal) Collected: 05/07/23 1741    Lab Status: Final result Specimen: Swab from Nasopharynx Updated: 05/07/23 1847     ADENOVIRUS, PCR Not Detected     Coronavirus 229E Not Detected     Coronavirus HKU1 Not Detected     Coronavirus NL63 Not Detected     Coronavirus OC43 Not Detected     COVID19 Not Detected     Human Metapneumovirus Not Detected     Human Rhinovirus/Enterovirus Not Detected     Influenza A PCR Not Detected     Influenza B PCR Not Detected     Parainfluenza Virus 1 Not Detected     Parainfluenza Virus 2 Not Detected     Parainfluenza Virus 3 Not Detected     Parainfluenza Virus 4 Not Detected     RSV, PCR Not Detected     Bordetella pertussis pcr Not Detected     Bordetella parapertussis PCR Not Detected     Chlamydophila pneumoniae PCR Not Detected     Mycoplasma pneumo by PCR Not Detected    Narrative:      In the setting of a positive respiratory panel with a viral infection PLUS a negative procalcitonin without other underlying  concern for bacterial infection, consider observing off antibiotics or discontinuation of antibiotics and continue supportive care. If the respiratory panel is positive for atypical bacterial infection (Bordetella pertussis, Chlamydophila pneumoniae, or Mycoplasma pneumoniae), consider antibiotic de-escalation to target atypical bacterial infection.          Adult Transthoracic Echo Complete w/ Color, Spectral and Contrast if Necessary Per Protocol    Result Date: 5/8/2023  •  Left ventricular systolic function is normal. Left ventricular ejection fraction appears to be 61 - 65%. •  Left ventricular diastolic function is consistent with (grade II w/high LAP) pseudonormalization. •  Left atrial volume is severely increased. •  The right atrial cavity is moderately  dilated. •  There is calcification of the aortic valve. •  Moderate tricuspid valve regurgitation is present. •  Estimated right ventricular systolic pressure from tricuspid regurgitation is mildly elevated (35-45 mmHg). Calculated right ventricular systolic pressure from tricuspid regurgitation is 41 mmHg. •  Mild pulmonary hypertension is present.     US Gallbladder    Result Date: 5/8/2023  US GALLBLADDER Date of Exam: 5/8/2023 9:40 AM EDT Indication: abnl imaging. Comparison: CT angiogram May 8, 2023 Technique: Grayscale and color Doppler ultrasound evaluation of the right upper quadrant was performed. Findings: Exam is limited due to patient condition with limited scanning window. Assessment of the liver was limited due to scanning window and bowel gas. No focal hepatic abnormality is identified. The portal vein and hepatic veins demonstrate normal directional flow. Assessment was limited. There appear to be some echogenic material within the gallbladder lumen suggesting biliary sludge and/or gallstones. No significant focal shadowing gallstone was visualized. The gallbladder wall measures 2 mm.  No pericholecystic fluid was seen.  The common bile  duct measures 4 mm diameter. The right kidney measures 9.9 x 6 x 4.3 cm. Assessment of the right kidney was also limited. No definite stone, hydronephrosis, or significant focal renal lesion was identified. Pancreas was obscured by bowel gas. There is no significant ascites within the right upper quadrant.     Impression: 1.Limited evaluation due to patient condition and limited scanning window. Assessment of the right upper quadrant structures was significantly limited on this exam. 2.Echogenic material in the gallbladder lumen suggesting biliary sludge and/or gallstones. No definite sonographic findings to suggest acute cholecystitis or biliary ductal dilatation. 3.No other gross abnormality was seen in the right upper quadrant on this limited exam. Electronically Signed: Raheem Owens  5/8/2023 10:12 AM EDT  Workstation ID: [id number]    XR Abdomen KUB    Result Date: 5/9/2023  XR ABDOMEN KUB Date of Exam: 5/9/2023 3:42 PM EDT Indication: assess stool burden Comparison: 5/8/2023 Findings: Moderate colonic stool burden with moderate rectal stool ball, slightly increased from prior. Nonobstructive bowel gas pattern. Gaseous distention of the stomach. Spinal fusion hardware is seen. Lung bases appear clear. Degenerative changes of the left greater than right hip. Irregularity of the pubic bones may reflect sequela of old fracture no normal abdominal calcification seen.     Impression: Impression: Moderate colonic stool burden with moderate rectal stool ball, slightly increased from prior examination. Electronically Signed: Keyshawn Gonsalez  5/9/2023 4:28 PM EDT  Workstation ID: [id number]    XR Abdomen KUB    Result Date: 5/8/2023  XR ABDOMEN KUB Date of Exam: 5/8/2023 4:01 PM EDT Indication: reassess fecal burden Comparison: CT angiogram of the abdomen and pelvis May 8, 2023 Findings: There is a suspected large amount of stool in the colon and rectum, possibly mildly decreased compared to the prior CT. There does not  appear to be colonic dilatation. There are some gas distended small bowel loops without definite small bowel dilatation. No ectopic bowel gas is seen on this limited supine view. Fusion hardware is seen in the upper lumbar spine. Visualized lung bases appear grossly unremarkable.     Impression: Impression: Large amount of stool in the colon and rectum, possibly mildly decreased compared to the prior CT. Electronically Signed: Raheem Chapinaditya  5/8/2023 4:36 PM EDT  Workstation ID: [id number]      Results for orders placed during the hospital encounter of 05/07/23    Adult Transthoracic Echo Complete w/ Color, Spectral and Contrast if Necessary Per Protocol    Interpretation Summary  •  Left ventricular systolic function is normal. Left ventricular ejection fraction appears to be 61 - 65%.  •  Left ventricular diastolic function is consistent with (grade II w/high LAP) pseudonormalization.  •  Left atrial volume is severely increased.  •  The right atrial cavity is moderately  dilated.  •  There is calcification of the aortic valve.  •  Moderate tricuspid valve regurgitation is present.  •  Estimated right ventricular systolic pressure from tricuspid regurgitation is mildly elevated (35-45 mmHg). Calculated right ventricular systolic pressure from tricuspid regurgitation is 41 mmHg.  •  Mild pulmonary hypertension is present.      Current medications:  Scheduled Meds:apixaban, 2.5 mg, Oral, Q12H  cefTRIAXone, 2 g, Intravenous, Q24H  donepezil, 5 mg, Oral, Nightly  FLUoxetine, 40 mg, Oral, Daily  folic acid, 1 mg, Oral, Daily  lactulose, 300 mL, Rectal, Once  mineral oil, 1 enema, Rectal, Once  pantoprazole, 40 mg, Oral, BID AC  polyethylene glycol, 17 g, Oral, TID  risperiDONE, 0.25 mg, Oral, Daily  risperiDONE, 0.5 mg, Oral, Nightly  senna-docusate sodium, 2 tablet, Oral, BID  sodium chloride, 10 mL, Intravenous, Q12H  sodium hypochlorite, , Topical, Q12H      Continuous Infusions:lactated ringers, 100 mL/hr, Last Rate:  100 mL/hr (05/10/23 0522)      PRN Meds:.•  acetaminophen  •  cyclobenzaprine  •  Potassium Replacement - Follow Nurse / BPA Driven Protocol  •  sodium chloride  •  sodium chloride  •  sodium chloride    Assessment & Plan   Assessment & Plan     Active Hospital Problems    Diagnosis  POA   • **Fever, unknown origin [R50.9]  Yes   • Severe Malnutrition (HCC) [E43]  Yes   • Pulmonary nodule [R91.1]  Unknown   • Constipation [K59.00]  Unknown   • Pleural effusion [J90]  Unknown   • Compression fracture of fourth lumbar vertebra [S32.040A]  Unknown   • Thyroid mass of unclear etiology [E07.89]  Unknown   • Fecal impaction [K56.41]  Unknown   • Sepsis [A41.9]  Unknown   • Elevated troponin [R77.8]  Yes   • Acute kidney injury [N17.9]  Yes   • Acute on chronic anemia [D64.9]  Yes   • Lactic acidosis [E87.20]  Yes   • Wound with tunneling [T14.8XXA]  Yes   • PAF (paroxysmal atrial fibrillation) [I48.0]  Yes   • HTN (hypertension) [I10]  Yes   • Acute UTI (urinary tract infection) [N39.0]  Yes   • Anxiety [F41.9]  Yes   • Pulmonary hypertension [I27.20]  Yes   • Late onset Alzheimer's disease without behavioral disturbance (HCC) [G30.1, F02.80]  Yes      Resolved Hospital Problems   No resolved problems to display.        Brief Hospital Course to date:  Laura Wallace is a 83 y.o. female w/ a hx of pulmonary hypertension, HTN, PAF on Eliquis, alzheimer's dementia, anxiety who presented to the ED w/ c/o diaphoresis.     This patient's problems and plans were partially entered by my partner and updated as appropriate by me 05/10/23.     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, grew Enterobacter cloacae complex on 4/18/23 ()  -Fever, tachycardia, elevated lactic acid, elevated procalcitonin  reports episodes of diaphoresis x 1-2 months- worse past few days, source  -lactic acid 2.2; repeat 1.2, procal 0.37, WBC WNL   -CT chest, abdomen and pelvis completed  -CT head without  "acute findings  -blood cx 1 of 2 positive for gram negative bacilli --> now changed to bacillus species, likely contamination   -follow-up repeat blood cultures sent on 5/9  -s/p IVF  -s/p Vanc and Zosyn --> transitioned to CTX per ID, will likely switch to oral on discharge  -ID consulted  -symptom mgt  -WOC consulted  -ID consulted   -Constipation, stercoral proctitis noted on imaging.   -Abnormal gallbladder on imaging, gallbladder ultrasound negative for acute cholecystitis.    **Fecal impaction  **Stercoral proctitis  - Covering with antibiotics per above  - S/p Soapsuds enema every 4 hours  - Still with some fecal impaction, will trial mineral oil enema followed by lactulose enema  - Patient unable to take GoLytely; will trial TID MiraLAX; Doc senna 2 tabs twice daily  - If the above is unsuccessful, then nursing to do disimpaction versus GI consult if the impaction is too high    **Acute kidney injury, resolved  -previous CR ~0.8-0.7  -On admission CR 1.08 w/ eGFR 51.7  -s/p IVF  -monitor     **Dysphagia  -SLP consulted; nectar-thickened liquids     **Acute/chronic anemia   -previous H/H 9.3/30.1 in 9/2022  -current Hgb 8.3  -pt on Eliquis; continued for now      **Type II MI/demand ischemia due to above  -EKG reviewed  -troponin 66     **PAF   -continue Eliquis   -EKG reviewed  -resume BB, no longer hypotensive     **Pulmonary HTN   **Bilateral pleural effusions  -last ECHO 9/2022 w/ EF 60% and normal LVSF, RVSP of 38  -TTE for a.m.     **HTN   -pt initially hypotensive per EMS w/ BP 82/62  -s/p 500ml LR bolus and NS 1 liter bolus   -will resume Norvasc and lopressor     **Alzheimer's dementia   -continue Aricept, Risperidone  -bed alarm, fall precautions  -pt,ot and case mgt consult   -Discussed advanced dementia diagnosis with the patient's , he stated he wants her to be full code and he is not ready to discuss hospice as he does not feel she is \"there yet\"     **Anxiety   -continue routine Prozac "   -listed as taking PRN Ativan BID- not reordered 2/2 increased AMS and hypotension, add if needed      **Pulmonary nodule  **Thyroid mass  - Incidental findings on imaging  - Defer to outpatient follow-up    Expected Discharge Location and Transportation: Once fecal impaction resolved, discharge home, has 24/7 care  Expected Discharge   Expected Discharge Date: 5/12/2023; Expected Discharge Time:      DVT prophylaxis:  Medical DVT prophylaxis orders are present.          CODE STATUS:   Code Status and Medical Interventions:   Ordered at: 05/07/23 5294     Level Of Support Discussed With:    Health Care Surrogate     Code Status (Patient has no pulse and is not breathing):    CPR (Attempt to Resuscitate)     Medical Interventions (Patient has pulse or is breathing):    Full Support       Shannan Martin MD  05/10/23

## 2023-05-11 LAB
ANION GAP SERPL CALCULATED.3IONS-SCNC: 10 MMOL/L (ref 5–15)
BUN SERPL-MCNC: 5 MG/DL (ref 8–23)
BUN/CREAT SERPL: 8.5 (ref 7–25)
CALCIUM SPEC-SCNC: 8.9 MG/DL (ref 8.6–10.5)
CHLORIDE SERPL-SCNC: 105 MMOL/L (ref 98–107)
CO2 SERPL-SCNC: 22 MMOL/L (ref 22–29)
CREAT SERPL-MCNC: 0.59 MG/DL (ref 0.57–1)
EGFRCR SERPLBLD CKD-EPI 2021: 90.7 ML/MIN/1.73
GLUCOSE SERPL-MCNC: 75 MG/DL (ref 65–99)
POTASSIUM SERPL-SCNC: 4.2 MMOL/L (ref 3.5–5.2)
SODIUM SERPL-SCNC: 137 MMOL/L (ref 136–145)

## 2023-05-11 PROCEDURE — 92526 ORAL FUNCTION THERAPY: CPT

## 2023-05-11 PROCEDURE — 99231 SBSQ HOSP IP/OBS SF/LOW 25: CPT | Performed by: FAMILY MEDICINE

## 2023-05-11 PROCEDURE — 25010000002 CEFTRIAXONE PER 250 MG: Performed by: INTERNAL MEDICINE

## 2023-05-11 PROCEDURE — 80048 BASIC METABOLIC PNL TOTAL CA: CPT | Performed by: STUDENT IN AN ORGANIZED HEALTH CARE EDUCATION/TRAINING PROGRAM

## 2023-05-11 RX ADMIN — DONEPEZIL HYDROCHLORIDE 5 MG: 5 TABLET, FILM COATED ORAL at 20:52

## 2023-05-11 RX ADMIN — APIXABAN 2.5 MG: 2.5 TABLET, FILM COATED ORAL at 20:52

## 2023-05-11 RX ADMIN — RISPERIDONE 0.25 MG: 0.25 TABLET, FILM COATED ORAL at 08:57

## 2023-05-11 RX ADMIN — Medication 10 ML: at 08:58

## 2023-05-11 RX ADMIN — Medication 10 ML: at 20:53

## 2023-05-11 RX ADMIN — METOPROLOL TARTRATE 25 MG: 25 TABLET, FILM COATED ORAL at 08:58

## 2023-05-11 RX ADMIN — SODIUM HYPOCHLORITE: 1.25 SOLUTION TOPICAL at 11:41

## 2023-05-11 RX ADMIN — APIXABAN 2.5 MG: 2.5 TABLET, FILM COATED ORAL at 08:57

## 2023-05-11 RX ADMIN — SENNOSIDES AND DOCUSATE SODIUM 2 TABLET: 8.6; 5 TABLET ORAL at 20:52

## 2023-05-11 RX ADMIN — METOPROLOL TARTRATE 25 MG: 25 TABLET, FILM COATED ORAL at 20:52

## 2023-05-11 RX ADMIN — FOLIC ACID 1 MG: 1 TABLET ORAL at 08:57

## 2023-05-11 RX ADMIN — AMLODIPINE BESYLATE 10 MG: 10 TABLET ORAL at 08:58

## 2023-05-11 RX ADMIN — SODIUM CHLORIDE, POTASSIUM CHLORIDE, SODIUM LACTATE AND CALCIUM CHLORIDE 100 ML/HR: 600; 310; 30; 20 INJECTION, SOLUTION INTRAVENOUS at 02:46

## 2023-05-11 RX ADMIN — SODIUM HYPOCHLORITE: 1.25 SOLUTION TOPICAL at 06:50

## 2023-05-11 RX ADMIN — PANTOPRAZOLE SODIUM 40 MG: 40 TABLET, DELAYED RELEASE ORAL at 08:57

## 2023-05-11 RX ADMIN — CEFTRIAXONE 2 G: 2 INJECTION, POWDER, FOR SOLUTION INTRAMUSCULAR; INTRAVENOUS at 17:23

## 2023-05-11 RX ADMIN — RISPERIDONE 0.5 MG: 0.25 TABLET, FILM COATED ORAL at 20:52

## 2023-05-11 RX ADMIN — FLUOXETINE 40 MG: 20 CAPSULE ORAL at 08:57

## 2023-05-11 RX ADMIN — PANTOPRAZOLE SODIUM 40 MG: 40 TABLET, DELAYED RELEASE ORAL at 17:24

## 2023-05-11 RX ADMIN — SENNOSIDES AND DOCUSATE SODIUM 2 TABLET: 8.6; 5 TABLET ORAL at 08:57

## 2023-05-11 NOTE — PROGRESS NOTES
[facility] Medicine Services  PROGRESS NOTE    Patient Name: Laura Wallace  : 1940  MRN: [id number]    Date of Admission: 2023  Primary Care Physician: Justin Brumfield MD    Subjective   Subjective     CC:  Follow-up fever    HPI:  Pt resting comfortably  Family bedside     ROS:  Unable to obtain     Objective   Objective     Vital Signs:   Temp:  [97.5 °F (36.4 °C)-98.8 °F (37.1 °C)] (P) 98.2 °F (36.8 °C)  Heart Rate:  [52-81] (P) 52  Resp:  [17-18] (P) 17  BP: (127-166)/(50-74) (P) 144/54     Physical Exam:  Constitutional: No acute distress, awake, alert  HENT: NCAT, mucous membranes moist  Respiratory: Clear to auscultation bilaterally, respiratory effort normal   Cardiovascular: RRR, no murmurs, rubs, or gallops  Gastrointestinal:  nondistended  Musculoskeletal: No bilateral ankle edema  Psychiatric: Appropriate affect, cooperative  Neurologic: follows   Skin: No rashes      Results Reviewed:  LAB RESULTS:      Lab 05/10/23  0329 23  0402 23  0607 23  0015 23  1744   WBC 6.65 8.34 10.13  --   --  9.77   HEMOGLOBIN 8.4* 8.5* 9.1* 8.9*  --  8.3*   HEMATOCRIT 28.0* 26.9* 28.6*  28.2* 29.3*  --  26.4*   PLATELETS 226 275 302  --   --  256   NEUTROS ABS 4.26 5.49 6.61  --   --  7.49*   IMMATURE GRANS (ABS) 0.02 0.02 0.04  --   --  0.04   LYMPHS ABS 1.47 1.83 2.15  --   --  1.22   MONOS ABS 0.76 0.89 1.22*  --   --  0.98*   EOS ABS 0.12 0.10 0.09  --   --  0.02   MCV 87.8 83.5 83.6  --   --  83.8   PROCALCITONIN  --   --   --   --   --  0.37*   LACTATE  --   --   --   --  1.2 2.2*         Lab 23  0353 05/10/23  0847 05/10/23  0329 23  1612 23  0402 23  0607 23  1744   SODIUM 137  --  140  --  137 135* 133*   POTASSIUM 4.2 4.5 3.9 2.9* 3.2* 3.9 3.5   CHLORIDE 105  --  107  --  102 101 99   CO2 22.0  --  22.0  --  23.0 22.0 22.0   ANION GAP 10.0  --  11.0  --  12.0 12.0 12.0   BUN 5*  --  6*  --  13 15 21    CREATININE 0.59  --  0.68  --  0.85 0.88 1.08*   EGFR 90.7  --  87.6  --  68.9 66.1 51.7*   GLUCOSE 75  --  82  --  87 92 106*   CALCIUM 8.9  --  8.5*  --  8.7 8.9 8.5*   IONIZED CALCIUM  --   --   --   --   --  1.28  --    MAGNESIUM  --   --   --   --   --   --  2.1   TSH  --   --   --   --   --   --  4.460*         Lab 05/09/23  0402 05/07/23  1744   TOTAL PROTEIN 6.0 5.9*   ALBUMIN 3.5 3.2*   GLOBULIN 2.5 2.7   ALT (SGPT) 24 21   AST (SGOT) 42* 40*   BILIRUBIN 0.5 0.3   ALK PHOS 91 98         Lab 05/08/23  0607 05/08/23  0157 05/08/23  0015 05/07/23  1744   HSTROP T 70* 65* 67* 66*             Lab 05/08/23  0607 05/07/23 2000 05/07/23  1744   IRON  --   --  25*  25*   IRON SATURATION  --   --  6*   TIBC  --   --  417   TRANSFERRIN  --   --  280   FERRITIN  --   --  26.34   FOLATE  --  >20.00  --    VITAMIN B 12  --  >2,000*  --    ABO TYPING A  --  A   RH TYPING Positive  --  Positive   ANTIBODY SCREEN Negative  --   --          Brief Urine Lab Results  (Last result in the past 365 days)      Color   Clarity   Blood   Leuk Est   Nitrite   Protein   CREAT   Urine HCG        05/07/23 2218 Yellow   Turbid   Trace   Small (1+)   Positive   Trace                 Microbiology Results Abnormal     Procedure Component Value - Date/Time    Blood Culture - Blood, Hand, Right [[id number]]  (Normal) Collected: 05/08/23 2104    Lab Status: Preliminary result Specimen: Blood from Hand, Right Updated: 05/10/23 2131     Blood Culture No growth at 2 days    Blood Culture - Blood, Hand, Left [[id number]]  (Normal) Collected: 05/08/23 2104    Lab Status: Preliminary result Specimen: Blood from Hand, Left Updated: 05/10/23 2131     Blood Culture No growth at 2 days    Blood Culture - Blood, Arm, Right [[id number]]  (Normal) Collected: 05/07/23 1749    Lab Status: Preliminary result Specimen: Blood from Arm, Right Updated: 05/10/23 1830     Blood Culture No growth at 3 days    Blood Culture ID, PCR - Blood, Hand, Left [[id number]]   (Normal) Collected: 05/07/23 1748    Lab Status: Final result Specimen: Blood from Hand, Left Updated: 05/08/23 2145     BCID, PCR Negative by BCID PCR. Culture to Follow.    COVID PRE-OP / PRE-PROCEDURE SCREENING ORDER (NO ISOLATION) - Swab, Nasopharynx [[id number]]  (Normal) Collected: 05/07/23 1741    Lab Status: Final result Specimen: Swab from Nasopharynx Updated: 05/07/23 1847    Narrative:      The following orders were created for panel order COVID PRE-OP / PRE-PROCEDURE SCREENING ORDER (NO ISOLATION) - Swab, Nasopharynx.  Procedure                               Abnormality         Status                     ---------                               -----------         ------                     Respiratory Panel PCR w/...[[id number]]  Normal              Final result                 Please view results for these tests on the individual orders.    Respiratory Panel PCR w/COVID-19(SARS-CoV-2) JOSE/ALEJANDRA/SAKSHI/PAD/COR/MAD/BRYN In-House, NP Swab in UTM/VTM, 3-4 HR TAT - Swab, Nasopharynx [[id number]]  (Normal) Collected: 05/07/23 1741    Lab Status: Final result Specimen: Swab from Nasopharynx Updated: 05/07/23 1847     ADENOVIRUS, PCR Not Detected     Coronavirus 229E Not Detected     Coronavirus HKU1 Not Detected     Coronavirus NL63 Not Detected     Coronavirus OC43 Not Detected     COVID19 Not Detected     Human Metapneumovirus Not Detected     Human Rhinovirus/Enterovirus Not Detected     Influenza A PCR Not Detected     Influenza B PCR Not Detected     Parainfluenza Virus 1 Not Detected     Parainfluenza Virus 2 Not Detected     Parainfluenza Virus 3 Not Detected     Parainfluenza Virus 4 Not Detected     RSV, PCR Not Detected     Bordetella pertussis pcr Not Detected     Bordetella parapertussis PCR Not Detected     Chlamydophila pneumoniae PCR Not Detected     Mycoplasma pneumo by PCR Not Detected    Narrative:      In the setting of a positive respiratory panel with a viral infection PLUS a negative procalcitonin  without other underlying concern for bacterial infection, consider observing off antibiotics or discontinuation of antibiotics and continue supportive care. If the respiratory panel is positive for atypical bacterial infection (Bordetella pertussis, Chlamydophila pneumoniae, or Mycoplasma pneumoniae), consider antibiotic de-escalation to target atypical bacterial infection.          XR Abdomen KUB    Result Date: 5/10/2023  XR ABDOMEN KUB Date of Exam: 5/10/2023 4:16 PM EDT Indication: reassess fecal burden Comparison: 5/9/2023 Findings: Stool previously seen in the rectum is markedly diminished, and stool burden elsewhere appears decreased, with visible stool shadow only in the area of the distal ascending colon/hepatic flexure. Small bowel gas is increased but bowel loops are normal in  caliber. No bowel wall edema or pneumatosis is seen. Paraspinous fixation rods and bilateral hip joint DJD are again noted.     Impression: Impression: Nonobstructive bowel gas pattern, with significantly diminished, normal stool volume. Electronically Signed: Saqib Levin  5/10/2023 5:12 PM EDT  Workstation ID: [id number]    XR Abdomen KUB    Result Date: 5/9/2023  XR ABDOMEN KUB Date of Exam: 5/9/2023 3:42 PM EDT Indication: assess stool burden Comparison: 5/8/2023 Findings: Moderate colonic stool burden with moderate rectal stool ball, slightly increased from prior. Nonobstructive bowel gas pattern. Gaseous distention of the stomach. Spinal fusion hardware is seen. Lung bases appear clear. Degenerative changes of the left greater than right hip. Irregularity of the pubic bones may reflect sequela of old fracture no normal abdominal calcification seen.     Impression: Impression: Moderate colonic stool burden with moderate rectal stool ball, slightly increased from prior examination. Electronically Signed: Kesyhawn Gonsalez  5/9/2023 4:28 PM EDT  Workstation ID: [id number]      Results for orders placed during the hospital encounter of  05/07/23    Adult Transthoracic Echo Complete w/ Color, Spectral and Contrast if Necessary Per Protocol    Interpretation Summary  •  Left ventricular systolic function is normal. Left ventricular ejection fraction appears to be 61 - 65%.  •  Left ventricular diastolic function is consistent with (grade II w/high LAP) pseudonormalization.  •  Left atrial volume is severely increased.  •  The right atrial cavity is moderately  dilated.  •  There is calcification of the aortic valve.  •  Moderate tricuspid valve regurgitation is present.  •  Estimated right ventricular systolic pressure from tricuspid regurgitation is mildly elevated (35-45 mmHg). Calculated right ventricular systolic pressure from tricuspid regurgitation is 41 mmHg.  •  Mild pulmonary hypertension is present.      Current medications:  Scheduled Meds:amLODIPine, 10 mg, Oral, Q24H  apixaban, 2.5 mg, Oral, Q12H  cefTRIAXone, 2 g, Intravenous, Q24H  donepezil, 5 mg, Oral, Nightly  FLUoxetine, 40 mg, Oral, Daily  folic acid, 1 mg, Oral, Daily  metoprolol tartrate, 25 mg, Oral, Q12H  pantoprazole, 40 mg, Oral, BID AC  risperiDONE, 0.25 mg, Oral, Daily  risperiDONE, 0.5 mg, Oral, Nightly  senna-docusate sodium, 2 tablet, Oral, BID  sodium chloride, 10 mL, Intravenous, Q12H  sodium hypochlorite, , Topical, Q12H      Continuous Infusions:lactated ringers, 100 mL/hr, Last Rate: 100 mL/hr (05/11/23 0246)      PRN Meds:.•  acetaminophen  •  cyclobenzaprine  •  Potassium Replacement - Follow Nurse / BPA Driven Protocol  •  sodium chloride  •  sodium chloride  •  sodium chloride    Assessment & Plan   Assessment & Plan     Active Hospital Problems    Diagnosis  POA   • **Fever, unknown origin [R50.9]  Yes   • Severe Malnutrition (HCC) [E43]  Yes   • Pulmonary nodule [R91.1]  Unknown   • Constipation [K59.00]  Unknown   • Pleural effusion [J90]  Unknown   • Compression fracture of fourth lumbar vertebra [S32.040A]  Unknown   • Thyroid mass of unclear etiology  [E07.89]  Unknown   • Fecal impaction [K56.41]  Unknown   • Sepsis [A41.9]  Unknown   • Elevated troponin [R77.8]  Yes   • Acute kidney injury [N17.9]  Yes   • Acute on chronic anemia [D64.9]  Yes   • Lactic acidosis [E87.20]  Yes   • Wound with tunneling [T14.8XXA]  Yes   • PAF (paroxysmal atrial fibrillation) [I48.0]  Yes   • HTN (hypertension) [I10]  Yes   • Acute UTI (urinary tract infection) [N39.0]  Yes   • Anxiety [F41.9]  Yes   • Pulmonary hypertension [I27.20]  Yes   • Late onset Alzheimer's disease without behavioral disturbance (HCC) [G30.1, F02.80]  Yes      Resolved Hospital Problems   No resolved problems to display.        Brief Hospital Course to date:  Laura Wallace is a 83 y.o. female w/ a hx of pulmonary hypertension, HTN, PAF on Eliquis, alzheimer's dementia, anxiety who presented to the ED w/ c/o diaphoresis.     This patient's problems and plans were partially entered by my partner and updated as appropriate by me 05/11/23.     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, grew Enterobacter cloacae complex on 4/18/23 ()  -Fever, tachycardia, elevated lactic acid, elevated procalcitonin  reports episodes of diaphoresis x 1-2 months- worse past few days, source  -lactic acid 2.2; repeat 1.2, procal 0.37, WBC WNL   -CT chest, abdomen and pelvis completed  -CT head without acute findings  -blood cx 1 of 2 positive for gram negative bacilli --> now changed to bacillus species, likely contamination   -follow-up repeat blood cultures sent on 5/9  -s/p IVF  -s/p Vanc and Zosyn --> transitioned to CTX per ID, will likely switch to oral on discharge  -ID consulted  -symptom mgt  -WOC consulted  -ID consulted   -Constipation, stercoral proctitis noted on imaging.   -Abnormal gallbladder on imaging, gallbladder ultrasound negative for acute cholecystitis.    **Fecal impaction-resolved   **Stercoral proctitis  - Covering with antibiotics per above  - S/p  Soapsuds enema every 4 hours  - Still with some fecal impaction, will trial mineral oil enema followed by lactulose enema  - Patient unable to take GoLytely; will trial TID MiraLAX; Doc senna 2 tabs twice daily      **Acute kidney injury, resolved  -previous CR ~0.8-0.7  -On admission CR 1.08 w/ eGFR 51.7  -s/p IVF  -noiw 0.59     **Dysphagia  -SLP consulted; nectar-thickened liquids     **Acute/chronic anemia   -previous H/H 9.3/30.1 in 9/2022  -current Hgb 8.4  -pt on Eliquis; continued for now      **Type II MI/demand ischemia due to above  -troponin 66     **PAF   -continue Eliquis   -resume BB,      **Pulmonary HTN   **Bilateral pleural effusions  -last ECHO 9/2022 w/ EF 60% and normal LVSF, RVSP of 38  -ECH) 5/8 EF 61-65 , grade II diastolic, RVSP mildly elevated due to tricuspid regurg Mild pulm HTN      **HTN   -pt initially hypotensive per EMS w/ BP 82/62  -s/p 500ml LR bolus and NS 1 liter bolus   - resumed Norvasc and lopressor     **Alzheimer's dementia   -continue Aricept, Risperidone  -bed alarm, fall precautions  -pt,ot and case mgt consult     **Anxiety   -continue routine Prozac   -listed as taking PRN Ativan BID- not reordered 2/2 increased AMS and hypotension     **Pulmonary nodule  **Thyroid mass  - Incidental findings on imaging  - Defer to outpatient follow-up    Expected Discharge Location and Transportation: home with 24/7 care   Expected Discharge   Expected Discharge Date: 5/12/2023; Expected Discharge Time:      DVT prophylaxis:  Medical DVT prophylaxis orders are present.          CODE STATUS:   Code Status and Medical Interventions:   Ordered at: 05/07/23 2229     Level Of Support Discussed With:    Health Care Surrogate     Code Status (Patient has no pulse and is not breathing):    CPR (Attempt to Resuscitate)     Medical Interventions (Patient has pulse or is breathing):    Full Support       Ronna Serrato, DO  05/11/23

## 2023-05-11 NOTE — THERAPY TREATMENT NOTE
Acute Care - Speech Language Pathology   Swallow Treatment Note  [place]     Patient Name: Laura Wallace  : 1940  MRN: [id number]  Today's Date: 2023               Admit Date: 2023    Visit Dx:     ICD-10-CM ICD-9-CM   1. Acute febrile illness  R50.9 780.60   2. Hypotension, unspecified hypotension type  I95.9 458.9   3. Altered mental status, unspecified altered mental status type  R41.82 780.97   4. Dysphagia, unspecified type  R13.10 787.20     Patient Active Problem List   Diagnosis   • Late onset Alzheimer's disease without behavioral disturbance (HCC)   • Multiple closed fractures of pelvis without disruption of pelvic ring, initial encounter   • Pneumonia   • Paroxysmal atrial fibrillation with rapid ventricular response   • Acute respiratory failure with hypoxia   • Pulmonary hypertension   • Anxiety   • Mild cognitive disorder   • Fever, unknown origin   • Elevated troponin   • Acute kidney injury   • Acute on chronic anemia   • Lactic acidosis   • Wound with tunneling   • PAF (paroxysmal atrial fibrillation)   • HTN (hypertension)   • Acute UTI (urinary tract infection)   • Pulmonary nodule   • Constipation   • Pleural effusion   • Compression fracture of fourth lumbar vertebra   • Thyroid mass of unclear etiology   • Fecal impaction   • Sepsis   • Severe Malnutrition (HCC)     Past Medical History:   Diagnosis Date   • Anxiety 2022   • Late onset Alzheimer's disease without behavioral disturbance 2018   • Paroxysmal atrial fibrillation with rapid ventricular response 2022   • Pulmonary hypertension 2022     Past Surgical History:   Procedure Laterality Date   • APPENDECTOMY  1960   • VERTEBROPLASTY      L123, fusion       SLP Recommendation and Plan  SLP Swallowing Diagnosis: mild, oral dysphagia, suspected pharyngeal dysphagia (23 1440)  SLP Diet Recommendation: regular textures, nectar thick liquids, water between meals after oral care, with supervision  (05/11/23 1440)  Recommended Precautions and Strategies: general aspiration precautions, assist with feeding (05/11/23 1440)  SLP Rec. for Method of Medication Administration: meds crushed, with puree (05/11/23 1440)     Monitor for Signs of Aspiration: notify SLP if any concerns (05/11/23 1440)     Swallow Criteria for Skilled Therapeutic Interventions Met: demonstrates skilled criteria (05/11/23 1440)  Anticipated Discharge Disposition (SLP): home with [address] [place] [address] (05/11/23 1440)  Rehab Potential/Prognosis, Swallowing: re-evaluate goals as necessary (05/11/23 1440)  Therapy Frequency (Swallow): 3 days per week (05/11/23 1440)  Predicted Duration Therapy Intervention (Days): until discharge (05/11/23 1440)        Daily Summary of Progress (SLP): progress toward functional goals as expected (05/11/23 1440)               Treatment Assessment (SLP): continued, toleration of diet, no clinical signs of, aspiration (with nectar thick liquids) (05/11/23 1440)     Plan for Continued Treatment (SLP): continue treatment per plan of care (05/11/23 1440)                SWALLOW EVALUATION (last 72 hours)     SLP Adult Swallow Evaluation     Row Name 05/11/23 1440 05/09/23 1130                Rehab Evaluation    Document Type -- re-evaluation  -DV       Subjective Information -- no complaints  -DV       Patient Observations -- alert;cooperative  -DV       Patient/Family/Caregiver Comments/Observations --  present  -DV       Patient Effort -- good  -DV       Symptoms Noted During/After Treatment -- none  -DV          General Information    Patient Profile Reviewed -- yes  -DV       Pertinent History Of Current Problem -- see initial ev al  -DV       Current Method of Nutrition -- regular textures;nectar/syrup-thick liquids  -DV       Precautions/Limitations, Vision -- WFL;for purposes of eval  -DV       Precautions/Limitations, Hearing -- WFL;for purposes of eval  -DV       Prior Level of Function-Communication --  cognitive-linguistic impairment;other (see comments)  hx dementia  -DV       Prior Level of Function-Swallowing -- safe, efficient swallowing in all situations  meds crushed in puree at baseline  -DV       Plans/Goals Discussed with -- patient;spouse/S.O.;agreed upon  -DV       Barriers to Rehab -- cognitive status;previous functional deficit  -DV       Patient's Goals for Discharge -- patient did not state  -DV       Family Goals for Discharge -- family did not state  -DV          Pain    Additional Documentation -- Pain Scale: FACES Pre/Post-Treatment (Group)  -DV          Pain Scale: FACES Pre/Post-Treatment    Pain: FACES Scale, Pretreatment -- 0-->no hurt  -DV       Posttreatment Pain Rating -- 0-->no hurt  -DV          Oral Motor Structure and Function    Dentition Assessment -- natural, present and adequate  -DV       Secretion Management -- WNL/WFL  -DV       Mucosal Quality -- moist, healthy  -DV          Oral Musculature and Cranial Nerve Assessment    Oral Motor General Assessment -- generalized oral motor weakness;unable to assess  -DV          General Eating/Swallowing Observations    Positioning During Eating -- upright 90 degree  -DV       Utensils Used -- spoon;straw;cup  -DV       Consistencies Trialed -- regular textures;pureed;thin liquids;nectar/syrup-thick liquids  -DV          Respiratory    Respiratory Status -- WFL  -DV          Clinical Swallow Eval    Oral Prep Phase -- WFL  -DV       Oral Transit -- WFL  -DV       Oral Residue -- WFL  -DV       Pharyngeal Phase -- suspected pharyngeal impairment  -DV       Clinical Swallow Evaluation Summary -- Cough w/ thin liquid. No s/sx aspiration with trials of nectar-thick liquid, pudding, or regular solid.  -DV          Pharyngeal Phase Concerns    Pharyngeal Phase Concerns -- cough  -DV       Cough -- thin  -DV          SLP Evaluation Clinical Impression    SLP Swallowing Diagnosis mild;oral dysphagia;suspected pharyngeal dysphagia  -CH mild;oral  dysphagia;suspected pharyngeal dysphagia  -       Functional Impact risk of aspiration/pneumonia;risk of dehydration  - risk of aspiration/pneumonia;risk of dehydration  -       Rehab Potential/Prognosis, Swallowing re-evaluate goals as necessary  - re-evaluate goals as necessary  -       Swallow Criteria for Skilled Therapeutic Interventions Met demonstrates skilled criteria  -CH demonstrates skilled criteria  -DV          SLP Treatment Clinical Impressions    Treatment Assessment (SLP) continued;toleration of diet;no clinical signs of;aspiration  with nectar thick liquids  - --       Daily Summary of Progress (SLP) progress toward functional goals as expected  - --       Plan for Continued Treatment (SLP) continue treatment per plan of care  - --       Care Plan Review evaluation/treatment results reviewed;care plan/treatment goals reviewed;risks/benefits reviewed  - --       Care Plan Review, Other Participant(s) spouse  - --          Recommendations    Therapy Frequency (Swallow) 3 days per week  - 5 days per week  -       Predicted Duration Therapy Intervention (Days) until discharge  -CH until discharge  -DV       SLP Diet Recommendation regular textures;nectar thick liquids;water between meals after oral care, with supervision  - regular textures;nectar thick liquids;water between meals after oral care, with supervision  -DV       Recommended Precautions and Strategies general aspiration precautions;assist with feeding  - general aspiration precautions;assist with feeding  -       Oral Care Recommendations Oral Care BID/PRN;Toothbrush  - Oral Care BID/PRN;Toothbrush  -DV       SLP Rec. for Method of Medication Administration meds crushed;with puree  - meds crushed;with puree  -DV       Monitor for Signs of Aspiration notify SLP if any concerns  - notify SLP if any concerns  -DV       Anticipated Discharge Disposition (SLP) home with 24/7 care  - home with 24/7 care  -              User Key  (r) = Recorded By, (t) = Taken By, (c) = Cosigned By    Initials Name Effective Dates    CH Danna Chamorro, MS CCC-SLP 06/16/21 -     DV Brittanie Grider MS CCC-SLP 06/16/21 -                 EDUCATION  The patient has been educated in the following areas:   Dysphagia (Swallowing Impairment) Oral Care/Hydration Modified Diet Instruction.        SLP GOALS     Row Name 05/11/23 1440 05/09/23 1130          (LTG) Patient will demonstrate functional swallow for    Diet Texture (Demonstrate functional swallow) regular textures  -CH regular textures  -DV     Liquid viscosity (Demonstrate functional swallow) thin liquids  - thin liquids  -DV     [place] (Demonstrate functional swallow) with minimal cues (75-90% accuracy)  - with minimal cues (75-90% accuracy)  -DV     Time Frame (Demonstrate functional swallow) by discharge  - by discharge  -DV     Progress/Outcomes (Demonstrate functional swallow) continuing progress toward goal  - new goal  -DV        (STG) Patient will tolerate trials of    Consistencies Trialed (Tolerate trials) regular textures;nectar/ mildly thick liquids  - regular textures;nectar/ mildly thick liquids  -DV     Desired Outcome (Tolerate trials) without signs/symptoms of aspiration;with adequate oral prep/transit/clearance  - without signs/symptoms of aspiration;with adequate oral prep/transit/clearance  -DV     [place] (Tolerate trials) independently (over 90% accuracy)  - independently (over 90% accuracy)  -DV     Time Frame (Tolerate trials) 1 week  - 1 week  -DV     Progress/Outcomes (Tolerate trials) continuing progress toward goal  - new goal  -DV     Comment (Tolerate trials) No s/s of aspiration with nectar thick trials by straw  - --        (STG) Patient will tolerate therapeutic trials of    Consistencies Trialed (Tolerate therapeutic trials) thin liquids  - thin liquids  -DV     Desired Outcome (Tolerate therapeutic trials) without  signs/symptoms of aspiration  -CH without signs/symptoms of aspiration  -DV     [place] (Tolerate therapeutic trials) with minimal cues (75-90% accuracy)  -CH with minimal cues (75-90% accuracy)  -DV     Time Frame (Tolerate therapeutic trials) by discharge  - --     Progress/Outcomes (Tolerate therapeutic trials) continuing progress toward goal  -CH new goal  -DV     Comment (Tolerate therapeutic trials) No s/s with thin liquids by spoon or small single controlled cup sips. Spouse reported better tolerance with NT liquids  - --           User Key  (r) = Recorded By, (t) = Taken By, (c) = Cosigned By    Initials Name Provider Type    Danna Franklin MS CCC-SLP Speech and Language Pathologist    Brittanie Oseguera MS CCC-SLP Speech and Language Pathologist                   Time Calculation:    Time Calculation- SLP     Row Name 05/11/23 1521             Time Calculation- SLP    SLP Start Time 1440  -      SLP Received On 05/11/23  -CH         Untimed Charges    54673-VP Treatment Swallow Minutes 38  -CH         Total Minutes    Untimed Charges Total Minutes 38  -CH       Total Minutes 38  -CH            User Key  (r) = Recorded By, (t) = Taken By, (c) = Cosigned By    Initials Name Provider Type    Danna Franklin MS CCC-SLP Speech and Language Pathologist                Therapy Charges for Today     Code Description Service Date Service Provider Modifiers Qty    [id number]  ST TREATMENT SWALLOW 3 5/11/2023 Danna Chamorro MS CCC-SLP GN 1               Danna Chamorro MS CCC-SLP  5/11/2023

## 2023-05-11 NOTE — PLAN OF CARE
Problem: Adult Inpatient Plan of Care  Goal: Plan of Care Review  Outcome: Ongoing, Progressing  Goal: Patient-Specific Goal (Individualized)  Outcome: Ongoing, Progressing  Goal: Absence of Hospital-Acquired Illness or Injury  Outcome: Ongoing, Progressing  Intervention: Identify and Manage Fall Risk  Recent Flowsheet Documentation  Taken 5/11/2023 1400 by Flor Lieberman RN Extern  Safety Promotion/Fall Prevention:   activity supervised   assistive device/personal items within reach   safety round/check completed   room organization consistent   clutter free environment maintained   nonskid shoes/slippers when out of bed  Taken 5/11/2023 1200 by Flor Lieberman RN Extern  Safety Promotion/Fall Prevention:   assistive device/personal items within reach   clutter free environment maintained   safety round/check completed   room organization consistent   nonskid shoes/slippers when out of bed  Taken 5/11/2023 1000 by Flor Lieberman RN Extern  Safety Promotion/Fall Prevention:   activity supervised   assistive device/personal items within reach   safety round/check completed   room organization consistent   nonskid shoes/slippers when out of bed  Taken 5/11/2023 0800 by Flor Lieberman RN Extern  Safety Promotion/Fall Prevention:   activity supervised   assistive device/personal items within reach   room organization consistent   nonskid shoes/slippers when out of bed  Intervention: Prevent Skin Injury  Recent Flowsheet Documentation  Taken 5/11/2023 1400 by Flor Lieberman RN Extern  Body Position:   left   turned   neutral body alignment   neutral head position   supine  Skin Protection:   adhesive use limited   tubing/devices free from skin contact   transparent dressing maintained   incontinence pads utilized  Taken 5/11/2023 1200 by Flor Lieberman RN Extern  Body Position:   supine, legs elevated   lower extremity elevated   turned   right  Skin Protection:   adhesive use limited   tubing/devices free  from skin contact   transparent dressing maintained   incontinence pads utilized  Taken 5/11/2023 1000 by Flor Lieberman RN Extern  Skin Protection:   tubing/devices free from skin contact   transparent dressing maintained   incontinence pads utilized   adhesive use limited  Taken 5/11/2023 0800 by Flor Lieberman RN Extern  Skin Protection:   tubing/devices free from skin contact   transparent dressing maintained   adhesive use limited   incontinence pads utilized  Intervention: Prevent and Manage VTE (Venous Thromboembolism) Risk  Recent Flowsheet Documentation  Taken 5/11/2023 1400 by Flor Lieberman RN Extern  Activity Management: activity encouraged  Taken 5/11/2023 1000 by Flor Lieberman RN Extern  Activity Management: activity encouraged  Taken 5/11/2023 0800 by Flor Lieberman RN Extern  Activity Management: activity encouraged  VTE Prevention/Management: sequential compression devices off  Range of Motion: active ROM (range of motion) encouraged  Intervention: Prevent Infection  Recent Flowsheet Documentation  Taken 5/11/2023 1400 by Flor Lieberman RN Extern  Infection Prevention: environmental surveillance performed  Taken 5/11/2023 1200 by Flor Lieberman RN Extern  Infection Prevention: environmental surveillance performed  Taken 5/11/2023 1000 by Flor Lieberman RN Extern  Infection Prevention: environmental surveillance performed  Taken 5/11/2023 0800 by Flor Lieberman RN Extern  Infection Prevention: environmental surveillance performed  Goal: Optimal Comfort and Wellbeing  Outcome: Ongoing, Progressing  Intervention: Provide Person-Centered Care  Recent Flowsheet Documentation  Taken 5/11/2023 0800 by Flor Lieberman RN Extern  Trust Relationship/Rapport:   care explained   choices provided   reassurance provided   thoughts/feelings acknowledged   questions answered  Goal: Readiness for Transition of Care  Outcome: Ongoing, Progressing     Problem: Fall Injury Risk  Goal: Absence of  Fall and Fall-Related Injury  Outcome: Ongoing, Progressing  Intervention: Identify and Manage Contributors  Recent Flowsheet Documentation  Taken 5/11/2023 1400 by Flor Lieberman RN Extern  Medication Review/Management: medications reviewed  Self-Care Promotion: BADL personal objects within reach  Taken 5/11/2023 1200 by Flor Lieberman RN Extern  Medication Review/Management: medications reviewed  Self-Care Promotion: BADL personal objects within reach  Taken 5/11/2023 1000 by Flor Lieberman RN Extern  Medication Review/Management: medications reviewed  Self-Care Promotion: BADL personal objects within reach  Taken 5/11/2023 0800 by Flor Lieberman RN Extern  Medication Review/Management: medications reviewed  Self-Care Promotion: independence encouraged  Intervention: Promote Injury-Free Environment  Recent Flowsheet Documentation  Taken 5/11/2023 1400 by Flor Lieberman RN Extern  Safety Promotion/Fall Prevention:   activity supervised   assistive device/personal items within reach   safety round/check completed   room organization consistent   clutter free environment maintained   nonskid shoes/slippers when out of bed  Taken 5/11/2023 1200 by Flor Lieberman RN Extern  Safety Promotion/Fall Prevention:   assistive device/personal items within reach   clutter free environment maintained   safety round/check completed   room organization consistent   nonskid shoes/slippers when out of bed  Taken 5/11/2023 1000 by Flor Lieberman RN Extern  Safety Promotion/Fall Prevention:   activity supervised   assistive device/personal items within reach   safety round/check completed   room organization consistent   nonskid shoes/slippers when out of bed  Taken 5/11/2023 0800 by Flor Lieberman RN Extern  Safety Promotion/Fall Prevention:   activity supervised   assistive device/personal items within reach   room organization consistent   nonskid shoes/slippers when out of bed     Problem: Skin Injury Risk  Increased  Goal: Skin Health and Integrity  Outcome: Ongoing, Progressing  Intervention: Promote and Optimize Oral Intake  Recent Flowsheet Documentation  Taken 5/11/2023 1200 by Flor Lieberman RN Extern  Oral Nutrition Promotion: medicated  Taken 5/11/2023 1000 by Flor Lieberman RN Extern  Oral Nutrition Promotion: rest periods promoted  Taken 5/11/2023 0800 by Flor Lieberman RN Extern  Oral Nutrition Promotion: medicated  Intervention: Optimize Skin Protection  Recent Flowsheet Documentation  Taken 5/11/2023 1400 by Flor Lieberman RN Extern  Pressure Reduction Techniques:   frequent weight shift encouraged   weight shift assistance provided   heels elevated off bed   pressure points protected   positioned off wounds  Head of Bed (HOB) Positioning: [facility] [place] elevated  Pressure Reduction Devices:   specialty bed utilized   heel offloading device utilized   positioning supports utilized  Skin Protection:   adhesive use limited   tubing/devices free from skin contact   transparent dressing maintained   incontinence pads utilized  Taken 5/11/2023 1200 by Flor Lieberman RN Extern  Pressure Reduction Techniques:   frequent weight shift encouraged   weight shift assistance provided   positioned off wounds  Head of Bed (HOB) Positioning: HOB elevated  Skin Protection:   adhesive use limited   tubing/devices free from skin contact   transparent dressing maintained   incontinence pads utilized  Taken 5/11/2023 1000 by Flor Lieberman RN Extern  Pressure Reduction Techniques:   positioned off wounds   weight shift assistance provided   frequent weight shift encouraged  Head of Bed (HOB) Positioning: [facility] [place] elevated  Pressure Reduction Devices: specialty bed utilized  Skin Protection:   tubing/devices free from skin contact   transparent dressing maintained   incontinence pads utilized   adhesive use limited  Taken 5/11/2023 0800 by Flor Lieberman RN Extern  Pressure Reduction Techniques:   weight shift assistance  provided   frequent weight shift encouraged   positioned off wounds  Head of Bed (HOB) Positioning: HOB elevated  Skin Protection:   tubing/devices free from skin contact   transparent dressing maintained   adhesive use limited   incontinence pads utilized   Goal Outcome Evaluation:

## 2023-05-11 NOTE — PROGRESS NOTES
INFECTIOUS DISEASE progress note    Laura Wallace  1940  [id number]    Date of consult: 5/9/23  Admit date: 5/7/2023    Requesting Provider: Dr. Martin  Evaluating physician: Hamzah Charlton MD  Reason for Consultation: deep back wound infection  Chief Complaint: diaphoresis      Subjective   History of present illness:  Laura Wallace is a  83 y.o.  Yr old female with a past medical history of hypertension, paroxysmal A. Fib on Eliquis, Alzheimer's dementia, and pulmonary hypertension who presented to the ER on 5/7 with complaints of diaphoresis. She has had intermittent episodes of diaphoresis for the last 1-2 months.  Since arrival, the patient has been febrile up to 101.1°F.  SPO2 has been mostly in the mid 90s on room air. Respiratory PCR panel was negative. Pro-calcitonin was mildly elevated at 0.37.  CPK level was 446. High sensitivity troponin was 66-->70. White blood cell count has remained normal. Hemoglobin is low at 8.5. potassium is slightly low at 3.2. CTA chest/abdomen/pelvis showed a large amount of layering debris within the gallbladder with a distended gallbladder.  There is a very large amount of rectal fecal debris consistent with fecal impaction or obstipation.  There is edematous wall thickening involving the rectum which could represent stercoral proctitis.  She had edematous rugal wall thickening of the stomach which could represent gastritis.  GI compression fracture at L4.  There is a 6 mm pulmonary nodule in the right lower lobe. Trace bilateral pleural effusions.  Has some background emphysema. There is a low density cystic mass in the left lobe of the thyroid measuring 2.2 cm. Gallbladder ultrasound showed echogenic material in the gallbladder lumen suggesting biliary sludge and/or gallstones that there is no definitive findings of acute cholecystitis or biliary ductal dilatation. CT head showed no acute intracranial abnormality. 1 out of 2 blood cultures from 5/7 has  bacillus species.  Urine culture has greater than 100,000 CFU per milliliter lactose  with final result pending. Repeat blood cultures from 5/8 are in progress. The patient does have a sacral spine pressure injury. She reportedly grew Enterobacter cloaca complex on 4/18/23 from her sacral spine wound.  The patient has been on IV vancomycin and Zosyn.    Subjective:    5/10/23: The patient has no complaints today although history is limited due to her dementia.  She denies any abdominal pain.  Her  is at bedside and reports that she has received some enemas due to constipation.  No documented fevers since the evening of 5/8/23.    5/11/23: The patient is doing well and has no complaints although she is a poor historian.  She does deny abdominal or flank pain.  Not having any diarrhea or nausea.  Her  is at bedside helping her breakfast.  She is not having any fevers.  Her repeat blood cultures are no growth to date.  Her  asks about ways to help prevent her from having recurrent urinary tract infections.  We discussed in length today.    Past Medical History:   Diagnosis Date   • Anxiety 9/23/2022   • Late onset Alzheimer's disease without behavioral disturbance 5/25/2018   • Paroxysmal atrial fibrillation with rapid ventricular response 4/23/2022   • Pulmonary hypertension 9/20/2022       Past Surgical History:   Procedure Laterality Date   • APPENDECTOMY  1960   • VERTEBROPLASTY      L123, fusion       Pediatric History   Patient Parents   • Not on file     Other Topics Concern   • Not on file   Social History Narrative   • Not on file       family history includes Colon cancer in her brother; Heart attack in her father.    Allergies   Allergen Reactions   • Codeine Nausea And Vomiting       Immunization History   Administered Date(s) Administered   • COVID-19 (PFIZER) Purple Cap Monovalent 01/28/2021, 02/23/2021, 12/08/2021   • Flu Vaccine Split Quad 09/14/2016   • Pneumococcal  Conjugate 13-Valent (PCV13) 04/24/2017       Medication:    Current Facility-Administered Medications:   •  acetaminophen (TYLENOL) tablet 650 mg, 650 mg, Oral, Q4H PRN, Lakisha Leslie APRN, 650 mg at 05/08/23 2130  •  amLODIPine (NORVASC) tablet 10 mg, 10 mg, Oral, Q24H, Shannan Martin MD, 10 mg at 05/11/23 0858  •  apixaban (ELIQUIS) tablet 2.5 mg, 2.5 mg, Oral, Q12H, Lakisha Leslie APRN, 2.5 mg at 05/11/23 0857  •  cefTRIAXone (ROCEPHIN) 2 g/100 mL 0.9% NS IVPB (MBP), 2 g, Intravenous, Q24H, Hamzah Charlton MD, 2 g at 05/11/23 1723  •  cyclobenzaprine (FLEXERIL) tablet 5 mg, 5 mg, Oral, TID PRN, Shannan Martin MD  •  donepezil (ARICEPT) tablet 5 mg, 5 mg, Oral, Nightly, Lakisha Leslie APRN, 5 mg at 05/10/23 2203  •  FLUoxetine (PROzac) capsule 40 mg, 40 mg, Oral, Daily, Lakisha Leslie APRN, 40 mg at 05/11/23 0857  •  folic acid (FOLVITE) tablet 1 mg, 1 mg, Oral, Daily, Lakisha Leslie APRN, 1 mg at 05/11/23 0857  •  metoprolol tartrate (LOPRESSOR) tablet 25 mg, 25 mg, Oral, Q12H, Shannan Martin MD, 25 mg at 05/11/23 0858  •  pantoprazole (PROTONIX) EC tablet 40 mg, 40 mg, Oral, BID AC, Gertrude Hendrickson DO, 40 mg at 05/11/23 1724  •  Potassium Replacement - Follow Nurse / BPA Driven Protocol, , Does not apply, PRN, Lakisha Leslie APRN  •  risperiDONE (risperDAL) tablet 0.25 mg, 0.25 mg, Oral, Daily, Lakisha Leslie APRN, 0.25 mg at 05/11/23 0857  •  risperiDONE (risperDAL) tablet 0.5 mg, 0.5 mg, Oral, Nightly, Lakisha Leslie APRN, 0.5 mg at 05/10/23 2204  •  sennosides-docusate (PERICOLACE) 8.6-50 MG per tablet 2 tablet, 2 tablet, Oral, BID, Shannan Martin MD, 2 tablet at 05/11/23 0857  •  sodium chloride 0.9 % flush 10 mL, 10 mL, Intravenous, PRN, Chapincito Gauthier MD  •  sodium chloride 0.9 % flush 10 mL, 10 mL, Intravenous, Q12H, Lakisha Leslie APRN, 10 mL at 05/11/23 0858  •  sodium chloride 0.9 % flush 10 mL, 10 mL, Intravenous, PRN, Lakisha Leslie APRN  •  sodium chloride 0.9 % infusion 40 mL, 40 mL,  "Intravenous, PRN, Lakisha Leslie, CARLOZ  •  sodium hypochlorite (DAKIN'S 1/4 STRENGTH) 0.125 % topical solution 0.125% solution, , Topical, Q12H, Shannan Martin MD, Given at 05/11/23 1141    Please refer to the medical record for a full medication list    Review of Systems:    Unable to obtain an accurate 12 point review of systems in the setting of the patient's underlying Alzheimer's dementia.    Physical Exam:   Vital Signs   Temp:  [97.5 °F (36.4 °C)-98.8 °F (37.1 °C)] 98 °F (36.7 °C)  Heart Rate:  [52-81] 64  Resp:  [17-18] 18  BP: (121-166)/(48-74) 130/48    Temp  Min: 97.5 °F (36.4 °C)  Max: 98.8 °F (37.1 °C)  BP  Min: 121/50  Max: 166/74  Pulse  Min: 52  Max: 81  Resp  Min: 17  Max: 18  SpO2  Min: 96 %  Max: 98 %    Blood pressure 130/48, pulse 64, temperature 98 °F (36.7 °C), temperature source Axillary, resp. rate 18, height 154.9 cm (61\"), weight 44.1 kg (97 lb 3.2 oz), SpO2 98 %.  GENERAL: Awake and alert, in no acute distress. Appears stated age.  Frail.  Sitting up in bed eating breakfast  HEENT:  Normocephalic, atraumatic.  No external oral lesions noted.  EYES: pupils equal and round.  No conjunctival injection.  anicteric  HEART: RRR, no murmur  LUNGS: CTA B.  Nonlabored breathing on room air  ABDOMEN: Soft, nontender, nondistended. No appreciable HSM.  Decreased bowel sounds  GENITAL: no Contreras catheter  SKIN: no generalized rashes.   I did not visualize her sacral decubitus ulcer today  EXT:  No cellulitic change.  NEURO: awake alert. Has baseline dementia and is a very poor historian and not oriented. Answer some simple yes/no questions     Results Review:   I reviewed the patient's new clinical results.    Results from last 7 days   Lab Units 05/10/23  0329 05/09/23  0402 05/08/23  0607   WBC 10*3/mm3 6.65 8.34 10.13   HEMOGLOBIN g/dL 8.4* 8.5* 9.1*   HEMATOCRIT % 28.0* 26.9* 28.6*  28.2*   PLATELETS 10*3/mm3 226 275 302     Results from last 7 days   Lab Units 05/11/23  0353   SODIUM mmol/L 137 "   POTASSIUM mmol/L 4.2   CHLORIDE mmol/L 105   CO2 mmol/L 22.0   BUN mg/dL 5*   CREATININE mg/dL 0.59   GLUCOSE mg/dL 75   CALCIUM mg/dL 8.9     Results from last 7 days   Lab Units 05/09/23  0402   ALK PHOS U/L 91   BILIRUBIN mg/dL 0.5   ALT (SGPT) U/L 24   AST (SGOT) U/L 42*             Results from last 7 days   Lab Units 05/09/23  1357   VANCOMYCIN RM mcg/mL 8.00     Results from last 7 days   Lab Units 05/07/23  2051   LACTATE mmol/L 1.2     Estimated Creatinine Clearance: 52.1 mL/min (by C-G formula based on SCr of 0.59 mg/dL).  CPK        5/8/2023    06:07   Common Labsle   Creatine Kinase 477        Procalitonin Results:      Lab 05/07/23  1744   PROCALCITONIN 0.37*      Brief Urine Lab Results  (Last result in the past 365 days)      Color   Clarity   Blood   Leuk Est   Nitrite   Protein   CREAT   Urine HCG        05/07/23 2218 Yellow   Turbid   Trace   Small (1+)   Positive   Trace                No results found for: SITE, ALLENTEST, PHART, IPK6JHB, PO2ART, VFS3IBZ, BASEEXCESS, E8DNQQGR, HGBBG, HCTABG, OXYHEMOGLOBI, METHHGBN, CARBOXYHGB, CO2CT, BAROMETRIC, MODALITY, FIO2     Microbiology:  Blood Culture   Date Value Ref Range Status   05/07/2023 No growth at 24 hours  Preliminary     BCID, PCR   Date Value Ref Range Status   05/07/2023 Negative by BCID PCR. Culture to Follow. Negative by BCID PCR. Culture to Follow. Final     Urine Culture   Date Value Ref Range Status   05/07/2023 >100,000 CFU/mL Lactose  (A)  Preliminary     No results found for: VIRALCULTU, WOUNDCX     Blood Culture   Date Value Ref Range Status   05/07/2023 No growth at 24 hours  Preliminary     Urine Culture   Date Value Ref Range Status   05/07/2023 >100,000 CFU/mL Lactose  (A)  Preliminary     BCID, PCR   Date Value Ref Range Status   05/07/2023 Negative by BCID PCR. Culture to Follow. Negative by BCID PCR. Culture to Follow. Final   (      Radiology:  Imaging Results (Last 72 Hours)     Procedure Component Value  Units Date/Time    XR Abdomen KUB [[id number]] Collected: 05/10/23 1710     Updated: 05/10/23 1715    Narrative:      XR ABDOMEN KUB    Date of Exam: 5/10/2023 4:16 PM EDT    Indication: reassess fecal burden    Comparison: 5/9/2023    Findings:  Stool previously seen in the rectum is markedly diminished, and stool burden elsewhere appears decreased, with visible stool shadow only in the area of the distal ascending colon/hepatic flexure. Small bowel gas is increased but bowel loops are normal in   caliber. No bowel wall edema or pneumatosis is seen. Paraspinous fixation rods and bilateral hip joint DJD are again noted.      Impression:      Impression:  Nonobstructive bowel gas pattern, with significantly diminished, normal stool volume.      Electronically Signed: Saqib Levin    5/10/2023 5:12 PM EDT    Workstation ID: [id number]    XR Abdomen KUB [[id number]] Collected: 05/09/23 1626     Updated: 05/09/23 1631    Narrative:      XR ABDOMEN KUB    Date of Exam: 5/9/2023 3:42 PM EDT    Indication: assess stool burden    Comparison: 5/8/2023    Findings:  Moderate colonic stool burden with moderate rectal stool ball, slightly increased from prior. Nonobstructive bowel gas pattern. Gaseous distention of the stomach. Spinal fusion hardware is seen. Lung bases appear clear. Degenerative changes of the left   greater than right hip. Irregularity of the pubic bones may reflect sequela of old fracture no normal abdominal calcification seen.      Impression:      Impression:  Moderate colonic stool burden with moderate rectal stool ball, slightly increased from prior examination.      Electronically Signed: Keyshawn Gonsalez    5/9/2023 4:28 PM EDT    Workstation ID: [id number]          IMPRESSION:     Problems:  1. Fevers-likely secondary to urinary tract infection.  Sacral decubitus ulcer could be contributing. Also has a stercoral proctitis  2. Urinary tract infection-now with E. Coli that was susceptible to  ceftriaxone  3. Bacillus species in 1 out of 2 blood culture sets from admission.-This represents a contaminated blood culture  4. Sacral decubitus ulcer-has a prior history of Enterobacter cloaca complex on 4/18/23 on superficial wound culture done at .  Isolate was susceptible to Zosyn, cefepime, quinolones, etc. Her ulcer does not appear significantly infected based on her wound care picture  5. Mild hypokalemia  6. Fecal impaction/stercoral proctitis  7. Mild acute kidney injury-creatinine was elevated to 1.08 on arrival but has now improved to 0.85.  Likely prerenal  8. Dysphagia  9. Acute on chronic normocytic anemia  10. Elevated high sensitivity troponin level  11. Paroxysmal A. Fib, on Eliquis  12. Pulmonary hypertension  13. Small bilateral pleural effusions  14. Alzheimer's dementia  15. Small pulmonary nodule-needs to be followed on an outpatient basis  16. Cystic thyroid mass-needs to be followed up on an outpatient basis    RECOMMENDATIONS:      -follow repeat blood cultures-no growth to date  -continue ceftriaxone 2 g IV every 24 hours    Seems to be doing well.  I am okay with her discharge whenever.  Plan appears to be for discharge home tomorrow    UM/JAKE:  Could consider discharging her soon on Macrobid 100 mg by mouth twice a day with an anticipated stop date of 5/21/23.    I discussed in length with the patient's  bedside today.    Hamzah Charlton MD  5/11/2023

## 2023-05-11 NOTE — PLAN OF CARE
Problem: Fall Injury Risk  Goal: Absence of Fall and Fall-Related Injury  Outcome: Ongoing, Progressing  Intervention: Identify and Manage Contributors  Recent Flowsheet Documentation  Taken 5/10/2023 1935 by Katherine Mcnamara RN  Medication Review/Management: medications reviewed  Intervention: Promote Injury-Free Environment  Recent Flowsheet Documentation  Taken 5/11/2023 0200 by Katherine Mcnamara RN  Safety Promotion/Fall Prevention: assistive device/personal items within reach  Taken 5/11/2023 0000 by Katherine Mcnamara RN  Safety Promotion/Fall Prevention:   safety round/check completed   activity supervised  Taken 5/10/2023 2200 by Katherine Mcnamara RN  Safety Promotion/Fall Prevention:   safety round/check completed   activity supervised   assistive device/personal items within reach  Taken 5/10/2023 2000 by Katherine Mcnamara RN  Safety Promotion/Fall Prevention:   safety round/check completed   assistive device/personal items within reach  Taken 5/10/2023 1935 by Katherine Mcnamara RN  Safety Promotion/Fall Prevention:   activity supervised   assistive device/personal items within reach   safety round/check completed     Problem: Skin Injury Risk Increased  Goal: Skin Health and Integrity  Outcome: Ongoing, Progressing  Intervention: Optimize Skin Protection  Recent Flowsheet Documentation  Taken 5/11/2023 0200 by Katherine Mcnamara RN  Pressure Reduction Techniques: pressure points protected  Head of Bed (HOB) Positioning: [facility] elevated  Pressure Reduction Devices: specialty bed utilized  Skin Protection:   tubing/devices free from skin contact   transparent dressing maintained   adhesive use limited   incontinence pads utilized  Taken 5/11/2023 0000 by Katherine Mcnamara RN  Pressure Reduction Techniques: pressure points protected  Head of Bed (HOB) Positioning: HOB elevated  Pressure Reduction Devices: specialty bed utilized  Skin Protection:   tubing/devices free from skin contact   transparent dressing maintained   adhesive use limited  Taken  5/10/2023 2200 by Katherine Mcnamara RN  Pressure Reduction Techniques:   pressure points protected   weight shift assistance provided  Head of Bed (HOB) Positioning: HOB at 30-45 degrees  Pressure Reduction Devices: specialty bed utilized  Skin Protection:   tubing/devices free from skin contact   transparent dressing maintained   adhesive use limited  Taken 5/10/2023 2000 by Katherine Mcnamara RN  Pressure Reduction Techniques: pressure points protected  Head of Bed (HOB) Positioning: HOB elevated  Pressure Reduction Devices: specialty bed utilized  Skin Protection:   adhesive use limited   tubing/devices free from skin contact   transparent dressing maintained  Taken 5/10/2023 1935 by Katherine Mcnamara RN  Pressure Reduction Techniques: pressure points protected  Head of Bed (HOB) Positioning: [facility] elevated  Pressure Reduction Devices: specialty bed utilized  Skin Protection: adhesive use limited   Goal Outcome Evaluation:

## 2023-05-11 NOTE — PLAN OF CARE
Goal Outcome Evaluation:  Plan of Care Reviewed With: patient, spouse    SLP treatment completed. Will continue to address dysphagia/diet tolerance. Please see note for further details and recommendations.

## 2023-05-12 ENCOUNTER — READMISSION MANAGEMENT (OUTPATIENT)
Dept: CALL CENTER | Facility: HOSPITAL | Age: 83
End: 2023-05-12
Payer: MEDICARE

## 2023-05-12 VITALS
OXYGEN SATURATION: 97 % | WEIGHT: 97.2 LBS | HEIGHT: 61 IN | TEMPERATURE: 97.7 F | DIASTOLIC BLOOD PRESSURE: 63 MMHG | SYSTOLIC BLOOD PRESSURE: 150 MMHG | BODY MASS INDEX: 18.35 KG/M2 | RESPIRATION RATE: 18 BRPM | HEART RATE: 61 BPM

## 2023-05-12 LAB
ANION GAP SERPL CALCULATED.3IONS-SCNC: 11 MMOL/L (ref 5–15)
BACTERIA SPEC AEROBE CULT: NORMAL
BUN SERPL-MCNC: 8 MG/DL (ref 8–23)
BUN/CREAT SERPL: 12.7 (ref 7–25)
CALCIUM SPEC-SCNC: 8.8 MG/DL (ref 8.6–10.5)
CHLORIDE SERPL-SCNC: 103 MMOL/L (ref 98–107)
CO2 SERPL-SCNC: 24 MMOL/L (ref 22–29)
CREAT SERPL-MCNC: 0.63 MG/DL (ref 0.57–1)
EGFRCR SERPLBLD CKD-EPI 2021: 89.3 ML/MIN/1.73
GLUCOSE SERPL-MCNC: 92 MG/DL (ref 65–99)
POTASSIUM SERPL-SCNC: 3.6 MMOL/L (ref 3.5–5.2)
SODIUM SERPL-SCNC: 138 MMOL/L (ref 136–145)

## 2023-05-12 PROCEDURE — 99239 HOSP IP/OBS DSCHRG MGMT >30: CPT | Performed by: NURSE PRACTITIONER

## 2023-05-12 PROCEDURE — 80048 BASIC METABOLIC PNL TOTAL CA: CPT | Performed by: STUDENT IN AN ORGANIZED HEALTH CARE EDUCATION/TRAINING PROGRAM

## 2023-05-12 RX ORDER — POTASSIUM CHLORIDE 750 MG/1
40 CAPSULE, EXTENDED RELEASE ORAL EVERY 4 HOURS
Status: DISCONTINUED | OUTPATIENT
Start: 2023-05-12 | End: 2023-05-12 | Stop reason: HOSPADM

## 2023-05-12 RX ORDER — AMOXICILLIN 250 MG
2 CAPSULE ORAL 2 TIMES DAILY
Qty: 120 TABLET | Refills: 0 | Status: SHIPPED | OUTPATIENT
Start: 2023-05-12

## 2023-05-12 RX ORDER — NITROFURANTOIN 25; 75 MG/1; MG/1
100 CAPSULE ORAL 2 TIMES DAILY
Qty: 18 CAPSULE | Refills: 0 | Status: SHIPPED | OUTPATIENT
Start: 2023-05-12 | End: 2023-05-21

## 2023-05-12 RX ADMIN — RISPERIDONE 0.25 MG: 0.25 TABLET, FILM COATED ORAL at 08:59

## 2023-05-12 RX ADMIN — PANTOPRAZOLE SODIUM 40 MG: 40 TABLET, DELAYED RELEASE ORAL at 08:59

## 2023-05-12 RX ADMIN — SENNOSIDES AND DOCUSATE SODIUM 2 TABLET: 8.6; 5 TABLET ORAL at 08:59

## 2023-05-12 RX ADMIN — METOPROLOL TARTRATE 25 MG: 25 TABLET, FILM COATED ORAL at 08:59

## 2023-05-12 RX ADMIN — FOLIC ACID 1 MG: 1 TABLET ORAL at 08:59

## 2023-05-12 RX ADMIN — APIXABAN 2.5 MG: 2.5 TABLET, FILM COATED ORAL at 08:59

## 2023-05-12 RX ADMIN — AMLODIPINE BESYLATE 10 MG: 10 TABLET ORAL at 08:59

## 2023-05-12 RX ADMIN — SODIUM HYPOCHLORITE: 1.25 SOLUTION TOPICAL at 06:40

## 2023-05-12 RX ADMIN — FLUOXETINE 40 MG: 20 CAPSULE ORAL at 08:59

## 2023-05-12 NOTE — CASE MANAGEMENT/SOCIAL WORK
Case Management Discharge Note      Final Note: Per chart notes and interdisciplinary rounds, the patient is to be discharged. Plan is home with spouse and 24/7 caregivers. Spouse to transport.         Selected Continued Care - Admitted Since 5/7/2023     Destination    No services have been selected for the patient.              Durable Medical Equipment    No services have been selected for the patient.              Dialysis/Infusion    No services have been selected for the patient.              Home Medical Care    No services have been selected for the patient.              Therapy    No services have been selected for the patient.              Community Resources    No services have been selected for the patient.              Community & DME    No services have been selected for the patient.                  Transportation Services  Private: Car    Final Discharge Disposition Code: 01 - home or self-care

## 2023-05-12 NOTE — CASE MANAGEMENT/SOCIAL WORK
Case Management Discharge Note      Final Note: Received a call from Juliana with [facility] about the patient's discharge. CM placed orders in [facility] for [facility] to retrieve. Patient was current with [facility] before coming to the hospital. Spouse to transport.         Selected Continued Care - Admitted Since 5/7/2023     Destination    No services have been selected for the patient.              Durable Medical Equipment    No services have been selected for the patient.              Dialysis/Infusion    No services have been selected for the patient.              Home Medical Care     Service Provider Selected Services Address Phone Fax Patient Preferred    [facility] ,  Home Rehabilitation [address], [address], [place] [address] [phone] --          Therapy    No services have been selected for the patient.              Community Resources    No services have been selected for the patient.              Community & DME    No services have been selected for the patient.                  Transportation Services  Private: Car    Final Discharge Disposition Code: 06 - home with home health care

## 2023-05-12 NOTE — DISCHARGE SUMMARY
[facility] Medicine Services  DISCHARGE SUMMARY    Patient Name: Laura Wallace  : 1940  MRN: [id number]    Date of Admission: 2023  5:23 PM  Date of Discharge:  2023  Primary Care Physician: Justin Brumfield MD    Consults     Date and Time Order Name Status Description    2023  3:16 PM Inpatient Infectious Diseases Consult Completed           Hospital Course     Presenting Problem:   Fever, unknown origin [R50.9]  Sepsis [A41.9]    Active Hospital Problems    Diagnosis  POA   • **Fever, unknown origin [R50.9]  Yes   • Severe Malnutrition (HCC) [E43]  Yes   • Pulmonary nodule [R91.1]  Unknown   • Constipation [K59.00]  Unknown   • Pleural effusion [J90]  Unknown   • Compression fracture of fourth lumbar vertebra [S32.040A]  Unknown   • Thyroid mass of unclear etiology [E07.89]  Unknown   • Fecal impaction [K56.41]  Unknown   • Sepsis [A41.9]  Unknown   • Elevated troponin [R77.8]  Yes   • Acute kidney injury [N17.9]  Yes   • Acute on chronic anemia [D64.9]  Yes   • Lactic acidosis [E87.20]  Yes   • Wound with tunneling [T14.8XXA]  Yes   • PAF (paroxysmal atrial fibrillation) [I48.0]  Yes   • HTN (hypertension) [I10]  Yes   • Acute UTI (urinary tract infection) [N39.0]  Yes   • Anxiety [F41.9]  Yes   • Pulmonary hypertension [I27.20]  Yes   • Late onset Alzheimer's disease without behavioral disturbance (HCC) [G30.1, F02.80]  Yes      Resolved Hospital Problems   No resolved problems to display.          Hospital Course:  Laura Wallace is a 83 y.o. female   w/ a hx of pulmonary hypertension, HTN, PAF on Eliquis, alzheimer's dementia, anxiety who presented to the ED w/ c/o diaphoresis.      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, grew Enterobacter cloacae complex on 23 ()  -Fever, tachycardia, elevated lactic acid, elevated procalcitonin  reports episodes of diaphoresis x 1-2 months- worse past few days,  source  -lactic acid 2.2; repeat 1.2, procal 0.37, WBC WNL   -CT chest, abdomen and pelvis completed  -CT head without acute findings  -blood cx 1 of 2 positive for gram negative bacilli --> now changed to bacillus species, likely contamination   -follow-up repeat blood cultures sent on 5/9  -s/p IVF  -s/p Vanc and Zosyn --> transitioned to CTX per ID, will switch to oral on discharge  -ID consulted  -symptom mgt  -WOC consulted  -Constipation, stercoral proctitis noted on imaging.   -Abnormal gallbladder on imaging, gallbladder ultrasound negative for acute cholecystitis.  --will cont on Macrobid 100 mg BID through 5/21/23 per ID recs      **Fecal impaction-resolved   **Stercoral proctitis  - Covering with antibiotics per above  - S/p Soapsuds enema every 4 hours  - Still with some fecal impaction, will trial mineral oil enema followed by lactulose enema  - Patient unable to take GoLytely; will trial TID MiraLAX; Doc senna 2 tabs twice daily  --improved         **Acute kidney injury, resolved  -previous CR ~0.8-0.7  -On admission CR 1.08 w/ eGFR 51.7  -s/p IVF  -noiw 0.59      **Dysphagia  -SLP consulted; nectar-thickened liquids     **Acute/chronic anemia   -previous H/H 9.3/30.1 in 9/2022  -current Hgb 8.4  -pt on Eliquis; continued for now      **Type II MI/demand ischemia due to above  -troponin 66     **PAF   -continue Eliquis   -resume BB      **Pulmonary HTN   **Bilateral pleural effusions  -last ECHO 9/2022 w/ EF 60% and normal LVSF, RVSP of 38  -ECH) 5/8 EF 61-65 , grade II diastolic, RVSP mildly elevated due to tricuspid regurg Mild pulm HTN      **HTN   -pt initially hypotensive per EMS w/ BP 82/62  -s/p 500ml LR bolus and NS 1 liter bolus   - resumed Norvasc and lopressor     **Alzheimer's dementia   -continue Aricept, Risperidone  -bed alarm, fall precautions  -pt,ot and case mgt consult      **Anxiety   -continue routine Prozac   -listed as taking PRN Ativan BID- not reordered 2/2 increased AMS and  hypotension     **Pulmonary nodule  **Thyroid mass  - Incidental findings on imaging  - Defer to outpatient follow-up    Discharge Follow Up Recommendations for outpatient labs/diagnostics:   PCP 1 week     Day of Discharge     HPI:   Resting in bed, NAD. Appears comfortable, answers a few questions, but  at bedside helps. Spouse is eager to go home today, states patient is doing well.     Review of Systems  BURAK    Vital Signs:   Temp:  [97.7 °F (36.5 °C)-98.4 °F (36.9 °C)] 97.7 °F (36.5 °C)  Heart Rate:  [51-85] 61  Resp:  [16-18] 18  BP: (130-162)/(48-69) 150/63      Physical Exam:  Constitutional: No acute distress, awake, alert, chronically ill appearing, frail and thin   HENT: NCAT, mucous membranes moist  Respiratory: Clear to auscultation bilaterally, respiratory effort normal   Cardiovascular: RRR, no murmurs, rubs, or gallops  Gastrointestinal: Positive bowel sounds, soft, nontender, nondistended  Musculoskeletal: No bilateral ankle edema, decreased muscle tone  Psychiatric: Flat affect, cooperative  Neurologic: Oriented to person, moves upper extremities, follows simple commands, speech clear  Skin: No rashes     Pertinent  and/or Most Recent Results     LAB RESULTS:      Lab 05/10/23  0329 05/09/23  0402 05/08/23  0607 05/08/23  0015 05/07/23  2051 05/07/23  1744   WBC 6.65 8.34 10.13  --   --  9.77   HEMOGLOBIN 8.4* 8.5* 9.1* 8.9*  --  8.3*   HEMATOCRIT 28.0* 26.9* 28.6*  28.2* 29.3*  --  26.4*   PLATELETS 226 275 302  --   --  256   NEUTROS ABS 4.26 5.49 6.61  --   --  7.49*   IMMATURE GRANS (ABS) 0.02 0.02 0.04  --   --  0.04   LYMPHS ABS 1.47 1.83 2.15  --   --  1.22   MONOS ABS 0.76 0.89 1.22*  --   --  0.98*   EOS ABS 0.12 0.10 0.09  --   --  0.02   MCV 87.8 83.5 83.6  --   --  83.8   PROCALCITONIN  --   --   --   --   --  0.37*   LACTATE  --   --   --   --  1.2 2.2*         Lab 05/12/23  0450 05/11/23  0353 05/10/23  0847 05/10/23  0329 05/09/23  1612 05/09/23  0402 05/08/23  0607  05/07/23  1744   SODIUM 138 137  --  140  --  137 135* 133*   POTASSIUM 3.6 4.2 4.5 3.9 2.9* 3.2* 3.9 3.5   CHLORIDE 103 105  --  107  --  102 101 99   CO2 24.0 22.0  --  22.0  --  23.0 22.0 22.0   ANION GAP 11.0 10.0  --  11.0  --  12.0 12.0 12.0   BUN 8 5*  --  6*  --  13 15 21   CREATININE 0.63 0.59  --  0.68  --  0.85 0.88 1.08*   EGFR 89.3 90.7  --  87.6  --  68.9 66.1 51.7*   GLUCOSE 92 75  --  82  --  87 92 106*   CALCIUM 8.8 8.9  --  8.5*  --  8.7 8.9 8.5*   IONIZED CALCIUM  --   --   --   --   --   --  1.28  --    MAGNESIUM  --   --   --   --   --   --   --  2.1   TSH  --   --   --   --   --   --   --  4.460*         Lab 05/09/23  0402 05/07/23  1744   TOTAL PROTEIN 6.0 5.9*   ALBUMIN 3.5 3.2*   GLOBULIN 2.5 2.7   ALT (SGPT) 24 21   AST (SGOT) 42* 40*   BILIRUBIN 0.5 0.3   ALK PHOS 91 98         Lab 05/08/23  0607 05/08/23  0157 05/08/23  0015 05/07/23  1744   HSTROP T 70* 65* 67* 66*             Lab 05/08/23  0607 05/07/23 2000 05/07/23  1744   IRON  --   --  25*  25*   IRON SATURATION  --   --  6*   TIBC  --   --  417   TRANSFERRIN  --   --  280   FERRITIN  --   --  26.34   FOLATE  --  >20.00  --    VITAMIN B 12  --  >2,000*  --    ABO TYPING A  --  A   RH TYPING Positive  --  Positive   ANTIBODY SCREEN Negative  --   --          Brief Urine Lab Results  (Last result in the past 365 days)      Color   Clarity   Blood   Leuk Est   Nitrite   Protein   CREAT   Urine HCG        05/07/23 2218 Yellow   Turbid   Trace   Small (1+)   Positive   Trace               Microbiology Results (last 10 days)     Procedure Component Value - Date/Time    Blood Culture - Blood, Hand, Right [[id number]]  (Normal) Collected: 05/08/23 2104    Lab Status: Preliminary result Specimen: Blood from Hand, Right Updated: 05/11/23 2130     Blood Culture No growth at 3 days    Blood Culture - Blood, Hand, Left [[id number]]  (Normal) Collected: 05/08/23 2104    Lab Status: Preliminary result Specimen: Blood from Hand, Left Updated:  05/11/23 2130     Blood Culture No growth at 3 days    Urine Culture - Urine, Straight Cath [[id number]]  (Abnormal)  (Susceptibility) Collected: 05/07/23 2218    Lab Status: Final result Specimen: Urine from Straight Cath Updated: 05/09/23 2130     Urine Culture >100,000 CFU/mL Escherichia coli    Narrative:      Organism under investigation.      Colonization of the urinary tract without infection is common. Treatment is discouraged unless the patient is symptomatic, pregnant, or undergoing an invasive urologic procedure.    Susceptibility      Escherichia coli      KARYNA      Amikacin Susceptible      Ampicillin Intermediate      Ampicillin + Sulbactam Susceptible      Cefazolin Susceptible      Cefepime Susceptible      Ceftazidime Susceptible      Ceftriaxone Susceptible      Gentamicin Intermediate      Levofloxacin Susceptible      Nitrofurantoin Susceptible      Piperacillin + Tazobactam Susceptible      Tobramycin Resistant     Trimethoprim + Sulfamethoxazole Susceptible                           Blood Culture - Blood, Arm, Right [[id number]]  (Normal) Collected: 05/07/23 1749    Lab Status: Preliminary result Specimen: Blood from Arm, Right Updated: 05/11/23 1830     Blood Culture No growth at 4 days    Blood Culture - Blood, Hand, Left [[id number]]  (Abnormal) Collected: 05/07/23 1748    Lab Status: Final result Specimen: Blood from Hand, Left Updated: 05/09/23 0903     Blood Culture Bacillus species     Isolated from Aerobic Bottle     Gram Stain Aerobic Bottle Gram positive bacilli     Comment: Modified report. Previous result was Aerobic Bottle Gram negative bacilli on 5/8/2023 at 2024 EDT.       Narrative:      Probable contaminant requires clinical correlation, susceptibility not performed unless requested by physician.    Blood culture does not meet the specified criteria for PCR identification.  If pregnant, immunocompromised, or clinical concern for meningitis, call lab to run BCID for Listeria  monocytogenes.    Blood Culture ID, PCR - Blood, Hand, Left [[id number]]  (Normal) Collected: 05/07/23 1748    Lab Status: Final result Specimen: Blood from Hand, Left Updated: 05/08/23 2145     BCID, PCR Negative by BCID PCR. Culture to Follow.    COVID PRE-OP / PRE-PROCEDURE SCREENING ORDER (NO ISOLATION) - Swab, Nasopharynx [[id number]]  (Normal) Collected: 05/07/23 1741    Lab Status: Final result Specimen: Swab from Nasopharynx Updated: 05/07/23 1847    Narrative:      The following orders were created for panel order COVID PRE-OP / PRE-PROCEDURE SCREENING ORDER (NO ISOLATION) - Swab, Nasopharynx.  Procedure                               Abnormality         Status                     ---------                               -----------         ------                     Respiratory Panel PCR w/...[[id number]]  Normal              Final result                 Please view results for these tests on the individual orders.    Respiratory Panel PCR w/COVID-19(SARS-CoV-2) JOSE/ALEJANDRA/SAKSHI/PAD/COR/MAD/BRYN In-House, NP Swab in UTM/VTM, 3-4 HR TAT - Swab, Nasopharynx [[id number]]  (Normal) Collected: 05/07/23 1741    Lab Status: Final result Specimen: Swab from Nasopharynx Updated: 05/07/23 1847     ADENOVIRUS, PCR Not Detected     Coronavirus 229E Not Detected     Coronavirus HKU1 Not Detected     Coronavirus NL63 Not Detected     Coronavirus OC43 Not Detected     COVID19 Not Detected     Human Metapneumovirus Not Detected     Human Rhinovirus/Enterovirus Not Detected     Influenza A PCR Not Detected     Influenza B PCR Not Detected     Parainfluenza Virus 1 Not Detected     Parainfluenza Virus 2 Not Detected     Parainfluenza Virus 3 Not Detected     Parainfluenza Virus 4 Not Detected     RSV, PCR Not Detected     Bordetella pertussis pcr Not Detected     Bordetella parapertussis PCR Not Detected     Chlamydophila pneumoniae PCR Not Detected     Mycoplasma pneumo by PCR Not Detected    Narrative:      In the setting of a  positive respiratory panel with a viral infection PLUS a negative procalcitonin without other underlying concern for bacterial infection, consider observing off antibiotics or discontinuation of antibiotics and continue supportive care. If the respiratory panel is positive for atypical bacterial infection (Bordetella pertussis, Chlamydophila pneumoniae, or Mycoplasma pneumoniae), consider antibiotic de-escalation to target atypical bacterial infection.          Adult Transthoracic Echo Complete w/ Color, Spectral and Contrast if Necessary Per Protocol    Result Date: 5/8/2023  •  Left ventricular systolic function is normal. Left ventricular ejection fraction appears to be 61 - 65%. •  Left ventricular diastolic function is consistent with (grade II w/high LAP) pseudonormalization. •  Left atrial volume is severely increased. •  The right atrial cavity is moderately  dilated. •  There is calcification of the aortic valve. •  Moderate tricuspid valve regurgitation is present. •  Estimated right ventricular systolic pressure from tricuspid regurgitation is mildly elevated (35-45 mmHg). Calculated right ventricular systolic pressure from tricuspid regurgitation is 41 mmHg. •  Mild pulmonary hypertension is present.     CT Head Without Contrast    Result Date: 5/8/2023  EXAMINATION: CT HEAD WO CONTRAST DATE: 5/8/2023 12:27 AM  HISTORY: Dementia COMPARISON: 4/13/2022. TECHNIQUE: Thin section noncontrast axial images were obtained through the head. Coronal reformatted images were then created. CT dose lowering techniques including automated exposure control, adjustment for patient size, and/or use of iterative reconstruction were used. FINDINGS: Ventricles and Extra-axial Spaces: Moderate ventriculomegaly, disproportionate to sulcal dilatation at the vertex. Parenchyma: Confluent areas of periventricular and subcortical white matter low-attenuation are present, similar to prior.   No CT evidence of an acute large-vessel  territory infarct.   No mass effect or midline shift. Intracranial Hemorrhage: None. Bones/Extracranial soft tissues: No acute calvarial fracture. 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 Visualized Sinuses/Mastoids: Clear.       1.  No acute intracranial abnormality. 2.  Moderate to severe ventriculomegaly, disproportionate to sulcal dilatation. Findings could be on the basis of normal pressure hydrocephalus versus central volume loss, and appear grossly similar to the prior exam. 3.  Moderate to severe white matter hypodensities, which are grossly unchanged and could represent microvascular ischemic changes and/or transependymal CSF edema.  Electronically signed by:  Tha Feliciano M.D.  5/7/2023 11:29 PM Mountain Time    CT Angiogram Abdomen Pelvis    Result Date: 5/8/2023  Exam: CTA thorax, abdomen and pelvis with and without contrast Date: May 8, 2023 History: Sepsis, fever of unknown origin, chest pain, abdominal pain Comparison: September 23, 2022 Technique: Contiguous axial CT images obtained of the thorax, abdomen and pelvis initially without contrast, then following the uneventful intravenous administration of 100 mL Isovue-370 contrast. Additionally, sagittal, coronal and 3-D reformatted images were obtained. CT dose lowering techniques were used, to include: automated exposure control, adjustment for patient size, and or use of iterative reconstruction. Findings: CT thorax: Thoracic aorta: Noncontrast imaging fails to demonstrate evidence of an intramural hematoma. There are severe atherosclerotic changes throughout the thoracic aorta. There is no aneurysm or dissection. HEART: The heart is borderline in size. There are coronary artery calcifications. Pulmonary arteries: The pulmonary arteries are suboptimally characterized secondary to respiratory motion artifact. There is no visualized pulmonary embolus. Mediastinum: There is a low-density cystic mass in the left lobe of the thyroid measuring  up to 2.2 cm. There is a small cyst in the right lobe of the thyroid. There is no pathologic mediastinal adenopathy. There is a very small hiatal hernia. Lung parenchyma: There is a background of emphysema. There is a 6 mm pulmonary nodule in the right lower lobe. There is an element of interlobular septal thickening. There are trace bilateral pleural effusions. CT ABDOMEN: Liver: The liver is mildly hypodense. Spleen: Normal. Pancreas: Normal. Adrenal glands: Normal. Gallbladder: The gallbladder is distended. There is a large amount of layering debris within the gallbladder. Kidneys: There is renal cortical thinning. There are small bilateral renal cysts. Abdominal mesentery: There is no pathologic intra-abdominal mass. Bowel loops: There is a very large amount of retained rectal fecal debris. There is edematous wall thickening involving the rectum. There is a large amount of retained colonic fecal debris consistent with constipation. The appendix is not well delineated  on this study. There are no findings to suggest acute appendicitis. There is no small bowel obstruction. There is edematous rugal wall thickening of the stomach. The stomach is not well-distended. CT PELVIS: The genitourinary structures are intact. There are multiple nonenlarged lymph nodes in the left inguinal region. Abdominal aorta: There are severe atherosclerotic changes throughout the abdominal aorta extending into the iliac vasculature. Osseous structures: The osseous structures are markedly demineralized. There are chronic deformities involving the inferior pubic rami, right acetabulum and left pubic symphysis. There are moderate to severe degenerative changes involving both hips. There is posterior transpedicular fusion hardware extending from L1-L3. There is an age indeterminant compression fracture involving L4 with approximately 80% loss of the vertebral body height. There is a chronic deformity involving the proximal left humerus.  There is heterotopic ossification around the left shoulder. There is a left hip joint effusion. There are bilateral breast prostheses. There is mild body wall edema.     1. Large amount of layering debris within the gallbladder. The gallbladder is distended. Consider a follow-up right upper quadrant ultrasound for better characterization as determined clinically. 2. Very large amount of retained rectal fecal debris consistent with fecal impaction or obstipation. There is also edematous wall thickening involving the rectum which could represent stercoral proctitis. Close clinical follow-up is recommended. There is  also constipation. 3. Severe atherosclerotic disease. 4. Coronary artery calcifications. 5. Emphysema. 6. Trace bilateral pleural effusions. There is also mild pulmonary edema. 7. Edematous rugal wall thickening of the stomach. Correlate for symptoms of gastritis. The need for a follow-up EGD for better characterization can be determined clinically. 8. There is an age indeterminant compression fracture involving L4 with approximately 80% loss of the vertebral body height. Correlation with point tenderness is recommended. 9. There is a 6 mm pulmonary nodule in the right lower lobe. Follow-up is suggested per Fleischner guidelines below. 10. Marked osteopenia. 11. There is a low-density cystic mass in the left lobe of the thyroid measuring up to 2.2 cm. A follow-up nonemergent thyroid ultrasound is recommended for better characterization. 12. No visualized pulmonary embolus. *FLEISCH[employer] SOCIETY GUIDELINES: Low risk patient: <6mm - Low Risk, no further f/u >6-8mm - low dose CT 6-12 mo, then 2nd f/u CT at 18-24mo >8mm - low dose CT 3,9,24mo OR PET/CT OR Biopsy High Risk Patient: <6mm - optional CT at 12 mo >6-8mm - low dose CT 6-12 mo, then 2nd f/u CT at 18-24mo >8mm - low dose CT at 3 mo, OR PET/CT OR Biopsy MULTIPLE NODULES: Low Risk Patient: <6mm - Low Risk, no further f/u >6-8mm - low dose CT 3-6 mo, then  2nd f/u CT at 18-24mo >8mm - low dose CT 3-6 mo, then 2nd f/u CT at 18-24mo High Risk Patient: <6mm - High risk, multiple nodules, optional CT at 12 mo >6-8mm - low dose CT 3-6 mo, then 2nd f/u CT at 18-24mo >8mm - low dose CT 3-6 mo, then 2nd f/u CT at 18-24mo Electronically signed by:  Ankit Tabor M.D.  5/8/2023 12:05 AM Mountain Time    US Gallbladder    Result Date: 5/8/2023  US GALLBLADDER Date of Exam: 5/8/2023 9:40 AM EDT Indication: abnl imaging. Comparison: CT angiogram May 8, 2023 Technique: Grayscale and color Doppler ultrasound evaluation of the right upper quadrant was performed. Findings: Exam is limited due to patient condition with limited scanning window. Assessment of the liver was limited due to scanning window and bowel gas. No focal hepatic abnormality is identified. The portal vein and hepatic veins demonstrate normal directional flow. Assessment was limited. There appear to be some echogenic material within the gallbladder lumen suggesting biliary sludge and/or gallstones. No significant focal shadowing gallstone was visualized. The gallbladder wall measures 2 mm.  No pericholecystic fluid was seen.  The common bile duct measures 4 mm diameter. The right kidney measures 9.9 x 6 x 4.3 cm. Assessment of the right kidney was also limited. No definite stone, hydronephrosis, or significant focal renal lesion was identified. Pancreas was obscured by bowel gas. There is no significant ascites within the right upper quadrant.     1.Limited evaluation due to patient condition and limited scanning window. Assessment of the right upper quadrant structures was significantly limited on this exam. 2.Echogenic material in the gallbladder lumen suggesting biliary sludge and/or gallstones. No definite sonographic findings to suggest acute cholecystitis or biliary ductal dilatation. 3.No other gross abnormality was seen in the right upper quadrant on this limited exam. Electronically Signed: Raheem Owens   5/8/2023 10:12 AM EDT  Workstation ID: [id number]    XR Chest 1 View    Result Date: 5/7/2023  XR CHEST 1 VW Date of Exam: 5/7/2023 5:55 PM EDT Indication: Acute febrile illness with altered mental status Comparison: 9/23/2022. Findings: There is diffuse interstitial prominence in the lungs. There is no pneumothorax, pleural effusion or focal airspace consolidation. The heart size is normal. Pulmonary vasculature is largely obscured by interstitial prominence. There is an old healed proximal left humerus fracture. No acute osseous abnormalities.     Impression: 1. Chronic appearing interstitial lung disease without acute cardiopulmonary abnormality. Electronically Signed: Ethan Thorpe  5/7/2023 6:49 PM EDT  Workstation ID: [id number]    CT Angiogram Chest    Result Date: 5/8/2023  Exam: CTA thorax, abdomen and pelvis with and without contrast Date: May 8, 2023 History: Sepsis, fever of unknown origin, chest pain, abdominal pain Comparison: September 23, 2022 Technique: Contiguous axial CT images obtained of the thorax, abdomen and pelvis initially without contrast, then following the uneventful intravenous administration of 100 mL Isovue-370 contrast. Additionally, sagittal, coronal and 3-D reformatted images were obtained. CT dose lowering techniques were used, to include: automated exposure control, adjustment for patient size, and or use of iterative reconstruction. Findings: CT thorax: Thoracic aorta: Noncontrast imaging fails to demonstrate evidence of an intramural hematoma. There are severe atherosclerotic changes throughout the thoracic aorta. There is no aneurysm or dissection. HEART: The heart is borderline in size. There are coronary artery calcifications. Pulmonary arteries: The pulmonary arteries are suboptimally characterized secondary to respiratory motion artifact. There is no visualized pulmonary embolus. Mediastinum: There is a low-density cystic mass in the left lobe of the thyroid measuring up to  2.2 cm. There is a small cyst in the right lobe of the thyroid. There is no pathologic mediastinal adenopathy. There is a very small hiatal hernia. Lung parenchyma: There is a background of emphysema. There is a 6 mm pulmonary nodule in the right lower lobe. There is an element of interlobular septal thickening. There are trace bilateral pleural effusions. CT ABDOMEN: Liver: The liver is mildly hypodense. Spleen: Normal. Pancreas: Normal. Adrenal glands: Normal. Gallbladder: The gallbladder is distended. There is a large amount of layering debris within the gallbladder. Kidneys: There is renal cortical thinning. There are small bilateral renal cysts. Abdominal mesentery: There is no pathologic intra-abdominal mass. Bowel loops: There is a very large amount of retained rectal fecal debris. There is edematous wall thickening involving the rectum. There is a large amount of retained colonic fecal debris consistent with constipation. The appendix is not well delineated  on this study. There are no findings to suggest acute appendicitis. There is no small bowel obstruction. There is edematous rugal wall thickening of the stomach. The stomach is not well-distended. CT PELVIS: The genitourinary structures are intact. There are multiple nonenlarged lymph nodes in the left inguinal region. Abdominal aorta: There are severe atherosclerotic changes throughout the abdominal aorta extending into the iliac vasculature. Osseous structures: The osseous structures are markedly demineralized. There are chronic deformities involving the inferior pubic rami, right acetabulum and left pubic symphysis. There are moderate to severe degenerative changes involving both hips. There is posterior transpedicular fusion hardware extending from L1-L3. There is an age indeterminant compression fracture involving L4 with approximately 80% loss of the vertebral body height. There is a chronic deformity involving the proximal left humerus. There is  heterotopic ossification around the left shoulder. There is a left hip joint effusion. There is mild body wall edema.     1. Large amount of layering debris within the gallbladder. The gallbladder is distended. Consider a follow-up right upper quadrant ultrasound for better characterization as determined clinically. 2. Very large amount of retained rectal fecal debris consistent with fecal impaction or obstipation. There is also edematous wall thickening involving the rectum which could represent stercoral proctitis. Close clinical follow-up is recommended. There is  also constipation. 3. Severe atherosclerotic disease. 4. Coronary artery calcifications. 5. Emphysema. 6. Trace bilateral pleural effusions. There is also mild pulmonary edema. 7. Edematous rugal wall thickening of the stomach. Correlate for symptoms of gastritis. The need for a follow-up EGD for better characterization can be determined clinically. 8. There is an age indeterminant compression fracture involving L4 with approximately 80% loss of the vertebral body height. Correlation with point tenderness is recommended. 9. There is a 6 mm pulmonary nodule in the right lower lobe. Follow-up is suggested per Fleischner guidelines below. 10. Marked osteopenia. 11. There is a low-density cystic mass in the left lobe of the thyroid measuring up to 2.2 cm. A follow-up nonemergent thyroid ultrasound is recommended for better characterization. 12. No visualized pulmonary embolus. *FLEISCHNER SOCIETY GUIDELINES: Low risk patient: <6mm - Low Risk, no further f/u >6-8mm - low dose CT 6-12 mo, then 2nd f/u CT at 18-24mo >8mm - low dose CT 3,9,24mo OR PET/CT OR Biopsy High Risk Patient: <6mm - optional CT at 12 mo >6-8mm - low dose CT 6-12 mo, then 2nd f/u CT at 18-24mo >8mm - low dose CT at 3 mo, OR PET/CT OR Biopsy MULTIPLE NODULES: Low Risk Patient: <6mm - Low Risk, no further f/u >6-8mm - low dose CT 3-6 mo, then 2nd f/u CT at 18-24mo >8mm - low dose CT 3-6 mo,  then 2nd f/u CT at 18-24mo High Risk Patient: <6mm - High risk, multiple nodules, optional CT at 12 mo >6-8mm - low dose CT 3-6 mo, then 2nd f/u CT at 18-24mo >8mm - low dose CT 3-6 mo, then 2nd f/u CT at 18-24mo Electronically signed by:  Ankit Tabor M.D.  5/7/2023 11:58 PM Mountain Time    XR Abdomen KUB    Result Date: 5/10/2023  XR ABDOMEN KUB Date of Exam: 5/10/2023 4:16 PM EDT Indication: reassess fecal burden Comparison: 5/9/2023 Findings: Stool previously seen in the rectum is markedly diminished, and stool burden elsewhere appears decreased, with visible stool shadow only in the area of the distal ascending colon/hepatic flexure. Small bowel gas is increased but bowel loops are normal in  caliber. No bowel wall edema or pneumatosis is seen. Paraspinous fixation rods and bilateral hip joint DJD are again noted.     Impression: Nonobstructive bowel gas pattern, with significantly diminished, normal stool volume. Electronically Signed: Saqib Levin  5/10/2023 5:12 PM EDT  Workstation ID: [id number]    XR Abdomen KUB    Result Date: 5/9/2023  XR ABDOMEN KUB Date of Exam: 5/9/2023 3:42 PM EDT Indication: assess stool burden Comparison: 5/8/2023 Findings: Moderate colonic stool burden with moderate rectal stool ball, slightly increased from prior. Nonobstructive bowel gas pattern. Gaseous distention of the stomach. Spinal fusion hardware is seen. Lung bases appear clear. Degenerative changes of the left greater than right hip. Irregularity of the pubic bones may reflect sequela of old fracture no normal abdominal calcification seen.     Impression: Moderate colonic stool burden with moderate rectal stool ball, slightly increased from prior examination. Electronically Signed: Keyshawn Gonsalez  5/9/2023 4:28 PM EDT  Workstation ID: [id number]    XR Abdomen KUB    Result Date: 5/8/2023  XR ABDOMEN KUB Date of Exam: 5/8/2023 4:01 PM EDT Indication: reassess fecal burden Comparison: CT angiogram of the abdomen and pelvis  May 8, 2023 Findings: There is a suspected large amount of stool in the colon and rectum, possibly mildly decreased compared to the prior CT. There does not appear to be colonic dilatation. There are some gas distended small bowel loops without definite small bowel dilatation. No ectopic bowel gas is seen on this limited supine view. Fusion hardware is seen in the upper lumbar spine. Visualized lung bases appear grossly unremarkable.     Impression: Large amount of stool in the colon and rectum, possibly mildly decreased compared to the prior CT. Electronically Signed: Raheem Owens  5/8/2023 4:36 PM EDT  Workstation ID: [id number]      Results for orders placed during the hospital encounter of 09/06/22    Duplex venous lower extremity left CAR    Interpretation Summary  · The left lower extremity venous duplex scan is negative for DVT and SVT.      Results for orders placed during the hospital encounter of 09/06/22    Duplex venous lower extremity left CAR    Interpretation Summary  · The left lower extremity venous duplex scan is negative for DVT and SVT.      Results for orders placed during the hospital encounter of 05/07/23    Adult Transthoracic Echo Complete w/ Color, Spectral and Contrast if Necessary Per Protocol    Interpretation Summary  •  Left ventricular systolic function is normal. Left ventricular ejection fraction appears to be 61 - 65%.  •  Left ventricular diastolic function is consistent with (grade II w/high LAP) pseudonormalization.  •  Left atrial volume is severely increased.  •  The right atrial cavity is moderately  dilated.  •  There is calcification of the aortic valve.  •  Moderate tricuspid valve regurgitation is present.  •  Estimated right ventricular systolic pressure from tricuspid regurgitation is mildly elevated (35-45 mmHg). Calculated right ventricular systolic pressure from tricuspid regurgitation is 41 mmHg.  •  Mild pulmonary hypertension is present.      Plan for Follow-up of  Pending Labs/Results: PCP   Pending Labs     Order Current Status    Blood Culture - Blood, Arm, Right Preliminary result    Blood Culture - Blood, Hand, Left Preliminary result    Blood Culture - Blood, Hand, Right Preliminary result        Discharge Details        Discharge Medications      New Medications      Instructions Start Date   nitrofurantoin (macrocrystal-monohydrate) 100 MG capsule  Commonly known as: Macrobid   100 mg, Oral, 2 Times Daily      sennosides-docusate 8.6-50 MG per tablet  Commonly known as: PERICOLACE   2 tablets, Oral, 2 Times Daily         Continue These Medications      Instructions Start Date   acetaminophen 325 MG tablet  Commonly known as: TYLENOL   650 mg, Oral, Every 4 Hours PRN      amLODIPine 10 MG tablet  Commonly known as: NORVASC   10 mg, Oral, Every 24 Hours Scheduled      apixaban 2.5 MG tablet tablet  Commonly known as: ELIQUIS   2.5 mg, Oral, Every 12 Hours Scheduled      donepezil 5 MG tablet  Commonly known as: ARICEPT   5 mg, Oral, Nightly      famotidine 20 MG tablet  Commonly known as: PEPCID   20 mg, Oral, 2 Times Daily, Spouse been holding the 2nd dose unless patients stomach bothering her      FLUoxetine 20 MG capsule  Commonly known as: PROzac   40 mg, Oral, Daily      folic acid 1 MG tablet  Commonly known as: FOLVITE   1 mg, Oral, Daily      LORazepam 0.5 MG tablet  Commonly known as: ATIVAN   0.25 mg, Oral, 2 Times Daily      melatonin 5 MG tablet tablet   5 mg, Oral, Nightly      metoprolol tartrate 25 MG tablet  Commonly known as: LOPRESSOR   25 mg, Oral, Every 12 Hours Scheduled      multivitamin with minerals tablet tablet   1 tablet, Oral, Daily      risperiDONE 0.5 MG tablet  Commonly known as: risperDAL   0.5 mg, Oral, Nightly      risperiDONE 0.25 MG tablet  Commonly known as: risperDAL   0.25 mg, Oral, Daily      triamcinolone 0.1 % cream  Commonly known as: KENALOG   Topical, See Admin Instructions, Apply topically three times daily. Been giving it as  needed      vitamin D3 125 MCG (5000 UT) capsule capsule   5,000 Units, Oral, Daily, 125 mcg capsule         Stop These Medications    FIBER THERAPY PO            Allergies   Allergen Reactions   • Codeine Nausea And Vomiting         Discharge Disposition:  Home or Self Care    Diet:  Hospital:  Diet Order   Procedures   • Diet: Regular/House Diet; Texture: Regular Texture (IDDSI 7); Fluid Consistency: Nectar Thick          CODE STATUS:    Code Status and Medical Interventions:   Ordered at: 05/07/23 9492     Level Of Support Discussed With:    Health Care Surrogate     Code Status (Patient has no pulse and is not breathing):    CPR (Attempt to Resuscitate)     Medical Interventions (Patient has pulse or is breathing):    Full Support       No future appointments.              Yuliya Moreno, CARLOZ  05/12/23      Time Spent on Discharge:  I spent  40 minutes on this discharge activity which included: face-to-face encounter with the patient, reviewing the data in the system, coordination of the care with the nursing staff as well as consultants, documentation, and entering orders.

## 2023-05-12 NOTE — PLAN OF CARE
Problem: Skin Injury Risk Increased  Goal: Skin Health and Integrity  Outcome: Ongoing, Progressing  Intervention: Optimize Skin Protection  Flowsheets  Taken 5/12/2023 0342  Pressure Reduction Techniques: pressure points protected  Head of Bed (HOB) Positioning: HOB elevated  Pressure Reduction Devices:   heel offloading device utilized   positioning supports utilized   specialty bed utilized  Skin Protection:   adhesive use limited   tubing/devices free from skin contact   transparent dressing maintained  Taken 5/12/2023 0200  Pressure Reduction Techniques: weight shift assistance provided  Head of Bed (HOB) Positioning: HOB elevated  Pressure Reduction Devices: specialty bed utilized  Skin Protection:   tubing/devices free from skin contact   transparent dressing maintained   adhesive use limited  Taken 5/12/2023 0000  Pressure Reduction Techniques: weight shift assistance provided  Head of Bed (HOB) Positioning: HOB elevated  Pressure Reduction Devices: specialty bed utilized  Skin Protection:   tubing/devices free from skin contact   transparent dressing maintained  Taken 5/11/2023 2200  Head of Bed (HOB) Positioning: HOB elevated  Taken 5/11/2023 2008  Head of Bed (HOB) Positioning: HOB elevated  Taken 5/11/2023 2000  Pressure Reduction Techniques: frequent weight shift encouraged  Head of Bed (HOB) Positioning: HOB elevated  Pressure Reduction Devices: specialty bed utilized  Skin Protection:   tubing/devices free from skin contact   transparent dressing maintained   adhesive use limited   Goal Outcome Evaluation:

## 2023-05-13 LAB
BACTERIA SPEC AEROBE CULT: NORMAL
BACTERIA SPEC AEROBE CULT: NORMAL

## 2023-05-13 NOTE — OUTREACH NOTE
Prep Survey    Flowsheet Row Responses   [facility] facility patient discharged from? [facility]   Is LACE score < 7 ? No   Eligibility Readm Mgmt   Discharge diagnosis Sepsis, E coli UTI, stercoral proctitis, Wound with tunneling, TRANG, acute on chronic anemia, fecal impaction   Does the patient have one of the following disease processes/diagnoses(primary or secondary)? Sepsis   Does the patient have Home health ordered? Yes   What is the Home health agency?  [facility]    Is there a DME ordered? No   Prep survey completed? Yes          Angely BRYANT - Registered Nurse

## 2023-05-17 ENCOUNTER — READMISSION MANAGEMENT (OUTPATIENT)
Dept: CALL CENTER | Facility: HOSPITAL | Age: 83
End: 2023-05-17
Payer: MEDICARE

## 2023-05-17 NOTE — OUTREACH NOTE
"Sepsis Week 1 Survey    Flowsheet Row Responses   [facility] patient discharged from? [facility]   Does the patient have one of the following disease processes/diagnoses(primary or secondary)? Sepsis   Week 1 attempt successful? Yes   Call start time 0936   Call end time 0940   General alerts for this patient Alzheimer's   Discharge diagnosis Sepsis, E coli UTI, stercoral proctitis, Wound with tunneling, TRANG, acute on chronic anemia, fecal impaction   Person spoke with today (if not patient) and relationship Horace(spouse)   Meds reviewed with patient/caregiver? Yes   Is the patient having any side effects they believe may be caused by any medication additions or changes? No   Does the patient have all medications related to this admission filled (includes all antibiotics, inhalers, nebulizers,steroids,etc.) Yes   Is the patient taking all medications as directed (includes completed medication regime)? No   What is preventing the patient from taking all medications as directed? Other  [spouse reports starting pt. back on \" a little fiber\" secondary to diarrhea.AVS instructions stop fiber therapy.]   Nursing Interventions Nurse provided patient education   Comments regarding appointments Hospital f/u appt. with PCP 5/19/23 @ 2:15pm.   Does the patient have a primary care provider?  Yes   Does the patient have an appointment with their PCP within 7 days of discharge? Yes   Has the patient kept scheduled appointments due by today? N/A   What is the Home health agency?  [facility]    Has home health visited the patient within 72 hours of discharge? No   Home health comments Spouse reports HH attempted to come but requested they come later. HH coming today.   Psychosocial issues? Yes  [alzheimer's]   Did the patient receive a copy of their discharge instructions? Yes   Nursing interventions Reviewed instructions with patient   What is the patient's perception of their health status since discharge? " Improving   Nursing interventions Nurse provided patient education   Is the patient/caregiver able to teach back TIME? T emperature - higher or lower than normal, I nfection - may have signs and symptoms of an infection, M ental Decline - confused, sleepy, difficult to arouse, E xtremely Ill - severe pain, discomfort, shortness of breath   Nursing interventions Nurse provided patient education   Is patient/caregiver able to teach back steps to recovery at home? Eat a balanced diet, Rest and regain strength   Is the patient/caregiver able to teach back signs and symptoms of worsening condition: Fever, Rapid heart rate (>90), Shortness of breath/rapid respiratory rate, Altered mental status(confusion/coma)   If the patient is a current smoker, are they able to teach back resources for cessation? Not a smoker   Is the patient/caregiver able to teach back the hierarchy of who to call/visit for symptoms/problems? PCP, Specialist, Home health nurse, Urgent Care, ED, 911 Yes   Week 1 call completed? Yes   Wrap up additional comments Spouse reports pt. had 2-3 diarrhea stools yesterday. Encouraged fluids, he reports pt. eating well, resting at this time. Advised to f/u with PCP if no improvement in diarrhea, any worsening symptoms go to ED, v/u.          Elke NOVAK - Registered Nurse

## 2023-05-25 NOTE — PROGRESS NOTES
"Enter Query Response Below      Query Response:   acute kidney injury due to sepsis, type 2 MI not due to sepsis but was due to anemia and TRANG     Electronically signed by Yuliya CARLOZ [place], 23, 3:01 PM EDT.             If applicable, please update the problem list.       Patient: Laura Wallace \"Pat\"        : 1940  Account: [id number]           Admit Date: 2023        How to Respond to this query:       a. Click New Note     b. Answer query within the yellow box.                c. Update the Problem List, if applicable.      If you have any questions about this query contact me at: [email or web address]     Ms. Moreno,    Patient presented on  and was determined to have sepsis due to E. coli UTI.  Patient also noted to have acute kidney injury.  Patient was treated with IV fluids and IV antibiotics.  Discharge summary includes:    \"**Type II MI/demand ischemia due to above   -troponin 66\"    Can this be further clarified as:    - acute kidney injury and type 2 MI due to sepsis  - acute kidney injury due to sepsis, type 2 MI not due to sepsis but was due to (please provide etiology of type 2 MI) _______________  - other ___________________________  - clinically indeterminable    By submitting this query, we are merely seeking further clarification of documentation to accurately reflect all conditions that you are monitoring, evaluating, treating or that extend the hospitalization or utilize additional resources of care. Please utilize your independent clinical judgment when addressing the question(s) above.     This query and your response, once completed, will be entered into the legal medical record.    Sincerely,  Carly Ding RN CCDS [facility]  Clinical Documentation Integrity Program   "

## 2023-07-10 ENCOUNTER — APPOINTMENT (OUTPATIENT)
Dept: GENERAL RADIOLOGY | Facility: HOSPITAL | Age: 83
DRG: 291 | End: 2023-07-10
Payer: MEDICARE

## 2023-07-10 ENCOUNTER — HOSPITAL ENCOUNTER (INPATIENT)
Facility: HOSPITAL | Age: 83
LOS: 9 days | Discharge: HOSPICE | DRG: 291 | End: 2023-07-19
Attending: EMERGENCY MEDICINE | Admitting: INTERNAL MEDICINE
Payer: MEDICARE

## 2023-07-10 DIAGNOSIS — J96.21 ACUTE ON CHRONIC RESPIRATORY FAILURE WITH HYPOXEMIA: ICD-10-CM

## 2023-07-10 DIAGNOSIS — F41.9 ANXIETY: ICD-10-CM

## 2023-07-10 DIAGNOSIS — Z87.891 FORMER SMOKER: ICD-10-CM

## 2023-07-10 DIAGNOSIS — I50.9 ACUTE ON CHRONIC CONGESTIVE HEART FAILURE, UNSPECIFIED HEART FAILURE TYPE: ICD-10-CM

## 2023-07-10 DIAGNOSIS — R50.9 ACUTE FEBRILE ILLNESS: Primary | ICD-10-CM

## 2023-07-10 DIAGNOSIS — Z86.79 HISTORY OF PULMONARY HYPERTENSION: ICD-10-CM

## 2023-07-10 DIAGNOSIS — N39.0 URINARY TRACT INFECTION IN ELDERLY PATIENT: ICD-10-CM

## 2023-07-10 PROBLEM — I51.89 DIASTOLIC DYSFUNCTION: Status: ACTIVE | Noted: 2023-07-10

## 2023-07-10 PROBLEM — I50.33 ACUTE ON CHRONIC HEART FAILURE WITH PRESERVED EJECTION FRACTION (HFPEF): Status: ACTIVE | Noted: 2023-07-10

## 2023-07-10 PROBLEM — J96.90 RESPIRATORY FAILURE: Status: ACTIVE | Noted: 2023-07-10

## 2023-07-10 PROBLEM — Z79.01 CHRONIC ANTICOAGULATION: Status: ACTIVE | Noted: 2023-07-10

## 2023-07-10 LAB
ALBUMIN SERPL-MCNC: 3.2 G/DL (ref 3.5–5.2)
ALBUMIN/GLOB SERPL: 1 G/DL
ALP SERPL-CCNC: 123 U/L (ref 39–117)
ALT SERPL W P-5'-P-CCNC: 15 U/L (ref 1–33)
ANION GAP SERPL CALCULATED.3IONS-SCNC: 15 MMOL/L (ref 5–15)
ARTERIAL PATENCY WRIST A: ABNORMAL
AST SERPL-CCNC: 26 U/L (ref 1–32)
ATMOSPHERIC PRESS: ABNORMAL MM[HG]
BASE EXCESS BLDA CALC-SCNC: 3.9 MMOL/L (ref 0–2)
BASOPHILS # BLD AUTO: 0.01 10*3/MM3 (ref 0–0.2)
BASOPHILS NFR BLD AUTO: 0.1 % (ref 0–1.5)
BDY SITE: ABNORMAL
BILIRUB SERPL-MCNC: 0.6 MG/DL (ref 0–1.2)
BODY TEMPERATURE: 37 C
BUN SERPL-MCNC: 13 MG/DL (ref 8–23)
BUN/CREAT SERPL: 20.6 (ref 7–25)
CALCIUM SPEC-SCNC: 8.6 MG/DL (ref 8.6–10.5)
CHLORIDE SERPL-SCNC: 101 MMOL/L (ref 98–107)
CO2 BLDA-SCNC: 27.9 MMOL/L (ref 22–33)
CO2 SERPL-SCNC: 23 MMOL/L (ref 22–29)
COHGB MFR BLD: 1.6 % (ref 0–2)
CREAT SERPL-MCNC: 0.63 MG/DL (ref 0.57–1)
D-LACTATE SERPL-SCNC: 0.7 MMOL/L (ref 0.5–2)
D-LACTATE SERPL-SCNC: 2.2 MMOL/L (ref 0.5–2)
DEPRECATED RDW RBC AUTO: 45.8 FL (ref 37–54)
EGFRCR SERPLBLD CKD-EPI 2021: 89.3 ML/MIN/1.73
EOSINOPHIL # BLD AUTO: 0.04 10*3/MM3 (ref 0–0.4)
EOSINOPHIL NFR BLD AUTO: 0.4 % (ref 0.3–6.2)
EPAP: 0
ERYTHROCYTE [DISTWIDTH] IN BLOOD BY AUTOMATED COUNT: 14.9 % (ref 12.3–15.4)
FLUAV RNA RESP QL NAA+PROBE: NOT DETECTED
FLUBV RNA RESP QL NAA+PROBE: NOT DETECTED
GEN 5 2HR TROPONIN T REFLEX: 57 NG/L
GLOBULIN UR ELPH-MCNC: 3.3 GM/DL
GLUCOSE SERPL-MCNC: 102 MG/DL (ref 65–99)
HCO3 BLDA-SCNC: 26.9 MMOL/L (ref 20–26)
HCT VFR BLD AUTO: 28.2 % (ref 34–46.6)
HCT VFR BLD CALC: 24.2 % (ref 38–51)
HGB BLD-MCNC: 8.8 G/DL (ref 12–15.9)
HGB BLDA-MCNC: 7.9 G/DL (ref 14–18)
HOLD SPECIMEN: NORMAL
IMM GRANULOCYTES # BLD AUTO: 0.05 10*3/MM3 (ref 0–0.05)
IMM GRANULOCYTES NFR BLD AUTO: 0.5 % (ref 0–0.5)
INHALED O2 CONCENTRATION: 36 %
IPAP: 0
LYMPHOCYTES # BLD AUTO: 1.02 10*3/MM3 (ref 0.7–3.1)
LYMPHOCYTES NFR BLD AUTO: 10.1 % (ref 19.6–45.3)
MCH RBC QN AUTO: 26.1 PG (ref 26.6–33)
MCHC RBC AUTO-ENTMCNC: 31.2 G/DL (ref 31.5–35.7)
MCV RBC AUTO: 83.7 FL (ref 79–97)
METHGB BLD QL: 0.3 % (ref 0–1.5)
MODALITY: ABNORMAL
MONOCYTES # BLD AUTO: 1.15 10*3/MM3 (ref 0.1–0.9)
MONOCYTES NFR BLD AUTO: 11.4 % (ref 5–12)
NEUTROPHILS NFR BLD AUTO: 7.83 10*3/MM3 (ref 1.7–7)
NEUTROPHILS NFR BLD AUTO: 77.5 % (ref 42.7–76)
NOTE: ABNORMAL
NRBC BLD AUTO-RTO: 0 /100 WBC (ref 0–0.2)
NT-PROBNP SERPL-MCNC: 6389 PG/ML (ref 0–1800)
OXYHGB MFR BLDV: 91.7 % (ref 94–99)
PAW @ PEAK INSP FLOW SETTING VENT: 0 CMH2O
PCO2 BLDA: 33 MM HG (ref 35–45)
PCO2 TEMP ADJ BLD: 33 MM HG (ref 35–45)
PH BLDA: 7.52 PH UNITS (ref 7.35–7.45)
PH, TEMP CORRECTED: 7.52 PH UNITS
PLATELET # BLD AUTO: 274 10*3/MM3 (ref 140–450)
PMV BLD AUTO: 9 FL (ref 6–12)
PO2 BLDA: 61.5 MM HG (ref 83–108)
PO2 TEMP ADJ BLD: 61.5 MM HG (ref 83–108)
POTASSIUM SERPL-SCNC: 3.3 MMOL/L (ref 3.5–5.2)
PROT SERPL-MCNC: 6.5 G/DL (ref 6–8.5)
RBC # BLD AUTO: 3.37 10*6/MM3 (ref 3.77–5.28)
SARS-COV-2 RNA RESP QL NAA+PROBE: NOT DETECTED
SODIUM SERPL-SCNC: 139 MMOL/L (ref 136–145)
TOTAL RATE: 0 BREATHS/MINUTE
TROPONIN T DELTA: -7 NG/L
TROPONIN T SERPL HS-MCNC: 64 NG/L
WBC NRBC COR # BLD: 10.1 10*3/MM3 (ref 3.4–10.8)
WHOLE BLOOD HOLD COAG: NORMAL
WHOLE BLOOD HOLD SPECIMEN: NORMAL

## 2023-07-10 PROCEDURE — 83605 ASSAY OF LACTIC ACID: CPT | Performed by: EMERGENCY MEDICINE

## 2023-07-10 PROCEDURE — 94660 CPAP INITIATION&MGMT: CPT

## 2023-07-10 PROCEDURE — 25010000002 AZITHROMYCIN PER 500 MG: Performed by: EMERGENCY MEDICINE

## 2023-07-10 PROCEDURE — 87040 BLOOD CULTURE FOR BACTERIA: CPT | Performed by: EMERGENCY MEDICINE

## 2023-07-10 PROCEDURE — 99291 CRITICAL CARE FIRST HOUR: CPT | Performed by: INTERNAL MEDICINE

## 2023-07-10 PROCEDURE — 80053 COMPREHEN METABOLIC PANEL: CPT | Performed by: EMERGENCY MEDICINE

## 2023-07-10 PROCEDURE — 71045 X-RAY EXAM CHEST 1 VIEW: CPT

## 2023-07-10 PROCEDURE — 84484 ASSAY OF TROPONIN QUANT: CPT | Performed by: EMERGENCY MEDICINE

## 2023-07-10 PROCEDURE — 25010000002 CEFTRIAXONE PER 250 MG: Performed by: EMERGENCY MEDICINE

## 2023-07-10 PROCEDURE — 83880 ASSAY OF NATRIURETIC PEPTIDE: CPT | Performed by: EMERGENCY MEDICINE

## 2023-07-10 PROCEDURE — 87636 SARSCOV2 & INF A&B AMP PRB: CPT | Performed by: EMERGENCY MEDICINE

## 2023-07-10 PROCEDURE — 83050 HGB METHEMOGLOBIN QUAN: CPT

## 2023-07-10 PROCEDURE — 99285 EMERGENCY DEPT VISIT HI MDM: CPT

## 2023-07-10 PROCEDURE — 85025 COMPLETE CBC W/AUTO DIFF WBC: CPT | Performed by: EMERGENCY MEDICINE

## 2023-07-10 PROCEDURE — 82805 BLOOD GASES W/O2 SATURATION: CPT

## 2023-07-10 PROCEDURE — 25010000002 HEPARIN (PORCINE) PER 1000 UNITS: Performed by: INTERNAL MEDICINE

## 2023-07-10 PROCEDURE — 94799 UNLISTED PULMONARY SVC/PX: CPT

## 2023-07-10 PROCEDURE — 36600 WITHDRAWAL OF ARTERIAL BLOOD: CPT

## 2023-07-10 PROCEDURE — 93005 ELECTROCARDIOGRAM TRACING: CPT | Performed by: EMERGENCY MEDICINE

## 2023-07-10 PROCEDURE — 82375 ASSAY CARBOXYHB QUANT: CPT

## 2023-07-10 RX ORDER — HEPARIN SODIUM 5000 [USP'U]/ML
5000 INJECTION, SOLUTION INTRAVENOUS; SUBCUTANEOUS EVERY 8 HOURS SCHEDULED
Status: DISCONTINUED | OUTPATIENT
Start: 2023-07-10 | End: 2023-07-11

## 2023-07-10 RX ORDER — DONEPEZIL HYDROCHLORIDE 5 MG/1
5 TABLET, FILM COATED ORAL NIGHTLY
Status: DISCONTINUED | OUTPATIENT
Start: 2023-07-10 | End: 2023-07-19 | Stop reason: HOSPADM

## 2023-07-10 RX ORDER — FAMOTIDINE 10 MG/ML
20 INJECTION, SOLUTION INTRAVENOUS DAILY
Status: DISCONTINUED | OUTPATIENT
Start: 2023-07-11 | End: 2023-07-13

## 2023-07-10 RX ORDER — FLUOXETINE HYDROCHLORIDE 20 MG/1
40 CAPSULE ORAL DAILY
Status: DISCONTINUED | OUTPATIENT
Start: 2023-07-11 | End: 2023-07-13

## 2023-07-10 RX ORDER — ACETAMINOPHEN 650 MG/1
SUPPOSITORY RECTAL
Status: DISPENSED
Start: 2023-07-10 | End: 2023-07-11

## 2023-07-10 RX ORDER — SODIUM CHLORIDE 0.9 % (FLUSH) 0.9 %
10 SYRINGE (ML) INJECTION AS NEEDED
Status: DISCONTINUED | OUTPATIENT
Start: 2023-07-10 | End: 2023-07-19 | Stop reason: HOSPADM

## 2023-07-10 RX ORDER — FUROSEMIDE 10 MG/ML
80 INJECTION INTRAMUSCULAR; INTRAVENOUS EVERY 12 HOURS
Status: DISCONTINUED | OUTPATIENT
Start: 2023-07-11 | End: 2023-07-11

## 2023-07-10 RX ORDER — ACETAMINOPHEN 650 MG/1
650 SUPPOSITORY RECTAL EVERY 4 HOURS PRN
Status: DISCONTINUED | OUTPATIENT
Start: 2023-07-10 | End: 2023-07-11

## 2023-07-10 RX ORDER — BUMETANIDE 0.25 MG/ML
2 INJECTION INTRAMUSCULAR; INTRAVENOUS ONCE
Status: COMPLETED | OUTPATIENT
Start: 2023-07-10 | End: 2023-07-10

## 2023-07-10 RX ADMIN — BUMETANIDE 2 MG: 0.25 INJECTION, SOLUTION INTRAMUSCULAR; INTRAVENOUS at 22:09

## 2023-07-10 RX ADMIN — HEPARIN SODIUM 5000 UNITS: 5000 INJECTION INTRAVENOUS; SUBCUTANEOUS at 22:17

## 2023-07-10 RX ADMIN — SODIUM CHLORIDE 1 G: 900 INJECTION INTRAVENOUS at 19:10

## 2023-07-10 RX ADMIN — AZITHROMYCIN 500 MG: 500 INJECTION, POWDER, LYOPHILIZED, FOR SOLUTION INTRAVENOUS at 19:45

## 2023-07-10 RX ADMIN — ACETAMINOPHEN 650 MG: 650 SUPPOSITORY RECTAL at 22:51

## 2023-07-10 RX ADMIN — DOXYCYCLINE 100 MG: 100 INJECTION, POWDER, LYOPHILIZED, FOR SOLUTION INTRAVENOUS at 22:11

## 2023-07-10 NOTE — ED PROVIDER NOTES
Subjective   History of Present Illness  83 year old female presenting to the ER today for a 3 day period of shortness of breath and oxygen saturation in the low 90's at home while on 4 L of O2 via nasal cannula.  Along with this she has had a rattle to her breathing per the . Yesterday she began to develop sweating, and fever which persisted today. About 2 months ago, she was hospitalized for an UTI which she was given IV nitrofurotin and sent home with a 7 day oral prescription for.  She was off it 3 days and redeveloped a fever and sweating.  She repeated this routine through another 10 days of nitrofurantoin, and then a period of 15 and 30 days of the antibiotic with no success.      History provided by:  Spouse, EMS personnel and medical records  History limited by:  Dementia    Review of Systems    Past Medical History:   Diagnosis Date    Anxiety 2022    Late onset Alzheimer's disease without behavioral disturbance 2018    Paroxysmal atrial fibrillation with rapid ventricular response 2022    Pulmonary hypertension 2022       Allergies   Allergen Reactions    Codeine Nausea And Vomiting       Past Surgical History:   Procedure Laterality Date    APPENDECTOMY      VERTEBROPLASTY      L123, fusion       Family History   Problem Relation Age of Onset    Heart attack Father     Colon cancer Brother        Social History     Socioeconomic History    Marital status:    Tobacco Use    Smoking status: Former     Packs/day: 1.00     Years: 30.00     Pack years: 30.00     Types: Cigarettes     Start date: 1960     Quit date: 1993     Years since quittin.5    Smokeless tobacco: Never   Vaping Use    Vaping Use: Never used   Substance and Sexual Activity    Alcohol use: Not Currently    Drug use: No    Sexual activity: Defer           Objective   Physical Exam  Vitals and nursing note reviewed.   Constitutional:       General: She is in acute distress.      Appearance:  She is ill-appearing.   HENT:      Head: Normocephalic.   Cardiovascular:      Rate and Rhythm: Normal rate and regular rhythm.   Pulmonary:      Effort: Tachypnea and respiratory distress present.      Breath sounds: Decreased breath sounds present.   Abdominal:      General: Bowel sounds are normal.   Skin:     General: Skin is warm and dry.   Neurological:      Comments: Somnolent.       Critical Care  Performed by: Demian Andrade MD  Authorized by: Demian Andrade MD     Critical care provider statement:     Critical care time (minutes):  45    Critical care end time:  7/10/2023 9:11 PM    Critical care was necessary to treat or prevent imminent or life-threatening deterioration of the following conditions:  Respiratory failure and sepsis    Critical care was time spent personally by me on the following activities:  Development of treatment plan with patient or surrogate, discussions with consultants, evaluation of patient's response to treatment, examination of patient, obtaining history from patient or surrogate, ordering and performing treatments and interventions, ordering and review of laboratory studies, ordering and review of radiographic studies, pulse oximetry, re-evaluation of patient's condition and review of old charts    Care discussed with: admitting provider             ED Course  ED Course as of 07/10/23 2112   Mon Jul 10, 2023   1808 Chart review demonstrates a couple of admissions in September 2022 for similar type presentations with acute on chronic respiratory failure with hypoxemia with findings of bilateral infiltrates and effusions.  See that documentation in note for further details. [RS]   1826 Temp(!): 100.9 °F (38.3 °C)  Fever noted on arrival. [RS]   1837 Hemoglobin(!): 8.8  Stable chronic anemia when compared to prior values. [RS]   1854 Blood Gas, Arterial With Co-Ox(!)  ABG consistent with acute hypoxic respiratory failure with respiratory alkalosis. [RS]   2013 Single  High Sensitivity Troponin T(!!)  Similar to prior values.  Chronically elevated troponin level. [RS]   2038 Case discussed with the intensivist who agrees with the plan for admission.  Patient with hypoxemia likely associated with acute exacerbation of chronic congestive heart failure with chronic anemia with fever associated with urinary tract infection. [RS]      ED Course User Index  [RS] Demian Andrade MD                                           Medical Decision Making  Problems Addressed:  Acute febrile illness: complicated acute illness or injury  Acute on chronic congestive heart failure, unspecified heart failure type: complicated acute illness or injury  Acute on chronic respiratory failure with hypoxemia: complicated acute illness or injury  Former smoker: chronic illness or injury  History of pulmonary hypertension: chronic illness or injury  Urinary tract infection in elderly patient: complicated acute illness or injury    Amount and/or Complexity of Data Reviewed  Independent Historian: spouse  External Data Reviewed: labs and notes.  Labs: ordered. Decision-making details documented in ED Course.  Radiology: ordered.  ECG/medicine tests: ordered.    Risk  Prescription drug management.  Decision regarding hospitalization.    Critical Care  Total time providing critical care: 45 minutes      Final diagnoses:   Acute febrile illness   Acute on chronic respiratory failure with hypoxemia   Urinary tract infection in elderly patient   Acute on chronic congestive heart failure, unspecified heart failure type   History of pulmonary hypertension   Former smoker       ED Disposition  ED Disposition       ED Disposition   Decision to Admit    Condition   --    Comment   Level of Care: Critical Care [6]   Diagnosis: Respiratory failure [979675]   Admitting Physician: RIOS ARCHER [[id number]]   Certification: I Certify That Inpatient Hospital Services Are Medically Necessary For Greater Than 2  Midnights                 No follow-up provider specified.       Medication List      No changes were made to your prescriptions during this visit.            Demian Andrade MD  07/10/23 2999       Demian Andrade MD  07/10/23 4183

## 2023-07-10 NOTE — LETTER
EMS Transport Request  For use at [facility], [place], and [place] only   Patient Name: Laura Wallace : 1940   Weight:46.9 kg (103 lb 6.3 oz) Pick-up Location: [facility] BLS/ALS: BLS/ALS: BLS   Insurance: [employer] MEDICARE REPLACEMENT Auth End Date:    Pre-Cert #: D/C Summary complete: no   Destination: Home How many stairs 3 spread apart, Will the patient be on the main level yes, Is there a ramp available no, Can the patient stand and pivot yes, Address [address] [place] [address], and Name/contact number for who will be present Horace [phone]   Contact Precautions: None   Equipment (O2, Fluids, etc.): O2, settings 2 L/NC   Arrive By Date/Time:  today Stretcher/WC: Stretcher   CM Requesting: Charlene Kuhn RN Ext: [phone]   Notes/Medical Necessity: Pt is unable to bend enough to properly sit in a w/c, especially for transportation     ______________________________________________________________________    *Only 2 patient bags OR 1 carry-on size bag are permitted.  Wheelchairs and walkers CANNOT transported with the patient. Acknowledge: Yes

## 2023-07-11 ENCOUNTER — APPOINTMENT (OUTPATIENT)
Dept: GENERAL RADIOLOGY | Facility: HOSPITAL | Age: 83
DRG: 291 | End: 2023-07-11
Payer: MEDICARE

## 2023-07-11 PROBLEM — E44.0 MODERATE MALNUTRITION: Status: ACTIVE | Noted: 2023-07-11

## 2023-07-11 LAB
ALBUMIN SERPL-MCNC: 3.2 G/DL (ref 3.5–5.2)
ALBUMIN/GLOB SERPL: 0.9 G/DL
ALP SERPL-CCNC: 122 U/L (ref 39–117)
ALT SERPL W P-5'-P-CCNC: 16 U/L (ref 1–33)
ANION GAP SERPL CALCULATED.3IONS-SCNC: 14 MMOL/L (ref 5–15)
AST SERPL-CCNC: 24 U/L (ref 1–32)
BACTERIA UR QL AUTO: NORMAL /HPF
BILIRUB SERPL-MCNC: 0.8 MG/DL (ref 0–1.2)
BILIRUB UR QL STRIP: NEGATIVE
BUN SERPL-MCNC: 11 MG/DL (ref 8–23)
BUN/CREAT SERPL: 17.7 (ref 7–25)
CA-I SERPL ISE-MCNC: 1.19 MMOL/L (ref 1.12–1.32)
CALCIUM SPEC-SCNC: 8.6 MG/DL (ref 8.6–10.5)
CHLORIDE SERPL-SCNC: 99 MMOL/L (ref 98–107)
CLARITY UR: CLEAR
CO2 SERPL-SCNC: 28 MMOL/L (ref 22–29)
COLOR UR: YELLOW
CREAT SERPL-MCNC: 0.62 MG/DL (ref 0.57–1)
D-LACTATE SERPL-SCNC: 0.8 MMOL/L (ref 0.5–2)
DEPRECATED RDW RBC AUTO: 45.1 FL (ref 37–54)
EGFRCR SERPLBLD CKD-EPI 2021: 89.6 ML/MIN/1.73
ERYTHROCYTE [DISTWIDTH] IN BLOOD BY AUTOMATED COUNT: 14.9 % (ref 12.3–15.4)
GLOBULIN UR ELPH-MCNC: 3.4 GM/DL
GLUCOSE SERPL-MCNC: 100 MG/DL (ref 65–99)
GLUCOSE UR STRIP-MCNC: NEGATIVE MG/DL
HCT VFR BLD AUTO: 27 % (ref 34–46.6)
HGB BLD-MCNC: 8.3 G/DL (ref 12–15.9)
HGB UR QL STRIP.AUTO: ABNORMAL
HYALINE CASTS UR QL AUTO: NORMAL /LPF
KETONES UR QL STRIP: NEGATIVE
LEUKOCYTE ESTERASE UR QL STRIP.AUTO: NEGATIVE
MAGNESIUM SERPL-MCNC: 1.6 MG/DL (ref 1.6–2.4)
MCH RBC QN AUTO: 25.5 PG (ref 26.6–33)
MCHC RBC AUTO-ENTMCNC: 30.7 G/DL (ref 31.5–35.7)
MCV RBC AUTO: 83.1 FL (ref 79–97)
NITRITE UR QL STRIP: NEGATIVE
PH UR STRIP.AUTO: 6.5 [PH] (ref 5–8)
PHOSPHATE SERPL-MCNC: 2.9 MG/DL (ref 2.5–4.5)
PLATELET # BLD AUTO: 250 10*3/MM3 (ref 140–450)
PMV BLD AUTO: 8.7 FL (ref 6–12)
POTASSIUM SERPL-SCNC: 2.6 MMOL/L (ref 3.5–5.2)
POTASSIUM SERPL-SCNC: 3.5 MMOL/L (ref 3.5–5.2)
PROT SERPL-MCNC: 6.6 G/DL (ref 6–8.5)
PROT UR QL STRIP: NEGATIVE
QT INTERVAL: 436 MS
QTC INTERVAL: 506 MS
RBC # BLD AUTO: 3.25 10*6/MM3 (ref 3.77–5.28)
RBC # UR STRIP: NORMAL /HPF
REF LAB TEST METHOD: NORMAL
SODIUM SERPL-SCNC: 141 MMOL/L (ref 136–145)
SP GR UR STRIP: 1.01 (ref 1–1.03)
SQUAMOUS #/AREA URNS HPF: NORMAL /HPF
TSH SERPL DL<=0.05 MIU/L-ACNC: 3.89 UIU/ML (ref 0.27–4.2)
UROBILINOGEN UR QL STRIP: ABNORMAL
WBC # UR STRIP: NORMAL /HPF
WBC NRBC COR # BLD: 7.58 10*3/MM3 (ref 3.4–10.8)

## 2023-07-11 PROCEDURE — 99232 SBSQ HOSP IP/OBS MODERATE 35: CPT | Performed by: INTERNAL MEDICINE

## 2023-07-11 PROCEDURE — 83605 ASSAY OF LACTIC ACID: CPT | Performed by: INTERNAL MEDICINE

## 2023-07-11 PROCEDURE — 0 POTASSIUM CHLORIDE 10 MEQ/100ML SOLUTION: Performed by: NURSE PRACTITIONER

## 2023-07-11 PROCEDURE — 25010000002 CEFTRIAXONE PER 250 MG: Performed by: INTERNAL MEDICINE

## 2023-07-11 PROCEDURE — 83735 ASSAY OF MAGNESIUM: CPT | Performed by: INTERNAL MEDICINE

## 2023-07-11 PROCEDURE — 80053 COMPREHEN METABOLIC PANEL: CPT | Performed by: INTERNAL MEDICINE

## 2023-07-11 PROCEDURE — 94660 CPAP INITIATION&MGMT: CPT

## 2023-07-11 PROCEDURE — 82330 ASSAY OF CALCIUM: CPT | Performed by: INTERNAL MEDICINE

## 2023-07-11 PROCEDURE — 84443 ASSAY THYROID STIM HORMONE: CPT | Performed by: INTERNAL MEDICINE

## 2023-07-11 PROCEDURE — 94799 UNLISTED PULMONARY SVC/PX: CPT

## 2023-07-11 PROCEDURE — 85027 COMPLETE CBC AUTOMATED: CPT | Performed by: INTERNAL MEDICINE

## 2023-07-11 PROCEDURE — 25010000002 HEPARIN (PORCINE) PER 1000 UNITS: Performed by: INTERNAL MEDICINE

## 2023-07-11 PROCEDURE — 71045 X-RAY EXAM CHEST 1 VIEW: CPT

## 2023-07-11 PROCEDURE — 84100 ASSAY OF PHOSPHORUS: CPT | Performed by: INTERNAL MEDICINE

## 2023-07-11 PROCEDURE — 84132 ASSAY OF SERUM POTASSIUM: CPT | Performed by: INTERNAL MEDICINE

## 2023-07-11 PROCEDURE — 81001 URINALYSIS AUTO W/SCOPE: CPT | Performed by: EMERGENCY MEDICINE

## 2023-07-11 RX ORDER — NYSTATIN 100000 [USP'U]/G
POWDER TOPICAL EVERY 12 HOURS SCHEDULED
Status: DISCONTINUED | OUTPATIENT
Start: 2023-07-11 | End: 2023-07-19 | Stop reason: HOSPADM

## 2023-07-11 RX ORDER — ACETAMINOPHEN 160 MG/5ML
650 SOLUTION ORAL EVERY 4 HOURS PRN
Status: DISCONTINUED | OUTPATIENT
Start: 2023-07-11 | End: 2023-07-13 | Stop reason: ALTCHOICE

## 2023-07-11 RX ORDER — PANTOPRAZOLE SODIUM 40 MG/1
40 TABLET, DELAYED RELEASE ORAL DAILY
COMMUNITY

## 2023-07-11 RX ORDER — FUROSEMIDE 40 MG/1
40 TABLET ORAL DAILY
Status: DISCONTINUED | OUTPATIENT
Start: 2023-07-11 | End: 2023-07-19 | Stop reason: HOSPADM

## 2023-07-11 RX ORDER — ALPRAZOLAM 0.25 MG/1
0.25 TABLET ORAL ONCE
Status: COMPLETED | OUTPATIENT
Start: 2023-07-12 | End: 2023-07-11

## 2023-07-11 RX ORDER — POTASSIUM CHLORIDE 7.45 MG/ML
10 INJECTION INTRAVENOUS
Status: COMPLETED | OUTPATIENT
Start: 2023-07-11 | End: 2023-07-11

## 2023-07-11 RX ADMIN — POTASSIUM CHLORIDE 10 MEQ: 7.46 INJECTION, SOLUTION INTRAVENOUS at 09:06

## 2023-07-11 RX ADMIN — POTASSIUM CHLORIDE 10 MEQ: 7.46 INJECTION, SOLUTION INTRAVENOUS at 15:15

## 2023-07-11 RX ADMIN — DOXYCYCLINE 100 MG: 100 INJECTION, POWDER, LYOPHILIZED, FOR SOLUTION INTRAVENOUS at 21:00

## 2023-07-11 RX ADMIN — APIXABAN 2.5 MG: 2.5 TABLET, FILM COATED ORAL at 20:58

## 2023-07-11 RX ADMIN — ACETAMINOPHEN 650 MG: 650 SOLUTION ORAL at 23:57

## 2023-07-11 RX ADMIN — SODIUM CHLORIDE 1 G: 900 INJECTION INTRAVENOUS at 17:42

## 2023-07-11 RX ADMIN — POTASSIUM CHLORIDE 10 MEQ: 7.46 INJECTION, SOLUTION INTRAVENOUS at 12:36

## 2023-07-11 RX ADMIN — ALPRAZOLAM 0.25 MG: 0.25 TABLET ORAL at 23:59

## 2023-07-11 RX ADMIN — FAMOTIDINE 20 MG: 10 INJECTION INTRAVENOUS at 09:06

## 2023-07-11 RX ADMIN — DOXYCYCLINE 100 MG: 100 INJECTION, POWDER, LYOPHILIZED, FOR SOLUTION INTRAVENOUS at 09:05

## 2023-07-11 RX ADMIN — NYSTATIN: 100000 POWDER TOPICAL at 20:58

## 2023-07-11 RX ADMIN — APIXABAN 2.5 MG: 2.5 TABLET, FILM COATED ORAL at 11:06

## 2023-07-11 RX ADMIN — FUROSEMIDE 40 MG: 40 TABLET ORAL at 15:16

## 2023-07-11 RX ADMIN — POTASSIUM CHLORIDE 10 MEQ: 7.46 INJECTION, SOLUTION INTRAVENOUS at 07:32

## 2023-07-11 RX ADMIN — POTASSIUM CHLORIDE 10 MEQ: 7.46 INJECTION, SOLUTION INTRAVENOUS at 14:26

## 2023-07-11 RX ADMIN — HEPARIN SODIUM 5000 UNITS: 5000 INJECTION INTRAVENOUS; SUBCUTANEOUS at 05:32

## 2023-07-11 RX ADMIN — POTASSIUM CHLORIDE 10 MEQ: 7.46 INJECTION, SOLUTION INTRAVENOUS at 11:06

## 2023-07-11 RX ADMIN — DONEPEZIL HYDROCHLORIDE 5 MG: 5 TABLET, FILM COATED ORAL at 20:58

## 2023-07-11 RX ADMIN — NYSTATIN: 100000 POWDER TOPICAL at 12:36

## 2023-07-11 NOTE — PROGRESS NOTES
Clinical Nutrition     Reason for Visit: MDR, Identified at risk by screening criteria, Malnutrition Severity Assessment      Patient Name: Laura Wallace  YOB: 1940  MRN: [id number]  Date of Encounter: 07/11/23 10:44 EDT  Admission date: 7/10/2023    Nutrition Assessment     Problem List:    Acute respiratory failure with hypoxia    Late onset Alzheimer's disease without behavioral disturbance    Pulmonary hypertension (WHO Group 2)    Lactic acidosis    Wound with tunneling    PAF (paroxysmal atrial fibrillation)    Acute UTI (urinary tract infection)    Severe malnutrition    Diastolic dysfunction    Acute on chronic heart failure with preserved ejection fraction (HFpEF)    Chronic anticoagulation (Eliquis)        PMH:   She  has a past medical history of Anxiety (9/23/2022), Late onset Alzheimer's disease without behavioral disturbance (5/25/2018), Paroxysmal atrial fibrillation with rapid ventricular response (4/23/2022), and Pulmonary hypertension (9/20/2022).    PSH:  She  has a past surgical history that includes Vertebroplasty and Appendectomy (1960).      Applicable Nutrition Concerns:   Skin:sacrum- tunneling wound  GI:last bm 7-10      SLP Recommendation and Plan from last admission:  SLP Swallowing Diagnosis: mild, oral dysphagia, suspected pharyngeal dysphagia (05/11/23 1440)  SLP Diet Recommendation: regular textures, nectar thick liquids, water between meals after oral care, with supervision (05/11/23 1440)  Recommended Precautions and Strategies: general aspiration precautions, assist with feeding (05/11/23 1440)  SLP Rec. for Method of Medication Administration: meds crushed, with puree (05/11/23 1440)  Monitor for Signs of Aspiration: notify SLP if any concerns (05/11/23 1440)    Reported/Observed/Food/Nutrition Related History:     Pt resting in bed, on nasal cannula, is nonverbal, appears much younger than actual age, arms are contracted     reports pt's  "STACY was ~ 100lb's most of her life, the highest she ever weighed was ~120lb's, has always been very careful about what she eats, ate small portions, only eats 2 meals per day, has gradually lost some weight over the past several years, on last admission was on a thickened liquid diet, he has been feeding her food she likes at home like nichols, eggs, and toast, with thickened liquids, she does not need to have meats chopped up      Labs    Labs Reviewed: Yes     Results from last 7 days   Lab Units 07/11/23  0550 07/10/23  1810   GLUCOSE mg/dL 100* 102*   BUN mg/dL 11 13   CREATININE mg/dL 0.62 0.63   SODIUM mmol/L 141 139   CHLORIDE mmol/L 99 101   POTASSIUM mmol/L 2.6* 3.3*   PHOSPHORUS mg/dL 2.9  --    MAGNESIUM mg/dL 1.6  --    ALT (SGPT) U/L 16 15       Results from last 7 days   Lab Units 07/11/23  0550 07/10/23  1810   ALBUMIN g/dL 3.2* 3.2*   IONIZED CALCIUM mmol/L 1.19  --            No results found for: HGBA1C      Results from last 7 days   Lab Units 07/10/23  1810   PROBNP pg/mL 6,389.0*         Medications    Medications Reviewed: Yes  Pertinent: lasix, abx, aricept, pepcid, prozac, eliquis  I      Intake/Ouptut 24 hrs (0701 - 0700)   I&O's Reviewed: Yes     Intake & Output (last day)         07/10 0701  07/11 0700 07/11 0701  07/12 0700    I.V. (mL/kg) 32 (0.7)     IV Piggyback 600     Total Intake(mL/kg) 632 (13.5)     Urine (mL/kg/hr) 1425     Total Output 1425     Net -793           Urine Unmeasured Occurrence 3 x               Anthropometrics     Flowsheet Rows      Flowsheet Row First Filed Value   Admission Height 157.5 cm (62\") Documented at 07/10/2023 1807   Admission Weight 44 kg (97 lb) Documented at 07/10/2023 1807            Height: Height: 157.5 cm (62\")  Last Filed Weight: Weight: 46.9 kg (103 lb 6.3 oz) (07/10/23 2150)  Method: Weight Method: Bed scale wt may be skewed by edema  BMI: BMI (Calculated): 18.9  bmi may be skewed by edema  BMI classification: Normal: 18.5-24.9kg/m2      UBW: " 100lb   Weight change: per EMR- gradual wt loss since 2018, ~ 22lb wt loss over 5 years, may have more wt loss than this as pt has some edema in BLE    Pt weighed 125lb 's at MD office 5-     Weight      Weight (kg) Weight (lbs) Weight Method   11/18/2014 56.25 kg  124 lb 0.1 oz     2/5/2015 58.51 kg  128 lb 15.9 oz     5/25/2018 56.7 kg  125 lb     6/28/2018 56.7 kg  125 lb     1/7/2019 56.7 kg  125 lb     7/8/2019 56.7 kg  125 lb     1/1/2021 50.803 kg  112 lb  Stated    11/9/2021 48.988 kg  108 lb  Stated    4/13/2022 49.896 kg  110 lb  Estimated    4/14/2022 53.842 kg  118 lb 11.2 oz  Bed scale    4/15/2022 53.524 kg  118 lb     9/6/2022 53.524 kg  118 lb     9/18/2022 49.896 kg  110 lb      50.531 kg  111 lb 6.4 oz     9/23/2022 50.349 kg  111 lb  Stated    9/27/2022 51.075 kg  112 lb 9.6 oz     10/4/2022 50.349 kg  111 lb     5/7/2023 49.896 kg  110 lb  Bed scale     44.09 kg  97 lb 3.2 oz      44.09 kg  97 lb 3.2 oz     7/10/2023 43.999 kg  97 lb  Stated     46.9 kg  103 lb 6.3 oz  Bed scale          Nutrition Focused Physical Exam     Date:7-11-23    Patient meets criteria for malnutrition diagnosis, see MSA note.    Current Nutrition Prescription     PO: NPO  Oral Nutrition Supplement:   Intake: N/A      Nutrition Diagnosis   Date: 7-11-23 Updated:   Problem Malnutrition    Etiology Dementia   Signs/Symptoms 22lb wt loss over 5 years, moderate muscle wasting and fat loss       Date:  7-11-23 Updated:  Problem Swallowing difficulty   Etiology Per Clinical Status   Signs/Symptoms Per SLP eval last admission   Status: pt on nectar thick liquids at home    Goal:   General: Nutrition support treatment  PO: Advance diet  EN/PN: N/A    Nutrition Intervention      Follow treatment progress, Interview for preferences, Menu provided, Menu adjusted, Supplement provided    Per MD: ok to feed pt whatever she eats at home    SLP rec: Regular diet/ nectar liquids on last admission 5-11-23     has been giving  wife nectar liquids at home with regular food    Will order Soft diet with whole meats,  with nectar liquids    Magic Cups 3x/day    Consider SLP eval if pt unable to tolerate this diet    Pt meets criteria for moderate chronic malnutrition with 22lb wt loss over 5 years, moderate muscle wasting and fat loss    Monitoring/Evaluation:   Per protocol, I&O, PO intake, Pertinent labs, Weight, Skin status, GI status, Symptoms, Swallow function      Marlene Thomas, CONNIE  Time Spent: 60min

## 2023-07-11 NOTE — PLAN OF CARE
Goal Outcome Evaluation:  Plan of Care Reviewed With: patient, spouse        Progress: improving     -Pt arrived to the unit at 2200 last night   -O2 sats stable on nonrebreather, placed on Bipap at 50%, titrated down to 30% overnight, on 2L NC this am   -Urinary catheter placed, UA sent, UOP 1400 + 3 unmeasured occurences   -WOC consulted for tunneling wound on coccyx  -Spouse updated at bedside          Problem: Adult Inpatient Plan of Care  Goal: Absence of Hospital-Acquired Illness or Injury  Intervention: Prevent Skin Injury  Recent Flowsheet Documentation  Taken 7/11/2023 0600 by Denise Dawson, RN  Body Position:   turned   right   lower extremity elevated   neutral body alignment   neutral head position   upper extremity elevated  Skin Protection:   adhesive use limited   incontinence pads utilized   tubing/devices free from skin contact   transparent dressing maintained   skin-to-skin areas padded   skin-to-device areas padded  Taken 7/11/2023 0400 by Denise Dawson RN  Body Position:   turned   supine   lower extremity elevated   neutral body alignment   neutral head position   upper extremity elevated  Skin Protection:   adhesive use limited   incontinence pads utilized   skin-to-device areas padded   skin-to-skin areas padded   transparent dressing maintained   tubing/devices free from skin contact  Taken 7/11/2023 0200 by Denise Dawson RN  Body Position:   turned   supine   lower extremity elevated   neutral body alignment   neutral head position   upper extremity elevated  Skin Protection:   adhesive use limited   incontinence pads utilized   skin-to-device areas padded   skin-to-skin areas padded   transparent dressing maintained   tubing/devices free from skin contact  Taken 7/11/2023 0000 by Denise Dawson RN  Body Position:   turned   right   lower extremity elevated   neutral body alignment   neutral head position   upper extremity elevated  Skin Protection:   adhesive use limited   incontinence pads  utilized   silicone foam dressing in place   skin-to-device areas padded   transparent dressing maintained   tubing/devices free from skin contact   skin-to-skin areas padded  Taken 7/10/2023 2200 by Denise Dawson RN  Body Position:   turned   right   lower extremity elevated   neutral body alignment   neutral head position   upper extremity elevated  Skin Protection:   adhesive use limited   incontinence pads utilized   pulse oximeter probe site changed   skin-to-device areas padded   skin-to-skin areas padded   transparent dressing maintained   tubing/devices free from skin contact     Problem: Skin Injury Risk Increased  Goal: Skin Health and Integrity  Intervention: Optimize Skin Protection  Recent Flowsheet Documentation  Taken 7/11/2023 0600 by Denise Dawson RN  Pressure Reduction Techniques:   heels elevated off bed   positioned off wounds   pressure points protected   weight shift assistance provided  Head of Bed (HOB) Positioning: HOB at 20-30 degrees  Pressure Reduction Devices:   elbow protectors utilized   foam padding utilized   heel offloading device utilized   positioning supports utilized   specialty bed utilized  Skin Protection:   adhesive use limited   incontinence pads utilized   tubing/devices free from skin contact   transparent dressing maintained   skin-to-skin areas padded   skin-to-device areas padded  Taken 7/11/2023 0400 by Denise Dawson RN  Pressure Reduction Techniques:   heels elevated off bed   positioned off wounds   pressure points protected   weight shift assistance provided  Head of Bed (HOB) Positioning: HOB at 20-30 degrees  Pressure Reduction Devices:   elbow protectors utilized   heel offloading device utilized   positioning supports utilized   specialty bed utilized  Skin Protection:   adhesive use limited   incontinence pads utilized   skin-to-device areas padded   skin-to-skin areas padded   transparent dressing maintained   tubing/devices free from skin contact  Taken 7/11/2023  0200 by Denise Dawson RN  Pressure Reduction Techniques:   heels elevated off bed   positioned off wounds   pressure points protected   weight shift assistance provided  Head of Bed (HOB) Positioning: HOB at 20-30 degrees  Pressure Reduction Devices:   elbow protectors utilized   foam padding utilized   heel offloading device utilized   positioning supports utilized   specialty bed utilized  Skin Protection:   adhesive use limited   incontinence pads utilized   skin-to-device areas padded   skin-to-skin areas padded   transparent dressing maintained   tubing/devices free from skin contact  Taken 7/11/2023 0000 by Denise Dawson RN  Pressure Reduction Techniques:   heels elevated off bed   positioned off wounds   pressure points protected   weight shift assistance provided  Head of Bed (\[facility]\"") Positioning: HOB at 20-30 degrees  Pressure Reduction Devices:   elbow protectors utilized   foam padding utilized   heel offloading device utilized   positioning supports utilized   specialty bed utilized  Skin Protection:   adhesive use limited   incontinence pads utilized   silicone foam dressing in place   skin-to-device areas padded   transparent dressing maintained   tubing/devices free from skin contact   skin-to-skin areas padded  Taken 7/10/2023 2200 by Denise Dawson RN  Pressure Reduction Techniques:   heels elevated off bed   positioned off wounds   pressure points protected   weight shift assistance provided  Head of Bed (\[facility]\"") Positioning: HOB at 20-30 degrees  Pressure Reduction Devices:   chair cushion utilized   elbow protectors utilized   foam padding utilized   heel offloading device utilized   positioning supports utilized   specialty bed utilized  Skin Protection:   adhesive use limited   incontinence pads utilized   pulse oximeter probe site changed   skin-to-device areas padded   skin-to-skin areas padded   transparent dressing maintained   tubing/devices free from skin contact

## 2023-07-11 NOTE — H&P
CRITICAL CARE ADMISSION NOTE    Chief Complaint     Acute respiratory failure with hypoxia    History of Present Illness     83-year-old female admitted to the intensive care unit on 7/10/2023 with acute hypoxic respiratory failure due to decompensated diastolic heart failure.    Patient was just at [facility] in May.  She has late onset Alzheimer's.  She has been bedbound for the last 3 years.  Problems on her last hospital stay included volume overload, UTI, and a tunneling back wound.    She came back to the emergency room this evening with 3 days of worsening shortness of breath and low oxygen level.  She is on supplemental oxygen at home.  She had a persistent fever yesterday and has been receiving nitrofurantoin for urinary tract infection.    In the emergency room the patient was lethargic, tachypneic, hypoxic requiring nonrebreather mask to maintain adequate oxygenation.  Chest x-ray consistent with decompensated heart failure.  We have been asked to undertake her care in the intensive care unit    Problem List, Surgical History, Family, Social History, and ROS     Acute respiratory failure with hypoxia    Acute on chronic heart failure with preserved ejection fraction (HFpEF)    Acute UTI (urinary tract infection)    Late onset Alzheimer's disease without behavioral disturbance    Pulmonary hypertension (WHO Group 2)    Lactic acidosis    Wound with tunneling    PAF (paroxysmal atrial fibrillation)    Severe malnutrition    Diastolic dysfunction    Chronic anticoagulation (Eliquis)     Past Surgical History:   Procedure Laterality Date    APPENDECTOMY  1960    VERTEBROPLASTY      L123, fusion       Allergies   Allergen Reactions    Codeine Nausea And Vomiting     No current facility-administered medications on file prior to encounter.     Current Outpatient Medications on File Prior to Encounter   Medication Sig    acetaminophen (TYLENOL) 325 MG tablet Take 2 tablets by mouth Every 4 (Four) Hours As  Needed for Mild Pain .    amLODIPine (NORVASC) 10 MG tablet Take 1 tablet by mouth Daily.    apixaban (ELIQUIS) 2.5 MG tablet tablet Take 1 tablet by mouth Every 12 (Twelve) Hours. Indications: Atrial Fibrillation    Cholecalciferol (VITAMIN D3) 5000 units capsule capsule Take 1 capsule by mouth Daily. 125 mcg capsule    donepezil (ARICEPT) 5 MG tablet Take 5 mg by mouth Every Night.    famotidine (PEPCID) 20 MG tablet Take 20 mg by mouth 2 (Two) Times a Day. Spouse been holding the 2nd dose unless patients stomach bothering her    FLUoxetine (PROzac) 20 MG capsule Take 40 mg by mouth Daily.    folic acid (FOLVITE) 1 MG tablet Take 1 tablet by mouth Daily.    LORazepam (ATIVAN) 0.5 MG tablet Take 0.25 mg by mouth 2 (Two) Times a Day.    melatonin 5 MG tablet tablet Take 5 mg by mouth Every Night.    metoprolol tartrate (LOPRESSOR) 25 MG tablet Take 1 tablet by mouth Every 12 (Twelve) Hours.    multivitamin with minerals tablet tablet Take 1 tablet by mouth Daily.    risperiDONE (risperDAL) 0.25 MG tablet Take 1 tablet by mouth Daily.    risperiDONE (risperDAL) 0.5 MG tablet Take 1 tablet by mouth Every Night.    sennosides-docusate (PERICOLACE) 8.6-50 MG per tablet Take 2 tablets by mouth 2 (Two) Times a Day.    triamcinolone (KENALOG) 0.1 % cream Apply  topically to the appropriate area as directed See Admin Instructions. Apply topically three times daily. Been giving it as needed     MEDICATION LIST AND ALLERGIES REVIEWED.    Family History   Problem Relation Age of Onset    Heart attack Father     Colon cancer Brother      Social History     Tobacco Use    Smoking status: Former     Packs/day: 1.00     Years: 30.00     Pack years: 30.00     Types: Cigarettes     Start date: 1960     Quit date: 1993     Years since quittin.5    Smokeless tobacco: Never   Vaping Use    Vaping Use: Never used   Substance Use Topics    Alcohol use: Not Currently    Drug use: No     Social History     Social History  "Narrative    Not on file     FAMILY AND SOCIAL HISTORY REVIEWED.    Review of Systems  AS ABOVE OR ALL OTHER SYSTEMS REVIEWED AND ARE NEGATIVE.    Physical Exam and Clinical Information   /62   Pulse 72   Temp (!) 100.9 °F (38.3 °C) (Axillary)   Resp 18   Ht 157.5 cm (62\")   Wt 44 kg (97 lb)   SpO2 99%   BMI 17.74 kg/m²   Physical Exam:   GENERAL: Lethargic, tachypneic, no extremis   HEENT: Sclera nonicteric, no adenopathy   LUNGS: Decreased breath sounds bilaterally with crackles, no wheezes   HEART: Regular tachycardia without appreciable murmur   GI: Soft, nontender   EXTREMITIES: Pitting bilateral lower extremity edema.  Pitting up to her knees.  No cyanosis.  Bilateral foot drop.   NEURO/PSYCH: Lethargic.  Opens eyes.  Does not follow commands    Results from last 7 days   Lab Units 07/10/23  1810   WBC 10*3/mm3 10.10   HEMOGLOBIN g/dL 8.8*   PLATELETS 10*3/mm3 274     Results from last 7 days   Lab Units 07/10/23  1810   SODIUM mmol/L 139   POTASSIUM mmol/L 3.3*   CO2 mmol/L 23.0   BUN mg/dL 13   CREATININE mg/dL 0.63   GLUCOSE mg/dL 102*     Estimated Creatinine Clearance: 48.6 mL/min (by C-G formula based on SCr of 0.63 mg/dL).      Results from last 7 days   Lab Units 07/10/23  1847   PH, ARTERIAL pH units 7.520*   PCO2, ARTERIAL mm Hg 33.0*   PO2 ART mm Hg 61.5*     Lab Results   Component Value Date    LACTATE 2.2 (C) 07/10/2023        IMAGES: Chest x-ray reveals cardiomegaly and bilateral interstitial infiltrates    I reviewed the patient's results and images.     Assesment     Active Hospital Problems    Diagnosis     **Acute respiratory failure with hypoxia     Acute on chronic heart failure with preserved ejection fraction (HFpEF)     Acute UTI (urinary tract infection)     Diastolic dysfunction     Chronic anticoagulation (Eliquis)     Severe malnutrition     Lactic acidosis     PAF (paroxysmal atrial fibrillation)     Wound with tunneling     Pulmonary hypertension (WHO Group 2)     " Late onset Alzheimer's disease without behavioral disturbance      Plan/Recommendations     Admit to the intensive care unit  High flow nasal cannula  Diuresis  Follow-up electrolytes and renal function  Replace electrolytes as needed  Follow blood sugars and address with sliding scale insulin  Follow-up cultures  Antibiotics to cover previously cultured E. coli, sensitive to ceftriaxone  Add doxycycline for possible community-acquired respiratory tract infection  Wound care consultation for tunneling back wound    Goals of care discussed with the patient at the bedside.  He does not desire extensive resuscitative measures in the event of a cardiopulmonary arrest.    Critical Care time spent in direct patient care: 38 minutes (excluding procedure time, if applicable) including high complexity decision making to assess, manipulate, and support vital organ system failure in this individual who has impairment of one or more vital organ systems such that there is a high probability of imminent or life threatening deterioration in the patient’s condition.    ELIZABETH Murray MD  Pulmonary and Critical Care Medicine     CC: Justin Brumfield MD

## 2023-07-11 NOTE — PROGRESS NOTES
Malnutrition Severity Assessment    Patient Name:  Laura Wallace  YOB: 1940  MRN: [id number]  Admit Date:  7/10/2023    Patient meets criteria for : Moderate (non-severe) Malnutrition      Malnutrition Severity Assessment  Malnutrition Type: Chronic Disease - Related Malnutrition  Malnutrition Type (last 8 hours)       Malnutrition Severity Assessment       Row Name 07/11/23 1112       Malnutrition Severity Assessment    Malnutrition Type Chronic Disease - Related Malnutrition      Row Name 07/11/23 1112       Unintentional Weight Loss     Unintentional Weight Loss Findings --  22lb wt loss over 5 years      Row Name 07/11/23 1112       Muscle Loss    Loss of Muscle Mass Findings Moderate    Restorationist Region Moderate - slight depression    Clavicle Bone Region Moderate - some protrusion in females, visible in males    Patellar Region Moderate - patella more prominent, less muscle definition around patella    Anterior Thigh Region Moderate - mild depression on inner thigh      Row Name 07/11/23 1112       Fat Loss    Subcutaneous Fat Loss Findings Moderate    Orbital Region  Moderate -  somewhat hollowness, slightly dark circles    Upper Arm Region Moderate - some fat tissue, not ample      Row Name 07/11/23 1112       Fluid Accumulation (Edema)    Fluid Acumulation Findings --  BLE edema      Row Name 07/11/23 1112       Declining Functional Status    Declining Functional Status Findings Measurably Reduced  bedbound      Row Name 07/11/23 1112       Criteria Met (Must meet criteria for severity in at least 2 of these categories: M Wasting, Fat Loss, Fluid, Secondary Signs, Wt. Status, Intake)    Patient meets criteria for  Moderate (non-severe) Malnutrition                    Electronically signed by:  Marlene Thomas RD  07/11/23 11:16 EDT

## 2023-07-11 NOTE — CASE MANAGEMENT/SOCIAL WORK
Discharge Planning Assessment  [facility]     Patient Name: Laura Wallace  MRN: [id number]  Today's Date: 7/11/2023    Admit Date: 7/10/2023    Plan: Home with [facility]   Discharge Needs Assessment       Row Name 07/11/23 1530       Living Environment    People in Home spouse    Current Living Arrangements home    Primary Care Provided by homecare agency;spouse/significant other    Provides Primary Care For no one, unable/limited ability to care for self    Family Caregiver if Needed spouse;child(emmanuel), adult    Able to Return to Prior Arrangements yes       Transition Planning    Patient/Family Anticipates Transition to home with family;home with help/services    Transportation Anticipated family or friend will provide       Discharge Needs Assessment    Readmission Within the Last 30 Days no previous admission in last 30 days    Equipment Currently Used at Home wheelchair;oxygen;hospital bed;other (see comments)  pure wick and incontinence briefs    Equipment Needed After Discharge wheelchair, manual    Discharge Facility/Level of Care Needs home with home health    Current Discharge Risk chronically ill;cognitively impaired;dependent with mobility/activities of daily living;physical impairment;terminally ill                   Discharge Plan       Row Name 07/11/23 1531       Plan    Plan Home with [facility]    Patient/Family in Agreement with Plan yes    Plan Comments I spoke with Mrs. Wallace's , Horace, on the phone today and he provides all the following information. Mrs. Wallace lives with Horace in [facility]. She is dependent with mobility and activities of daily living. Horace drives her when leaving the home and she is current with [facility] for skilled nursing. Horace does have 24/7 private caregivers for Mrs. Wallace. She has a hospital bed with an air mattress, transport wheelchair, pure wick, and incontinence briefs. Denies any difficulty affording  her medications. Horace will take Mrs. Wallace home at discharge and he will transport her at that time. CM will continue to follow.    Final Discharge Disposition Code 06 - home with home health care                  Continued Care and Services - Admitted Since 7/10/2023    Coordination has not been started for this encounter.       Selected Continued Care - Prior Encounters Includes continued care and service providers with selected services from prior encounters from 4/11/2023 to 7/11/2023      Discharged on 5/12/2023 Admission date: 5/7/2023 - Discharge disposition: Home-Health Care [facility]      Home Medical Care       Service Provider Selected Services Address Phone Fax Patient Preferred    [facility] ,  Home Rehabilitation [address], [address], [place] [address] [phone] --                          Expected Discharge Date and Time       Expected Discharge Date Expected Discharge Time    Jul 14, 2023            Demographic Summary       Row Name 07/11/23 1528       General Information    General Information Comments Confirmed PCP to be Justin Brumfield [facility] to be insurer.                   Functional Status       Row Name 07/11/23 1529       Functional Status, IADL    Medications completely dependent    Meal Preparation completely dependent    Housekeeping completely dependent    Laundry completely dependent    Shopping completely dependent       Employment/    Employment Status retired                   Psychosocial    No documentation.                  Abuse/Neglect    No documentation.                  Legal    No documentation.                  Substance Abuse    No documentation.                  Patient Forms    No documentation.                     Rodo Mack RN

## 2023-07-11 NOTE — PROGRESS NOTES
INTENSIVIST   PROGRESS NOTE     Hospital:  LOS: 1 day     Ms. Laura Wallace, 83 y.o. female is followed for a Chief Complaint of: Acute Respiratory Failure      Subjective   S     Interval History:  On 2L nasal cannula this AM.        The patient's relevant past medical, surgical and social history were reviewed and updated in Epic as appropriate.      ROS: Unable to obtain secondary to dementia.     Objective   O     Vitals:  Temp  Min: 98.8 °F (37.1 °C)  Max: 102.2 °F (39 °C)  BP  Min: 97/50  Max: 159/57  Pulse  Min: 52  Max: 91  Resp  Min: 18  Max: 24  SpO2  Min: 81 %  Max: 100 % Flow (L/min)  Min: 2  Max: 15    Intake/Ouptut 24 hrs (7:00AM - 6:59 AM)  Intake & Output (last 3 days)         07/08 0701 07/09 0700 07/09 0701  07/10 0700 07/10 0701 07/11 0700 07/11 0701 07/12 0700    I.V. (mL/kg)   32 (0.7)     IV Piggyback   600     Total Intake(mL/kg)   632 (13.5)     Urine (mL/kg/hr)   1425     Total Output   1425     Net   -793             Urine Unmeasured Occurrence   3 x               Physical Examination  Telemetry:  Normal sinus rhythm.    Constitutional:  No acute distress.  Resting in bed comfortably.    Eyes: No scleral icterus.   PERRL, EOM intact.    Neck:  Supple, FROM   Cardiovascular: Normal rate, regular and rhythm. Normal heart sounds.  No murmurs, gallop or rub.   Respiratory: No respiratory distress. Normal respiratory effort.  Diminished bilaterally.    Abdominal:  Soft. No masses. Nontender. No distension. No HSM.   Extremities: Some contracture of the upper and lower extremities.   No peripheral edema.   Skin: No rashes, lesions or ulcers   Neurological:   Sleeping. Opens eyes to stimulation.              Results from last 7 days   Lab Units 07/11/23  0550 07/10/23  1810   WBC 10*3/mm3 7.58 10.10   HEMOGLOBIN g/dL 8.3* 8.8*   MCV fL 83.1 83.7   PLATELETS 10*3/mm3 250 274     Results from last 7 days   Lab Units 07/11/23  0550 07/10/23  1810   SODIUM mmol/L 141 139   POTASSIUM mmol/L 2.6*  3.3*   CO2 mmol/L 28.0 23.0   CREATININE mg/dL 0.62 0.63   GLUCOSE mg/dL 100* 102*   MAGNESIUM mg/dL 1.6  --    PHOSPHORUS mg/dL 2.9  --      Estimated Creatinine Clearance: 52.7 mL/min (by C-G formula based on SCr of 0.62 mg/dL).  Results from last 7 days   Lab Units 07/11/23  0550 07/10/23  1810   ALK PHOS U/L 122* 123*   BILIRUBIN mg/dL 0.8 0.6   ALT (SGPT) U/L 16 15   AST (SGOT) U/L 24 26       Results from last 7 days   Lab Units 07/10/23  1847   PH, ARTERIAL pH units 7.520*   PCO2, ARTERIAL mm Hg 33.0*   PO2 ART mm Hg 61.5*   FIO2 % 36       Images:  Imaging Results (Last 24 Hours)       Procedure Component Value Units Date/Time    XR Chest 1 View [[id number]] Collected: 07/11/23 0736     Updated: 07/11/23 0741    Narrative:      XR CHEST 1 VW    Date of Exam: 7/11/2023 4:05 AM EDT    Indication: Resp Distress    Comparison: 7/10/2023    Findings:  The trachea is midline. There is stable appearance of the heart. Pulmonary vasculature is indistinct secondary to persistent diffuse reticular and airspace opacities. There are no definite pleural effusions. Chronic appearing fracture deformity of the   left humeral head and neck.      Impression:      Impression:  Stable diffuse bilateral perihilar reticular airspace disease may represent pulmonary edema or multifocal pneumonia.      Electronically Signed: Robert Stein    7/11/2023 7:38 AM EDT    Workstation ID: [id number]    XR Chest 1 View [[id number]] Collected: 07/10/23 1906     Updated: 07/10/23 1911    Narrative:      XR CHEST 1 VW    Date of Exam: 7/10/2023 6:13 PM EDT    Indication: SOA triage protocol    Comparison: Chest x-ray 5/7/2023    Findings:  Increased fine reticular opacities in a bilateral perihilar distribution compared to prior chest x-ray. Vague increased density in the lower lobes may reflect artifact from overlying breast prostheses. No pleural effusion or pneumothorax. There is a   chronic appearing fracture of the proximal left humerus.  There is partial visualization of lumbar spinal fusion hardware. Stable cardiomediastinal silhouette within normal limits.      Impression:      Impression:  New and increased bilateral perihilar interstitial opacities/reticular markings suspicious for atypical/viral infection or possibly interstitial edema.      Electronically Signed: Demian Machado    7/10/2023 7:08 PM EDT    Workstation ID: [id number]               Results: Reviewed.  I reviewed the patient's new laboratory and imaging results.  I independently reviewed the patient's new images.    Medications: Reviewed.    Assessment & Plan   A / P     Ms. Wallace is an 82yo F with a history of Alzheimer's Dementia, bedbound x 3 years, HFpEF, Pulmonary HTN, PAF on chronic Eliquis, and chronic wound with tunneling who presented to the [facility] ED on 7/10/23 with 3 days of worsening shortness of breath and low oxygen level. She was being treated for a UTI with Macrobid.     She was admitted to the ICU on NRB/HFNC and given IV diuretics. Rocephin and Doxycycline were added to cover for UTI and possible CAP.     This AM, she is on 2L nasal cannula.       Nutrition:   Diet: Regular/House Diet; Texture: Soft to Chew (NDD 3); Soft to Chew: Whole Meat; Fluid Consistency: Nectar Thick  Advance Directives:   Code Status and Medical Interventions:   Ordered at: 07/10/23 2107     Medical Intervention Limits:    NO intubation (DNI)    NO cardioversion     Code Status (Patient has no pulse and is not breathing):    No CPR (Do Not Attempt to Resuscitate)     Medical Interventions (Patient has pulse or is breathing):    Limited Support     Comments:    Discussed with      Release to patient:    Routine Release       Active Hospital Problems    Diagnosis     **Acute respiratory failure with hypoxia     Diastolic dysfunction     Acute on chronic heart failure with preserved ejection fraction (HFpEF)     Chronic anticoagulation (Eliquis)     Severe malnutrition     Acute UTI  (urinary tract infection)     Lactic acidosis     PAF (paroxysmal atrial fibrillation)     Wound with tunneling     Pulmonary hypertension (WHO Group 2)     Late onset Alzheimer's disease without behavioral disturbance        Assessment / Plan:    Change IV diuretics to Lasix 40mg PO daily. She may need to be discharged on chronic diuretic therapy.   Replace potassium.   Restart home Eliquis  Complete a course of Rocephin and Doxycycline. Follow up cultures.   WOC following for tunneling wound on back.   Wean supplemental oxygen as tolerated.   Start a PO diet. Previously on thickened liquids per family.   AM labs  Okay to transfer to telemetry.     High level of risk due to:  severe exacerbation of chronic illness and illness with threat to life or bodily function.    Plan of care and goals reviewed during interdisciplinary rounds.  I discussed the patient's findings and my recommendations with family and nursing staff    Azalia Elmore, DO    Intensive Care Medicine and Pulmonary Medicine

## 2023-07-11 NOTE — NURSING NOTE
"                             Wound, Ostomy and Continence (WOC) Note    Reason for WOC Consultation: \"Tunneling wound on back\"     Patient awake.  Family/support person at bedside.   Subjective: Patient spouse states,\" we have been seeing wound care at .\"    Skin/Wound Assessment:     Wound Type: Pressure injury - stalled  Location: Superior sacral spine  Measurements: 0.5 x 0.5 x 0.3 cm tunnels approximately 4.5 cm towards 6:00  Wound Bed: dermis and moist  Wound Edges: Irregular, Open, and Rolled/Closed  Periwound Skin: macerated and redness , suspect yeast dermatitis  Drainage Characteristics/Odor: none  Drainage Amount: none  Pain: No   Care provided: The wound was cleansed with Vashe moistened 4 x 4 gauze.  Then loosely packed with Vashe moistened iodoform gauze into the wound and covered with an Optifoam dressing.  Image:     Summary and Recommendation(s):     -Practice pressure injury prevention protocol.  -Loosely pack wound with Vashe moistened packing strip gauze.  -Will order nystatin for suspected yeast dermatitis at periwound skin.  - Refer to wound care instructions in the \"Orders\" tab  - Specialty support surface in place: Isolibrium       Pressure Injury Prevention Measures (initiate for a Waldo Scale Score of <18):     Most recent Waldo Scale score:  Sensory Perception: 2-->very limited  Moisture: 3-->occasionally moist  Activity: 1-->bedfast  Mobility: 2-->very limited  Nutrition: 2-->probably inadequate  Friction and Shear: 2-->potential problem  Waldo Score: 12 (07/11/23 0800)     -Turn q 2 hr. using an offloading foam wedge, keep heels elevated and offloaded with offloading heel boots.    -Raise knee-gatch before elevating HOB to reduce shearing   -Follow C.A.R.E protocol if medical devices (Bipap, gotti, Ng tube, etc) are being used.  -Apply moisture barrier cream to bottom BID & PRN, if incontinent.  -Clean skin gently with no-rinse PH-balanced foam cleanser and a soft, disposable cloth " (barrier wipes-blue pack).   -Reduce layers under patient (one sheet as drawsheet and two incontinence pads)    Thank you for consulting the WOC Nurse.  WOC Team will follow.  Please re consult if the wound(s) worsens.     Javier Dodd RN, BSN, CCRN, CWOCN  Wound, Ostomy and Continence (WOC) Department  [facility]

## 2023-07-12 LAB
ANION GAP SERPL CALCULATED.3IONS-SCNC: 12 MMOL/L (ref 5–15)
BUN SERPL-MCNC: 14 MG/DL (ref 8–23)
BUN/CREAT SERPL: 21.2 (ref 7–25)
CALCIUM SPEC-SCNC: 8.7 MG/DL (ref 8.6–10.5)
CHLORIDE SERPL-SCNC: 101 MMOL/L (ref 98–107)
CO2 SERPL-SCNC: 25 MMOL/L (ref 22–29)
CREAT SERPL-MCNC: 0.66 MG/DL (ref 0.57–1)
EGFRCR SERPLBLD CKD-EPI 2021: 88.3 ML/MIN/1.73
GLUCOSE SERPL-MCNC: 108 MG/DL (ref 65–99)
MAGNESIUM SERPL-MCNC: 1.8 MG/DL (ref 1.6–2.4)
PHOSPHATE SERPL-MCNC: 3.4 MG/DL (ref 2.5–4.5)
POTASSIUM SERPL-SCNC: 3.3 MMOL/L (ref 3.5–5.2)
POTASSIUM SERPL-SCNC: 4.6 MMOL/L (ref 3.5–5.2)
SODIUM SERPL-SCNC: 138 MMOL/L (ref 136–145)

## 2023-07-12 PROCEDURE — 80048 BASIC METABOLIC PNL TOTAL CA: CPT | Performed by: INTERNAL MEDICINE

## 2023-07-12 PROCEDURE — 25010000002 HALOPERIDOL LACTATE PER 5 MG: Performed by: NURSE PRACTITIONER

## 2023-07-12 PROCEDURE — 99232 SBSQ HOSP IP/OBS MODERATE 35: CPT | Performed by: INTERNAL MEDICINE

## 2023-07-12 PROCEDURE — 83735 ASSAY OF MAGNESIUM: CPT | Performed by: INTERNAL MEDICINE

## 2023-07-12 PROCEDURE — 84100 ASSAY OF PHOSPHORUS: CPT | Performed by: INTERNAL MEDICINE

## 2023-07-12 PROCEDURE — 94799 UNLISTED PULMONARY SVC/PX: CPT

## 2023-07-12 PROCEDURE — 93005 ELECTROCARDIOGRAM TRACING: CPT | Performed by: NURSE PRACTITIONER

## 2023-07-12 PROCEDURE — 25010000002 CEFTRIAXONE PER 250 MG: Performed by: INTERNAL MEDICINE

## 2023-07-12 PROCEDURE — 94660 CPAP INITIATION&MGMT: CPT

## 2023-07-12 PROCEDURE — 84132 ASSAY OF SERUM POTASSIUM: CPT | Performed by: INTERNAL MEDICINE

## 2023-07-12 RX ORDER — LORAZEPAM 0.5 MG/1
0.5 TABLET ORAL 2 TIMES DAILY
Status: DISCONTINUED | OUTPATIENT
Start: 2023-07-12 | End: 2023-07-12

## 2023-07-12 RX ORDER — POTASSIUM CHLORIDE 1.5 G/1.58G
40 POWDER, FOR SOLUTION ORAL EVERY 4 HOURS
Status: COMPLETED | OUTPATIENT
Start: 2023-07-12 | End: 2023-07-12

## 2023-07-12 RX ORDER — HALOPERIDOL 5 MG/ML
5 INJECTION INTRAMUSCULAR ONCE
Status: COMPLETED | OUTPATIENT
Start: 2023-07-12 | End: 2023-07-12

## 2023-07-12 RX ORDER — LORAZEPAM 0.5 MG/1
0.25 TABLET ORAL ONCE
Status: COMPLETED | OUTPATIENT
Start: 2023-07-12 | End: 2023-07-12

## 2023-07-12 RX ORDER — LORAZEPAM 0.5 MG/1
0.25 TABLET ORAL 2 TIMES DAILY
Status: DISCONTINUED | OUTPATIENT
Start: 2023-07-12 | End: 2023-07-19 | Stop reason: HOSPADM

## 2023-07-12 RX ADMIN — FLUOXETINE 40 MG: 20 CAPSULE ORAL at 08:41

## 2023-07-12 RX ADMIN — SODIUM CHLORIDE 1 G: 900 INJECTION INTRAVENOUS at 17:51

## 2023-07-12 RX ADMIN — NYSTATIN: 100000 POWDER TOPICAL at 08:41

## 2023-07-12 RX ADMIN — LORAZEPAM 0.25 MG: 0.5 TABLET ORAL at 12:02

## 2023-07-12 RX ADMIN — APIXABAN 2.5 MG: 2.5 TABLET, FILM COATED ORAL at 21:17

## 2023-07-12 RX ADMIN — LORAZEPAM 0.25 MG: 0.5 TABLET ORAL at 21:17

## 2023-07-12 RX ADMIN — POTASSIUM CHLORIDE 40 MEQ: 1.5 POWDER, FOR SOLUTION ORAL at 05:49

## 2023-07-12 RX ADMIN — DOXYCYCLINE 100 MG: 100 INJECTION, POWDER, LYOPHILIZED, FOR SOLUTION INTRAVENOUS at 21:17

## 2023-07-12 RX ADMIN — APIXABAN 2.5 MG: 2.5 TABLET, FILM COATED ORAL at 08:41

## 2023-07-12 RX ADMIN — FUROSEMIDE 40 MG: 40 TABLET ORAL at 08:40

## 2023-07-12 RX ADMIN — LORAZEPAM 0.25 MG: 0.5 TABLET ORAL at 10:20

## 2023-07-12 RX ADMIN — DOXYCYCLINE 100 MG: 100 INJECTION, POWDER, LYOPHILIZED, FOR SOLUTION INTRAVENOUS at 10:04

## 2023-07-12 RX ADMIN — DONEPEZIL HYDROCHLORIDE 5 MG: 5 TABLET, FILM COATED ORAL at 21:17

## 2023-07-12 RX ADMIN — FAMOTIDINE 20 MG: 10 INJECTION INTRAVENOUS at 08:40

## 2023-07-12 RX ADMIN — NYSTATIN: 100000 POWDER TOPICAL at 21:18

## 2023-07-12 RX ADMIN — HALOPERIDOL LACTATE 5 MG: 5 INJECTION, SOLUTION INTRAMUSCULAR at 15:20

## 2023-07-12 RX ADMIN — POTASSIUM CHLORIDE 40 MEQ: 1.5 POWDER, FOR SOLUTION ORAL at 10:04

## 2023-07-12 NOTE — PLAN OF CARE
Goal Outcome Evaluation:           Progress: improving  Outcome Evaluation: Patient alert, but remains confused. 1L NC when awake, 30% Bi-pap while sleeping. Contreras remains in place with adequate UOP, 1 BM. Patient appeared anxious, PRN medication given. Patient with low grade fever, PRN medication given.  at bedside and updated. Awaiting tele bed.

## 2023-07-12 NOTE — PROGRESS NOTES
INTENSIVIST   PROGRESS NOTE     Hospital:  LOS: 2 days     Ms. Laura Wallace, 83 y.o. female is followed for a Chief Complaint of: Acute Respiratory Failure      Subjective   S     Interval History:  Some sweating and withdrawal symptoms overnight that improved with Xanax.        The patient's relevant past medical, surgical and social history were reviewed and updated in Epic as appropriate.      ROS: Unable to obtain secondary to dementia.     Objective   O     Vitals:  Temp  Min: 97.6 °F (36.4 °C)  Max: 100.6 °F (38.1 °C)  BP  Min: 93/77  Max: 173/84  Pulse  Min: 52  Max: 107  Resp  Min: 14  Max: 24  SpO2  Min: 85 %  Max: 100 % Flow (L/min)  Min: 1  Max: 2    Intake/Ouptut 24 hrs (7:00AM - 6:59 AM)  Intake & Output (last 3 days)         07/09 0701  07/10 0700 07/10 0701 07/11 0700 07/11 0701 07/12 0700 07/12 0701 07/13 0700    I.V. (mL/kg)  32 (0.7)      IV Piggyback  600      Total Intake(mL/kg)  632 (13.5)      Urine (mL/kg/hr)  1425 1025 (0.9)     Stool   0     Total Output  1425 1025     Net  -793 -1025             Urine Unmeasured Occurrence  3 x      Stool Unmeasured Occurrence   1 x               Physical Examination  Telemetry:  Normal sinus rhythm.    Constitutional:  No acute distress.  Resting in bed comfortably on 2L nasal cannula.    Eyes: No scleral icterus.   PERRL, EOM intact.    Neck:  Supple, FROM   Cardiovascular: Normal rate, regular and rhythm. Normal heart sounds.  No murmurs, gallop or rub.   Respiratory: No respiratory distress. Normal respiratory effort.  Diminished bilaterally.    Abdominal:  Soft. No masses. Nontender. No distension. No HSM.   Extremities: Some contracture of the upper and lower extremities.   No peripheral edema.   Skin: No rashes, lesions or ulcers   Neurological:   Sleeping. Opens eyes to stimulation.              Results from last 7 days   Lab Units 07/11/23  0550 07/10/23  1810   WBC 10*3/mm3 7.58 10.10   HEMOGLOBIN g/dL 8.3* 8.8*   MCV fL 83.1 83.7    PLATELETS 10*3/mm3 250 274     Results from last 7 days   Lab Units 07/12/23  0445 07/11/23  1742 07/11/23  0550 07/10/23  1810   SODIUM mmol/L 138  --  141 139   POTASSIUM mmol/L 3.3* 3.5 2.6* 3.3*   CO2 mmol/L 25.0  --  28.0 23.0   CREATININE mg/dL 0.66  --  0.62 0.63   GLUCOSE mg/dL 108*  --  100* 102*   MAGNESIUM mg/dL 1.8  --  1.6  --    PHOSPHORUS mg/dL 3.4  --  2.9  --      Estimated Creatinine Clearance: 49.5 mL/min (by C-G formula based on SCr of 0.66 mg/dL).  Results from last 7 days   Lab Units 07/11/23  0550 07/10/23  1810   ALK PHOS U/L 122* 123*   BILIRUBIN mg/dL 0.8 0.6   ALT (SGPT) U/L 16 15   AST (SGOT) U/L 24 26       Results from last 7 days   Lab Units 07/10/23  1847   PH, ARTERIAL pH units 7.520*   PCO2, ARTERIAL mm Hg 33.0*   PO2 ART mm Hg 61.5*   FIO2 % 36       Images:  Imaging Results (Last 24 Hours)       ** No results found for the last 24 hours. **               Results: Reviewed.  I reviewed the patient's new laboratory and imaging results.  I independently reviewed the patient's new images.    Medications: Reviewed.    Assessment & Plan   A / P     Ms. Wallace is an 84yo F with a history of Alzheimer's Dementia, bedbound x 3 years, HFpEF, Pulmonary HTN, PAF on chronic Eliquis, and chronic wound with tunneling who presented to the [facility] ED on 7/10/23 with 3 days of worsening shortness of breath and low oxygen level. She was being treated for a UTI with Macrobid.     She was admitted to the ICU on NRB/HFNC and given IV diuretics. Rocephin and Doxycycline were added to cover for UTI and possible CAP.     IV Lasix changed to PO Lasix on 7/11. She is on 2L nasal cannula.     Nutrition:   Diet: Regular/House Diet; Texture: Soft to Chew (NDD 3); Soft to Chew: Whole Meat; Fluid Consistency: Nectar Thick  Advance Directives:   Code Status and Medical Interventions:   Ordered at: 07/10/23 2109     Medical Intervention Limits:    NO intubation (DNI)    NO cardioversion     Code Status (Patient  has no pulse and is not breathing):    No CPR (Do Not Attempt to Resuscitate)     Medical Interventions (Patient has pulse or is breathing):    Limited Support     Comments:    Discussed with      Release to patient:    Routine Release       Active Hospital Problems    Diagnosis     **Acute respiratory failure with hypoxia     Moderate Malnutrition (HCC)     Diastolic dysfunction     Acute on chronic heart failure with preserved ejection fraction (HFpEF)     Chronic anticoagulation (Eliquis)     Severe malnutrition     Acute UTI (urinary tract infection)     Lactic acidosis     PAF (paroxysmal atrial fibrillation)     Wound with tunneling     Pulmonary hypertension (WHO Group 2)     Late onset Alzheimer's disease without behavioral disturbance        Assessment / Plan:    Continue Lasix 40mg PO daily. She will likely need oral diuretics at discharge to maintain euvolemia.   Monitor renal function.    Replace potassium.   Continue home Eliquis  Complete a course of Rocephin and Doxycycline. Follow up cultures.   WOC following for tunneling wound on back.   Wean supplemental oxygen as tolerated.   Restart home Ativan.    AM labs  Awaiting a telemetry bed. Back home with family at discharge.     High level of risk due to:  severe exacerbation of chronic illness and illness with threat to life or bodily function.    Plan of care and goals reviewed during interdisciplinary rounds.  I discussed the patient's findings and my recommendations with family and nursing staff    Azalia Elmore, DO    Intensive Care Medicine and Pulmonary Medicine

## 2023-07-13 LAB
ANION GAP SERPL CALCULATED.3IONS-SCNC: 12 MMOL/L (ref 5–15)
BASOPHILS # BLD AUTO: 0.02 10*3/MM3 (ref 0–0.2)
BASOPHILS NFR BLD AUTO: 0.2 % (ref 0–1.5)
BUN SERPL-MCNC: 16 MG/DL (ref 8–23)
BUN/CREAT SERPL: 24.6 (ref 7–25)
CALCIUM SPEC-SCNC: 8.8 MG/DL (ref 8.6–10.5)
CHLORIDE SERPL-SCNC: 103 MMOL/L (ref 98–107)
CK SERPL-CCNC: 99 U/L (ref 20–180)
CO2 SERPL-SCNC: 23 MMOL/L (ref 22–29)
CREAT SERPL-MCNC: 0.65 MG/DL (ref 0.57–1)
DEPRECATED RDW RBC AUTO: 44.7 FL (ref 37–54)
EGFRCR SERPLBLD CKD-EPI 2021: 88.6 ML/MIN/1.73
EOSINOPHIL # BLD AUTO: 0.14 10*3/MM3 (ref 0–0.4)
EOSINOPHIL NFR BLD AUTO: 1.4 % (ref 0.3–6.2)
ERYTHROCYTE [DISTWIDTH] IN BLOOD BY AUTOMATED COUNT: 14.9 % (ref 12.3–15.4)
GLUCOSE SERPL-MCNC: 106 MG/DL (ref 65–99)
HCT VFR BLD AUTO: 27 % (ref 34–46.6)
HGB BLD-MCNC: 8.6 G/DL (ref 12–15.9)
IMM GRANULOCYTES # BLD AUTO: 0.04 10*3/MM3 (ref 0–0.05)
IMM GRANULOCYTES NFR BLD AUTO: 0.4 % (ref 0–0.5)
LYMPHOCYTES # BLD AUTO: 1.07 10*3/MM3 (ref 0.7–3.1)
LYMPHOCYTES NFR BLD AUTO: 10.4 % (ref 19.6–45.3)
MAGNESIUM SERPL-MCNC: 1.6 MG/DL (ref 1.6–2.4)
MCH RBC QN AUTO: 26.3 PG (ref 26.6–33)
MCHC RBC AUTO-ENTMCNC: 31.9 G/DL (ref 31.5–35.7)
MCV RBC AUTO: 82.6 FL (ref 79–97)
MONOCYTES # BLD AUTO: 0.89 10*3/MM3 (ref 0.1–0.9)
MONOCYTES NFR BLD AUTO: 8.7 % (ref 5–12)
NEUTROPHILS NFR BLD AUTO: 78.9 % (ref 42.7–76)
NEUTROPHILS NFR BLD AUTO: 8.11 10*3/MM3 (ref 1.7–7)
NRBC BLD AUTO-RTO: 0 /100 WBC (ref 0–0.2)
PHOSPHATE SERPL-MCNC: 3.4 MG/DL (ref 2.5–4.5)
PLATELET # BLD AUTO: 279 10*3/MM3 (ref 140–450)
PMV BLD AUTO: 8.7 FL (ref 6–12)
POTASSIUM SERPL-SCNC: 4.5 MMOL/L (ref 3.5–5.2)
RBC # BLD AUTO: 3.27 10*6/MM3 (ref 3.77–5.28)
SODIUM SERPL-SCNC: 138 MMOL/L (ref 136–145)
WBC NRBC COR # BLD: 10.27 10*3/MM3 (ref 3.4–10.8)

## 2023-07-13 PROCEDURE — 25010000002 LORAZEPAM PER 2 MG: Performed by: INTERNAL MEDICINE

## 2023-07-13 PROCEDURE — 25010000002 HALOPERIDOL LACTATE PER 5 MG

## 2023-07-13 PROCEDURE — 85025 COMPLETE CBC W/AUTO DIFF WBC: CPT | Performed by: NURSE PRACTITIONER

## 2023-07-13 PROCEDURE — 94660 CPAP INITIATION&MGMT: CPT

## 2023-07-13 PROCEDURE — 93005 ELECTROCARDIOGRAM TRACING: CPT | Performed by: INTERNAL MEDICINE

## 2023-07-13 PROCEDURE — 82550 ASSAY OF CK (CPK): CPT | Performed by: NURSE PRACTITIONER

## 2023-07-13 PROCEDURE — 93010 ELECTROCARDIOGRAM REPORT: CPT | Performed by: INTERNAL MEDICINE

## 2023-07-13 PROCEDURE — 84100 ASSAY OF PHOSPHORUS: CPT | Performed by: INTERNAL MEDICINE

## 2023-07-13 PROCEDURE — 83735 ASSAY OF MAGNESIUM: CPT | Performed by: INTERNAL MEDICINE

## 2023-07-13 PROCEDURE — 25010000002 MAGNESIUM SULFATE 2 GM/50ML SOLUTION: Performed by: NURSE PRACTITIONER

## 2023-07-13 PROCEDURE — 99232 SBSQ HOSP IP/OBS MODERATE 35: CPT | Performed by: NURSE PRACTITIONER

## 2023-07-13 PROCEDURE — 80048 BASIC METABOLIC PNL TOTAL CA: CPT | Performed by: INTERNAL MEDICINE

## 2023-07-13 PROCEDURE — 25010000002 CEFTRIAXONE PER 250 MG: Performed by: INTERNAL MEDICINE

## 2023-07-13 PROCEDURE — 94799 UNLISTED PULMONARY SVC/PX: CPT

## 2023-07-13 RX ORDER — HALOPERIDOL 5 MG/ML
5 INJECTION INTRAMUSCULAR ONCE
Status: COMPLETED | OUTPATIENT
Start: 2023-07-13 | End: 2023-07-13

## 2023-07-13 RX ORDER — RISPERIDONE 1 MG/1
0.5 TABLET ORAL NIGHTLY
Status: DISCONTINUED | OUTPATIENT
Start: 2023-07-13 | End: 2023-07-14

## 2023-07-13 RX ORDER — LORAZEPAM 2 MG/ML
0.25 INJECTION INTRAMUSCULAR ONCE
Status: COMPLETED | OUTPATIENT
Start: 2023-07-13 | End: 2023-07-14

## 2023-07-13 RX ORDER — MAGNESIUM SULFATE HEPTAHYDRATE 40 MG/ML
2 INJECTION, SOLUTION INTRAVENOUS ONCE
Status: COMPLETED | OUTPATIENT
Start: 2023-07-13 | End: 2023-07-13

## 2023-07-13 RX ORDER — ACETAMINOPHEN 325 MG/1
650 TABLET ORAL EVERY 4 HOURS PRN
Status: DISCONTINUED | OUTPATIENT
Start: 2023-07-13 | End: 2023-07-19 | Stop reason: HOSPADM

## 2023-07-13 RX ORDER — FAMOTIDINE 20 MG/1
20 TABLET, FILM COATED ORAL DAILY
Status: DISCONTINUED | OUTPATIENT
Start: 2023-07-14 | End: 2023-07-19 | Stop reason: HOSPADM

## 2023-07-13 RX ORDER — LORAZEPAM 2 MG/ML
0.25 INJECTION INTRAMUSCULAR ONCE AS NEEDED
Status: COMPLETED | OUTPATIENT
Start: 2023-07-13 | End: 2023-07-13

## 2023-07-13 RX ADMIN — HALOPERIDOL LACTATE 5 MG: 5 INJECTION, SOLUTION INTRAMUSCULAR at 06:24

## 2023-07-13 RX ADMIN — FLUOXETINE 40 MG: 20 CAPSULE ORAL at 08:13

## 2023-07-13 RX ADMIN — SODIUM CHLORIDE 1 G: 900 INJECTION INTRAVENOUS at 17:33

## 2023-07-13 RX ADMIN — LORAZEPAM 0.25 MG: 2 INJECTION INTRAMUSCULAR; INTRAVENOUS at 00:41

## 2023-07-13 RX ADMIN — APIXABAN 2.5 MG: 2.5 TABLET, FILM COATED ORAL at 08:13

## 2023-07-13 RX ADMIN — NYSTATIN: 100000 POWDER TOPICAL at 21:29

## 2023-07-13 RX ADMIN — MAGNESIUM SULFATE HEPTAHYDRATE 2 G: 2 INJECTION, SOLUTION INTRAVENOUS at 12:29

## 2023-07-13 RX ADMIN — NYSTATIN: 100000 POWDER TOPICAL at 08:14

## 2023-07-13 RX ADMIN — RISPERIDONE 0.5 MG: 1 TABLET ORAL at 21:28

## 2023-07-13 RX ADMIN — DONEPEZIL HYDROCHLORIDE 5 MG: 5 TABLET, FILM COATED ORAL at 21:28

## 2023-07-13 RX ADMIN — LORAZEPAM 0.25 MG: 0.5 TABLET ORAL at 08:13

## 2023-07-13 RX ADMIN — Medication 10 ML: at 08:14

## 2023-07-13 RX ADMIN — FUROSEMIDE 40 MG: 40 TABLET ORAL at 08:13

## 2023-07-13 RX ADMIN — LORAZEPAM 0.25 MG: 0.5 TABLET ORAL at 21:28

## 2023-07-13 RX ADMIN — DOXYCYCLINE 100 MG: 100 INJECTION, POWDER, LYOPHILIZED, FOR SOLUTION INTRAVENOUS at 10:25

## 2023-07-13 RX ADMIN — DOXYCYCLINE 100 MG: 100 INJECTION, POWDER, LYOPHILIZED, FOR SOLUTION INTRAVENOUS at 21:28

## 2023-07-13 RX ADMIN — APIXABAN 2.5 MG: 2.5 TABLET, FILM COATED ORAL at 21:28

## 2023-07-13 RX ADMIN — FAMOTIDINE 20 MG: 10 INJECTION INTRAVENOUS at 08:14

## 2023-07-13 NOTE — CASE MANAGEMENT/SOCIAL WORK
Continued Stay Note   [facility]     Patient Name: Laura Wallace  MRN: [id number]  Today's Date: 7/13/2023    Admit Date: 7/10/2023    Plan: Home with [facility]   Discharge Plan       Row Name 07/13/23 1703       Plan    Plan Home with [facility]    Patient/Family in Agreement with Plan yes    Plan Comments Per MDR, Mrs. Wallace remains on 5L O2 via nasal cannula. She remains on diuretics and antibiotics.  I have confirmed with [facility] that she is currently receiving skilled nursing Home Health services.  CM will continue to follow the evolving plan of care, enter resumption of  home health care orders closer to discharge and continue to assist with discharge needs as recommendations become available.    Final Discharge Disposition Code 06 - home with home health care                   Discharge Codes    No documentation.                 Expected Discharge Date and Time       Expected Discharge Date Expected Discharge Time    Jul 14, 2023               Maryann Berman RN

## 2023-07-13 NOTE — PROGRESS NOTES
Clinical Nutrition     Reason for Visit: Follow-up protocol      Patient Name: Laura Wallace  YOB: 1940  MRN: [id number]  Date of Encounter: 07/13/23 16:47 EDT  Admission date: 7/10/2023    Nutrition Assessment     Problem List:    Acute respiratory failure with hypoxia    Late onset Alzheimer's disease without behavioral disturbance    Pulmonary hypertension (WHO Group 2)    Lactic acidosis    Wound with tunneling    PAF (paroxysmal atrial fibrillation)    Acute UTI (urinary tract infection)    Severe malnutrition    Diastolic dysfunction    Acute on chronic heart failure with preserved ejection fraction (HFpEF)    Chronic anticoagulation (Eliquis)    Moderate Malnutrition (HCC)        PMH:   She  has a past medical history of Anxiety (9/23/2022), Late onset Alzheimer's disease without behavioral disturbance (5/25/2018), Paroxysmal atrial fibrillation with rapid ventricular response (4/23/2022), and Pulmonary hypertension (9/20/2022).    PSH:  She  has a past surgical history that includes Vertebroplasty and Appendectomy (1960).      Applicable Nutrition Concerns:   Skin:sacrum- tunneling wound      SLP Recommendation and Plan from last admission:  SLP Swallowing Diagnosis: mild, oral dysphagia, suspected pharyngeal dysphagia (05/11/23 1440)  SLP Diet Recommendation: regular textures, nectar thick liquids, water between meals after oral care, with supervision (05/11/23 1440)  Recommended Precautions and Strategies: general aspiration precautions, assist with feeding (05/11/23 1440)  SLP Rec. for Method of Medication Administration: meds crushed, with puree (05/11/23 1440)  Monitor for Signs of Aspiration: notify SLP if any concerns (05/11/23 1440)  Per MD ok to use rx from last adm consistent with home diet.    Reported/Observed/Food/Nutrition Related History:     /13  Pt slumbering at time of adm. Family allow pt is eating when she it alert. Likes gunnar flavor Magic Cup.        7/11  Pt resting in bed, on nasal cannula, is nonverbal, appears much younger than actual age, arms are contracted     reports pt's UBW was ~ 100lb's most of her life, the highest she ever weighed was ~120lb's, has always been very careful about what she eats, ate small portions, only eats 2 meals per day, has gradually lost some weight over the past several years, on last admission was on a thickened liquid diet, he has been feeding her food she likes at home like nichols, eggs, and toast, with thickened liquids, she does not need to have meats chopped up      Labs    Labs Reviewed: Yes     Results from last 7 days   Lab Units 07/13/23  0550 07/12/23  1432 07/12/23  0445 07/11/23  1742 07/11/23  0550 07/10/23  1810   GLUCOSE mg/dL 106*  --  108*  --  100* 102*   BUN mg/dL 16  --  14  --  11 13   CREATININE mg/dL 0.65  --  0.66  --  0.62 0.63   SODIUM mmol/L 138  --  138  --  141 139   CHLORIDE mmol/L 103  --  101  --  99 101   POTASSIUM mmol/L 4.5 4.6 3.3*   < > 2.6* 3.3*   PHOSPHORUS mg/dL 3.4  --  3.4  --  2.9  --    MAGNESIUM mg/dL 1.6  --  1.8  --  1.6  --    ALT (SGPT) U/L  --   --   --   --  16 15    < > = values in this interval not displayed.         Results from last 7 days   Lab Units 07/11/23  0550 07/10/23  1810   ALBUMIN g/dL 3.2* 3.2*   IONIZED CALCIUM mmol/L 1.19  --              No results found for: HGBA1C      Results from last 7 days   Lab Units 07/10/23  1810   PROBNP pg/mL 6,389.0*           Medications    Medications Reviewed: Yes  Pertinent: abx, pepcid, lasix       Intake/Ouptut 24 hrs (0701 - 0700)   I&O's Reviewed: Yes     Intake & Output (last day)         07/12 0701 07/13 0700 07/13 0701 07/14 0700    P.O.  0    I.V. (mL/kg) 20 (0.4)     IV Piggyback 200     Total Intake(mL/kg) 220 (4.7) 0 (0)    Urine (mL/kg/hr) 2175 (1.9) 920 (2)    Stool 0     Total Output 2175 920    Net -1955 -920          Stool Unmeasured Occurrence 3 x               Anthropometrics     Flowsheet Rows   "    Flowsheet Row First Filed Value   Admission Height 157.5 cm (62\") Documented at 07/10/2023 1807   Admission Weight 44 kg (97 lb) Documented at 07/10/2023 1807            Height: Height: 157.5 cm (62\")  Last Filed Weight: Weight: 46.9 kg (103 lb 6.3 oz) (07/10/23 2150)  Method: Weight Method: Bed scale wt may be skewed by edema  BMI: BMI (Calculated): 18.9  bmi may be skewed by edema  BMI classification: Normal: 18.5-24.9kg/m2      UBW: 100lb   Weight change: per EMR- gradual wt loss since 2018, ~ 22lb wt loss over 5 years, may have more wt loss than this as pt has some edema in BLE    Pt weighed 125lb 's at MD office 5-     Weight      Weight (kg) Weight (lbs) Weight Method   11/18/2014 56.25 kg  124 lb 0.1 oz     2/5/2015 58.51 kg  128 lb 15.9 oz     5/25/2018 56.7 kg  125 lb     6/28/2018 56.7 kg  125 lb     1/7/2019 56.7 kg  125 lb     7/8/2019 56.7 kg  125 lb     1/1/2021 50.803 kg  112 lb  Stated    11/9/2021 48.988 kg  108 lb  Stated    4/13/2022 49.896 kg  110 lb  Estimated    4/14/2022 53.842 kg  118 lb 11.2 oz  Bed scale    4/15/2022 53.524 kg  118 lb     9/6/2022 53.524 kg  118 lb     9/18/2022 49.896 kg  110 lb      50.531 kg  111 lb 6.4 oz     9/23/2022 50.349 kg  111 lb  Stated    9/27/2022 51.075 kg  112 lb 9.6 oz     10/4/2022 50.349 kg  111 lb     5/7/2023 49.896 kg  110 lb  Bed scale     44.09 kg  97 lb 3.2 oz      44.09 kg  97 lb 3.2 oz     7/10/2023 43.999 kg  97 lb  Stated     46.9 kg  103 lb 6.3 oz  Bed scale          Nutrition Focused Physical Exam     Date:7-11-23    Patient meets criteria for malnutrition diagnosis, see MSA note.    Current Nutrition Prescription     PO: Soft to Chew Nectar thick liquid  Oral Nutrition Supplement: Magic Cup 3x/da  Intake: Insufficient data       Nutrition Diagnosis   Date: 7-11-23 Updated:   Problem Malnutrition moderate chronic   Etiology Dementia   Signs/Symptoms 22lb wt loss over 5 years, moderate muscle wasting and fat loss "   Status:ongoing    Date:  7-11-23 Updated:  Problem Swallowing difficulty   Etiology Per Clinical Status   Signs/Symptoms Per SLP reed last admission   Status: pt on nectar thick liquids at home    Goal:   General: Nutrition support treatment  PO: Establish PO - consistent as feasible  EN/PN: N/A    Nutrition Intervention      Follow treatment progress, Care plan reviewed, Advise alternate selection, Menu provided    Monitoring/Evaluation:   Per protocol, I&O, PO intake, Supplement intake, Pertinent labs, Weight, Skin status, Symptoms, Swallow function      Yessi Brown RD  Time Spent: 20 min

## 2023-07-13 NOTE — PLAN OF CARE
Goal Outcome Evaluation:  Plan of Care Reviewed With: spouse        Progress: declining    Pt is very anxious with any care she receives. Pt's heart rate was as high as 170 with respirations as high as 40. Staff was unable to calm pt back down with just talking. Pt received 1 dose of ativan ivp last night  and a dose of haldol ivp this morning. Pt had 2 bowel movements. Total uop was 850ml. Magnesium of 1.6 needs replaced.  remains at bedside. Pt is ordered to telemetry.

## 2023-07-13 NOTE — PROGRESS NOTES
"Intensive Care Follow-up     Hospital:  LOS: 3 days   Ms. Laura Wallace, 83 y.o. female is followed for:   Acute respiratory failure with hypoxia     Subjective   Interval History:  The chart has been reviewed. Patient had multiple repeated episodes yesterday afternoon and overnight that included anxiety, tachycardia, hypertension, muscle rigidity, and diaphoresis, and received both IV Ativan and Haldol. AM labs unremarkable aside from magnesium of 1.6.     Patient resting supine in bed this AM in NAD with spouse present at bedside. Very drowsy but arouses / opens eyes to stimulation. Tmax was 102.1F overnight. She is currently hemodynamically stable and oxygenating appropriately on 2L NC. PO intake is poor. Per , she has not had any further \"episodes\" this morning.     ROS unable to be obtained due to severe dementia.      The patient's past medical, surgical and social history were reviewed and updated in Epic as appropriate.       Objective     Infusions:     Medications:  apixaban, 2.5 mg, Oral, Q12H  cefTRIAXone, 1 g, Intravenous, Q24H  donepezil, 5 mg, Oral, Nightly  doxycycline, 100 mg, Intravenous, Q12H  [START ON 7/14/2023] famotidine, 20 mg, Oral, Daily  furosemide, 40 mg, Oral, Daily  LORazepam, 0.25 mg, Oral, BID  nystatin, , Topical, Q12H  risperiDONE, 0.5 mg, Oral, Nightly      I reviewed the patient's medications.    Vital Sign Min/Max for last 24 hours  Temp  Min: 99.8 °F (37.7 °C)  Max: 102.1 °F (38.9 °C)   BP  Min: 106/51  Max: 186/90   Pulse  Min: 79  Max: 170   Resp  Min: 18  Max: 40   SpO2  Min: 87 %  Max: 100 %   Flow (L/min)  Min: 2  Max: 6       Input/Output for last 24 hour shift  07/12 0701 - 07/13 0700  In: 220 [I.V.:20]  Out: 2175 [Urine:2175]     Physical Exam:  GENERAL: Ill-appearing elderly female lying supine in bed. No acute distress.   HEENT: Normocephalic and atraumatic. Trachea midline. PER. EOM WNL.   LUNGS: Chest rise of normal depth and symmetric. Lungs clear to " auscultation bilaterally. No wheezes, rhonchi, or rales.   HEART: S1,S2 detected. Regular rate and rhythm. No rub, murmur, or gallop.   ABDOMEN / GI / : Soft, round, nondistended, and nontender. Bowel sounds present. Urinary catheter in place, draining appropriately.    EXTREMITIES: No clubbing, edema, or cyanosis. Peripheral pulses present. Skin warm and dry. Some upper and lower extremity contractures.    NEURO/PSYCH: Sleeping, arouses / eyes open to stimulation. Doesn't follow commands. Moves all extremities.      Results from last 7 days   Lab Units 07/13/23  1004 07/11/23  0550 07/10/23  1810   WBC 10*3/mm3 10.27 7.58 10.10   HEMOGLOBIN g/dL 8.6* 8.3* 8.8*   PLATELETS 10*3/mm3 279 250 274     Results from last 7 days   Lab Units 07/13/23  0550 07/12/23  1432 07/12/23  0445 07/11/23  1742 07/11/23  0550   SODIUM mmol/L 138  --  138  --  141   POTASSIUM mmol/L 4.5 4.6 3.3*   < > 2.6*   CO2 mmol/L 23.0  --  25.0  --  28.0   BUN mg/dL 16  --  14  --  11   CREATININE mg/dL 0.65  --  0.66  --  0.62   MAGNESIUM mg/dL 1.6  --  1.8  --  1.6   PHOSPHORUS mg/dL 3.4  --  3.4  --  2.9   GLUCOSE mg/dL 106*  --  108*  --  100*    < > = values in this interval not displayed.     Estimated Creatinine Clearance: 50.3 mL/min (by C-G formula based on SCr of 0.65 mg/dL).    Results from last 7 days   Lab Units 07/10/23  1847   PH, ARTERIAL pH units 7.520*   PCO2, ARTERIAL mm Hg 33.0*   PO2 ART mm Hg 61.5*     I reviewed the patient's new clinical results.  I reviewed the patient's new imaging results/reports including actual images and agree with reports.     Assessment & Plan   Impression      Acute respiratory failure with hypoxia    Late onset Alzheimer's disease without behavioral disturbance    Pulmonary hypertension (WHO Group 2)    Lactic acidosis    Wound with tunneling    PAF (paroxysmal atrial fibrillation)    Acute UTI (urinary tract infection)    Severe malnutrition    Diastolic dysfunction    Acute on chronic heart  "failure with preserved ejection fraction (HFpEF)    Chronic anticoagulation (Eliquis)    Moderate Malnutrition (HCC)    83 yoF with PMH of Alzheimer's dementia, bedbound x3 years, HFpEF, PAH, PAF on Eliquis, and chronic tunneling back wound who presented to [facility] ED on 7/10/23 with 3 days of worsening shortness of breath and hypoxia. She was being treated for a UTI with Macrobid.     She was admitted to ICU on NRB / HFNC and given IV diuretics. Rocephin and doxycycline were added to cover for UTI and possible CAP.     IV Lasix was changed to PO Lasix on 7/11. She is on 2L NC.      Plan        # Acute hypoxic respiratory failure 2* decompensated HFrEF   # Pulmonary hypertension (WHO Group 2)   Improved with initiation of diuretics + alternating HFNC / BiPAP   IV Lasix changed to PO on 7/11/23, may need to continue upon discharge    Currently on 2L (with SpO2 >94%), wean supplemental O2 as able   Continue empiric doxycycline and Rocephin for possible PNA and UTI (which she was being treated for just prior to admission)    # Paroxysmal atrial fibrillation  # Chronic anticoagulation on Eliquis   Currently in NSR / intermittent ST   Continue Eliquis     # Alzheimer's disease   Continue Aricept, BID Ativan  Restart nightly risperidone      # Chronic tunneling back wound    Follows with wound care at [facility] following     # Hypomagnesemia  Replace per protocol   Recheck in AM     # Suspected serotonin syndrome   Patient with repeated episodes yesterday afternoon and overnight that included anxiety, tachycardia, hypertension, muscle rigidity, and diaphoresis, additionally febrile with Tmax 102.1 F    states these have been ongoing for >6 months and they call them her \"spells\"  Currently takes Fluoxetine 40 mg daily (SS can particularly occur with this single agent)   Already received AM dose, however we will D/C and monitor response   Avoid any further antipsychotics and utilize PRN Ativan if needed   NMS less " likely (WBC, CK normal)     DVT Prophylaxis: SCDs   GI Prophylaxis: Pepcid   Dispo: Transfer to telemetry; Hospitalists to assume attending role in AM     Time spent: 40 minutes   Plan of care and goals reviewed with multidisciplinary/antibiotic stewardship team during rounds.   I discussed the patient's findings and my recommendations with patient, family, nursing staff, and primary care team     Shara Stein, DNP, APRN, [facility]  Pulmonary and Critical Care Medicine

## 2023-07-13 NOTE — PLAN OF CARE
Problem: Adult Inpatient Plan of Care  Goal: Plan of Care Review  Outcome: Ongoing, Progressing  Flowsheets (Taken 7/13/2023 0152)  Progress: no change  Outcome Evaluation: VSS. On 2 liters O2 NC. Transfered from ICU this afternoon. No events since arrival.   Goal Outcome Evaluation:           Progress: no change  Outcome Evaluation: VSS. On 2 liters O2 NC. Transfered from ICU this afternoon. No events since arrival.

## 2023-07-14 ENCOUNTER — APPOINTMENT (OUTPATIENT)
Dept: NEUROLOGY | Facility: HOSPITAL | Age: 83
DRG: 291 | End: 2023-07-14
Payer: MEDICARE

## 2023-07-14 ENCOUNTER — APPOINTMENT (OUTPATIENT)
Dept: GENERAL RADIOLOGY | Facility: HOSPITAL | Age: 83
DRG: 291 | End: 2023-07-14
Payer: MEDICARE

## 2023-07-14 PROCEDURE — 25010000002 AMIODARONE IN DEXTROSE 5% 360-4.14 MG/200ML-% SOLUTION: Performed by: INTERNAL MEDICINE

## 2023-07-14 PROCEDURE — 95816 EEG AWAKE AND DROWSY: CPT

## 2023-07-14 PROCEDURE — 25010000002 AMIODARONE IN DEXTROSE 5% 150-4.21 MG/100ML-% SOLUTION: Performed by: INTERNAL MEDICINE

## 2023-07-14 PROCEDURE — 99233 SBSQ HOSP IP/OBS HIGH 50: CPT | Performed by: FAMILY MEDICINE

## 2023-07-14 PROCEDURE — 95816 EEG AWAKE AND DROWSY: CPT | Performed by: PSYCHIATRY & NEUROLOGY

## 2023-07-14 PROCEDURE — 25010000002 LORAZEPAM PER 2 MG: Performed by: INTERNAL MEDICINE

## 2023-07-14 PROCEDURE — 25010000002 LORAZEPAM PER 2 MG: Performed by: NURSE PRACTITIONER

## 2023-07-14 PROCEDURE — 71045 X-RAY EXAM CHEST 1 VIEW: CPT

## 2023-07-14 PROCEDURE — 25010000002 CEFTRIAXONE PER 250 MG: Performed by: INTERNAL MEDICINE

## 2023-07-14 RX ORDER — CYPROHEPTADINE HYDROCHLORIDE 2 MG/5ML
12 SOLUTION ORAL ONCE
Status: DISCONTINUED | OUTPATIENT
Start: 2023-07-14 | End: 2023-07-14

## 2023-07-14 RX ORDER — CYPROHEPTADINE HYDROCHLORIDE 4 MG/1
2 TABLET ORAL
Status: DISCONTINUED | OUTPATIENT
Start: 2023-07-14 | End: 2023-07-15

## 2023-07-14 RX ORDER — METOPROLOL TARTRATE 5 MG/5ML
5 INJECTION INTRAVENOUS EVERY 6 HOURS PRN
Status: DISCONTINUED | OUTPATIENT
Start: 2023-07-14 | End: 2023-07-17

## 2023-07-14 RX ORDER — CYPROHEPTADINE HYDROCHLORIDE 2 MG/5ML
2 SOLUTION ORAL
Status: DISCONTINUED | OUTPATIENT
Start: 2023-07-14 | End: 2023-07-14

## 2023-07-14 RX ORDER — METOPROLOL TARTRATE 5 MG/5ML
5 INJECTION INTRAVENOUS ONCE
Status: COMPLETED | OUTPATIENT
Start: 2023-07-14 | End: 2023-07-14

## 2023-07-14 RX ORDER — LORAZEPAM 2 MG/ML
0.25 INJECTION INTRAMUSCULAR ONCE
Status: COMPLETED | OUTPATIENT
Start: 2023-07-14 | End: 2023-07-14

## 2023-07-14 RX ORDER — CYPROHEPTADINE HYDROCHLORIDE 4 MG/1
12 TABLET ORAL ONCE
Status: COMPLETED | OUTPATIENT
Start: 2023-07-14 | End: 2023-07-14

## 2023-07-14 RX ADMIN — SODIUM CHLORIDE 1 G: 900 INJECTION INTRAVENOUS at 17:14

## 2023-07-14 RX ADMIN — LORAZEPAM 0.25 MG: 0.5 TABLET ORAL at 23:19

## 2023-07-14 RX ADMIN — AMIODARONE HYDROCHLORIDE 1 MG/MIN: 1.8 INJECTION, SOLUTION INTRAVENOUS at 22:59

## 2023-07-14 RX ADMIN — FUROSEMIDE 40 MG: 40 TABLET ORAL at 11:06

## 2023-07-14 RX ADMIN — METOPROLOL TARTRATE 5 MG: 5 INJECTION INTRAVENOUS at 21:23

## 2023-07-14 RX ADMIN — LORAZEPAM 0.25 MG: 0.5 TABLET ORAL at 12:27

## 2023-07-14 RX ADMIN — CYPROHEPTADINE HYDROCHLORIDE 12 MG: 4 TABLET ORAL at 14:47

## 2023-07-14 RX ADMIN — CYPROHEPTADINE HYDROCHLORIDE 2 MG: 4 TABLET ORAL at 23:19

## 2023-07-14 RX ADMIN — APIXABAN 2.5 MG: 2.5 TABLET, FILM COATED ORAL at 11:06

## 2023-07-14 RX ADMIN — CYPROHEPTADINE HYDROCHLORIDE 2 MG: 4 TABLET ORAL at 17:14

## 2023-07-14 RX ADMIN — APIXABAN 2.5 MG: 2.5 TABLET, FILM COATED ORAL at 23:18

## 2023-07-14 RX ADMIN — NYSTATIN: 100000 POWDER TOPICAL at 09:26

## 2023-07-14 RX ADMIN — LORAZEPAM 0.25 MG: 2 INJECTION INTRAMUSCULAR; INTRAVENOUS at 20:31

## 2023-07-14 RX ADMIN — Medication 10 ML: at 09:26

## 2023-07-14 RX ADMIN — DONEPEZIL HYDROCHLORIDE 5 MG: 5 TABLET, FILM COATED ORAL at 23:18

## 2023-07-14 RX ADMIN — CYPROHEPTADINE HYDROCHLORIDE 2 MG: 4 TABLET ORAL at 19:15

## 2023-07-14 RX ADMIN — FAMOTIDINE 20 MG: 20 TABLET ORAL at 11:06

## 2023-07-14 RX ADMIN — LORAZEPAM 0.25 MG: 2 INJECTION INTRAMUSCULAR; INTRAVENOUS at 04:46

## 2023-07-14 RX ADMIN — DOXYCYCLINE 100 MG: 100 INJECTION, POWDER, LYOPHILIZED, FOR SOLUTION INTRAVENOUS at 09:25

## 2023-07-14 RX ADMIN — AMIODARONE HYDROCHLORIDE 150 MG: 1.5 INJECTION, SOLUTION INTRAVENOUS at 22:45

## 2023-07-14 NOTE — PLAN OF CARE
Problem: Adult Inpatient Plan of Care  Goal: Plan of Care Review  Outcome: Ongoing, Progressing  Flowsheets (Taken 7/14/2023 7775)  Progress: no change  Plan of Care Reviewed With:   patient   spouse  Outcome Evaluation: VSS. On 2 liters. One event this afternoon with tachycardia, fever, diaphoretic, shaking. EEG, CXR, EKG done today. Wound care done.   Goal Outcome Evaluation:  Plan of Care Reviewed With: patient, spouse        Progress: no change  Outcome Evaluation: VSS. On 2 liters. One event this afternoon with tachycardia, fever, diaphoretic, shaking. EEG, CXR, EKG done today. Wound care done.

## 2023-07-14 NOTE — PLAN OF CARE
Goal Outcome Evaluation:   Patient on 2L. VSS most of the night. 0410 patient had increased HR, respiration, temperature and shakes which lasted about 40 minutes. Provider notified. See MAR for medication orders. Patient is now stable and resting.

## 2023-07-14 NOTE — PROGRESS NOTES
[facility] Medicine Services  PROGRESS NOTE    Patient Name: Laura Wallace  : 1940  MRN: [id number]    Date of Admission: 7/10/2023  Primary Care Physician: Justin Brumfield MD    Subjective   Subjective     CC:  pna    HPI:  Patient is an 83-year-old who was admitted to the intensive care unit with acute respiratory failure with hypoxia thought secondary to combination of heart failure as well as community-acquired pneumonia.  There is also concern for suspected serotonin syndrome. Pt  and nursing report and episde again this am with shaking, tremors, fever, tachycardia and tachypnea. Discussed serotonin syndrome with  and he is agreeable to trial of cyproheptadine.     ROS:  Gen-positive fevers, chills  CV- No chest pain, positive tachycardia  Resp-positive cough, dyspnea  GI- No N/V/D, abd pain       Objective   Objective     Vital Signs:   Temp:  [98.4 °F (36.9 °C)-101 °F (38.3 °C)] 98.7 °F (37.1 °C)  Heart Rate:  [] 78  Resp:  [16-38] 17  BP: (119-175)/(55-92) 144/56  Flow (L/min):  [2-5] 2     Physical Exam:  Constitutional: No acute distress, solmnolent and minimally responsive  HENT: NCAT, mucous membranes moist  Respiratory: Decreased breath sounds bilaterally, respiratory effort normal   Cardiovascular: Tachycardia with heart rate 102  Gastrointestinal: Positive bowel sounds, soft, nontender, nondistended  Musculoskeletal: No bilateral ankle edema  Psychiatric: eyes closed, minimally responsive  Neurologic: generally weak, nods yes/no; minimally responsive  Skin: pale      Results Reviewed:  LAB RESULTS:      Lab 23  1004 23  0550 07/10/23  2055 07/10/23  1810   WBC 10.27 7.58  --  10.10   HEMOGLOBIN 8.6* 8.3*  --  8.8*   HEMATOCRIT 27.0* 27.0*  --  28.2*   PLATELETS 279 250  --  274   NEUTROS ABS 8.11*  --   --  7.83*   IMMATURE GRANS (ABS) 0.04  --   --  0.05   LYMPHS ABS 1.07  --   --  1.02   MONOS ABS 0.89  --   --  1.15*   EOS ABS  0.14  --   --  0.04   MCV 82.6 83.1  --  83.7   LACTATE  --  0.8 0.7 2.2*         Lab 07/13/23  0550 07/12/23  1432 07/12/23  0445 07/11/23  1742 07/11/23  0550 07/10/23  1810   SODIUM 138  --  138  --  141 139   POTASSIUM 4.5 4.6 3.3* 3.5 2.6* 3.3*   CHLORIDE 103  --  101  --  99 101   CO2 23.0  --  25.0  --  28.0 23.0   ANION GAP 12.0  --  12.0  --  14.0 15.0   BUN 16  --  14  --  11 13   CREATININE 0.65  --  0.66  --  0.62 0.63   EGFR 88.6  --  88.3  --  89.6 89.3   GLUCOSE 106*  --  108*  --  100* 102*   CALCIUM 8.8  --  8.7  --  8.6 8.6   IONIZED CALCIUM  --   --   --   --  1.19  --    MAGNESIUM 1.6  --  1.8  --  1.6  --    PHOSPHORUS 3.4  --  3.4  --  2.9  --    TSH  --   --   --   --  3.890  --          Lab 07/11/23  0550 07/10/23  1810   TOTAL PROTEIN 6.6 6.5   ALBUMIN 3.2* 3.2*   GLOBULIN 3.4 3.3   ALT (SGPT) 16 15   AST (SGOT) 24 26   BILIRUBIN 0.8 0.6   ALK PHOS 122* 123*         Lab 07/10/23  2055 07/10/23  1810   PROBNP  --  6,389.0*   HSTROP T 57* 64*                 Lab 07/10/23  1847   PH, ARTERIAL 7.520*   PCO2, ARTERIAL 33.0*   PO2 ART 61.5*   FIO2 36   HCO3 ART 26.9*   BASE EXCESS ART 3.9*   CARBOXYHEMOGLOBIN 1.6     Brief Urine Lab Results  (Last result in the past 365 days)        Color   Clarity   Blood   Leuk Est   Nitrite   Protein   CREAT   Urine HCG        07/11/23 0059 Yellow   Clear   Small (1+)   Negative   Negative   Negative                   Microbiology Results Abnormal       Procedure Component Value - Date/Time    Blood Culture - Blood, Arm, Left [[id number]]  (Normal) Collected: 07/10/23 1852    Lab Status: Preliminary result Specimen: Blood from Arm, Left Updated: 07/13/23 2000     Blood Culture No growth at 3 days    Blood Culture - Blood, Arm, Left [[id number]]  (Normal) Collected: 07/10/23 1859    Lab Status: Preliminary result Specimen: Blood from Arm, Left Updated: 07/13/23 2000     Blood Culture No growth at 3 days    COVID PRE-OP / PRE-PROCEDURE SCREENING ORDER (NO  ISOLATION) - Swab, Nasopharynx [[id number]]  (Normal) Collected: 07/10/23 1824    Lab Status: Final result Specimen: Swab from Nasopharynx Updated: 07/10/23 1936    Narrative:      The following orders were created for panel order COVID PRE-OP / PRE-PROCEDURE SCREENING ORDER (NO ISOLATION) - Swab, Nasopharynx.  Procedure                               Abnormality         Status                     ---------                               -----------         ------                     COVID-19 and FLU A/B PCR...[[id number]]  Normal              Final result                 Please view results for these tests on the individual orders.    COVID-19 and FLU A/B PCR - Swab, Nasopharynx [[id number]]  (Normal) Collected: 07/10/23 1824    Lab Status: Final result Specimen: Swab from Nasopharynx Updated: 07/10/23 1936     COVID19 Not Detected     Influenza A PCR Not Detected     Influenza B PCR Not Detected    Narrative:      Fact sheet for providers: https://www.fda.gov/media/117016/download    Fact sheet for patients: https://www.fda.gov/media/558738/download    Test performed by PCR.            No radiology results from the last 24 hrs    Results for orders placed during the hospital encounter of 05/07/23    Adult Transthoracic Echo Complete w/ Color, Spectral and Contrast if Necessary Per Protocol    Interpretation Summary    Left ventricular systolic function is normal. Left ventricular ejection fraction appears to be 61 - 65%.    Left ventricular diastolic function is consistent with (grade II w/high LAP) pseudonormalization.    Left atrial volume is severely increased.    The right atrial cavity is moderately  dilated.    There is calcification of the aortic valve.    Moderate tricuspid valve regurgitation is present.    Estimated right ventricular systolic pressure from tricuspid regurgitation is mildly elevated (35-45 mmHg). Calculated right ventricular systolic pressure from tricuspid regurgitation is 41 mmHg.    Mild  pulmonary hypertension is present.      Current medications:  Scheduled Meds:apixaban, 2.5 mg, Oral, Q12H  cefTRIAXone, 1 g, Intravenous, Q24H  donepezil, 5 mg, Oral, Nightly  doxycycline, 100 mg, Intravenous, Q12H  famotidine, 20 mg, Oral, Daily  furosemide, 40 mg, Oral, Daily  LORazepam, 0.25 mg, Intravenous, Once  LORazepam, 0.25 mg, Oral, BID  nystatin, , Topical, Q12H  risperiDONE, 0.5 mg, Oral, Nightly      Continuous Infusions:   PRN Meds:.  acetaminophen    Magnesium Standard Dose Replacement - Follow Nurse / BPA Driven Protocol    Potassium Replacement - Follow Nurse / BPA Driven Protocol    sodium chloride    Assessment & Plan   Assessment & Plan     Active Hospital Problems    Diagnosis  POA    **Acute respiratory failure with hypoxia [J96.01]  Yes    Moderate Malnutrition (HCC) [E44.0]  Yes    Diastolic dysfunction [I51.89]  Yes    Acute on chronic heart failure with preserved ejection fraction (HFpEF) [I50.33]  Yes    Chronic anticoagulation (Eliquis) [Z79.01]  Not Applicable    Severe malnutrition [E43]  Yes    Acute UTI (urinary tract infection) [N39.0]  Yes    Lactic acidosis [E87.20]  Yes    PAF (paroxysmal atrial fibrillation) [I48.0]  Yes    Wound with tunneling [T14.8XXA]  Yes    Pulmonary hypertension (WHO Group 2) [I27.20]  Yes    Late onset Alzheimer's disease without behavioral disturbance [G30.1, F02.80]  Yes      Resolved Hospital Problems   No resolved problems to display.        Brief Hospital Course to date:  Laura Wallace is a 83 y.o. female with past medical history of paroxysmal A-fib, systolic congestive heart failure, Alzheimer's disease, chronic tunneling back wound followed at , pulmonary hypertension who presented with acute respiratory failure with hypoxia thought secondary to exacerbation of systolic congestive heart failure as well as community-acquired pneumonia.  She required high flow nasal cannula and BiPAP in the intensive care unit.  She was transferred to the floor  on 7/13/2023.  Hospitalist service assumed care on 7/14/2023.  Secondary to her sinus tachycardia and fevers there was concern for suspected serotonin syndrome.  SSRI was discontinued on 7/13/2023 but she did receive a dose that morning.    Acute respiratory failure with hypoxia  Community-acquired pneumonia  Systolic congestive heart failure with exacerbation  -conitnue o2 to maintain sats >90  -repeat cxr    Suspected serotonin syndrome  -Discontinued SSRI on 7/13/2023 with last dose 7/13/2023 AM  -Trial of cyproheptadine given symptoms persist, however most symptoms should resolve within 24 to 48 hours after discontinuation of SSRI    Alzheimer's dementia    Chronic tunneling back wound  -Follows at   - continue gotti catheter for local wound care    Paroxysmal A-fib  Chronic anticoagulation with Eliquis    Generalized weakness  PT and OT consults      Expected Discharge Location and Transportation: Rehab versus home, private vehicle  Expected Discharge   Expected Discharge Date: 7/17/2023; Expected Discharge Time:      DVT prophylaxis:  Medical and mechanical DVT prophylaxis orders are present.          CODE STATUS:   Code Status and Medical Interventions:   Ordered at: 07/10/23 2107     Medical Intervention Limits:    NO intubation (DNI)    NO cardioversion     Code Status (Patient has no pulse and is not breathing):    No CPR (Do Not Attempt to Resuscitate)     Medical Interventions (Patient has pulse or is breathing):    Limited Support     Comments:    Discussed with      Release to patient:    Routine Release       Malena Rowe MD  07/14/23

## 2023-07-14 NOTE — PROGRESS NOTES
"Enter Query Response Below      Query Response:     Lactic acidosis, clinically significatn  Electronically signed by Malena Rowe MD, 23, 2:03 PM EDT.           If applicable, please update the problem list.     Patient: Laura Wallace \"Pat\"        : 1940  Account: [id number]           Admit Date:         How to Respond to this query:       a. Click New Note     b. Answer query within the yellow box.                c. Update the Problem List, if applicable.      If you have any questions about this query contact me at:  [email or web address]    :    83 year old admitted with respiratory failure pulmonary hypertension. Note diagnosis of Lactic acidosis. Lactic Acid 2.2 on admission 7/10/23, Lactic acid 0.8 on 23. No fluids noted. Monitored during stay. After study can lactic acidosis be further clarified as:    Lactic acidosis-clinically significant  Lactic acidosis-clinically insignificant  Other (please specify)_________  Unable to determine    By submitting this query, we are merely seeking further clarification of documentation to accurately reflect all conditions that you are monitoring, evaluating, treating or that extend the hospitalization or utilize additional resources of care. Please utilize your independent clinical judgment when addressing the question(s) above.     This query and your response, once completed, will be entered into the legal medical record.    Sincerely,  Cely Murillo RN CDI  Clinical Documentation Integrity Program   "

## 2023-07-14 NOTE — CASE MANAGEMENT/SOCIAL WORK
Continued Stay Note  [facility]     Patient Name: Laura Wallace  MRN: [id number]  Today's Date: 7/14/2023    Admit Date: 7/10/2023    Plan: Home with [facility]   Discharge Plan       Row Name 07/14/23 1257       Plan    Plan Home with [facility]    Patient/Family in Agreement with Plan yes    Plan Comments Spoke with patient family member at bedside. Patient discharge plan is home with  through [facility]. resumation of care orders are in [facility]. Patient has oxygen through [facility]. No other discharge needs at this time. CM will follow.    Final Discharge Disposition Code 06 - home with home health care                   Discharge Codes    No documentation.                 Expected Discharge Date and Time       Expected Discharge Date Expected Discharge Time    Jul 17, 2023               Myah Elkins RN

## 2023-07-15 LAB
ALBUMIN SERPL-MCNC: 3.2 G/DL (ref 3.5–5.2)
ALBUMIN/GLOB SERPL: 0.9 G/DL
ALP SERPL-CCNC: 114 U/L (ref 39–117)
ALT SERPL W P-5'-P-CCNC: 15 U/L (ref 1–33)
ANION GAP SERPL CALCULATED.3IONS-SCNC: 19 MMOL/L (ref 5–15)
AST SERPL-CCNC: 21 U/L (ref 1–32)
BACTERIA SPEC AEROBE CULT: NORMAL
BACTERIA SPEC AEROBE CULT: NORMAL
BASOPHILS # BLD AUTO: 0.01 10*3/MM3 (ref 0–0.2)
BASOPHILS NFR BLD AUTO: 0.2 % (ref 0–1.5)
BILIRUB SERPL-MCNC: 0.2 MG/DL (ref 0–1.2)
BUN SERPL-MCNC: 21 MG/DL (ref 8–23)
BUN/CREAT SERPL: 15.8 (ref 7–25)
CALCIUM SPEC-SCNC: 9.3 MG/DL (ref 8.6–10.5)
CHLORIDE SERPL-SCNC: 103 MMOL/L (ref 98–107)
CO2 SERPL-SCNC: 21 MMOL/L (ref 22–29)
CREAT SERPL-MCNC: 1.33 MG/DL (ref 0.57–1)
D-LACTATE SERPL-SCNC: 0.7 MMOL/L (ref 0.5–2)
DEPRECATED RDW RBC AUTO: 44.9 FL (ref 37–54)
EGFRCR SERPLBLD CKD-EPI 2021: 40.3 ML/MIN/1.73
EOSINOPHIL # BLD AUTO: 0.23 10*3/MM3 (ref 0–0.4)
EOSINOPHIL NFR BLD AUTO: 3.7 % (ref 0.3–6.2)
ERYTHROCYTE [DISTWIDTH] IN BLOOD BY AUTOMATED COUNT: 14.7 % (ref 12.3–15.4)
GLOBULIN UR ELPH-MCNC: 3.5 GM/DL
GLUCOSE SERPL-MCNC: 112 MG/DL (ref 65–99)
HCT VFR BLD AUTO: 26.2 % (ref 34–46.6)
HGB BLD-MCNC: 8.1 G/DL (ref 12–15.9)
IMM GRANULOCYTES # BLD AUTO: 0.03 10*3/MM3 (ref 0–0.05)
IMM GRANULOCYTES NFR BLD AUTO: 0.5 % (ref 0–0.5)
L PNEUMO1 AG UR QL IA: NEGATIVE
LYMPHOCYTES # BLD AUTO: 1.08 10*3/MM3 (ref 0.7–3.1)
LYMPHOCYTES NFR BLD AUTO: 17.5 % (ref 19.6–45.3)
MAGNESIUM SERPL-MCNC: 1.7 MG/DL (ref 1.6–2.4)
MCH RBC QN AUTO: 25.8 PG (ref 26.6–33)
MCHC RBC AUTO-ENTMCNC: 30.9 G/DL (ref 31.5–35.7)
MCV RBC AUTO: 83.4 FL (ref 79–97)
MONOCYTES # BLD AUTO: 0.72 10*3/MM3 (ref 0.1–0.9)
MONOCYTES NFR BLD AUTO: 11.7 % (ref 5–12)
MRSA DNA SPEC QL NAA+PROBE: NEGATIVE
NEUTROPHILS NFR BLD AUTO: 4.1 10*3/MM3 (ref 1.7–7)
NEUTROPHILS NFR BLD AUTO: 66.4 % (ref 42.7–76)
NRBC BLD AUTO-RTO: 0 /100 WBC (ref 0–0.2)
PLATELET # BLD AUTO: 288 10*3/MM3 (ref 140–450)
PMV BLD AUTO: 8.6 FL (ref 6–12)
POTASSIUM SERPL-SCNC: 4.2 MMOL/L (ref 3.5–5.2)
PROCALCITONIN SERPL-MCNC: 0.74 NG/ML (ref 0–0.25)
PROT SERPL-MCNC: 6.7 G/DL (ref 6–8.5)
RBC # BLD AUTO: 3.14 10*6/MM3 (ref 3.77–5.28)
S PNEUM AG SPEC QL LA: NEGATIVE
SODIUM SERPL-SCNC: 143 MMOL/L (ref 136–145)
WBC NRBC COR # BLD: 6.17 10*3/MM3 (ref 3.4–10.8)

## 2023-07-15 PROCEDURE — 25010000002 AMIODARONE IN DEXTROSE 5% 360-4.14 MG/200ML-% SOLUTION: Performed by: INTERNAL MEDICINE

## 2023-07-15 PROCEDURE — 25010000002 LORAZEPAM PER 2 MG: Performed by: INTERNAL MEDICINE

## 2023-07-15 PROCEDURE — 99233 SBSQ HOSP IP/OBS HIGH 50: CPT | Performed by: FAMILY MEDICINE

## 2023-07-15 PROCEDURE — 83735 ASSAY OF MAGNESIUM: CPT | Performed by: FAMILY MEDICINE

## 2023-07-15 PROCEDURE — 25010000002 KETOROLAC TROMETHAMINE PER 15 MG: Performed by: INTERNAL MEDICINE

## 2023-07-15 PROCEDURE — 83605 ASSAY OF LACTIC ACID: CPT | Performed by: FAMILY MEDICINE

## 2023-07-15 PROCEDURE — 87641 MR-STAPH DNA AMP PROBE: CPT | Performed by: INTERNAL MEDICINE

## 2023-07-15 PROCEDURE — 87449 NOS EACH ORGANISM AG IA: CPT | Performed by: INTERNAL MEDICINE

## 2023-07-15 PROCEDURE — 80053 COMPREHEN METABOLIC PANEL: CPT | Performed by: FAMILY MEDICINE

## 2023-07-15 PROCEDURE — 99222 1ST HOSP IP/OBS MODERATE 55: CPT | Performed by: INTERNAL MEDICINE

## 2023-07-15 PROCEDURE — 87899 AGENT NOS ASSAY W/OPTIC: CPT | Performed by: INTERNAL MEDICINE

## 2023-07-15 PROCEDURE — 84145 PROCALCITONIN (PCT): CPT | Performed by: INTERNAL MEDICINE

## 2023-07-15 PROCEDURE — 25010000002 CEFTRIAXONE PER 250 MG: Performed by: INTERNAL MEDICINE

## 2023-07-15 PROCEDURE — 85025 COMPLETE CBC W/AUTO DIFF WBC: CPT | Performed by: FAMILY MEDICINE

## 2023-07-15 RX ORDER — LORAZEPAM 2 MG/ML
0.5 INJECTION INTRAMUSCULAR ONCE
Status: COMPLETED | OUTPATIENT
Start: 2023-07-15 | End: 2023-07-15

## 2023-07-15 RX ORDER — DOXYCYCLINE 100 MG/1
100 CAPSULE ORAL EVERY 12 HOURS SCHEDULED
Status: DISCONTINUED | OUTPATIENT
Start: 2023-07-15 | End: 2023-07-17

## 2023-07-15 RX ORDER — KETOROLAC TROMETHAMINE 15 MG/ML
15 INJECTION, SOLUTION INTRAMUSCULAR; INTRAVENOUS EVERY 6 HOURS PRN
Status: DISPENSED | OUTPATIENT
Start: 2023-07-15 | End: 2023-07-16

## 2023-07-15 RX ADMIN — AMIODARONE HYDROCHLORIDE 1 MG/MIN: 1.8 INJECTION, SOLUTION INTRAVENOUS at 23:51

## 2023-07-15 RX ADMIN — DOXYCYCLINE 100 MG: 100 INJECTION, POWDER, LYOPHILIZED, FOR SOLUTION INTRAVENOUS at 02:10

## 2023-07-15 RX ADMIN — APIXABAN 2.5 MG: 2.5 TABLET, FILM COATED ORAL at 20:51

## 2023-07-15 RX ADMIN — DILTIAZEM HYDROCHLORIDE 5 MG/HR: 5 INJECTION, SOLUTION INTRAVENOUS at 01:46

## 2023-07-15 RX ADMIN — ACETAMINOPHEN 650 MG: 325 TABLET ORAL at 01:46

## 2023-07-15 RX ADMIN — KETOROLAC TROMETHAMINE 15 MG: 15 INJECTION, SOLUTION INTRAMUSCULAR; INTRAVENOUS at 01:46

## 2023-07-15 RX ADMIN — NYSTATIN: 100000 POWDER TOPICAL at 02:53

## 2023-07-15 RX ADMIN — Medication 10 ML: at 20:58

## 2023-07-15 RX ADMIN — DOXYCYCLINE 100 MG: 100 INJECTION, POWDER, LYOPHILIZED, FOR SOLUTION INTRAVENOUS at 09:55

## 2023-07-15 RX ADMIN — FUROSEMIDE 40 MG: 40 TABLET ORAL at 08:50

## 2023-07-15 RX ADMIN — AMIODARONE HYDROCHLORIDE 1 MG/MIN: 1.8 INJECTION, SOLUTION INTRAVENOUS at 18:05

## 2023-07-15 RX ADMIN — SODIUM CHLORIDE 1 G: 900 INJECTION INTRAVENOUS at 17:08

## 2023-07-15 RX ADMIN — LORAZEPAM 0.25 MG: 0.5 TABLET ORAL at 20:50

## 2023-07-15 RX ADMIN — APIXABAN 2.5 MG: 2.5 TABLET, FILM COATED ORAL at 08:50

## 2023-07-15 RX ADMIN — AMIODARONE HYDROCHLORIDE 1 MG/MIN: 1.8 INJECTION, SOLUTION INTRAVENOUS at 11:27

## 2023-07-15 RX ADMIN — DOXYCYCLINE 100 MG: 100 CAPSULE ORAL at 20:51

## 2023-07-15 RX ADMIN — CYPROHEPTADINE HYDROCHLORIDE 2 MG: 4 TABLET ORAL at 01:46

## 2023-07-15 RX ADMIN — DONEPEZIL HYDROCHLORIDE 5 MG: 5 TABLET, FILM COATED ORAL at 20:51

## 2023-07-15 RX ADMIN — FAMOTIDINE 20 MG: 20 TABLET ORAL at 08:50

## 2023-07-15 RX ADMIN — LORAZEPAM 0.25 MG: 0.5 TABLET ORAL at 08:49

## 2023-07-15 RX ADMIN — NYSTATIN: 100000 POWDER TOPICAL at 08:50

## 2023-07-15 RX ADMIN — NYSTATIN: 100000 POWDER TOPICAL at 20:51

## 2023-07-15 RX ADMIN — LORAZEPAM 0.5 MG: 2 INJECTION INTRAMUSCULAR; INTRAVENOUS at 00:16

## 2023-07-15 NOTE — CONSULTS
[facility]  Cardiovascular Consultation Note    Reason for consultation: #1 A-fib RVR #2 chronically elevated troponin #3 elevated BNP    History of Present Illness:  83-year-old female with significant dementia previous A-fib was admitted with hypoxic respiratory failure and found to have a combination of pneumonia as well as CHF.  The patient went into A-fib RVR and was given IV amiodarone and she converted to sinus rhythm.  Previously she had been on sotalol but that is not one of her active medications admission.  At that time she was on very low-dose because of QTc prolongation.  According to the  with the patient goes into A-fib she has a panic feeling.  Otherwise he says she does not look short of breath and she does not complain of chest pain.  He tells me at home that she is actually been eating little bit better lately.  She is able to take pills in applesauce.    Cardiac risk factors: Age greater than 65 #2 hypertension    Past medical and surgical history, social and family history reviewed in EMR.    REVIEW OF SYSTEMS:     Past Medical History:   Diagnosis Date    Anxiety 2022    Late onset Alzheimer's disease without behavioral disturbance 2018    Paroxysmal atrial fibrillation with rapid ventricular response 2022    Pulmonary hypertension 2022     Past Surgical History:   Procedure Laterality Date    APPENDECTOMY  1960    VERTEBROPLASTY      L123, fusion     Social History     Socioeconomic History    Marital status:    Tobacco Use    Smoking status: Former     Packs/day: 1.00     Years: 30.00     Pack years: 30.00     Types: Cigarettes     Start date: 1960     Quit date: 1993     Years since quittin.5    Smokeless tobacco: Never   Vaping Use    Vaping Use: Never used   Substance and Sexual Activity    Alcohol use: Not Currently    Drug use: No    Sexual activity: Defer     Family History   Problem Relation Age of  Onset    Heart attack Father     Colon cancer Brother        H&P ROS reviewed and pertinent CV ROS as noted in HPI.    Cardiac: Patient has previous history of A-fib.  I treated her in November 2022 and she went into sinus rhythm with IV amiodarone bolus  Respiratory: Patient admitted with hypoxic respiratory failure was found to have CHF and pneumonia  Lower Extremities: No edema.  She has weakness of her legs  GI: No GI bleeding  Neuro: No history of stroke  Hematology: No bleeding diathesis ecchymosis or petechia  Renal: No kidney stones or hematuria  Musculoskeletal: Patient is bedbound  Endocrine: No diabetes or hypothyroidism  Constitutional: Has fever and hypoxic respiratory failure  Psych: Has severe dementia      Physical Exam: General thin elderly female in bed at a 75 degree angle not dyspneic tachypneic current heart rate 70       HEENT: No JVP or bruits       Respiratory: Clear anteriorly rare crackle posteriorly       Cardiovascular: Regular rate and rhythm without murmur gallop or click and no edema palpation       GI: Soft and flat       Lower Extremities: No lesions       Neuro: Cannot assess because of patient sleeping       Skin: Warm and dry no edema palpation       Psych: Cannot assess due to patient sleeping    Results Review: First EKG shows sinus rhythm.  Second EKG shows sinus rhythm with PACs.  Follow-up EKG showed A-fib RVR.  With A-fib RVR the patient was short of breath and diaphoretic.  After dose of Lopressor she improved.  She was given IV amiodarone converted to sinus rhythm.  Patient is a net -5.4 L since admission.  Tmax in the last 24 was 100.9 troponins are chronically elevated and the BNP was 6389           Vital Sign Min/Max for last 24 hours  Temp  Min: 97.1 °F (36.2 °C)  Max: 100.9 °F (38.3 °C)   BP  Min: 122/67  Max: 144/61   Pulse  Min: 69  Max: 172   Resp  Min: 12  Max: 18   SpO2  Min: 88 %  Max: 100 %   No data recorded      Intake/Output Summary (Last 24 hours) at  7/15/2023 1010  Last data filed at 7/15/2023 0955  Gross per 24 hour   Intake 600 ml   Output 700 ml   Net -100 ml             Current Facility-Administered Medications:     acetaminophen (TYLENOL) tablet 650 mg, 650 mg, Oral, Q4H PRN, Case, Azalia V., DO, 650 mg at 07/15/23 0146    amiodarone 360 mg in 200 mL D5W infusion, 1 mg/min, Intravenous, Continuous, Graeme Bass MD    apixaban (ELIQUIS) tablet 2.5 mg, 2.5 mg, Oral, Q12H, Case, Azalia V., DO, 2.5 mg at 07/15/23 0850    cefTRIAXone (ROCEPHIN) 1 g/100 mL 0.9% NS (MBP), 1 g, Intravenous, Q24H, Case, Azalia V., DO, 1 g at 07/14/23 1714    dilTIAZem (CARDIZEM) 125 mg in sodium chloride 0.9 % 125 mL infusion, 5-15 mg/hr, Intravenous, Continuous, Ammon Moreno, DO, Last Rate: 5 mL/hr at 07/15/23 0146, 5 mg/hr at 07/15/23 0146    dilTIAZem (CARDIZEM) bolus from bag 1 mg/mL 10 mg, 10 mg, Intravenous, Once, Ammon Moreno,     donepezil (ARICEPT) tablet 5 mg, 5 mg, Oral, Nightly, Case, Azalia V., DO, 5 mg at 07/14/23 2318    doxycycline (VIBRAMYCIN) 100 mg in sodium chloride 0.9 % 100 mL IVPB-VTB, 100 mg, Intravenous, Q12H, Horace Murray MD, 100 mg at 07/15/23 0955    famotidine (PEPCID) tablet 20 mg, 20 mg, Oral, Daily, Elana Ortiz, PharmD, 20 mg at 07/15/23 0850    furosemide (LASIX) tablet 40 mg, 40 mg, Oral, Daily, Case, Azalia V., DO, 40 mg at 07/15/23 0850    ketorolac (TORADOL) injection 15 mg, 15 mg, Intravenous, Q6H PRN, Ammon Moreno, DO, 15 mg at 07/15/23 0146    LORazepam (ATIVAN) tablet 0.25 mg, 0.25 mg, Oral, BID, Case, Azalia V., DO, 0.25 mg at 07/15/23 0849    Magnesium Standard Dose Replacement - Follow Nurse / BPA Driven Protocol, , Does not apply, PRN, Shara Stein, DNP, APRN    metoprolol tartrate (LOPRESSOR) injection 5 mg, 5 mg, Intravenous, Q6H PRN, Sebastian Marie MD    nystatin (MYCOSTATIN) powder, , Topical, Q12H, Case, Azalia V., DO, Given at 07/15/23 0850    Potassium Replacement - Follow Nurse / BPA Driven  Protocol, , Does not apply, PRN, Case, Azalia V., DO    sodium chloride 0.9 % flush 10 mL, 10 mL, Intravenous, PRN, Case, Azalia V., DO, 10 mL at 07/14/23 0926    Lab Review:   Results from last 7 days   Lab Units 07/13/23  1004 07/11/23  0550 07/10/23  1810   WBC 10*3/mm3 10.27 7.58 10.10   HEMOGLOBIN g/dL 8.6* 8.3* 8.8*   PLATELETS 10*3/mm3 279 250 274     Results from last 7 days   Lab Units 07/15/23  0209 07/13/23  0550 07/12/23  1432 07/12/23  0445 07/11/23  1742 07/11/23  0550   SODIUM mmol/L 143 138  --  138  --  141   POTASSIUM mmol/L 4.2 4.5 4.6 3.3*   < > 2.6*   CO2 mmol/L 21.0* 23.0  --  25.0  --  28.0   BUN mg/dL 21 16  --  14  --  11   CREATININE mg/dL 1.33* 0.65  --  0.66  --  0.62   MAGNESIUM mg/dL 1.7 1.6  --  1.8  --  1.6   PHOSPHORUS mg/dL  --  3.4  --  3.4  --  2.9   GLUCOSE mg/dL 112* 106*  --  108*  --  100*    < > = values in this interval not displayed.     Estimated Creatinine Clearance: 24.6 mL/min (A) (by C-G formula based on SCr of 1.33 mg/dL (H)).        .lipd  Lab Results   Component Value Date    AST 21 07/15/2023    ALT 15 07/15/2023       Radiology Reports:  Imaging Results (Last 72 Hours)       Procedure Component Value Units Date/Time    XR Chest 1 View [[id number]] Collected: 07/14/23 0943     Updated: 07/14/23 0947    Narrative:      XR CHEST 1 VW    Date of Exam: 7/14/2023 9:20 AM EDT    Indication: pna, tachycardia    Comparison: 7/11/2023    Findings:  There are increasing bilateral, left greater than right groundglass opacities. Cardiac mediastinal contours are within normal limits. Vascular calcifications again identified. Regional skeleton is unremarkable.      Impression:      Impression:    1. Increasing bilateral airspace opacities concerning for multifocal pneumonia. Pulmonary edema less favored.      Electronically Signed: Guanaco Segovia    7/14/2023 9:44 AM EDT    Workstation ID: [id number]            Assessment/Plan: #1 PAF-this responded to IV amiodarone.  I will  continue IV amiodarone drip for 24 hours and then put her on oral as well.  Continue anticoagulation  2 heart failure with preserved EF      Graeme Bass MD  07/15/23  10:10 EDT

## 2023-07-15 NOTE — PROGRESS NOTES
[facility] Medicine Services  PROGRESS NOTE    Patient Name: Laura Wallace  : 1940  MRN: [id number]    Date of Admission: 7/10/2023  Primary Care Physician: Justin Brumfield MD    Subjective   Subjective     CC:  pna    HPI:   - Patient is an 83-year-old who was admitted to the intensive care unit with acute respiratory failure with hypoxia thought secondary to combination of heart failure as well as community-acquired pneumonia.  There is also concern for suspected serotonin syndrome. Pt  and nursing report and episde again this am with shaking, tremors, fever, tachycardia and tachypnea. Discussed serotonin syndrome with  and he is agreeable to trial of cyproheptadine.     7/15 -patient received cyproheptadine yesterday times multiple doses without much improvement.  She continued to be febrile and tachycardic.  She went into A-fib with RVR.  Ultimately responded to Cardizem drip.  Still having spells of shaking.  Unclear etiology of persistent fevers so will consult ID.  Cardiology consulted for A-fib with RVR    ROS:  Gen-positive fevers, chills  CV- No chest pain, positive tachycardia with A-fib  Resp-positive cough, dyspnea  GI- No N/V/D, abd pain       Objective   Objective     Vital Signs:   Temp:  [98.3 °F (36.8 °C)-100.9 °F (38.3 °C)] 98.4 °F (36.9 °C)  Heart Rate:  [] 75  Resp:  [16-18] 16  BP: (122-144)/(61-91) 122/67  Flow (L/min):  [2-3] 3     Physical Exam:  Constitutional: No acute distress, awake and more responsive  HENT: NCAT, mucous membranes moist  Respiratory: Decreased breath sounds bilaterally, respiratory effort normal   Cardiovascular: Tachycardia with heart rate 102  Gastrointestinal: Positive bowel sounds, soft, nontender, nondistended  Musculoskeletal: No bilateral ankle edema  Psychiatric: eyes closed, blinks but does not verbally respond  Neurologic: generally weak, nods yes/no; minimally responsive  Skin: pale      Results  Reviewed:  LAB RESULTS:      Lab 07/15/23  0209 07/13/23  1004 07/11/23  0550 07/10/23  2055 07/10/23  1810   WBC  --  10.27 7.58  --  10.10   HEMOGLOBIN  --  8.6* 8.3*  --  8.8*   HEMATOCRIT  --  27.0* 27.0*  --  28.2*   PLATELETS  --  279 250  --  274   NEUTROS ABS  --  8.11*  --   --  7.83*   IMMATURE GRANS (ABS)  --  0.04  --   --  0.05   LYMPHS ABS  --  1.07  --   --  1.02   MONOS ABS  --  0.89  --   --  1.15*   EOS ABS  --  0.14  --   --  0.04   MCV  --  82.6 83.1  --  83.7   PROCALCITONIN 0.74*  --   --   --   --    LACTATE  --   --  0.8 0.7 2.2*         Lab 07/13/23  0550 07/12/23  1432 07/12/23  0445 07/11/23  1742 07/11/23  0550 07/10/23  1810   SODIUM 138  --  138  --  141 139   POTASSIUM 4.5 4.6 3.3* 3.5 2.6* 3.3*   CHLORIDE 103  --  101  --  99 101   CO2 23.0  --  25.0  --  28.0 23.0   ANION GAP 12.0  --  12.0  --  14.0 15.0   BUN 16  --  14  --  11 13   CREATININE 0.65  --  0.66  --  0.62 0.63   EGFR 88.6  --  88.3  --  89.6 89.3   GLUCOSE 106*  --  108*  --  100* 102*   CALCIUM 8.8  --  8.7  --  8.6 8.6   IONIZED CALCIUM  --   --   --   --  1.19  --    MAGNESIUM 1.6  --  1.8  --  1.6  --    PHOSPHORUS 3.4  --  3.4  --  2.9  --    TSH  --   --   --   --  3.890  --          Lab 07/11/23  0550 07/10/23  1810   TOTAL PROTEIN 6.6 6.5   ALBUMIN 3.2* 3.2*   GLOBULIN 3.4 3.3   ALT (SGPT) 16 15   AST (SGOT) 24 26   BILIRUBIN 0.8 0.6   ALK PHOS 122* 123*         Lab 07/10/23  2055 07/10/23  1810   PROBNP  --  6,389.0*   HSTROP T 57* 64*                 Lab 07/10/23  1847   PH, ARTERIAL 7.520*   PCO2, ARTERIAL 33.0*   PO2 ART 61.5*   FIO2 36   HCO3 ART 26.9*   BASE EXCESS ART 3.9*   CARBOXYHEMOGLOBIN 1.6     Brief Urine Lab Results  (Last result in the past 365 days)        Color   Clarity   Blood   Leuk Est   Nitrite   Protein   CREAT   Urine HCG        07/11/23 0059 Yellow   Clear   Small (1+)   Negative   Negative   Negative                   Microbiology Results Abnormal       Procedure Component Value -  Date/Time    Blood Culture - Blood, Arm, Left [[id number]]  (Normal) Collected: 07/10/23 1852    Lab Status: Preliminary result Specimen: Blood from Arm, Left Updated: 07/14/23 2000     Blood Culture No growth at 4 days    Blood Culture - Blood, Arm, Left [[id number]]  (Normal) Collected: 07/10/23 1859    Lab Status: Preliminary result Specimen: Blood from Arm, Left Updated: 07/14/23 2000     Blood Culture No growth at 4 days    COVID PRE-OP / PRE-PROCEDURE SCREENING ORDER (NO ISOLATION) - Swab, Nasopharynx [[id number]]  (Normal) Collected: 07/10/23 1824    Lab Status: Final result Specimen: Swab from Nasopharynx Updated: 07/10/23 1936    Narrative:      The following orders were created for panel order COVID PRE-OP / PRE-PROCEDURE SCREENING ORDER (NO ISOLATION) - Swab, Nasopharynx.  Procedure                               Abnormality         Status                     ---------                               -----------         ------                     COVID-19 and FLU A/B PCR...[[id number]]  Normal              Final result                 Please view results for these tests on the individual orders.    COVID-19 and FLU A/B PCR - Swab, Nasopharynx [[id number]]  (Normal) Collected: 07/10/23 1824    Lab Status: Final result Specimen: Swab from Nasopharynx Updated: 07/10/23 1936     COVID19 Not Detected     Influenza A PCR Not Detected     Influenza B PCR Not Detected    Narrative:      Fact sheet for providers: https://www.fda.gov/media/443157/download    Fact sheet for patients: https://www.fda.gov/media/165060/download    Test performed by PCR.            EEG    Result Date: 7/14/2023  Reason for referral: 83 y.o.female with shaking spells, consideration of seizures Technical Summary:  A 19 channel digital EEG was performed using the international 10-20 placement system, including eye leads and EKG leads. Duration: 20 minutes Findings: The patient is awake.  The background shows diffuse low to medium amplitude 5-10 Hz  intermixed theta and alpha activity which is present symmetrically over both hemispheres.  4 Hz generalized delta is intermixed at times.  At the beginning of the study rapid eye movements are noted with attendant artifact.  As a study proceeds, a slight mouth tremor is noted, without EEG correlate.  Later.  Right hand and left hand tremoring is noted, again without EEG correlate.  Sleep is not seen.  No focal features or epileptiform activity are present.  Photic stimulation does not change the background.  Hyperventilation is not performed. Video: Available Technical quality: Superior SUMMARY: Intermittent generalized slow No focal features or epileptiform activity are seen Episodes of eye movement and hand tremoring have no EEG correlate     Impression: Diffuse cerebral dysfunction of exceedingly mild degree, nonspecific No evidence for epilepsy is present This report is transcribed using the Dragon dictation system.      XR Chest 1 View    Result Date: 7/14/2023  XR CHEST 1 VW Date of Exam: 7/14/2023 9:20 AM EDT Indication: pna, tachycardia Comparison: 7/11/2023 Findings: There are increasing bilateral, left greater than right groundglass opacities. Cardiac mediastinal contours are within normal limits. Vascular calcifications again identified. Regional skeleton is unremarkable.     Impression: Impression: 1. Increasing bilateral airspace opacities concerning for multifocal pneumonia. Pulmonary edema less favored. Electronically Signed: Guanaco Segovia  7/14/2023 9:44 AM EDT  Workstation ID: [id number]     Results for orders placed during the hospital encounter of 05/07/23    Adult Transthoracic Echo Complete w/ Color, Spectral and Contrast if Necessary Per Protocol    Interpretation Summary    Left ventricular systolic function is normal. Left ventricular ejection fraction appears to be 61 - 65%.    Left ventricular diastolic function is consistent with (grade II w/high LAP) pseudonormalization.    Left atrial  volume is severely increased.    The right atrial cavity is moderately  dilated.    There is calcification of the aortic valve.    Moderate tricuspid valve regurgitation is present.    Estimated right ventricular systolic pressure from tricuspid regurgitation is mildly elevated (35-45 mmHg). Calculated right ventricular systolic pressure from tricuspid regurgitation is 41 mmHg.    Mild pulmonary hypertension is present.      Current medications:  Scheduled Meds:apixaban, 2.5 mg, Oral, Q12H  cefTRIAXone, 1 g, Intravenous, Q24H  dilTIAZem, 10 mg, Intravenous, Once  donepezil, 5 mg, Oral, Nightly  doxycycline, 100 mg, Intravenous, Q12H  famotidine, 20 mg, Oral, Daily  furosemide, 40 mg, Oral, Daily  LORazepam, 0.25 mg, Oral, BID  nystatin, , Topical, Q12H      Continuous Infusions:dilTIAZem, 5-15 mg/hr, Last Rate: 5 mg/hr (07/15/23 0146)    PRN Meds:.  acetaminophen    ketorolac    Magnesium Standard Dose Replacement - Follow Nurse / BPA Driven Protocol    metoprolol tartrate    Potassium Replacement - Follow Nurse / BPA Driven Protocol    sodium chloride    Assessment & Plan   Assessment & Plan     Active Hospital Problems    Diagnosis  POA    **Acute respiratory failure with hypoxia [J96.01]  Yes    Moderate Malnutrition (HCC) [E44.0]  Yes    Diastolic dysfunction [I51.89]  Yes    Acute on chronic heart failure with preserved ejection fraction (HFpEF) [I50.33]  Yes    Chronic anticoagulation (Eliquis) [Z79.01]  Not Applicable    Severe malnutrition [E43]  Yes    Sepsis without acute organ dysfunction [A41.9]  Yes    Acute UTI (urinary tract infection) [N39.0]  Yes    Lactic acidosis [E87.20]  Yes    PAF (paroxysmal atrial fibrillation) [I48.0]  Yes    Wound with tunneling [T14.8XXA]  Yes    Pulmonary hypertension (WHO Group 2) [I27.20]  Yes    Late onset Alzheimer's disease without behavioral disturbance [G30.1, F02.80]  Yes      Resolved Hospital Problems   No resolved problems to display.        Brief Hospital  Course to date:  Laura Wallace is a 83 y.o. female with past medical history of paroxysmal A-fib, systolic congestive heart failure, Alzheimer's disease, chronic tunneling back wound followed at , pulmonary hypertension who presented with acute respiratory failure with hypoxia thought secondary to exacerbation of systolic congestive heart failure as well as community-acquired pneumonia.  She required high flow nasal cannula and BiPAP in the intensive care unit.  She was transferred to the floor on 7/13/2023.  Hospitalist service assumed care on 7/14/2023.  Secondary to her sinus tachycardia and fevers there was concern for suspected serotonin syndrome.  SSRI was discontinued on 7/13/2023 but she did receive a dose that morning.  She received a trial regimen of cyproheptadine without improvement.  She developed A-fib with RVR on 7/14/2023.    Acute respiratory failure with hypoxia  Community-acquired pneumonia  Systolic congestive heart failure with exacerbation  -conitnue o2 to maintain sats >90  -repeat cxr 7/14/2023 shows increasing opacities concerning for multifocal pneumonia  -Currently on Rocephin and doxycycline  -Requested ID consult 7/15/2023  -Legionella, strep pneumo, MRSA screen pending  -Lactic acid pending    Sepsis with fever tachycardia  -Multiple etiologies possible including multifocal pneumonia, chronic tunneling back wound  -ID consult requested 7/15/2023  -Labs including CBC, CMP and lactic acid pending    Suspected serotonin syndrome, ruled out  -Discontinued SSRI on 7/13/2023 with last dose 7/13/2023 AM  -Trial of cyproheptadine given symptoms , however most symptoms should resolve within 24 to 48 hours after discontinuation of SSRI  -Patient did not improve after cyproheptadine so I feel we have effectively ruled out serotonin syndrome as an etiology for fever and tachycardia    Alzheimer's dementia    Chronic tunneling back wound  -Follows at   - continue gotti catheter for local  wound care  -Possibly etiology of her sepsis, fever tachycardia    Paroxysmal A-fib  Chronic anticoagulation with Eliquis  -Started on diltiazem drip on 7/14/2023  -Cardiology consult requested 7/15/2023    Generalized weakness  PT and OT consults once clinically improved from sepsis      Expected Discharge Location and Transportation: Rehab versus home, private vehicle  Expected Discharge   Expected Discharge Date: 7/17/2023; Expected Discharge Time:      DVT prophylaxis:  Medical and mechanical DVT prophylaxis orders are present.          CODE STATUS:   Code Status and Medical Interventions:   Ordered at: 07/10/23 2107     Medical Intervention Limits:    NO intubation (DNI)    NO cardioversion     Code Status (Patient has no pulse and is not breathing):    No CPR (Do Not Attempt to Resuscitate)     Medical Interventions (Patient has pulse or is breathing):    Limited Support     Comments:    Discussed with      Release to patient:    Routine Release       Malena Rowe MD  07/15/23

## 2023-07-15 NOTE — PROGRESS NOTES
Cross Cover Note    Called by nurse to see patient for persistent tachycardia, shaking, diaphoresis.  Chart reviewed apparently has been having some of the spells since admission, some concern for serotonin syndrome and SSRI was discontinued.  On arrival she is alert but appears diaphoretic and anxious appearing and mild diffuse shakiness.  Pulse was in the 170s which corresponded to telemetry, however due to shakiness was unable to determine rhythm on telemetry and EKG would not be adequate either.  Patient had 1 prior episode of A-fib per the , no history of SVT.  Due to presumed A-fib versus SVT I gave her a 5 mg dose of IV Lopressor.  Heart rate lowered into the 130s, still unable to determine rhythm secondary to tremors however she appears much less anxious and more comfortable.  Oxygenation has improved as well.  We will continue as needed IV Lopressor, continue starting oral in the morning.  Could consider cardiology consult if episodes continue as well.    Electronically signed by Sebastian Marie MD, 07/14/23, 9:45 PM EDT.      Addendum:  HR back in 170s.  Remains shaky and unable to get accurate EKG.  Still suspect a-fib vs SVT driven by fever and chronic medical issues.  Will trial amio infusion to try to control.  Still defer cards eval to attending.  Also consider palliative consult.    Electronically signed by Sebastian Marie MD, 07/14/23, 10:40 PM EDT.

## 2023-07-15 NOTE — CONSULTS
INFECTIOUS DISEASE CONSULT/INITIAL HOSPITAL VISIT    Laura Wallace  1940  [id number]    Date of Consult: 7/15/2023    Admission Date: 7/10/2023      Requesting Provider: Malena Rowe MD  Evaluating Physician: Jeffry Okeefe MD    Reason for Consultation: fever, sepsis    History of present illness:    Patient is a 83 y.o. female, known to Dr. Hamzah Charlton, with h/o Alzheimer's disease, bedbound, PAF/amiodarone/Eliquis, pulmonary hypertension, chronic anemia, sacral decubitus ulcer/chronic tunneling back wound, and recent pulmonary nodule/cystic thyroid mass who was admitted to [facility] from 5/7-5/12 for fevers and diaphoresis.  We were asked to see for UTI and sacral decubitus ulcer with tunneling back wound.  The decubitus ulcer did not appear to be actively infection, but she did have E.coli UTI and was treated with IV Rocephin.  She was discharged on nitrofurantoin until 5/21.      She returns to [facility] ED on 7/10 for acute hypoxic respiratory failure an decompensated diastolic heart failure.  She was placed in ICU for NRB/HFNC.  She was transferred to telemetry on 7/14 on 2L O2NC. She has been on Rocephin and doxycycline to cover for UTI and possible pneumonia.  She had multiple episode of tachycardia, HTN, muscle rigidity, hectic fevers, and diaphoresis which her  states have been ongoing and intermittent for last 6 months with suspected serotonin syndrome.  Her Fluoxetine was stopped on 7/14.  She had another episode on 7/14 and was given a dose of cyproheptadine. She was seen by cardiology on 7/15 and started on amiodarone drip.  She had some lengthened QT interval on admission and QTc stays close to 500.  Her CXR on 7/14 showed increasing bilateral airspace opacities concerning for multifocal pneumonia.   Her creatinine went from 0.65 on 7/13 to 1.33 on 7/15.  Her WBC is WNL.  Blood cultures are negative to date.  COVID-19/Flu PCR was negative.  She remains on Rocephin and doxycycline.  ID was  asked to evaluate and manage her antibiotic therapy.     Past Medical History:   Diagnosis Date    Anxiety 2022    Late onset Alzheimer's disease without behavioral disturbance 2018    Paroxysmal atrial fibrillation with rapid ventricular response 2022    Pulmonary hypertension 2022       Past Surgical History:   Procedure Laterality Date    APPENDECTOMY  1960    VERTEBROPLASTY      L123, fusion       Family History   Problem Relation Age of Onset    Heart attack Father     Colon cancer Brother        Social History     Socioeconomic History    Marital status:    Tobacco Use    Smoking status: Former     Packs/day: 1.00     Years: 30.00     Pack years: 30.00     Types: Cigarettes     Start date: 1960     Quit date: 1993     Years since quittin.5    Smokeless tobacco: Never   Vaping Use    Vaping Use: Never used   Substance and Sexual Activity    Alcohol use: Not Currently    Drug use: No    Sexual activity: Defer       Allergies   Allergen Reactions    Codeine Nausea And Vomiting         Medication:    Current Facility-Administered Medications:     acetaminophen (TYLENOL) tablet 650 mg, 650 mg, Oral, Q4H PRN, Case, Azalia V., DO, 650 mg at 07/15/23 0146    amiodarone 360 mg in 200 mL D5W infusion, 1 mg/min, Intravenous, Continuous, Graeme Bass MD    apixaban (ELIQUIS) tablet 2.5 mg, 2.5 mg, Oral, Q12H, Case, Azalia V., DO, 2.5 mg at 07/15/23 0850    cefTRIAXone (ROCEPHIN) 1 g/100 mL 0.9% NS (MBP), 1 g, Intravenous, Q24H, Case, Azalia V., DO, 1 g at 23 1714    dilTIAZem (CARDIZEM) 125 mg in sodium chloride 0.9 % 125 mL infusion, 5-15 mg/hr, Intravenous, Continuous, Ammon Moreno, DO, Last Rate: 5 mL/hr at 07/15/23 0146, 5 mg/hr at 07/15/23 014    dilTIAZem (CARDIZEM) bolus from bag 1 mg/mL 10 mg, 10 mg, Intravenous, Once, Ammon Moreno, DO    donepezil (ARICEPT) tablet 5 mg, 5 mg, Oral, Nightly, Case, Azalia V., DO, 5 mg at 23 5371    doxycycline  (VIBRAMYCIN) 100 mg in sodium chloride 0.9 % 100 mL IVPB-VTB, 100 mg, Intravenous, Q12H, Horace Murray MD, 100 mg at 07/15/23 0955    famotidine (PEPCID) tablet 20 mg, 20 mg, Oral, Daily, Elana Ortiz, PharmD, 20 mg at 07/15/23 0850    furosemide (LASIX) tablet 40 mg, 40 mg, Oral, Daily, Case, Azalia V., DO, 40 mg at 07/15/23 0850    ketorolac (TORADOL) injection 15 mg, 15 mg, Intravenous, Q6H PRN, Ammon Moreno, DO, 15 mg at 07/15/23 0146    LORazepam (ATIVAN) tablet 0.25 mg, 0.25 mg, Oral, BID, Case, Azalia V., DO, 0.25 mg at 07/15/23 0849    Magnesium Standard Dose Replacement - Follow Nurse / BPA Driven Protocol, , Does not apply, PRN, Shara Stein, DNP, APRN    metoprolol tartrate (LOPRESSOR) injection 5 mg, 5 mg, Intravenous, Q6H PRN, Seabstian Marie MD    nystatin (MYCOSTATIN) powder, , Topical, Q12H, Case, Azalia V., DO, Given at 07/15/23 0850    Potassium Replacement - Follow Nurse / BPA Driven Protocol, , Does not apply, PRN, Ramírez, Azalia V., DO    sodium chloride 0.9 % flush 10 mL, 10 mL, Intravenous, PRN, Case, Azalia V., DO, 10 mL at 07/14/23 0926    Antibiotics:  Anti-Infectives (From admission, onward)      Ordered     Dose/Rate Route Frequency Start Stop    07/11/23 1338  doxycycline (VIBRAMYCIN) 100 mg in sodium chloride 0.9 % 100 mL IVPB-VTB        Ordering Provider: Horace Murray MD    100 mg  over 60 Minutes Intravenous Every 12 Hours 07/11/23 2200 07/17/23 2159    07/10/23 2112  cefTRIAXone (ROCEPHIN) 1 g/100 mL 0.9% NS (MBP)        Ordering Provider: Ramírez Azalia V., DO    1 g  over 30 Minutes Intravenous Every 24 Hours 07/11/23 1800 07/18/23 1759    07/10/23 1828  cefTRIAXone (ROCEPHIN) 1 g/100 mL 0.9% NS (MBP)        Ordering Provider: Demian Andrade MD    1 g  over 30 Minutes Intravenous Once 07/10/23 1844 07/10/23 1945    07/10/23 1828  AZITHROMYCIN 500 MG/250 ML 0.9% NS IVPB (vial-mate)        Ordering Provider: Demian Andrade MD    500 mg  over  60 Minutes Intravenous Once 07/10/23 1844 07/10/23 2107              Review of Systems:  Unable to obtain d/t dementia      Physical Exam:   Vital Signs  Temp (24hrs), Av.2 °F (37.3 °C), Min:97.1 °F (36.2 °C), Max:100.9 °F (38.3 °C)    Temp  Min: 97.1 °F (36.2 °C)  Max: 100.9 °F (38.3 °C)  BP  Min: 122/67  Max: 144/61  Pulse  Min: 69  Max: 172  Resp  Min: 12  Max: 18  SpO2  Min: 88 %  Max: 100 %    GENERAL: Awake and alert, in no acute distress. Chronically ill appearing.  Does not follow directions.  No interaction.  Dementia  HEENT: Normocephalic, atraumatic.  PERRL. EOMI. No conjunctival injection. No icterus. Oropharynx clear without evidence of thrush or exudate.  NECK: Supple without nuchal rigidity. No mass.  LYMPH: No cervical, axillary or inguinal lymphadenopathy.  HEART: RRR; No murmur, rubs, gallops.   LUNGS: diminished at lung bases bilaterally without wheezing. Normal respiratory effort. Nonlabored.   ABDOMEN: Soft, nondistended. Positive bowel sounds. No rebound or guarding. NO mass or HSM.  EXT:  No cyanosis, clubbing or edema. No cord.  :  With Contreras catheter.  MSK: No joint effusions or erythema  SKIN: Unable to assess secondary to dementia.  PSYCHIATRIC: Unable to assess secondary to dementia.      23    Laboratory Data    Results from last 7 days   Lab Units 23  1004 23  0550 07/10/23  1810   WBC 10*3/mm3 10.27 7.58 10.10   HEMOGLOBIN g/dL 8.6* 8.3* 8.8*   HEMATOCRIT % 27.0* 27.0* 28.2*   PLATELETS 10*3/mm3 279 250 274     Results from last 7 days   Lab Units 07/15/23  0209   SODIUM mmol/L 143   POTASSIUM mmol/L 4.2   CHLORIDE mmol/L 103   CO2 mmol/L 21.0*   BUN mg/dL 21   CREATININE mg/dL 1.33*   GLUCOSE mg/dL 112*   CALCIUM mg/dL 9.3     Results from last 7 days   Lab Units 07/15/23  0209   ALK PHOS U/L 114   BILIRUBIN mg/dL 0.2   ALT (SGPT) U/L 15   AST (SGOT) U/L 21             Results from last 7 days   Lab Units 23  0550   LACTATE mmol/L 0.8      Results from last 7 days   Lab Units 07/13/23  0550   CK TOTAL U/L 99         Estimated Creatinine Clearance: 24.6 mL/min (A) (by C-G formula based on SCr of 1.33 mg/dL (H)).      Microbiology:  Microbiology Results (last 10 days)       Procedure Component Value - Date/Time    Blood Culture - Blood, Arm, Left [[id number]]  (Normal) Collected: 07/10/23 1859    Lab Status: Preliminary result Specimen: Blood from Arm, Left Updated: 07/14/23 2000     Blood Culture No growth at 4 days    Blood Culture - Blood, Arm, Left [[id number]]  (Normal) Collected: 07/10/23 1852    Lab Status: Preliminary result Specimen: Blood from Arm, Left Updated: 07/14/23 2000     Blood Culture No growth at 4 days    COVID PRE-OP / PRE-PROCEDURE SCREENING ORDER (NO ISOLATION) - Swab, Nasopharynx [[id number]]  (Normal) Collected: 07/10/23 1824    Lab Status: Final result Specimen: Swab from Nasopharynx Updated: 07/10/23 1936    Narrative:      The following orders were created for panel order COVID PRE-OP / PRE-PROCEDURE SCREENING ORDER (NO ISOLATION) - Swab, Nasopharynx.  Procedure                               Abnormality         Status                     ---------                               -----------         ------                     COVID-19 and FLU A/B PCR...[[id number]]  Normal              Final result                 Please view results for these tests on the individual orders.    COVID-19 and FLU A/B PCR - Swab, Nasopharynx [[id number]]  (Normal) Collected: 07/10/23 1824    Lab Status: Final result Specimen: Swab from Nasopharynx Updated: 07/10/23 1936     COVID19 Not Detected     Influenza A PCR Not Detected     Influenza B PCR Not Detected    Narrative:      Fact sheet for providers: https://www.fda.gov/media/672039/download    Fact sheet for patients: https://www.fda.gov/media/423398/download    Test performed by PCR.                  Radiology:  Imaging Results (Last 72 Hours)       Procedure Component Value Units Date/Time     XR Chest 1 View [[id number]] Collected: 07/14/23 0943     Updated: 07/14/23 0947    Narrative:      XR CHEST 1 VW    Date of Exam: 7/14/2023 9:20 AM EDT    Indication: pna, tachycardia    Comparison: 7/11/2023    Findings:  There are increasing bilateral, left greater than right groundglass opacities. Cardiac mediastinal contours are within normal limits. Vascular calcifications again identified. Regional skeleton is unremarkable.      Impression:      Impression:    1. Increasing bilateral airspace opacities concerning for multifocal pneumonia. Pulmonary edema less favored.      Electronically Signed: Guanaco Segovia    7/14/2023 9:44 AM EDT    Workstation ID: [id number]              Impression:   - Hectic fevers, may be associated with serotonin syndrome.  - Suspected Serotonin syndrome. Fluoxetine was stopped 7/13 after her dose on that day.  Continues to spike high fevers up to 101-103.  Given dose of cyproheptadine on 7/15.  - Bilateral pulmonary infiltrates, aspiration vs pneumonia vs edema.  - Acute hypoxic respiratory failure/d/t volume overload, now on 2L O2.   - Acute renal failure on 7/15.  Cr 1.33. 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  - Paroxysmal atrial fibrillation/amiodarone/Eliquis  - Alzheimer's dementia  - Debilitated/bedbound  - Pulmonary nodule/cystic thyroid mass seen on CT scan in 5/2923    PLAN/RECOMMENDATIONS:   Thank you for asking us to see Laura Wallace, I recommend the following:  - Follow Blood cultures, MRSA screen, UAg for Strep pneumo and Legionella.  - Continue doxycycline but change to oral  - Continue Rocephin for now.  - Continue O2 support    Jeffry Okeefe MD saw and examined patient, verified hx and PE,  reviewed labs and micro data, and formulated dx, plan for treatment and all medical decision making.      Miguel Ángel Murray PA-C for  Jeffry Okeefe MD  I have seen and examined pateint and agree with above    D/w Dr. Hwang,    Cont abx; observe off of SSRI    KERMIT Salas  7/15/2023  10:50 EDT

## 2023-07-16 LAB
IRON 24H UR-MRATE: 11 MCG/DL (ref 37–145)
IRON SATN MFR SERPL: 3 % (ref 20–50)
TIBC SERPL-MCNC: 325 MCG/DL (ref 298–536)
TRANSFERRIN SERPL-MCNC: 218 MG/DL (ref 200–360)

## 2023-07-16 PROCEDURE — 25010000002 CEFTRIAXONE PER 250 MG: Performed by: INTERNAL MEDICINE

## 2023-07-16 PROCEDURE — 83540 ASSAY OF IRON: CPT | Performed by: INTERNAL MEDICINE

## 2023-07-16 PROCEDURE — 25010000002 AMIODARONE IN DEXTROSE 5% 360-4.14 MG/200ML-% SOLUTION: Performed by: INTERNAL MEDICINE

## 2023-07-16 PROCEDURE — 97161 PT EVAL LOW COMPLEX 20 MIN: CPT

## 2023-07-16 PROCEDURE — 99232 SBSQ HOSP IP/OBS MODERATE 35: CPT | Performed by: INTERNAL MEDICINE

## 2023-07-16 PROCEDURE — 97165 OT EVAL LOW COMPLEX 30 MIN: CPT

## 2023-07-16 PROCEDURE — 84466 ASSAY OF TRANSFERRIN: CPT | Performed by: INTERNAL MEDICINE

## 2023-07-16 PROCEDURE — 99232 SBSQ HOSP IP/OBS MODERATE 35: CPT | Performed by: FAMILY MEDICINE

## 2023-07-16 RX ORDER — AMIODARONE HYDROCHLORIDE 200 MG/1
200 TABLET ORAL 3 TIMES DAILY
Status: DISCONTINUED | OUTPATIENT
Start: 2023-07-16 | End: 2023-07-19 | Stop reason: HOSPADM

## 2023-07-16 RX ADMIN — SODIUM CHLORIDE 1 G: 900 INJECTION INTRAVENOUS at 17:40

## 2023-07-16 RX ADMIN — LORAZEPAM 0.25 MG: 0.5 TABLET ORAL at 11:11

## 2023-07-16 RX ADMIN — LORAZEPAM 0.25 MG: 0.5 TABLET ORAL at 21:15

## 2023-07-16 RX ADMIN — NYSTATIN: 100000 POWDER TOPICAL at 21:16

## 2023-07-16 RX ADMIN — DONEPEZIL HYDROCHLORIDE 5 MG: 5 TABLET, FILM COATED ORAL at 21:16

## 2023-07-16 RX ADMIN — AMIODARONE HYDROCHLORIDE 1 MG/MIN: 1.8 INJECTION, SOLUTION INTRAVENOUS at 12:20

## 2023-07-16 RX ADMIN — FAMOTIDINE 20 MG: 20 TABLET ORAL at 11:11

## 2023-07-16 RX ADMIN — APIXABAN 2.5 MG: 2.5 TABLET, FILM COATED ORAL at 11:11

## 2023-07-16 RX ADMIN — AMIODARONE HYDROCHLORIDE 200 MG: 200 TABLET ORAL at 21:16

## 2023-07-16 RX ADMIN — NYSTATIN: 100000 POWDER TOPICAL at 11:12

## 2023-07-16 RX ADMIN — DOXYCYCLINE 100 MG: 100 CAPSULE ORAL at 11:11

## 2023-07-16 RX ADMIN — Medication 12.5 MG: at 21:16

## 2023-07-16 RX ADMIN — Medication 12.5 MG: at 11:12

## 2023-07-16 RX ADMIN — DOXYCYCLINE 100 MG: 100 CAPSULE ORAL at 21:16

## 2023-07-16 RX ADMIN — AMIODARONE HYDROCHLORIDE 200 MG: 200 TABLET ORAL at 17:40

## 2023-07-16 RX ADMIN — FUROSEMIDE 40 MG: 40 TABLET ORAL at 11:11

## 2023-07-16 RX ADMIN — AMIODARONE HYDROCHLORIDE 1 MG/MIN: 1.8 INJECTION, SOLUTION INTRAVENOUS at 17:40

## 2023-07-16 RX ADMIN — APIXABAN 2.5 MG: 2.5 TABLET, FILM COATED ORAL at 21:16

## 2023-07-16 RX ADMIN — Medication 10 ML: at 21:19

## 2023-07-16 RX ADMIN — AMIODARONE HYDROCHLORIDE 1 MG/MIN: 1.8 INJECTION, SOLUTION INTRAVENOUS at 05:22

## 2023-07-16 RX ADMIN — AMIODARONE HYDROCHLORIDE 200 MG: 200 TABLET ORAL at 11:11

## 2023-07-16 NOTE — PLAN OF CARE
Goal Outcome Evaluation:  Plan of Care Reviewed With: patient, spouse        Progress: no change  Outcome Evaluation: Patient presents with significant weakness, BLE contractures, and cognitive impairments affecting functional mobility. Pt appears at her baseline functional mobility. Skilled IP PT services not warranted. Recommend home with 24/7 assist at discharge.      Anticipated Discharge Disposition (PT): home with 24/7 care

## 2023-07-16 NOTE — THERAPY EVALUATION
Patient Name: Laura Wallace  : 1940    MRN: [id number]                              Today's Date: 2023       Admit Date: 7/10/2023    Visit Dx:     ICD-10-CM ICD-9-CM   1. Acute febrile illness  R50.9 780.60   2. Acute on chronic respiratory failure with hypoxemia  J96.21 518.84   3. Urinary tract infection in elderly patient  N39.0 599.0   4. Acute on chronic congestive heart failure, unspecified heart failure type  I50.9 428.0   5. History of pulmonary hypertension  Z86.79 V12.59   6. Former smoker  Z87.891 V15.82     Patient Active Problem List   Diagnosis    Late onset Alzheimer's disease without behavioral disturbance    Multiple closed fractures of pelvis without disruption of pelvic ring, initial encounter    Pneumonia    Paroxysmal atrial fibrillation with rapid ventricular response    Acute respiratory failure with hypoxia    Pulmonary hypertension (WHO Group 2)    Anxiety    Mild cognitive disorder    Fever, unknown origin    Elevated troponin    Acute kidney injury    Acute on chronic anemia    Lactic acidosis    Wound with tunneling    PAF (paroxysmal atrial fibrillation)    HTN (hypertension)    Acute UTI (urinary tract infection)    Pulmonary nodule    Constipation    Pleural effusion    Compression fracture of fourth lumbar vertebra    Thyroid mass of unclear etiology    Fecal impaction    Sepsis without acute organ dysfunction    Severe malnutrition    Diastolic dysfunction    Acute on chronic heart failure with preserved ejection fraction (HFpEF)    Chronic anticoagulation (Eliquis)    Moderate Malnutrition (HCC)     Past Medical History:   Diagnosis Date    Anxiety 2022    Late onset Alzheimer's disease without behavioral disturbance 2018    Paroxysmal atrial fibrillation with rapid ventricular response 2022    Pulmonary hypertension 2022     Past Surgical History:   Procedure Laterality Date    APPENDECTOMY  1960    VERTEBROPLASTY      L123, fusion      General  Information       Row Name 07/16/23 [facility]          OT Time and Intention    Document Type evaluation  -CS     Mode of Treatment occupational therapy  -CS       Row Name 07/16/23 [facility]          General Information    Patient Profile Reviewed yes  -CS     Prior Level of Function dependent:;all household mobility;ADL's;feeding;max assist:;transfer  Pt performs pivot t/f to w/c per spouse w/ assist Max Ax1, has 24/7 caregivers  -CS     Existing Precautions/Restrictions fall;oxygen therapy device and L/min;other (see comments)  sacral wound, significant BLE stiffness, minimally verbal, BUE tremors  -CS     Barriers to Rehab medically complex  -CS       Row Name 07/16/23 [facility]7          Living Environment    People in Home spouse  -CS       Row Name 07/16/23 [facility]          Home Main Entrance    Number of Stairs, Main Entrance none  -CS       Row Name 07/16/23 [facility]          Stairs Within Home, Primary    Stair Railings, Within Home, Primary none  -CS       Row Name 07/16/23 [facility]          Cognition    Orientation Status (Cognition) oriented to;person  alerts to name  -CS       Row Name 07/16/23 [facility]          Safety Issues, Functional Mobility    Impairments Affecting Function (Mobility) balance;cognition;coordination;endurance/activity tolerance;grasp;motor planning;motor control;strength;range of motion (ROM);postural/trunk control  -CS     Cognitive Impairments, Mobility Safety/Performance attention;awareness, need for assistance;insight into deficits/self-awareness;sequencing abilities;safety precaution awareness  -CS               User Key  (r) = Recorded By, (t) = Taken By, (c) = Cosigned By      Initials Name Provider Type    CS Brittany Brito OT Occupational Therapist                     Mobility/ADL's       Row Name 07/16/23 9857          Bed Mobility    Bed Mobility rolling left;rolling right;supine-sit  -CS     Rolling Left [place] (Bed Mobility) dependent (less than 25% patient effort);2 person assist;verbal  cues  -CS     Rolling Right [place] (Bed Mobility) dependent (less than 25% patient effort);2 person assist;verbal cues  -CS     Supine-Sit [place] (Bed Mobility) dependent (less than 25% patient effort);2 person assist;verbal cues  -CS     Assistive Device (Bed Mobility) bed rails;draw sheet;head of bed elevated  -CS       Row Name 07/16/23 1358          Transfers    Comment, (Transfers) Deferred d/t poor sitting balance  -       Row Name 07/16/23 1358          Activities of Daily Living    BADL Assessment/Intervention lower body dressing;toileting  -       Row Name 07/16/23 1358          Lower Body Dressing Assessment/Training    [place] Level (Lower Body Dressing) don;socks;dependent (less than 25% patient effort)  -CS     Position (Lower Body Dressing) supine  -       Row Name 07/16/23 1358          Toileting Assessment/Training    [place] Level (Toileting) adjust/manage clothing;change pad/brief;perform perineal hygiene;dependent (less than 25% patient effort)  -CS     Position (Toileting) supine  -               User Key  (r) = Recorded By, (t) = Taken By, (c) = Cosigned By      Initials Name Provider Type    CS Brittany Brito, OT Occupational Therapist                   Obj/Interventions       Row Name 07/16/23 1401          Sensory Assessment (Somatosensory)    Sensory Assessment (Somatosensory) unable/difficult to assess  -[facility] Name 07/16/23 1401          Vision Assessment/Intervention    Visual Impairment/Limitations unable/difficult to assess  -       Row Name 07/16/23 1401          Range of Motion Comprehensive    Comment, General Range of Motion Limited formal assessment d/t cognitive status  -       Row Name 07/16/23 1401          Strength Comprehensive (MMT)    Comment, General Manual Muscle Testing (MMT) Assessment Limited formal assessment d/t cognitive status  -       Row Name 07/16/23 1401          Balance    Balance Assessment sitting static  balance;sitting dynamic balance  -CS     Static Sitting Balance maximum assist  -CS     Dynamic Sitting Balance maximum assist  -CS     Position, Sitting Balance sitting edge of bed  -CS     Balance Interventions sitting;static;dynamic;dynamic reaching;weight shifting activity;occupation based/functional task  -CS               User Key  (r) = Recorded By, (t) = Taken By, (c) = Cosigned By      Initials Name Provider Type    CS Brittany Brito, OT Occupational Therapist                   Goals/Plan       Row Name 07/16/23 2657          Grooming Goal 1 (OT)    Activity/Device (Grooming Goal 1, OT) hair care;wash face, hands  -CS     [place] (Grooming Goal 1, OT) maximum assist (25-49% patient effort)  -CS     Time Frame (Grooming Goal 1, OT) long term goal (LTG);10 days  -CS     Progress/Outcome (Grooming Goal 1, OT) goal ongoing  -CS       Row Name 07/16/23 9147          Self-Feeding Goal 1 (OT)    Activity/Device (Self-Feeding Goal 1, OT) liquids to mouth;finger foods  -CS     [place] Level/Cues Needed (Self-Feeding Goal 1, OT) moderate assist (50-74% patient effort)  -CS     Time Frame (Self-Feeding Goal 1, OT) long term goal (LTG);10 days  -CS     Strategies/Barriers (Self-Feeding Goal 1, OT) AAD  -CS     Progress/Outcomes (Self-Feeding Goal 1, OT) goal ongoing  -CS       Row Name 07/16/23 8707          Therapy Assessment/Plan (OT)    Planned Therapy Interventions (OT) activity tolerance training;adaptive equipment training;BADL retraining;functional balance retraining;occupation/activity based interventions;ROM/therapeutic exercise;strengthening exercise;patient/caregiver education/training  -CS               User Key  (r) = Recorded By, (t) = Taken By, (c) = Cosigned By      Initials Name Provider Type    CS Brittany Brito, OT Occupational Therapist                   Clinical Impression       Row Name 07/16/23 1407          Pain Assessment    Additional Documentation Pain Scale: FACES  Pre/Post-Treatment (Group)  -CS       Row Name 07/16/23 1401          Pain Scale: FACES Pre/Post-Treatment    Pain: FACES Scale, Pretreatment 0-->no hurt  -CS     Posttreatment Pain Rating 0-->no hurt  -CS       Row Name 07/16/23 1401          Plan of Care Review    Plan of Care Reviewed With patient;spouse  -CS     Outcome Evaluation OT eval complete. Pt presents w/ deficits in balance, sustained attention to task, and BUE tremors limiting functional independence from baseline. Pt would benefit from cont skilled IPOT POC 3x/wk to promote return to PLOF. Recommend pt return home w/ 24/7 assist and HH OT/PT services.  -CS       Row Name 07/16/23 1401          Therapy Assessment/Plan (OT)    Patient/Family Therapy Goal Statement (OT) return to PLOF  -CS     Rehab Potential (OT) fair, will monitor progress closely  -CS     Criteria for Skilled Therapeutic Interventions Met (OT) yes;skilled treatment is necessary  -CS     Therapy Frequency (OT) 3 times/wk  -CS       Row Name 07/16/23 1401          Therapy Plan Review/Discharge Plan (OT)    Anticipated Discharge Disposition (OT) home with home health;home with 24/7 care  -CS       Row Name 07/16/23 1401          Vital Signs    Pre Systolic BP Rehab --  VSS  -CS     O2 Delivery Pre Treatment nasal cannula  -CS     O2 Delivery Intra Treatment nasal cannula  -CS     O2 Delivery Post Treatment nasal cannula  -CS     Pre Patient Position Supine  -CS     Intra Patient Position Sitting  -CS     Post Patient Position Sitting  -CS       Row Name 07/16/23 1401          Positioning and Restraints    Pre-Treatment Position in bed  -CS     Post Treatment Position bed  -CS     In Bed notified nsg;sitting EOB;with PT;encouraged to call for assist;with family/caregiver;call light within reach  -CS               User Key  (r) = Recorded By, (t) = Taken By, (c) = Cosigned By      Initials Name Provider Type    Brittany Huston, OT Occupational Therapist                   Outcome Measures        Row Name 07/16/23 1408          How much help from another is currently needed...    Putting on and taking off regular lower body clothing? 1  -CS     Bathing (including washing, rinsing, and drying) 1  -CS     Toileting (which includes using toilet bed pan or urinal) 1  -CS     Putting on and taking off regular upper body clothing 1  -CS     Taking care of personal grooming (such as brushing teeth) 1  -CS     Eating meals 1  -CS     AM-PAC 6 Clicks Score (OT) 6  -CS       Row Name 07/16/23 1408          Functional Assessment    Outcome Measure Options AM-PAC 6 Clicks Daily Activity (OT)  -CS               User Key  (r) = Recorded By, (t) = Taken By, (c) = Cosigned By      Initials Name Provider Type    CS Brittany Brito OT Occupational Therapist                    Occupational Therapy Education       Title: PT OT SLP Therapies (In Progress)       Topic: Occupational Therapy (In Progress)       Point: ADL training (In Progress)       Description:   Instruct learner(s) on proper safety adaptation and remediation techniques during self care or transfers.   Instruct in proper use of assistive devices.                  Learning Progress Summary             Patient Acceptance, E, NR by CS at 7/16/2023 1408   Family Acceptance, E, NR by CS at 7/16/2023 1408                         Point: Home exercise program (Not Started)       Description:   Instruct learner(s) on appropriate technique for monitoring, assisting and/or progressing therapeutic exercises/activities.                  Learner Progress:  Not documented in this visit.              Point: Precautions (In Progress)       Description:   Instruct learner(s) on prescribed precautions during self-care and functional transfers.                  Learning Progress Summary             Patient Acceptance, E, NR by CS at 7/16/2023 1408   Family Acceptance, E, NR by CS at 7/16/2023 1408                         Point: Body mechanics (In Progress)       Description:    Instruct learner(s) on proper positioning and spine alignment during self-care, functional mobility activities and/or exercises.                  Learning Progress Summary             Patient Acceptance, E, NR by CS at 7/16/2023 1408   Family Acceptance, E, NR by CS at 7/16/2023 1408                                         User Key       Initials Effective Dates Name Provider Type Discipline     09/02/21 -  Brittany Brito OT Occupational Therapist OT                  OT Recommendation and Plan  Planned Therapy Interventions (OT): activity tolerance training, adaptive equipment training, BADL retraining, functional balance retraining, occupation/activity based interventions, ROM/therapeutic exercise, strengthening exercise, patient/caregiver education/training  Therapy Frequency (OT): 3 times/wk  Plan of Care Review  Plan of Care Reviewed With: patient, spouse  Outcome Evaluation: OT eval complete. Pt presents w/ deficits in balance, sustained attention to task, and BUE tremors limiting functional independence from baseline. Pt would benefit from cont skilled IPOT POC 3x/wk to promote return to PLOF. Recommend pt return home w/ 24/7 assist and HH OT/PT services.     Time Calculation:    Time Calculation- OT       Row Name 07/16/23 1409             Time Calculation- OT    OT Start Time 1130  -CS      OT Received On 07/16/23  -CS      OT Goal Re-Cert Due Date 07/26/23  -CS         Untimed Charges    OT Eval/Re-eval Minutes 46  -CS         Total Minutes    Untimed Charges Total Minutes 46  -CS       Total Minutes 46  -CS                User Key  (r) = Recorded By, (t) = Taken By, (c) = Cosigned By      Initials Name Provider Type    CS Brittany Brito OT Occupational Therapist                  Therapy Charges for Today       Code Description Service Date Service Provider Modifiers Qty    [id number] HC OT EVAL LOW COMPLEXITY 4 7/16/2023 Brittany Brito OT GO 1                 Brittany Brito OT  7/16/2023

## 2023-07-16 NOTE — PROGRESS NOTES
INFECTIOUS DISEASE f/u     Laura Wallace  1940  [id number]    Date of Consult: 7/15/2023    Admission Date: 7/10/2023      Requesting Provider: Malena Rowe MD  Evaluating Physician: Jeffry Okeefe MD    Reason for Consultation: fever, sepsis    History of present illness:    Patient is a 83 y.o. female, known to Dr. Hamzah Charlton, with h/o Alzheimer's disease, bedbound, PAF/amiodarone/Eliquis, pulmonary hypertension, chronic anemia, sacral decubitus ulcer/chronic tunneling back wound, and recent pulmonary nodule/cystic thyroid mass who was admitted to [facility] from 5/7-5/12 for fevers and diaphoresis.  We were asked to see for UTI and sacral decubitus ulcer with tunneling back wound.  The decubitus ulcer did not appear to be actively infection, but she did have E.coli UTI and was treated with IV Rocephin.  She was discharged on nitrofurantoin until 5/21.      She returns to [facility] ED on 7/10 for acute hypoxic respiratory failure an decompensated diastolic heart failure.  She was placed in ICU for NRB/HFNC.  She was transferred to telemetry on 7/14 on 2L O2NC. She has been on Rocephin and doxycycline to cover for UTI and possible pneumonia.  She had multiple episode of tachycardia, HTN, muscle rigidity, hectic fevers, and diaphoresis which her  states have been ongoing and intermittent for last 6 months with suspected serotonin syndrome.  Her Fluoxetine was stopped on 7/14.  She had another episode on 7/14 and was given a dose of cyproheptadine. She was seen by cardiology on 7/15 and started on amiodarone drip.  She had some lengthened QT interval on admission and QTc stays close to 500.  Her CXR on 7/14 showed increasing bilateral airspace opacities concerning for multifocal pneumonia.   Her creatinine went from 0.65 on 7/13 to 1.33 on 7/15.  Her WBC is WNL.  Blood cultures are negative to date.  COVID-19/Flu PCR was negative.  She remains on Rocephin and doxycycline.  ID was asked to evaluate and  manage her antibiotic therapy.      has been considering comfort measures palliative medicine to be consulted currently afebrile  Past Medical History:   Diagnosis Date    Anxiety 2022    Late onset Alzheimer's disease without behavioral disturbance 2018    Paroxysmal atrial fibrillation with rapid ventricular response 2022    Pulmonary hypertension 2022       Past Surgical History:   Procedure Laterality Date    APPENDECTOMY  1960    VERTEBROPLASTY      L123, fusion       Family History   Problem Relation Age of Onset    Heart attack Father     Colon cancer Brother        Social History     Socioeconomic History    Marital status:    Tobacco Use    Smoking status: Former     Packs/day: 1.00     Years: 30.00     Pack years: 30.00     Types: Cigarettes     Start date: 1960     Quit date: 1993     Years since quittin.5    Smokeless tobacco: Never   Vaping Use    Vaping Use: Never used   Substance and Sexual Activity    Alcohol use: Not Currently    Drug use: No    Sexual activity: Defer       Allergies   Allergen Reactions    Codeine Nausea And Vomiting    Serotonin Reuptake Inhibitors (Ssris) Other (See Comments)     Serotonin syndrome         Medication:    Current Facility-Administered Medications:     acetaminophen (TYLENOL) tablet 650 mg, 650 mg, Oral, Q4H PRN, Case, Azalia V., DO, 650 mg at 07/15/23 0146    amiodarone (PACERONE) tablet 200 mg, 200 mg, Oral, TID, Graeme Bass MD, 200 mg at 23 1111    amiodarone 360 mg in 200 mL D5W infusion, 1 mg/min, Intravenous, Continuous, Graeme Bass MD, Last Rate: 33.3 mL/hr at 23 1220, 1 mg/min at 23 1220    apixaban (ELIQUIS) tablet 2.5 mg, 2.5 mg, Oral, Q12H, Case, Azalia V., DO, 2.5 mg at 23 1111    cefTRIAXone (ROCEPHIN) 1 g/100 mL 0.9% NS (MBP), 1 g, Intravenous, Q24H, Case, Azalia V., DO, 1 g at 07/15/23 1708    donepezil (ARICEPT) tablet 5 mg, 5 mg, Oral, Nightly, Case, Azalia  V., DO, 5 mg at 07/15/23 2051    doxycycline (MONODOX) capsule 100 mg, 100 mg, Oral, Q12H, Miguel Ángel Murray PA, 100 mg at 07/16/23 1111    famotidine (PEPCID) tablet 20 mg, 20 mg, Oral, Daily, Elana Ortiz, PharmD, 20 mg at 07/16/23 1111    furosemide (LASIX) tablet 40 mg, 40 mg, Oral, Daily, Case, Azalia V., DO, 40 mg at 07/16/23 1111    LORazepam (ATIVAN) tablet 0.25 mg, 0.25 mg, Oral, BID, Case, Azalia V., DO, 0.25 mg at 07/16/23 1111    Magnesium Standard Dose Replacement - Follow Nurse / BPA Driven Protocol, , Does not apply, PRN, Shara Stein, DNP, APRN    metoprolol tartrate (LOPRESSOR) half tablet 12.5 mg, 12.5 mg, Oral, Q12H, Graeme Bass MD, 12.5 mg at 07/16/23 1112    metoprolol tartrate (LOPRESSOR) injection 5 mg, 5 mg, Intravenous, Q6H PRN, Sebastian Mraie MD    nystatin (MYCOSTATIN) powder, , Topical, Q12H, Case, Azalia V., DO, Given at 07/16/23 1112    Potassium Replacement - Follow Nurse / BPA Driven Protocol, , Does not apply, PRN, Ramírez, Azalia V., DO    sodium chloride 0.9 % flush 10 mL, 10 mL, Intravenous, PRN, Ramírez Azalia V., DO, 10 mL at 07/15/23 2058    Antibiotics:  Anti-Infectives (From admission, onward)      Ordered     Dose/Rate Route Frequency Start Stop    07/15/23 1346  doxycycline (MONODOX) capsule 100 mg        Ordering Provider: Miguel Ángel Murray PA    100 mg Oral Every 12 Hours Scheduled 07/15/23 2100 07/22/23 2059    07/10/23 2112  cefTRIAXone (ROCEPHIN) 1 g/100 mL 0.9% NS (MBP)        Ordering Provider: Ramírez Azalia V., DO    1 g  over 30 Minutes Intravenous Every 24 Hours 07/11/23 1800 07/18/23 1759    07/10/23 1828  cefTRIAXone (ROCEPHIN) 1 g/100 mL 0.9% NS (MBP)        Ordering Provider: Demian Andrade MD    1 g  over 30 Minutes Intravenous Once 07/10/23 1844 07/10/23 1945    07/10/23 1828  AZITHROMYCIN 500 MG/250 ML 0.9% NS IVPB (vial-mate)        Ordering Provider: Demian Andrade MD    500 mg  over 60 Minutes Intravenous Once 07/10/23 1844 07/10/23                Review of Systems:  Unable to obtain d/t dementia      Physical Exam:   Vital Signs  Temp (24hrs), Av.6 °F (37 °C), Min:97 °F (36.1 °C), Max:99.2 °F (37.3 °C)    Temp  Min: 97 °F (36.1 °C)  Max: 99.2 °F (37.3 °C)  BP  Min: 117/65  Max: 151/82  Pulse  Min: 67  Max: 110  Resp  Min: 18  Max: 22  SpO2  Min: 92 %  Max: 100 %    GENERAL: Awake and alert, in no acute distress. Chronically ill appearing.  Does not follow directions.  No interaction.  Dementia  HEENT: Normocephalic, atraumatic.  PERRL. EOMI. No conjunctival injection.       LUNGS: Symmetrical inspiration bilaterally  ABDOMEN: Soft, nondistended.  EXT:  No cyanosis, clubbing or edema. No cord.  :  With Contreras catheter.  MSK: No joint effusions or erythema  SKIN: Unable to assess secondary to dementia.  PSYCHIATRIC: Unable to assess secondary to dementia.      23    Laboratory Data    Results from last 7 days   Lab Units 07/15/23  1044 23  1004 23  0550   WBC 10*3/mm3 6.17 10.27 7.58   HEMOGLOBIN g/dL 8.1* 8.6* 8.3*   HEMATOCRIT % 26.2* 27.0* 27.0*   PLATELETS 10*3/mm3 288 279 250       Results from last 7 days   Lab Units 07/15/23  0209   SODIUM mmol/L 143   POTASSIUM mmol/L 4.2   CHLORIDE mmol/L 103   CO2 mmol/L 21.0*   BUN mg/dL 21   CREATININE mg/dL 1.33*   GLUCOSE mg/dL 112*   CALCIUM mg/dL 9.3       Results from last 7 days   Lab Units 07/15/23  0209   ALK PHOS U/L 114   BILIRUBIN mg/dL 0.2   ALT (SGPT) U/L 15   AST (SGOT) U/L 21               Results from last 7 days   Lab Units 07/15/23  1044   LACTATE mmol/L 0.7       Results from last 7 days   Lab Units 23  0550   CK TOTAL U/L 99           Estimated Creatinine Clearance: 24.6 mL/min (A) (by C-G formula based on SCr of 1.33 mg/dL (H)).      Microbiology:  Microbiology Results (last 10 days)       Procedure Component Value - Date/Time    MRSA Screen, PCR (Inpatient) - Swab, Nares [[id number]]  (Normal) Collected: 07/15/23 1117    Lab Status:  Final result Specimen: Swab from Nares Updated: 07/15/23 1247     MRSA PCR Negative    Narrative:      The negative predictive value of this diagnostic test is high and should only be used to consider de-escalating anti-MRSA therapy. A positive result may indicate colonization with MRSA and must be correlated clinically.  MRSA Negative    S. Pneumo Ag Urine or CSF - Urine, Urine, Clean Catch [[id number]]  (Normal) Collected: 07/15/23 1116    Lab Status: Final result Specimen: Urine, Clean Catch Updated: 07/15/23 2051     Strep Pneumo Ag Negative    Legionella Antigen, Urine - Urine, Urine, Clean Catch [[id number]]  (Normal) Collected: 07/15/23 1116    Lab Status: Final result Specimen: Urine, Clean Catch Updated: 07/15/23 2051     LEGIONELLA ANTIGEN, URINE Negative    Blood Culture - Blood, Arm, Left [[id number]]  (Normal) Collected: 07/10/23 1859    Lab Status: Final result Specimen: Blood from Arm, Left Updated: 07/15/23 2000     Blood Culture No growth at 5 days    Blood Culture - Blood, Arm, Left [[id number]]  (Normal) Collected: 07/10/23 1852    Lab Status: Final result Specimen: Blood from Arm, Left Updated: 07/15/23 2000     Blood Culture No growth at 5 days    COVID PRE-OP / PRE-PROCEDURE SCREENING ORDER (NO ISOLATION) - Swab, Nasopharynx [[id number]]  (Normal) Collected: 07/10/23 1824    Lab Status: Final result Specimen: Swab from Nasopharynx Updated: 07/10/23 1936    Narrative:      The following orders were created for panel order COVID PRE-OP / PRE-PROCEDURE SCREENING ORDER (NO ISOLATION) - Swab, Nasopharynx.  Procedure                               Abnormality         Status                     ---------                               -----------         ------                     COVID-19 and FLU A/B PCR...[[id number]]  Normal              Final result                 Please view results for these tests on the individual orders.    COVID-19 and FLU A/B PCR - Swab, Nasopharynx [[id number]]  (Normal) Collected:  07/10/23 1824    Lab Status: Final result Specimen: Swab from Nasopharynx Updated: 07/10/23 1936     COVID19 Not Detected     Influenza A PCR Not Detected     Influenza B PCR Not Detected    Narrative:      Fact sheet for providers: https://www.fda.gov/media/067045/download    Fact sheet for patients: https://www.fda.gov/media/554997/download    Test performed by PCR.                  Radiology:  Imaging Results (Last 72 Hours)       Procedure Component Value Units Date/Time    XR Chest 1 View [[id number]] Collected: 07/14/23 0943     Updated: 07/14/23 0947    Narrative:      XR CHEST 1 VW    Date of Exam: 7/14/2023 9:20 AM EDT    Indication: pna, tachycardia    Comparison: 7/11/2023    Findings:  There are increasing bilateral, left greater than right groundglass opacities. Cardiac mediastinal contours are within normal limits. Vascular calcifications again identified. Regional skeleton is unremarkable.      Impression:      Impression:    1. Increasing bilateral airspace opacities concerning for multifocal pneumonia. Pulmonary edema less favored.      Electronically Signed: Guanaco Segovia    7/14/2023 9:44 AM EDT    Workstation ID: [id number]              Impression:   - Hectic fevers, may be associated with serotonin syndrome.  - Suspected Serotonin syndrome. Fluoxetine was stopped 7/13 after her dose on that day.  Continues to spike high fevers up to 101-103.  Given dose of cyproheptadine on 7/15.  - Bilateral pulmonary infiltrates, aspiration vs pneumonia vs edema.  - Acute hypoxic respiratory failure/d/t volume overload, now on 2L O2.   - Acute renal failure on 7/15.  Cr 1.33. 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  - Paroxysmal atrial fibrillation/amiodarone/Eliquis  - Alzheimer's dementia  - Debilitated/bedbound  - Pulmonary nodule/cystic thyroid mass seen on CT  scan in 5/2923    PLAN/RECOMMENDATIONS:   Thank you for asking us to see Laura Wallace, I recommend the following:  - Follow Blood cultures, MRSA screen, UAg for Strep pneumo and Legionella.  - Continue doxycycline but change to oral  - Continue Rocephin for now.  - Continue O2 support        D/w Dr. Hwang,    Cont abx; guarded overall prognosis is reasonable considering palliation    Fever is better currently    Will ask Dr. POPEYE Charlton to resume care tomorrow    Jeffry Okeefe MD  7/16/2023  14:39 EDT

## 2023-07-16 NOTE — THERAPY DISCHARGE NOTE
Patient Name: Laura Wallace  : 1940    MRN: [id number]                              Today's Date: 2023       Admit Date: 7/10/2023    Visit Dx:     ICD-10-CM ICD-9-CM   1. Acute febrile illness  R50.9 780.60   2. Acute on chronic respiratory failure with hypoxemia  J96.21 518.84   3. Urinary tract infection in elderly patient  N39.0 599.0   4. Acute on chronic congestive heart failure, unspecified heart failure type  I50.9 428.0   5. History of pulmonary hypertension  Z86.79 V12.59   6. Former smoker  Z87.891 V15.82     Patient Active Problem List   Diagnosis    Late onset Alzheimer's disease without behavioral disturbance    Multiple closed fractures of pelvis without disruption of pelvic ring, initial encounter    Pneumonia    Paroxysmal atrial fibrillation with rapid ventricular response    Acute respiratory failure with hypoxia    Pulmonary hypertension (WHO Group 2)    Anxiety    Mild cognitive disorder    Fever, unknown origin    Elevated troponin    Acute kidney injury    Acute on chronic anemia    Lactic acidosis    Wound with tunneling    PAF (paroxysmal atrial fibrillation)    HTN (hypertension)    Acute UTI (urinary tract infection)    Pulmonary nodule    Constipation    Pleural effusion    Compression fracture of fourth lumbar vertebra    Thyroid mass of unclear etiology    Fecal impaction    Sepsis without acute organ dysfunction    Severe malnutrition    Diastolic dysfunction    Acute on chronic heart failure with preserved ejection fraction (HFpEF)    Chronic anticoagulation (Eliquis)    Moderate Malnutrition (HCC)     Past Medical History:   Diagnosis Date    Anxiety 2022    Late onset Alzheimer's disease without behavioral disturbance 2018    Paroxysmal atrial fibrillation with rapid ventricular response 2022    Pulmonary hypertension 2022     Past Surgical History:   Procedure Laterality Date    APPENDECTOMY  1960    VERTEBROPLASTY      L123, fusion      General  Information       Row Name 07/16/23 1138          Physical Therapy Time and Intention    Document Type discharge evaluation/summary  -NS     Mode of Treatment physical therapy  -NS       Row Name 07/16/23 1138          General Information    Patient Profile Reviewed yes  -NS     Prior Level of Function dependent:;w/c or scooter;bed mobility;ADL's;transfer  Pt's spouse states that pt transfers with assist x1 but does not help with transfer.  -NS     Existing Precautions/Restrictions fall;oxygen therapy device and L/min;other (see comments)  low back wound, BLE contractures, minimally verbal, BUE tremors, dementia  -NS     Barriers to Rehab medically complex;previous functional deficit;cognitive status  -NS       Row Name 07/16/23 1138          Living Environment    People in Home spouse  24/7 caregivers  -NS       Row Name 07/16/23 1138          Home Main Entrance    Number of Stairs, Main Entrance none  -NS       Row Name 07/16/23 1138          Stairs Within Home, Primary    Number of Stairs, Within Home, Primary none  -NS       Row Name 07/16/23 1138          Cognition    Orientation Status (Cognition) oriented to;person  -NS       Row Name 07/16/23 1138          Safety Issues, Functional Mobility    Safety Issues Affecting Function (Mobility) ability to follow commands;sequencing abilities  -NS     Impairments Affecting Function (Mobility) balance;cognition;coordination;endurance/activity tolerance;range of motion (ROM);postural/trunk control;grasp;motor control;motor planning;strength  -NS     Cognitive Impairments, Mobility Safety/Performance attention;awareness, need for assistance;insight into deficits/self-awareness;problem-solving/reasoning;safety precaution follow-through;safety precaution awareness;sequencing abilities  -NS               User Key  (r) = Recorded By, (t) = Taken By, (c) = Cosigned By      Initials Name Provider Type    Tanya Fontanez PT Physical Therapist                   Mobility        Row Name 07/16/23 1138          Bed Mobility    Bed Mobility rolling left;rolling right;supine-sit;sit-supine  -NS     Rolling Left [place] (Bed Mobility) dependent (less than 25% patient effort);2 person assist;verbal cues  -NS     Rolling Right [place] (Bed Mobility) dependent (less than 25% patient effort);2 person assist;verbal cues  -NS     Supine-Sit [place] (Bed Mobility) dependent (less than 25% patient effort);2 person assist;verbal cues  -NS     Sit-Supine [place] (Bed Mobility) dependent (less than 25% patient effort);2 person assist;verbal cues  -NS     Assistive Device (Bed Mobility) bed rails;draw sheet;head of bed elevated  -NS     Comment, (Bed Mobility) VCs for all bed mobility. Pt unable to assist with bed mobility.  -NS       Row Name 07/16/23 1138          Transfers    Comment, (Transfers) Deferred due to poor sitting balance and pt's inability to flex knees to place feet on floor.  -NS       Row Name 07/16/23 1138          Gait/Stairs (Locomotion)    Comment, (Gait/Stairs) Pt is nonambulatory at baseline.  -NS               User Key  (r) = Recorded By, (t) = Taken By, (c) = Cosigned By      Initials Name Provider Type    Tanya Fontanez PT Physical Therapist                   Obj/Interventions       Row Name 07/16/23 1138          Range of Motion Comprehensive    General Range of Motion lower extremity range of motion deficits identified  -NS     Comment, General Range of Motion B knees contracted in extension, B ankles contracted into plantarflexion. Pt's spouse states that her knees do not flex.  -NS       Row Name 07/16/23 1138          Strength Comprehensive (MMT)    Comment, General Manual Muscle Testing (MMT) Assessment Unable to formally assess due to cognition but no active movement observed in BLEs.  -NS       Row Name 07/16/23 1138          Balance    Balance Assessment sitting static balance  -NS     Static Sitting Balance maximum assist  -NS     Position,  Sitting Balance unsupported;sitting edge of bed  -NS       Row Name 07/16/23 1138          Sensory Assessment (Somatosensory)    Sensory Assessment (Somatosensory) unable/difficult to assess  -NS               User Key  (r) = Recorded By, (t) = Taken By, (c) = Cosigned By      Initials Name Provider Type    Tanya Fontanez, PT Physical Therapist                   Goals/Plan    No documentation.                  Clinical Impression       Row Name 07/16/23 1138          Pain    Pre/Posttreatment Pain Comment No signs or indications of pain.  -NS       [facility] Name 07/16/23 1138          Pain Scale: FACES Pre/Post-Treatment    Pain: FACES Scale, Pretreatment 0-->no hurt  -NS     Posttreatment Pain Rating 0-->no hurt  -NS       Row Name 07/16/23 1138          Plan of Care Review    Plan of Care Reviewed With patient;spouse  -NS     Progress no change  -NS     Outcome Evaluation Patient presents with significant weakness, BLE contractures, and cognitive impairments affecting functional mobility. Pt appears at her baseline functional mobility. Skilled IP PT services not warranted. Recommend home with 24/7 assist at discharge.  -NS       [facility] Name 07/16/23 1138          Therapy Assessment/Plan (PT)    Criteria for Skilled Interventions Met (PT) no;does not meet criteria for skilled intervention  -NS     Therapy Frequency (PT) evaluation only  -NS       [facility] Name 07/16/23 1138          Vital Signs    Pre Systolic BP Rehab --  VSS- RN cleared for PT eval  -NS     Pre Patient Position Supine  -NS     Intra Patient Position Sitting  -NS     Post Patient Position Supine  -NS       Row Name 07/16/23 1138          Positioning and Restraints    Pre-Treatment Position in bed  -NS     Post Treatment Position bed  -NS     In Bed notified nsg;supine;call light within reach;encouraged to call for assist;exit alarm on;side rails up x3;SCD pump applied;heels elevated;waffle boots/both;with family/caregiver  -NS               User Key  (r) =  Recorded By, (t) = Taken By, (c) = Cosigned By      Initials Name Provider Type    NS Tanya Brito, KLARISSA Physical Therapist                   Outcome Measures       Row Name 07/16/23 1138          How much help from another person do you currently need...    Turning from your back to your side while in flat bed without using bedrails? 1  -NS     Moving from lying on back to sitting on the side of a flat bed without bedrails? 1  -NS     Moving to and from a bed to a chair (including a wheelchair)? 1  -NS     Standing up from a chair using your arms (e.g., wheelchair, bedside chair)? 1  -NS     Climbing 3-5 steps with a railing? 1  -NS     To walk in hospital room? 1  -NS     AM-PAC 6 Clicks Score (PT) 6  -NS     Highest level of mobility 2 --> Bed activities/dependent transfer  -NS       Row Name 07/16/23 1408 07/16/23 1138       Functional Assessment    Outcome Measure Options AM-PAC 6 Clicks Daily Activity (OT)  -CS AM-PAC 6 Clicks Basic Mobility (PT)  -NS              User Key  (r) = Recorded By, (t) = Taken By, (c) = Cosigned By      Initials Name Provider Type    Brittany Huston OT Occupational Therapist    Tanya Fontanez PT Physical Therapist                  Physical Therapy Education       Title: PT OT SLP Therapies (In Progress)       Topic: Physical Therapy (In Progress)       Point: Mobility training (Done)       Learning Progress Summary             Significant Other Acceptance, E, VU by NS at 7/16/2023 1530                         Point: Home exercise program (Not Started)       Learner Progress:  Not documented in this visit.              Point: Body mechanics (Not Started)       Learner Progress:  Not documented in this visit.              Point: Precautions (Not Started)       Learner Progress:  Not documented in this visit.                              User Key       Initials Effective Dates Name Provider Type Discipline    NS 06/16/21 -  Tanya Brito PT Physical Therapist PT                   PT Recommendation and Plan     Plan of Care Reviewed With: patient, spouse  Progress: no change  Outcome Evaluation: Patient presents with significant weakness, BLE contractures, and cognitive impairments affecting functional mobility. Pt appears at her baseline functional mobility. Skilled IP PT services not warranted. Recommend home with 24/7 assist at discharge.     Time Calculation:    PT Charges       Row Name 07/16/23 1138             Time Calculation    Start Time 1138  -NS      PT Received On 07/16/23  -NS         Untimed Charges    PT Eval/Re-eval Minutes 48  -NS         Total Minutes    Untimed Charges Total Minutes 48  -NS       Total Minutes 48  -NS                User Key  (r) = Recorded By, (t) = Taken By, (c) = Cosigned By      Initials Name Provider Type    NS Tanya Brito, KLARISSA Physical Therapist                  Therapy Charges for Today       Code Description Service Date Service Provider Modifiers Qty    [id number] HC PT EVAL LOW COMPLEXITY 4 7/16/2023 Tanya Brito, PT GP 1            PT G-Codes  Outcome Measure Options: AM-PAC 6 Clicks Daily Activity (OT)  AM-PAC 6 Clicks Score (PT): 6  AM-PAC 6 Clicks Score (OT): 6    PT Discharge Summary  Anticipated Discharge Disposition (PT): home with 24/7 care  Reason for Discharge: At baseline function    Tanya Brito PT  7/16/2023

## 2023-07-16 NOTE — PLAN OF CARE
Goal Outcome Evaluation:      Several brief 'episodes' of tremors, sweating and increased HR. Resolves without intervention. Awake and alert most of the day once she woke up. Wound care done

## 2023-07-16 NOTE — PROGRESS NOTES
[facility]  IP Progress Note      Chief Complaint/Reason for service: #1 PAF #2 chronically elevated troponins    Subjective   Subjective: The patient's  tells me that he was feeding her last night at 6:00 and she had an episode where her heart rate went up she got anxious and sweaty.  She had another episode around 9:00.  These episodes were not near as profound as to when she had previously    Past medical, surgical, social and family history reviewed in the patient's electronic medical record.    Objective     Vital Sign Min/Max for last 24 hours  Temp  Min: 97 °F (36.1 °C)  Max: 99.2 °F (37.3 °C)   BP  Min: 117/65  Max: 151/82   Pulse  Min: 67  Max: 110   Resp  Min: 16  Max: 22   SpO2  Min: 96 %  Max: 100 %   Flow (L/min)  Min: 2  Max: 3      Intake/Output Summary (Last 24 hours) at 7/16/2023 0807  Last data filed at 7/16/2023 0502  Gross per 24 hour   Intake 525 ml   Output 525 ml   Net 0 ml             Current Facility-Administered Medications:     acetaminophen (TYLENOL) tablet 650 mg, 650 mg, Oral, Q4H PRN, Case, Azalia V., DO, 650 mg at 07/15/23 0146    amiodarone 360 mg in 200 mL D5W infusion, 1 mg/min, Intravenous, Continuous, Graeme Bass MD, Last Rate: 33.3 mL/hr at 07/16/23 0522, 1 mg/min at 07/16/23 0522    apixaban (ELIQUIS) tablet 2.5 mg, 2.5 mg, Oral, Q12H, Case, Azalia V., DO, 2.5 mg at 07/15/23 2051    cefTRIAXone (ROCEPHIN) 1 g/100 mL 0.9% NS (MBP), 1 g, Intravenous, Q24H, Case, Azalia V., DO, 1 g at 07/15/23 1708    donepezil (ARICEPT) tablet 5 mg, 5 mg, Oral, Nightly, Case, Azalia V., DO, 5 mg at 07/15/23 2051    doxycycline (MONODOX) capsule 100 mg, 100 mg, Oral, Q12H, Miguel Ángel Murray PA, 100 mg at 07/15/23 2051    famotidine (PEPCID) tablet 20 mg, 20 mg, Oral, Daily, Elana Ortiz, PharmD, 20 mg at 07/15/23 0850    furosemide (LASIX) tablet 40 mg, 40 mg, Oral, Daily, Azalia Elmore DO, 40 mg at 07/15/23 0850    LORazepam (ATIVAN)  tablet 0.25 mg, 0.25 mg, Oral, BID, Case, Azalia V., DO, 0.25 mg at 07/15/23 2050    Magnesium Standard Dose Replacement - Follow Nurse / BPA Driven Protocol, , Does not apply, PRN, Shara Stein, DNP, APRN    metoprolol tartrate (LOPRESSOR) injection 5 mg, 5 mg, Intravenous, Q6H PRN, Sebastian Marie MD    nystatin (MYCOSTATIN) powder, , Topical, Q12H, Case, Azalia V., DO, Given at 07/15/23 2051    Potassium Replacement - Follow Nurse / BPA Driven Protocol, , Does not apply, PRN, Case, Azalia V., DO    sodium chloride 0.9 % flush 10 mL, 10 mL, Intravenous, PRN, Case, Azalia V., DO, 10 mL at 07/15/23 2058    Physical Exam: General elderly frail-appearing female not dyspneic tachypneic current heart rate 75        HEENT: No JVP       Respiratory: Clear anteriorly       Cardiovascular: Regular rate and rhythm and no edema palpation                 Neuro: Cannot assess due to patient sleeping       Skin: Warm and dry no edema palpation       Psych: Cannot assess due to patient sleeping    Results Review: Today's EKG is pending.  Output exceeds intake by 4.9 L.  Heart rate 67-89.  Blood pressures good.  Hemoglobin 8.1.    Radiology Results:  Imaging Results (Last 72 Hours)       Procedure Component Value Units Date/Time    XR Chest 1 View [[id number]] Collected: 07/14/23 0943     Updated: 07/14/23 0947    Narrative:      XR CHEST 1 VW    Date of Exam: 7/14/2023 9:20 AM EDT    Indication: pna, tachycardia    Comparison: 7/11/2023    Findings:  There are increasing bilateral, left greater than right groundglass opacities. Cardiac mediastinal contours are within normal limits. Vascular calcifications again identified. Regional skeleton is unremarkable.      Impression:      Impression:    1. Increasing bilateral airspace opacities concerning for multifocal pneumonia. Pulmonary edema less favored.      Electronically Signed: Guanaco Segovia    7/14/2023 9:44 AM EDT    Workstation ID: [id number]            EKG: Sinus  rhythm    ECHO: Preserved EF    Tele: No recurrent A-fib    Assessment   Assessment/Plan: A-fib RVR-the patient converted with IV amiodarone.  The  reports 2 minor episodes of tachycardia and her being anxious and diaphoretic.  I will continue IV amiodarone at 1 mg/min.  I will also start her on p.o. amiodarone.  Continue anticoagulation    Graeme Bass MD  07/16/23  08:07 EDT

## 2023-07-16 NOTE — PLAN OF CARE
Goal Outcome Evaluation:  Plan of Care Reviewed With: patient, spouse           Outcome Evaluation: OT eval complete. Pt presents w/ deficits in balance, sustained attention to task, and BUE tremors limiting functional independence from baseline. Pt would benefit from cont skilled IPOT POC 3x/wk to promote return to PLOF. Recommend pt return home w/ 24/7 assist and HH OT/PT services.      Anticipated Discharge Disposition (OT): home with home health, home with 24/7 care

## 2023-07-16 NOTE — PROGRESS NOTES
[facility] Medicine Services  PROGRESS NOTE    Patient Name: Laura Wallace  : 1940  MRN: [id number]    Date of Admission: 7/10/2023  Primary Care Physician: Justin Brumfield MD    Subjective   Subjective     CC:  pna    HPI:   - Patient is an 83-year-old who was admitted to the intensive care unit with acute respiratory failure with hypoxia thought secondary to combination of heart failure as well as community-acquired pneumonia.  There is also concern for suspected serotonin syndrome. Pt  and nursing report and episde again this am with shaking, tremors, fever, tachycardia and tachypnea. Discussed serotonin syndrome with  and he is agreeable to trial of cyproheptadine.     7/15 -patient received cyproheptadine yesterday times multiple doses without much improvement.  She continued to be febrile and tachycardic.  She went into A-fib with RVR.  Ultimately responded to Cardizem drip.  Still having spells of shaking.  Unclear etiology of persistent fevers so will consult ID.  Cardiology consulted for A-fib with RVR     -cardiology transitioned patient to amiodarone for control of her A-fib with improvement although she did have a couple of episodes through the night of shaking and tachycardia.  Tmax was 100.  Unsure if the fevers were related to serotonin syndrome but would avoid SSRIs in the future.  ID note reviewed and appreciate their recommendations, continued work-up.  She remains on doxycycline and Rocephin.   at bedside today requested palliative care consult.  He is considering options for transition to comfort care.    ROS:  Gen-no fevers, chills in the past 24 hours  CV- A-fib better controlled  Resp-positive cough, dyspnea  GI-no reports of nausea vomiting or diarrhea       Objective   Objective     Vital Signs:   Temp:  [97 °F (36.1 °C)-99.2 °F (37.3 °C)] 98.7 °F (37.1 °C)  Heart Rate:  [] 89  Resp:  [16-22] 18  BP:  (117-151)/(57-82) 125/62  Flow (L/min):  [2-3] 2     Physical Exam:  Constitutional: No acute distress, awake, not able to verbally respond to questions  HENT: NCAT, mucous membranes moist  Respiratory: Decreased breath sounds bilaterally, respiratory effort normal   Cardiovascular: Irregular but heart rate 78  Gastrointestinal: Positive bowel sounds, soft, nontender, nondistended  Musculoskeletal: No bilateral ankle edema  Psychiatric: eyes open, blinks but does not verbally respond  Neurologic: generally weak, minimally responsive  Skin: pale      Results Reviewed:  LAB RESULTS:      Lab 07/15/23  1044 07/15/23  0209 07/13/23  1004 07/11/23  0550 07/10/23  2055 07/10/23  1810   WBC 6.17  --  10.27 7.58  --  10.10   HEMOGLOBIN 8.1*  --  8.6* 8.3*  --  8.8*   HEMATOCRIT 26.2*  --  27.0* 27.0*  --  28.2*   PLATELETS 288  --  279 250  --  274   NEUTROS ABS 4.10  --  8.11*  --   --  7.83*   IMMATURE GRANS (ABS) 0.03  --  0.04  --   --  0.05   LYMPHS ABS 1.08  --  1.07  --   --  1.02   MONOS ABS 0.72  --  0.89  --   --  1.15*   EOS ABS 0.23  --  0.14  --   --  0.04   MCV 83.4  --  82.6 83.1  --  83.7   PROCALCITONIN  --  0.74*  --   --   --   --    LACTATE 0.7  --   --  0.8 0.7 2.2*         Lab 07/15/23  0209 07/13/23  0550 07/12/23  1432 07/12/23  0445 07/11/23  1742 07/11/23  0550 07/10/23  1810   SODIUM 143 138  --  138  --  141 139   POTASSIUM 4.2 4.5 4.6 3.3* 3.5 2.6* 3.3*   CHLORIDE 103 103  --  101  --  99 101   CO2 21.0* 23.0  --  25.0  --  28.0 23.0   ANION GAP 19.0* 12.0  --  12.0  --  14.0 15.0   BUN 21 16  --  14  --  11 13   CREATININE 1.33* 0.65  --  0.66  --  0.62 0.63   EGFR 40.3* 88.6  --  88.3  --  89.6 89.3   GLUCOSE 112* 106*  --  108*  --  100* 102*   CALCIUM 9.3 8.8  --  8.7  --  8.6 8.6   IONIZED CALCIUM  --   --   --   --   --  1.19  --    MAGNESIUM 1.7 1.6  --  1.8  --  1.6  --    PHOSPHORUS  --  3.4  --  3.4  --  2.9  --    TSH  --   --   --   --   --  3.890  --          Lab 07/15/23  0209  07/11/23  0550 07/10/23  1810   TOTAL PROTEIN 6.7 6.6 6.5   ALBUMIN 3.2* 3.2* 3.2*   GLOBULIN 3.5 3.4 3.3   ALT (SGPT) 15 16 15   AST (SGOT) 21 24 26   BILIRUBIN 0.2 0.8 0.6   ALK PHOS 114 122* 123*         Lab 07/10/23  2055 07/10/23  1810   PROBNP  --  6,389.0*   HSTROP T 57* 64*                 Lab 07/10/23  1847   PH, ARTERIAL 7.520*   PCO2, ARTERIAL 33.0*   PO2 ART 61.5*   FIO2 36   HCO3 ART 26.9*   BASE EXCESS ART 3.9*   CARBOXYHEMOGLOBIN 1.6     Brief Urine Lab Results  (Last result in the past 365 days)        Color   Clarity   Blood   Leuk Est   Nitrite   Protein   CREAT   Urine HCG        07/11/23 0059 Yellow   Clear   Small (1+)   Negative   Negative   Negative                   Microbiology Results Abnormal       Procedure Component Value - Date/Time    Legionella Antigen, Urine - Urine, Urine, Clean Catch [[id number]]  (Normal) Collected: 07/15/23 1116    Lab Status: Final result Specimen: Urine, Clean Catch Updated: 07/15/23 2051     LEGIONELLA ANTIGEN, URINE Negative    S. Pneumo Ag Urine or CSF - Urine, Urine, Clean Catch [[id number]]  (Normal) Collected: 07/15/23 1116    Lab Status: Final result Specimen: Urine, Clean Catch Updated: 07/15/23 2051     Strep Pneumo Ag Negative    Blood Culture - Blood, Arm, Left [[id number]]  (Normal) Collected: 07/10/23 1852    Lab Status: Final result Specimen: Blood from Arm, Left Updated: 07/15/23 2000     Blood Culture No growth at 5 days    Blood Culture - Blood, Arm, Left [[id number]]  (Normal) Collected: 07/10/23 1859    Lab Status: Final result Specimen: Blood from Arm, Left Updated: 07/15/23 2000     Blood Culture No growth at 5 days    MRSA Screen, PCR (Inpatient) - Swab, Nares [[id number]]  (Normal) Collected: 07/15/23 1117    Lab Status: Final result Specimen: Swab from Nares Updated: 07/15/23 1247     MRSA PCR Negative    Narrative:      The negative predictive value of this diagnostic test is high and should only be used to consider de-escalating anti-MRSA  therapy. A positive result may indicate colonization with MRSA and must be correlated clinically.  MRSA Negative    COVID PRE-OP / PRE-PROCEDURE SCREENING ORDER (NO ISOLATION) - Swab, Nasopharynx [[id number]]  (Normal) Collected: 07/10/23 1824    Lab Status: Final result Specimen: Swab from Nasopharynx Updated: 07/10/23 1936    Narrative:      The following orders were created for panel order COVID PRE-OP / PRE-PROCEDURE SCREENING ORDER (NO ISOLATION) - Swab, Nasopharynx.  Procedure                               Abnormality         Status                     ---------                               -----------         ------                     COVID-19 and FLU A/B PCR...[[id number]]  Normal              Final result                 Please view results for these tests on the individual orders.    COVID-19 and FLU A/B PCR - Swab, Nasopharynx [[id number]]  (Normal) Collected: 07/10/23 1824    Lab Status: Final result Specimen: Swab from Nasopharynx Updated: 07/10/23 1936     COVID19 Not Detected     Influenza A PCR Not Detected     Influenza B PCR Not Detected    Narrative:      Fact sheet for providers: https://www.fda.gov/media/154019/download    Fact sheet for patients: https://www.fda.gov/media/638563/download    Test performed by PCR.            EEG    Result Date: 7/14/2023  Reason for referral: 83 y.o.female with shaking spells, consideration of seizures Technical Summary:  A 19 channel digital EEG was performed using the international 10-20 placement system, including eye leads and EKG leads. Duration: 20 minutes Findings: The patient is awake.  The background shows diffuse low to medium amplitude 5-10 Hz intermixed theta and alpha activity which is present symmetrically over both hemispheres.  4 Hz generalized delta is intermixed at times.  At the beginning of the study rapid eye movements are noted with attendant artifact.  As a study proceeds, a slight mouth tremor is noted, without EEG correlate.  Later.   Right hand and left hand tremoring is noted, again without EEG correlate.  Sleep is not seen.  No focal features or epileptiform activity are present.  Photic stimulation does not change the background.  Hyperventilation is not performed. Video: Available Technical quality: Superior SUMMARY: Intermittent generalized slow No focal features or epileptiform activity are seen Episodes of eye movement and hand tremoring have no EEG correlate     Impression: Diffuse cerebral dysfunction of exceedingly mild degree, nonspecific No evidence for epilepsy is present This report is transcribed using the Dragon dictation system.      XR Chest 1 View    Result Date: 7/14/2023  XR CHEST 1 VW Date of Exam: 7/14/2023 9:20 AM EDT Indication: pna, tachycardia Comparison: 7/11/2023 Findings: There are increasing bilateral, left greater than right groundglass opacities. Cardiac mediastinal contours are within normal limits. Vascular calcifications again identified. Regional skeleton is unremarkable.     Impression: Impression: 1. Increasing bilateral airspace opacities concerning for multifocal pneumonia. Pulmonary edema less favored. Electronically Signed: Guanaco Segovia  7/14/2023 9:44 AM EDT  Workstation ID: [id number]     Results for orders placed during the hospital encounter of 05/07/23    Adult Transthoracic Echo Complete w/ Color, Spectral and Contrast if Necessary Per Protocol    Interpretation Summary    Left ventricular systolic function is normal. Left ventricular ejection fraction appears to be 61 - 65%.    Left ventricular diastolic function is consistent with (grade II w/high LAP) pseudonormalization.    Left atrial volume is severely increased.    The right atrial cavity is moderately  dilated.    There is calcification of the aortic valve.    Moderate tricuspid valve regurgitation is present.    Estimated right ventricular systolic pressure from tricuspid regurgitation is mildly elevated (35-45 mmHg). Calculated right  ventricular systolic pressure from tricuspid regurgitation is 41 mmHg.    Mild pulmonary hypertension is present.      Current medications:  Scheduled Meds:apixaban, 2.5 mg, Oral, Q12H  cefTRIAXone, 1 g, Intravenous, Q24H  donepezil, 5 mg, Oral, Nightly  doxycycline, 100 mg, Oral, Q12H  famotidine, 20 mg, Oral, Daily  furosemide, 40 mg, Oral, Daily  LORazepam, 0.25 mg, Oral, BID  nystatin, , Topical, Q12H      Continuous Infusions:amiodarone, 1 mg/min, Last Rate: 1 mg/min (07/16/23 0522)    PRN Meds:.  acetaminophen    Magnesium Standard Dose Replacement - Follow Nurse / BPA Driven Protocol    metoprolol tartrate    Potassium Replacement - Follow Nurse / BPA Driven Protocol    sodium chloride    Assessment & Plan   Assessment & Plan     Active Hospital Problems    Diagnosis  POA    **Acute respiratory failure with hypoxia [J96.01]  Yes    Moderate Malnutrition (HCC) [E44.0]  Yes    Diastolic dysfunction [I51.89]  Yes    Acute on chronic heart failure with preserved ejection fraction (HFpEF) [I50.33]  Yes    Chronic anticoagulation (Eliquis) [Z79.01]  Not Applicable    Severe malnutrition [E43]  Yes    Sepsis without acute organ dysfunction [A41.9]  Yes    Acute UTI (urinary tract infection) [N39.0]  Yes    Lactic acidosis [E87.20]  Yes    PAF (paroxysmal atrial fibrillation) [I48.0]  Yes    Wound with tunneling [T14.8XXA]  Yes    Pulmonary hypertension (WHO Group 2) [I27.20]  Yes    Late onset Alzheimer's disease without behavioral disturbance [G30.1, F02.80]  Yes      Resolved Hospital Problems   No resolved problems to display.        Brief Hospital Course to date:  Laura Wallace is a 83 y.o. female with past medical history of paroxysmal A-fib, systolic congestive heart failure, Alzheimer's disease, chronic tunneling back wound followed at , pulmonary hypertension who presented with acute respiratory failure with hypoxia thought secondary to exacerbation of systolic congestive heart failure as well as  community-acquired pneumonia.  She required high flow nasal cannula and BiPAP in the intensive care unit.  She was transferred to the floor on 7/13/2023.  Hospitalist service assumed care on 7/14/2023.  Secondary to her sinus tachycardia and fevers there was concern for suspected serotonin syndrome.  SSRI was discontinued on 7/13/2023 but she did receive a dose that morning.  She received a trial regimen of cyproheptadine with eventual improvement.  She developed A-fib with RVR on 7/14/2023.    Failure to thrive  End-of-life care  -Requested palliative care consult 7/16/2023  -Discussed with patient's  regarding CODE STATUS and interventions  -Counseled it appears she is dying and would at least qualify for outpatient hospice    Acute respiratory failure with hypoxia  Community-acquired pneumonia  Systolic congestive heart failure with exacerbation  -conitnue o2 to maintain sats >90  -repeat cxr 7/14/2023 shows increasing opacities concerning for multifocal pneumonia  -Currently on Rocephin and doxycycline  -Requested ID consult 7/15/2023-appreciate their recs  -Legionella, strep pneumo, MRSA screen pending  -Lactic acid 0.7  -Procalcitonin 0.74    Sepsis with fever tachycardia  -Multiple etiologies possible including multifocal pneumonia, chronic tunneling back wound  -ID consult 7/15/2023, appreciate their recs  - Clinically sepsis improving, afebrile, improved heart rate    Suspected serotonin syndrome  -Discontinued SSRI on 7/13/2023 with last dose 7/13/2023 AM  -Trial of cyproheptadine given symptoms , however most symptoms should resolve within 24 to 48 hours after discontinuation of SSRI  - Patient was treated with cyproheptadine on 7/14/2023, on that day she did continue to have fever and tachycardia but subsequently fever and tachycardia have resolved  -Unclear if fever and tachycardia are related to serotonin syndrome versus infection versus uncontrolled A-fib  -Discontinue SSRI, will list as an  allergy    Alzheimer's dementia    Chronic tunneling back wound  -Follows at   - continue gotti catheter for local wound care  -Possibly etiology of her sepsis, fever tachycardia    Paroxysmal A-fib  Chronic anticoagulation with Eliquis  -Started on diltiazem drip on 7/14/2023, transition to amiodarone per cardiology on 7/15/2023  -Cardiology consulted and following, seen by Dr. Bass    Generalized weakness  PT and OT consults if clinically improves      Expected Discharge Location and Transportation: Long-term care versus home, private vehicle  Expected Discharge   Expected Discharge Date: 7/18/2023; Expected Discharge Time:      DVT prophylaxis:  Medical and mechanical DVT prophylaxis orders are present.          CODE STATUS:   Code Status and Medical Interventions:   Ordered at: 07/10/23 2107     Medical Intervention Limits:    NO intubation (DNI)    NO cardioversion     Code Status (Patient has no pulse and is not breathing):    No CPR (Do Not Attempt to Resuscitate)     Medical Interventions (Patient has pulse or is breathing):    Limited Support     Comments:    Discussed with      Release to patient:    Routine Release       Malena Rowe MD  07/16/23

## 2023-07-17 PROCEDURE — 99232 SBSQ HOSP IP/OBS MODERATE 35: CPT

## 2023-07-17 PROCEDURE — 25010000002 CEFTRIAXONE PER 250 MG: Performed by: INTERNAL MEDICINE

## 2023-07-17 PROCEDURE — 94799 UNLISTED PULMONARY SVC/PX: CPT

## 2023-07-17 PROCEDURE — 93005 ELECTROCARDIOGRAM TRACING: CPT | Performed by: INTERNAL MEDICINE

## 2023-07-17 PROCEDURE — 25010000002 AMIODARONE IN DEXTROSE 5% 360-4.14 MG/200ML-% SOLUTION: Performed by: INTERNAL MEDICINE

## 2023-07-17 PROCEDURE — 99232 SBSQ HOSP IP/OBS MODERATE 35: CPT | Performed by: FAMILY MEDICINE

## 2023-07-17 RX ADMIN — Medication 10 ML: at 09:57

## 2023-07-17 RX ADMIN — NYSTATIN: 100000 POWDER TOPICAL at 09:57

## 2023-07-17 RX ADMIN — DONEPEZIL HYDROCHLORIDE 5 MG: 5 TABLET, FILM COATED ORAL at 21:08

## 2023-07-17 RX ADMIN — APIXABAN 2.5 MG: 2.5 TABLET, FILM COATED ORAL at 09:56

## 2023-07-17 RX ADMIN — AMIODARONE HYDROCHLORIDE 200 MG: 200 TABLET ORAL at 21:14

## 2023-07-17 RX ADMIN — DOXYCYCLINE 100 MG: 100 CAPSULE ORAL at 21:08

## 2023-07-17 RX ADMIN — AMIODARONE HYDROCHLORIDE 1 MG/MIN: 1.8 INJECTION, SOLUTION INTRAVENOUS at 00:33

## 2023-07-17 RX ADMIN — LORAZEPAM 0.25 MG: 0.5 TABLET ORAL at 09:56

## 2023-07-17 RX ADMIN — DOXYCYCLINE 100 MG: 100 CAPSULE ORAL at 09:56

## 2023-07-17 RX ADMIN — LORAZEPAM 0.25 MG: 0.5 TABLET ORAL at 21:06

## 2023-07-17 RX ADMIN — Medication 12.5 MG: at 21:09

## 2023-07-17 RX ADMIN — AMIODARONE HYDROCHLORIDE 200 MG: 200 TABLET ORAL at 16:56

## 2023-07-17 RX ADMIN — AMIODARONE HYDROCHLORIDE 200 MG: 200 TABLET ORAL at 09:56

## 2023-07-17 RX ADMIN — FAMOTIDINE 20 MG: 20 TABLET ORAL at 09:56

## 2023-07-17 RX ADMIN — SODIUM CHLORIDE 1 G: 900 INJECTION INTRAVENOUS at 17:53

## 2023-07-17 RX ADMIN — FUROSEMIDE 40 MG: 40 TABLET ORAL at 09:56

## 2023-07-17 RX ADMIN — APIXABAN 2.5 MG: 2.5 TABLET, FILM COATED ORAL at 21:08

## 2023-07-17 RX ADMIN — NYSTATIN: 100000 POWDER TOPICAL at 21:06

## 2023-07-17 RX ADMIN — Medication 12.5 MG: at 09:56

## 2023-07-17 RX ADMIN — Medication 10 ML: at 21:09

## 2023-07-17 RX ADMIN — AMIODARONE HYDROCHLORIDE 1 MG/MIN: 1.8 INJECTION, SOLUTION INTRAVENOUS at 06:11

## 2023-07-17 NOTE — PROGRESS NOTES
"  [facility]  PROGRESS NOTE    Date of Admission: 7/10/2023  Date of Service: 07/17/23    Primary Care Physician: Justin Brumfield MD    Chief Complaint: follow up atrial fibrillation, elevated troponin    Problem List:   Acute respiratory failure with hypoxia    Late onset Alzheimer's disease without behavioral disturbance    Pulmonary hypertension (WHO Group 2)    Lactic acidosis    Wound with tunneling    PAF (paroxysmal atrial fibrillation)    Acute UTI (urinary tract infection)    Sepsis without acute organ dysfunction    Severe malnutrition    Diastolic dysfunction    Acute on chronic heart failure with preserved ejection fraction (HFpEF)    Chronic anticoagulation (Eliquis)    Moderate Malnutrition (HCC)      Subjective      No acute events overnight. Did have a 10 second run of afib RVR while I was in the room but quickly converted back to sinus bradycardia. Remains on amiodarone drip. She did not answer when asked questions.  at bedside and provided information and feedback.     Objective   Vitals: /63 (BP Location: Left arm, Patient Position: Lying)   Pulse 67   Temp 97.5 °F (36.4 °C) (Axillary)   Resp 16   Ht 157.5 cm (62\")   Wt 46.9 kg (103 lb 6.3 oz)   SpO2 98%   BMI 18.91 kg/m²     Physical Exam:  GENERAL: ill appearing, in no acute distress.   HEART: Regular rhythm, normal rate, and no murmurs, gallops, or rubs.   LUNGS: Clear to auscultation bilaterally. No wheezing, rales or rhonchi.   EXTREMITIES: No clubbing, cyanosis, or edema noted.     Results:  Results from last 7 days   Lab Units 07/15/23  1044 07/13/23  1004 07/11/23  0550   WBC 10*3/mm3 6.17 10.27 7.58   HEMOGLOBIN g/dL 8.1* 8.6* 8.3*   HEMATOCRIT % 26.2* 27.0* 27.0*   PLATELETS 10*3/mm3 288 279 250     Results from last 7 days   Lab Units 07/15/23  0209 07/13/23  0550 07/12/23  1432 07/12/23  0445   SODIUM mmol/L 143 138  --  138   POTASSIUM mmol/L 4.2 4.5 4.6 3.3*   CHLORIDE " mmol/L 103 103  --  101   CO2 mmol/L 21.0* 23.0  --  25.0   BUN mg/dL 21 16  --  14   CREATININE mg/dL 1.33* 0.65  --  0.66   GLUCOSE mg/dL 112* 106*  --  108*      Lab Results   Component Value Date    AST 21 07/15/2023    ALT 15 07/15/2023             Results from last 7 days   Lab Units 07/11/23  0550   TSH uIU/mL 3.890             Results from last 7 days   Lab Units 07/13/23  0550 07/10/23  2055 07/10/23  1810   CK TOTAL U/L 99  --   --    HSTROP T ng/L  --  57* 64*     Results from last 7 days   Lab Units 07/10/23  1810   PROBNP pg/mL 6,389.0*         Intake/Output Summary (Last 24 hours) at 7/17/2023 0905  Last data filed at 7/17/2023 0415  Gross per 24 hour   Intake 100 ml   Output 600 ml   Net -500 ml       I personally reviewed the patient's EKG/Telemetry data    Radiology Data:   Results for orders placed during the hospital encounter of 05/07/23    Adult Transthoracic Echo Complete w/ Color, Spectral and Contrast if Necessary Per Protocol    Interpretation Summary    Left ventricular systolic function is normal. Left ventricular ejection fraction appears to be 61 - 65%.    Left ventricular diastolic function is consistent with (grade II w/high LAP) pseudonormalization.    Left atrial volume is severely increased.    The right atrial cavity is moderately  dilated.    There is calcification of the aortic valve.    Moderate tricuspid valve regurgitation is present.    Estimated right ventricular systolic pressure from tricuspid regurgitation is mildly elevated (35-45 mmHg). Calculated right ventricular systolic pressure from tricuspid regurgitation is 41 mmHg.    Mild pulmonary hypertension is present.      Current Medications:  amiodarone, 200 mg, Oral, TID  apixaban, 2.5 mg, Oral, Q12H  cefTRIAXone, 1 g, Intravenous, Q24H  donepezil, 5 mg, Oral, Nightly  doxycycline, 100 mg, Oral, Q12H  famotidine, 20 mg, Oral, Daily  furosemide, 40 mg, Oral, Daily  LORazepam, 0.25 mg, Oral, BID  metoprolol tartrate, 12.5  mg, Oral, Q12H  nystatin, , Topical, Q12H      amiodarone, 1 mg/min, Last Rate: 1 mg/min (07/17/23 0611)        Assessment:  Paroxysmal atrial fibrillation, converted to sinus with IV amiodarone, has been anticoagulated with Eliquis  Respiratory failure with hypoxia   Sepsis, ID consulted  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   Continue metoprolol 12.5mg BID for HR control.  Continue Eliquis 2.5mg BID for stroke prophylaxis.  Await palliate care meeting. Further recommendations to follow.     Ryann Valerio PA-C

## 2023-07-17 NOTE — CASE MANAGEMENT/SOCIAL WORK
Continued Stay Note  [facility]     Patient Name: Laura Wallace  MRN: [id number]  Today's Date: 7/17/2023    Admit Date: 7/10/2023    Plan: ongoing   Discharge Plan       Row Name 07/17/23 1637       Plan    Plan ongoing    Patient/Family in Agreement with Plan yes    Plan Comments Discussed in MDR's. Patient having Palliative care meeting. Plan is ongoing. CM will follow.    Final Discharge Disposition Code 30 - still a patient                   Discharge Codes    No documentation.                 Expected Discharge Date and Time       Expected Discharge Date Expected Discharge Time    Jul 18, 2023               Myah Elkins RN

## 2023-07-17 NOTE — PROGRESS NOTES
[facility] Medicine Services  PROGRESS NOTE    Patient Name: Laura Wallace  : 1940  MRN: [id number]    Date of Admission: 7/10/2023  Primary Care Physician: Justin Brumfield MD    Subjective   Subjective     CC:  pna    HPI:   - Patient is an 83-year-old who was admitted to the intensive care unit with acute respiratory failure with hypoxia thought secondary to combination of heart failure as well as community-acquired pneumonia.  There is also concern for suspected serotonin syndrome. Pt  and nursing report and episde again this am with shaking, tremors, fever, tachycardia and tachypnea. Discussed serotonin syndrome with  and he is agreeable to trial of cyproheptadine.     7/15 -patient received cyproheptadine yesterday times multiple doses without much improvement.  She continued to be febrile and tachycardic.  She went into A-fib with RVR.  Ultimately responded to Cardizem drip.  Still having spells of shaking.  Unclear etiology of persistent fevers so will consult ID.  Cardiology consulted for A-fib with RVR     -cardiology transitioned patient to amiodarone for control of her A-fib with improvement although she did have a couple of episodes through the night of shaking and tachycardia.  Tmax was 100.  Unsure if the fevers were related to serotonin syndrome but would avoid SSRIs in the future.  ID note reviewed and appreciate their recommendations, continued work-up.  She remains on doxycycline and Rocephin.   at bedside today requested palliative care consult.  He is considering options for transition to comfort care.     - afebrile now more than 24 hours. Did not change abx so serotonin syndrome may have contributed. Palliative care consult requested and they will see her today. Still on amiodarone drip.  Patient was able to answer questions with one-word answers today.  When asked open ended questions she did not respond.      ROS:  Gen-no fevers, chills in the past 24 hours  CV- A-fib better controlled  Resp-positive cough, dyspnea  GI-no reports of nausea vomiting or diarrhea       Objective   Objective     Vital Signs:   Temp:  [98.1 °F (36.7 °C)-99.6 °F (37.6 °C)] 98.6 °F (37 °C)  Heart Rate:  [] 68  Resp:  [16-18] 16  BP: (108-160)/(50-67) 132/63  Flow (L/min):  [2] 2     Physical Exam:  Constitutional: No acute distress, awake, responds with 1-2 words  HENT: NCAT, mucous membranes moist; nystagmus noted  Respiratory: Decreased breath sounds bilaterally, respiratory effort normal   Cardiovascular: Irregular   Gastrointestinal: Positive bowel sounds, soft, nontender, nondistended  Musculoskeletal: No bilateral ankle edema; decorticate posturing noted  Psychiatric: eyes open, flat  Neurologic: generally weak, decorticate posturing noted  Skin: pale      Results Reviewed:  LAB RESULTS:      Lab 07/15/23  1044 07/15/23  0209 07/13/23  1004 07/11/23  0550 07/10/23  2055 07/10/23  1810   WBC 6.17  --  10.27 7.58  --  10.10   HEMOGLOBIN 8.1*  --  8.6* 8.3*  --  8.8*   HEMATOCRIT 26.2*  --  27.0* 27.0*  --  28.2*   PLATELETS 288  --  279 250  --  274   NEUTROS ABS 4.10  --  8.11*  --   --  7.83*   IMMATURE GRANS (ABS) 0.03  --  0.04  --   --  0.05   LYMPHS ABS 1.08  --  1.07  --   --  1.02   MONOS ABS 0.72  --  0.89  --   --  1.15*   EOS ABS 0.23  --  0.14  --   --  0.04   MCV 83.4  --  82.6 83.1  --  83.7   PROCALCITONIN  --  0.74*  --   --   --   --    LACTATE 0.7  --   --  0.8 0.7 2.2*           Lab 07/15/23  0209 07/13/23  0550 07/12/23  1432 07/12/23  0445 07/11/23  1742 07/11/23  0550 07/10/23  1810   SODIUM 143 138  --  138  --  141 139   POTASSIUM 4.2 4.5 4.6 3.3* 3.5 2.6* 3.3*   CHLORIDE 103 103  --  101  --  99 101   CO2 21.0* 23.0  --  25.0  --  28.0 23.0   ANION GAP 19.0* 12.0  --  12.0  --  14.0 15.0   BUN 21 16  --  14  --  11 13   CREATININE 1.33* 0.65  --  0.66  --  0.62 0.63   EGFR 40.3* 88.6  --  88.3  --  89.6  89.3   GLUCOSE 112* 106*  --  108*  --  100* 102*   CALCIUM 9.3 8.8  --  8.7  --  8.6 8.6   IONIZED CALCIUM  --   --   --   --   --  1.19  --    MAGNESIUM 1.7 1.6  --  1.8  --  1.6  --    PHOSPHORUS  --  3.4  --  3.4  --  2.9  --    TSH  --   --   --   --   --  3.890  --            Lab 07/15/23  0209 07/11/23  0550 07/10/23  1810   TOTAL PROTEIN 6.7 6.6 6.5   ALBUMIN 3.2* 3.2* 3.2*   GLOBULIN 3.5 3.4 3.3   ALT (SGPT) 15 16 15   AST (SGOT) 21 24 26   BILIRUBIN 0.2 0.8 0.6   ALK PHOS 114 122* 123*           Lab 07/10/23  2055 07/10/23  1810   PROBNP  --  6,389.0*   HSTROP T 57* 64*               Lab 07/16/23  0957   IRON 11*   IRON SATURATION (TSAT) 3*   TIBC 325   TRANSFERRIN 218         Lab 07/10/23  1847   PH, ARTERIAL 7.520*   PCO2, ARTERIAL 33.0*   PO2 ART 61.5*   FIO2 36   HCO3 ART 26.9*   BASE EXCESS ART 3.9*   CARBOXYHEMOGLOBIN 1.6       Brief Urine Lab Results  (Last result in the past 365 days)        Color   Clarity   Blood   Leuk Est   Nitrite   Protein   CREAT   Urine HCG        07/11/23 0059 Yellow   Clear   Small (1+)   Negative   Negative   Negative                   Microbiology Results Abnormal       Procedure Component Value - Date/Time    Legionella Antigen, Urine - Urine, Urine, Clean Catch [[id number]]  (Normal) Collected: 07/15/23 1116    Lab Status: Final result Specimen: Urine, Clean Catch Updated: 07/15/23 2051     LEGIONELLA ANTIGEN, URINE Negative    S. Pneumo Ag Urine or CSF - Urine, Urine, Clean Catch [[id number]]  (Normal) Collected: 07/15/23 1116    Lab Status: Final result Specimen: Urine, Clean Catch Updated: 07/15/23 2051     Strep Pneumo Ag Negative    Blood Culture - Blood, Arm, Left [[id number]]  (Normal) Collected: 07/10/23 1852    Lab Status: Final result Specimen: Blood from Arm, Left Updated: 07/15/23 2000     Blood Culture No growth at 5 days    Blood Culture - Blood, Arm, Left [[id number]]  (Normal) Collected: 07/10/23 1859    Lab Status: Final result Specimen: Blood from Arm,  Left Updated: 07/15/23 2000     Blood Culture No growth at 5 days    MRSA Screen, PCR (Inpatient) - Swab, Nares [[id number]]  (Normal) Collected: 07/15/23 1117    Lab Status: Final result Specimen: Swab from Nares Updated: 07/15/23 1247     MRSA PCR Negative    Narrative:      The negative predictive value of this diagnostic test is high and should only be used to consider de-escalating anti-MRSA therapy. A positive result may indicate colonization with MRSA and must be correlated clinically.  MRSA Negative    COVID PRE-OP / PRE-PROCEDURE SCREENING ORDER (NO ISOLATION) - Swab, Nasopharynx [[id number]]  (Normal) Collected: 07/10/23 1824    Lab Status: Final result Specimen: Swab from Nasopharynx Updated: 07/10/23 1936    Narrative:      The following orders were created for panel order COVID PRE-OP / PRE-PROCEDURE SCREENING ORDER (NO ISOLATION) - Swab, Nasopharynx.  Procedure                               Abnormality         Status                     ---------                               -----------         ------                     COVID-19 and FLU A/B PCR...[[id number]]  Normal              Final result                 Please view results for these tests on the individual orders.    COVID-19 and FLU A/B PCR - Swab, Nasopharynx [[id number]]  (Normal) Collected: 07/10/23 1824    Lab Status: Final result Specimen: Swab from Nasopharynx Updated: 07/10/23 1936     COVID19 Not Detected     Influenza A PCR Not Detected     Influenza B PCR Not Detected    Narrative:      Fact sheet for providers: https://www.fda.gov/media/410142/download    Fact sheet for patients: https://www.fda.gov/media/549703/download    Test performed by PCR.            No radiology results from the last 24 hrs    Results for orders placed during the hospital encounter of 05/07/23    Adult Transthoracic Echo Complete w/ Color, Spectral and Contrast if Necessary Per Protocol    Interpretation Summary    Left ventricular systolic function is normal.  Left ventricular ejection fraction appears to be 61 - 65%.    Left ventricular diastolic function is consistent with (grade II w/high LAP) pseudonormalization.    Left atrial volume is severely increased.    The right atrial cavity is moderately  dilated.    There is calcification of the aortic valve.    Moderate tricuspid valve regurgitation is present.    Estimated right ventricular systolic pressure from tricuspid regurgitation is mildly elevated (35-45 mmHg). Calculated right ventricular systolic pressure from tricuspid regurgitation is 41 mmHg.    Mild pulmonary hypertension is present.      Current medications:  Scheduled Meds:amiodarone, 200 mg, Oral, TID  apixaban, 2.5 mg, Oral, Q12H  cefTRIAXone, 1 g, Intravenous, Q24H  donepezil, 5 mg, Oral, Nightly  doxycycline, 100 mg, Oral, Q12H  famotidine, 20 mg, Oral, Daily  furosemide, 40 mg, Oral, Daily  LORazepam, 0.25 mg, Oral, BID  metoprolol tartrate, 12.5 mg, Oral, Q12H  nystatin, , Topical, Q12H      Continuous Infusions:amiodarone, 1 mg/min, Last Rate: 1 mg/min (07/17/23 0611)    PRN Meds:.  acetaminophen    Magnesium Standard Dose Replacement - Follow Nurse / BPA Driven Protocol    metoprolol tartrate    Potassium Replacement - Follow Nurse / BPA Driven Protocol    sodium chloride    Assessment & Plan   Assessment & Plan     Active Hospital Problems    Diagnosis  POA    **Acute respiratory failure with hypoxia [J96.01]  Yes    Moderate Malnutrition (HCC) [E44.0]  Yes    Diastolic dysfunction [I51.89]  Yes    Acute on chronic heart failure with preserved ejection fraction (HFpEF) [I50.33]  Yes    Chronic anticoagulation (Eliquis) [Z79.01]  Not Applicable    Severe malnutrition [E43]  Yes    Sepsis without acute organ dysfunction [A41.9]  Yes    Acute UTI (urinary tract infection) [N39.0]  Yes    Lactic acidosis [E87.20]  Yes    PAF (paroxysmal atrial fibrillation) [I48.0]  Yes    Wound with tunneling [T14.8XXA]  Yes    Pulmonary hypertension (WHO Group 2)  [I27.20]  Yes    Late onset Alzheimer's disease without behavioral disturbance [G30.1, F02.80]  Yes      Resolved Hospital Problems   No resolved problems to display.        Brief Hospital Course to date:  Laura Wallace is a 83 y.o. female with past medical history of paroxysmal A-fib, systolic congestive heart failure, Alzheimer's disease, chronic tunneling back wound followed at , pulmonary hypertension who presented with acute respiratory failure with hypoxia thought secondary to exacerbation of systolic congestive heart failure as well as community-acquired pneumonia.  She required high flow nasal cannula and BiPAP in the intensive care unit.  She was transferred to the floor on 7/13/2023.  Hospitalist service assumed care on 7/14/2023.  Secondary to her sinus tachycardia and fevers there was concern for suspected serotonin syndrome.  SSRI was discontinued on 7/13/2023 but she did receive a dose that morning.  She received a trial regimen of cyproheptadine with subsequent improvement and is now afebrile.  She developed A-fib with RVR on 7/14/2023.    Failure to thrive  End-of-life care  -Requested palliative care consult 7/16/2023  -Discussed with patient's  regarding CODE STATUS and interventions  -Counseled it appears she is dying and would at least qualify for outpatient hospice    Acute respiratory failure with hypoxia  Community-acquired pneumonia  Systolic congestive heart failure with exacerbation  -conitnue o2 to maintain sats >90  -repeat cxr 7/14/2023 shows increasing opacities concerning for multifocal pneumonia  -Currently on Rocephin and doxycycline  -Requested ID consult 7/15/2023-appreciate their recs  -Legionella neg, strep pneumo neg, MRSA screen negative  -Lactic acid 0.7  -Procalcitonin 0.74    Sepsis with fever tachycardia, improved  -Multiple etiologies possible including multifocal pneumonia, chronic tunneling back wound  -ID consult 7/15/2023, appreciate their recs  -  Clinically sepsis improving, afebrile, improved heart rate    Suspected serotonin syndrome  -Discontinued SSRI on 7/13/2023 with last dose 7/13/2023 AM  -Trial of cyproheptadine given symptoms , however most symptoms should resolve within 24 to 48 hours after discontinuation of SSRI  - Patient was treated with cyproheptadine on 7/14/2023, on that day she did continue to have fever and tachycardia but subsequently fever and tachycardia have resolved  -Unclear if fever and tachycardia are related to serotonin syndrome versus infection versus uncontrolled A-fib  -Discontinue SSRI, will list as an allergy    Alzheimer's dementia    Chronic tunneling back wound  -Follows at   - continue gotti catheter for local wound care  -Possibly etiology of her sepsis, fever tachycardia    Paroxysmal A-fib  Chronic anticoagulation with Eliquis  -Started on diltiazem drip on 7/14/2023, transitioned to amiodarone per cardiology on 7/15/2023  -Cardiology consulted and following, seen by Dr. Bass    Generalized weakness  PT and OT consults if clinically improves      Expected Discharge Location and Transportation: Long-term care versus home vs home with hospice, medical transport as she will need a stretcher  Expected Discharge   Expected Discharge Date: 7/18/2023; Expected Discharge Time:      DVT prophylaxis:  Medical and mechanical DVT prophylaxis orders are present.     AM-PAC 6 Clicks Score (PT): 6 (07/16/23 3034)    CODE STATUS:   Code Status and Medical Interventions:   Ordered at: 07/10/23 2108     Medical Intervention Limits:    NO intubation (DNI)    NO cardioversion     Code Status (Patient has no pulse and is not breathing):    No CPR (Do Not Attempt to Resuscitate)     Medical Interventions (Patient has pulse or is breathing):    Limited Support     Comments:    Discussed with      Release to patient:    Routine Release       Malena Rowe MD  07/17/23

## 2023-07-17 NOTE — PLAN OF CARE
Goal Outcome Evaluation:  Plan of Care Reviewed With: patient  Progress: declining  Outcome Evaluation: Patient is alert to self only, one word replies mostly. NSR to sinus hussein throughtout the night. Amio gtt currently infusing. Skin care provided per orders. No complaints of pain. Continue plan of care.

## 2023-07-17 NOTE — PLAN OF CARE
Goal Outcome Evaluation:  Plan of Care Reviewed With: other (see comments) (pt unable to participate in POC review, no family present at time of encounter)        Progress: no change  Outcome Evaluation: Palliative consult for GOC/ACP and per family request. No ACP doc on file, POA only, no healthcare surrogate designation; spouse is NOK. Pt sleeping at time of initial Palliative RN encounter, no observable signs of discomfort, no family present. Called pt's spouse, stated that he was in the cafeteria, just about to sit down for breakfast; advised Palliative physician will see pt today, speak with him at that time; verbalized agreement. Dr. Bautista to see this afternoon.    1300 Palliative IDT meeting:  MD, RN, SW,   After hours, weekends and holidays, contact Palliative Provider by calling [phone]     Problem: Palliative Care  Goal: Enhanced Quality of Life  Outcome: Ongoing, Progressing  Intervention: Promote Advance Care Planning  Flowsheets (Taken 7/17/2023 1616)  Life Transition/Adjustment:   palliative care discussed   palliative care initiated  Intervention: Maximize Comfort  Flowsheets (Taken 7/17/2023 1618)  Pain Management Interventions: (no observable signs of discomfort at time of Palliative RN encounter) other (see comments)  Intervention: Optimize Function  Flowsheets (Taken 7/17/2023 1618)  Sensory Stimulation Regulation: quiet environment promoted  Sleep/Rest Enhancement: awakenings minimized  Intervention: Optimize Psychosocial Wellbeing  Flowsheets (Taken 7/17/2023 1618)  Spiritual Activities Assistance: (Spiritual Care consult) other (see comments)  Family/Support System Care: (Palliative information, meal discount card provided) support provided

## 2023-07-18 PROCEDURE — 99232 SBSQ HOSP IP/OBS MODERATE 35: CPT

## 2023-07-18 PROCEDURE — 99232 SBSQ HOSP IP/OBS MODERATE 35: CPT | Performed by: INTERNAL MEDICINE

## 2023-07-18 RX ADMIN — AMIODARONE HYDROCHLORIDE 200 MG: 200 TABLET ORAL at 21:25

## 2023-07-18 RX ADMIN — AMIODARONE HYDROCHLORIDE 200 MG: 200 TABLET ORAL at 09:08

## 2023-07-18 RX ADMIN — NYSTATIN: 100000 POWDER TOPICAL at 21:30

## 2023-07-18 RX ADMIN — APIXABAN 2.5 MG: 2.5 TABLET, FILM COATED ORAL at 21:25

## 2023-07-18 RX ADMIN — NYSTATIN: 100000 POWDER TOPICAL at 09:09

## 2023-07-18 RX ADMIN — APIXABAN 2.5 MG: 2.5 TABLET, FILM COATED ORAL at 09:08

## 2023-07-18 RX ADMIN — AMIODARONE HYDROCHLORIDE 200 MG: 200 TABLET ORAL at 17:50

## 2023-07-18 RX ADMIN — FUROSEMIDE 40 MG: 40 TABLET ORAL at 09:09

## 2023-07-18 RX ADMIN — FAMOTIDINE 20 MG: 20 TABLET ORAL at 09:09

## 2023-07-18 RX ADMIN — LORAZEPAM 0.25 MG: 0.5 TABLET ORAL at 09:08

## 2023-07-18 RX ADMIN — LORAZEPAM 0.25 MG: 0.5 TABLET ORAL at 21:24

## 2023-07-18 RX ADMIN — DONEPEZIL HYDROCHLORIDE 5 MG: 5 TABLET, FILM COATED ORAL at 21:25

## 2023-07-18 RX ADMIN — Medication 12.5 MG: at 09:09

## 2023-07-18 NOTE — CONSULTS
Justin Brumfield MD - PCP  Consulting physician: Dr. Malena Rowe    Chief Complaint   Patient presents with    Shortness of Breath       Reason for consult: GOC/ACP, per 's request    HPI:   82yo F with Alzheimer dementia, PAF, CHF, pulmonary HTN, and chronic tunneling sacral decubitus wound.  Lives with  and has paid caregivers.  Dependent for all ADLs.  Bedbound with BLE contractures.  Incontinent.  Alert to self only.  Speaks, will reply with one word answers when addressed.      Hospitalized at [facility] 5/7-5/12/23 due to wound and E coli UTI.  Back 7/10 with acute hypoxic respiratory failure from PNA and CHF exacerbation. Received ICU care with use of HFNC and BiPAP.    Fluoxetine dc'ed and treated with Cyproheptadine due to concern for serotonin syndrome.  ID involved for abx management, and cards following due to CHF and AF with RVR.      Patient comfy at time of Palliative RN visit earlier today.   had stepped out to the cafeteria for a meal.     Meds reviewed.  Riperdone given x1 on 7/13.  Toradol given x1 on 7/15.    Pain assesment: none    Dyspnea: none    N/V: none    PPS: 30%      Past Medical History:   Diagnosis Date    Anxiety 9/23/2022    Late onset Alzheimer's disease without behavioral disturbance 5/25/2018    Paroxysmal atrial fibrillation with rapid ventricular response 4/23/2022    Pulmonary hypertension 9/20/2022     Past Surgical History:   Procedure Laterality Date    APPENDECTOMY  1960    VERTEBROPLASTY      L123, fusion     Current Code Status       Date Active Code Status Order ID Comments User Context       7/10/2023 2107 No CPR (Do Not Attempt to Resuscitate) [id number]  Horace Murray MD ED        Question Answer    Code Status (Patient has no pulse and is not breathing) No CPR (Do Not Attempt to Resuscitate)    Medical Interventions (Patient has pulse or is breathing) Limited Support    Medical Intervention Limits: NO intubation (DNI)     NO cardioversion  "   Comments Discussed with     Release to patient Routine Release                  Scheduled Meds:amiodarone, 200 mg, Oral, TID  apixaban, 2.5 mg, Oral, Q12H  donepezil, 5 mg, Oral, Nightly  doxycycline, 100 mg, Oral, Q12H  famotidine, 20 mg, Oral, Daily  furosemide, 40 mg, Oral, Daily  LORazepam, 0.25 mg, Oral, BID  metoprolol tartrate, 12.5 mg, Oral, Q12H  nystatin, , Topical, Q12H      PRN Meds:.  acetaminophen    Magnesium Standard Dose Replacement - Follow Nurse / BPA Driven Protocol    Potassium Replacement - Follow Nurse / BPA Driven Protocol    sodium chloride      Allergies   Allergen Reactions    Codeine Nausea And Vomiting    Serotonin Reuptake Inhibitors (Ssris) Other (See Comments)     Serotonin syndrome     Family History   Problem Relation Age of Onset    Heart attack Father     Colon cancer Brother      Social History     Socioeconomic History    Marital status:    Tobacco Use    Smoking status: Former     Packs/day: 1.00     Years: 30.00     Pack years: 30.00     Types: Cigarettes     Start date: 1960     Quit date: 1993     Years since quittin.5    Smokeless tobacco: Never   Vaping Use    Vaping Use: Never used   Substance and Sexual Activity    Alcohol use: Not Currently    Drug use: No    Sexual activity: Defer     Review of Systems - per HPI and PMH      /65 (BP Location: Left arm, Patient Position: Lying)   Pulse 71   Temp 99.6 °F (37.6 °C) (Oral)   Resp 18   Ht 157.5 cm (62\")   Wt 46.9 kg (103 lb 6.3 oz)   SpO2 94%   BMI 18.91 kg/m²     Intake/Output Summary (Last 24 hours) at 2023  Last data filed at 2023  Gross per 24 hour   Intake 354 ml   Output 950 ml   Net -596 ml     Physical Exam:  Frail, elderly F, sitting up in bed  Appears chronically ill  NAD, quite pleasant  Taking bites of food offered by   Head NC/AT  Trachea midline  Respirations even and unlabored   Heart RRR  Very pale  Skin fragile, arm wrapped  Contreras " anchored  Awake, alert, swallowing bites of food w/o difficulty as  feeds her  Says very few words but smiling      Results from last 7 days   Lab Units 07/15/23  1044   WBC 10*3/mm3 6.17   HEMOGLOBIN g/dL 8.1*   HEMATOCRIT % 26.2*   PLATELETS 10*3/mm3 288     Results from last 7 days   Lab Units 07/15/23  0209   SODIUM mmol/L 143   POTASSIUM mmol/L 4.2   CHLORIDE mmol/L 103   CO2 mmol/L 21.0*   BUN mg/dL 21   CREATININE mg/dL 1.33*   CALCIUM mg/dL 9.3   BILIRUBIN mg/dL 0.2   ALK PHOS U/L 114   ALT (SGPT) U/L 15   AST (SGOT) U/L 21   GLUCOSE mg/dL 112*     Results from last 7 days   Lab Units 07/15/23  0209   SODIUM mmol/L 143   POTASSIUM mmol/L 4.2   CHLORIDE mmol/L 103   CO2 mmol/L 21.0*   BUN mg/dL 21   CREATININE mg/dL 1.33*   GLUCOSE mg/dL 112*   CALCIUM mg/dL 9.3         Impression:   84yo F with advanced dementia and chronic tunneling sacral decubitus wound.  Acutely hospitalized with respiratory failure from PNA and CHF exacerbation., as well as suspected serotonin syndrome.     Symptoms:  Debilty  Encephalopathy  Skin failure    Plan:   -Visited patient with  Horace present at bedside feeding patient dinner.(Patient eating very well.)  -Introduced myself as Palliative Care physician and confirmed Mr. Wallace's request for Palliative Care consultation.  While he agreed, he requested I return in the morning to speak further.  -Palliative Team will follow for GOC and ACP needs.  Will revisit tomorrow.       Time: Time approx. 30 min. - includes chart review, F2F visit time, care coordination with Palliative Team and [facility], as well as documentation       Angela Bautista MD  07/17/23

## 2023-07-18 NOTE — PROGRESS NOTES
"Palliative Care Progress Note    Date of Admission: 7/10/2023    Subjective:    Patient had a good night.  Cardiac status stable, cardiology has signed off.  Appears ready for discharge.    Patient seen while  home getting showered and changed.    Pleasant, talkative.  Denies needs.    Meds reviewed.   +IV, +gotti.  BM today.  Sats normal on room air.    Current Code Status       Date Active Code Status Order ID Comments User Context       7/10/2023 2107 No CPR (Do Not Attempt to Resuscitate) [id number]  Horace Murray MD ED        Question Answer    Code Status (Patient has no pulse and is not breathing) No CPR (Do Not Attempt to Resuscitate)    Medical Interventions (Patient has pulse or is breathing) Limited Support    Medical Intervention Limits: NO intubation (DNI)     NO cardioversion    Comments Discussed with     Release to patient Routine Release                  Scheduled Meds:amiodarone, 200 mg, Oral, TID  apixaban, 2.5 mg, Oral, Q12H  donepezil, 5 mg, Oral, Nightly  famotidine, 20 mg, Oral, Daily  furosemide, 40 mg, Oral, Daily  LORazepam, 0.25 mg, Oral, BID  metoprolol tartrate, 12.5 mg, Oral, Q12H  nystatin, , Topical, Q12H        PRN Meds:.  acetaminophen    Magnesium Low Dose Replacement - Follow Nurse / BPA Driven Protocol    Potassium Replacement - Follow Nurse / BPA Driven Protocol    sodium chloride      Objective: /50 (BP Location: Left arm, Patient Position: Lying)   Pulse 51   Temp 97.9 °F (36.6 °C) (Axillary)   Resp 18   Ht 157.5 cm (62\")   Wt 46.9 kg (103 lb 6.3 oz)   SpO2 94%   BMI 18.91 kg/m²      Intake/Output Summary (Last 24 hours) at 7/18/2023 1512  Last data filed at 7/18/2023 1139  Gross per 24 hour   Intake 354 ml   Output 900 ml   Net -546 ml     Physical Exam:  Frail, elderly F, sitting up in bed  Appears chronically ill  NAD, watching TV  Head NC/AT, Trachea midline  Respirations even and unlabored, on room air  Heart RRR  Pale, Skin " fragile  Contreras anchored  Awake, alert, no focal deficit, no myoclonus  Talkative today - can't always get out what she wants but speaking full sentences in response to my questions and statements  Pleasant, smiles frequently      Results from last 7 days   Lab Units 07/15/23  1044   WBC 10*3/mm3 6.17   HEMOGLOBIN g/dL 8.1*   HEMATOCRIT % 26.2*   PLATELETS 10*3/mm3 288     Results from last 7 days   Lab Units 07/15/23  0209   SODIUM mmol/L 143   POTASSIUM mmol/L 4.2   CHLORIDE mmol/L 103   CO2 mmol/L 21.0*   BUN mg/dL 21   CREATININE mg/dL 1.33*   CALCIUM mg/dL 9.3   BILIRUBIN mg/dL 0.2   ALK PHOS U/L 114   ALT (SGPT) U/L 15   AST (SGOT) U/L 21   GLUCOSE mg/dL 112*       Impression:   82yo F with advanced dementia and chronic tunneling sacral decubitus wound.  Acutely hospitalized with respiratory failure from PNA and CHF exacerbation, as well as suspected serotonin syndrome.      Symptoms:  Debilty  Encephalopathy  Skin failure     Plan:   -Stable for discharge home.   Horace would like to elect Home Hospice services.  I agree with hospice eligibility for Alzheimer dementia dx, with related sacral wound and CHF.  -Spoke with Horace by phone.  Confirmed plan.  Offered to answer any additional questions about hospice.  Discussed involvement of interdisciplinary hospice team, as well as PCP (Dr. Brumfield) if desired.  -Horace appreciative of call. Palliative Team remains available for assistance if needed prior to discharge.      Angela Bautista MD  07/18/23

## 2023-07-18 NOTE — DISCHARGE PLACEMENT REQUEST
"Laura Wallace \"Pat\" (83 y.o. Female)    Referring MD: Angela Bautista MD  PCP: Justin Brumfield MD  COVID: neg 7/10/23  Dx: Alzheimer's, CHF     Date of Birth   1940    Social Security Number       Address   [address] [address]    Home Phone   [phone]    MRN   [id number]       Caodaism   Oriental orthodox    Marital Status                               Admission Date   7/10/23    Admission Type   Emergency    Admitting Provider   Norma Leger DO    Attending Provider   Norma Leger DO    Department, Room/Bed   [facility] 6B, N642/1       Discharge Date       Discharge Disposition       Discharge Destination                                 Attending Provider: Norma Leger DO    Allergies: Codeine, Serotonin Reuptake Inhibitors (Ssris)    Isolation: None   Infection: None   Code Status: No CPR    Ht: 157.5 cm (62\")   Wt: 46.9 kg (103 lb 6.3 oz)    Admission Cmt: None   Principal Problem: Acute respiratory failure with hypoxia [J96.01]                   Active Insurance as of 7/10/2023       Primary Coverage       Payor Plan Insurance Group Employer/Plan Group    HUMANA MEDICARE REPLACEMENT HUMANA MEDICARE REPLACEMENT [id number]       Payor Plan Address Payor Plan Phone Number Payor Plan Fax Number Effective Dates    [address] [address] [phone]  1/1/2018 - None Entered    [facility] [id number]         Subscriber Name Subscriber Birth Date Member ID       LAURA WALLACE 1940 [id number]                     Emergency Contacts        (Rel.) Home Phone Work Phone Mobile Phone    Horace Wallace (Spouse) [phone] -- [phone]    Jossue Mota (Daughter) -- -- [phone]              Emergency Contact Information       Name Relation Home Work Mobile    Horace Wallace Spouse [phone]    Jossue Mota Daughter   [phone]          Insurance Information                  HUMANA MEDICARE REPLACEMENT/HUMANA MEDICARE " REPLACEMENT Phone: [phone]    Subscriber: Laura Wallace Subscriber#: [id number]    Group#: [id number] Precert#: [id number]             History & Physical        Murray, Horace Cortes MD at 07/10/23 2115            CRITICAL CARE ADMISSION NOTE    Chief Complaint     Acute respiratory failure with hypoxia    History of Present Illness     83-year-old female admitted to the intensive care unit on 7/10/2023 with acute hypoxic respiratory failure due to decompensated diastolic heart failure.    Patient was just at [facility] in May.  She has late onset Alzheimer's.  She has been bedbound for the last 3 years.  Problems on her last hospital stay included volume overload, UTI, and a tunneling back wound.    She came back to the emergency room this evening with 3 days of worsening shortness of breath and low oxygen level.  She is on supplemental oxygen at home.  She had a persistent fever yesterday and has been receiving nitrofurantoin for urinary tract infection.    In the emergency room the patient was lethargic, tachypneic, hypoxic requiring nonrebreather mask to maintain adequate oxygenation.  Chest x-ray consistent with decompensated heart failure.  We have been asked to undertake her care in the intensive care unit    Problem List, Surgical History, Family, Social History, and ROS     Acute respiratory failure with hypoxia    Acute on chronic heart failure with preserved ejection fraction (HFpEF)    Acute UTI (urinary tract infection)    Late onset Alzheimer's disease without behavioral disturbance    Pulmonary hypertension (WHO Group 2)    Lactic acidosis    Wound with tunneling    PAF (paroxysmal atrial fibrillation)    Severe malnutrition    Diastolic dysfunction    Chronic anticoagulation (Eliquis)     Past Surgical History:   Procedure Laterality Date    APPENDECTOMY  1960    VERTEBROPLASTY      L123, fusion       Allergies   Allergen Reactions    Codeine Nausea And Vomiting     No current  facility-administered medications on file prior to encounter.     Current Outpatient Medications on File Prior to Encounter   Medication Sig    acetaminophen (TYLENOL) 325 MG tablet Take 2 tablets by mouth Every 4 (Four) Hours As Needed for Mild Pain .    amLODIPine (NORVASC) 10 MG tablet Take 1 tablet by mouth Daily.    apixaban (ELIQUIS) 2.5 MG tablet tablet Take 1 tablet by mouth Every 12 (Twelve) Hours. Indications: Atrial Fibrillation    Cholecalciferol (VITAMIN D3) 5000 units capsule capsule Take 1 capsule by mouth Daily. 125 mcg capsule    donepezil (ARICEPT) 5 MG tablet Take 5 mg by mouth Every Night.    famotidine (PEPCID) 20 MG tablet Take 20 mg by mouth 2 (Two) Times a Day. Spouse been holding the 2nd dose unless patients stomach bothering her    FLUoxetine (PROzac) 20 MG capsule Take 40 mg by mouth Daily.    folic acid (FOLVITE) 1 MG tablet Take 1 tablet by mouth Daily.    LORazepam (ATIVAN) 0.5 MG tablet Take 0.25 mg by mouth 2 (Two) Times a Day.    melatonin 5 MG tablet tablet Take 5 mg by mouth Every Night.    metoprolol tartrate (LOPRESSOR) 25 MG tablet Take 1 tablet by mouth Every 12 (Twelve) Hours.    multivitamin with minerals tablet tablet Take 1 tablet by mouth Daily.    risperiDONE (risperDAL) 0.25 MG tablet Take 1 tablet by mouth Daily.    risperiDONE (risperDAL) 0.5 MG tablet Take 1 tablet by mouth Every Night.    sennosides-docusate (PERICOLACE) 8.6-50 MG per tablet Take 2 tablets by mouth 2 (Two) Times a Day.    triamcinolone (KENALOG) 0.1 % cream Apply  topically to the appropriate area as directed See Admin Instructions. Apply topically three times daily. Been giving it as needed     MEDICATION LIST AND ALLERGIES REVIEWED.    Family History   Problem Relation Age of Onset    Heart attack Father     Colon cancer Brother      Social History     Tobacco Use    Smoking status: Former     Packs/day: 1.00     Years: 30.00     Pack years: 30.00     Types: Cigarettes     Start date: 1/1/1960      "Quit date: 1993     Years since quittin.5    Smokeless tobacco: Never   Vaping Use    Vaping Use: Never used   Substance Use Topics    Alcohol use: Not Currently    Drug use: No     Social History     Social History Narrative    Not on file     FAMILY AND SOCIAL HISTORY REVIEWED.    Review of Systems  AS ABOVE OR ALL OTHER SYSTEMS REVIEWED AND ARE NEGATIVE.    Physical Exam and Clinical Information   /62   Pulse 72   Temp (!) 100.9 °F (38.3 °C) (Axillary)   Resp 18   Ht 157.5 cm (62\")   Wt 44 kg (97 lb)   SpO2 99%   BMI 17.74 kg/m²   Physical Exam:   GENERAL: Lethargic, tachypneic, no extremis   HEENT: Sclera nonicteric, no adenopathy   LUNGS: Decreased breath sounds bilaterally with crackles, no wheezes   HEART: Regular tachycardia without appreciable murmur   GI: Soft, nontender   EXTREMITIES: Pitting bilateral lower extremity edema.  Pitting up to her knees.  No cyanosis.  Bilateral foot drop.   NEURO/PSYCH: Lethargic.  Opens eyes.  Does not follow commands    Results from last 7 days   Lab Units 07/10/23  1810   WBC 10*3/mm3 10.10   HEMOGLOBIN g/dL 8.8*   PLATELETS 10*3/mm3 274     Results from last 7 days   Lab Units 07/10/23  1810   SODIUM mmol/L 139   POTASSIUM mmol/L 3.3*   CO2 mmol/L 23.0   BUN mg/dL 13   CREATININE mg/dL 0.63   GLUCOSE mg/dL 102*     Estimated Creatinine Clearance: 48.6 mL/min (by C-G formula based on SCr of 0.63 mg/dL).      Results from last 7 days   Lab Units 07/10/23  1847   PH, ARTERIAL pH units 7.520*   PCO2, ARTERIAL mm Hg 33.0*   PO2 ART mm Hg 61.5*     Lab Results   Component Value Date    LACTATE 2.2 (C) 07/10/2023        IMAGES: Chest x-ray reveals cardiomegaly and bilateral interstitial infiltrates    I reviewed the patient's results and images.     Assesment     Active Hospital Problems    Diagnosis     **Acute respiratory failure with hypoxia     Acute on chronic heart failure with preserved ejection fraction (HFpEF)     Acute UTI (urinary tract " infection)     Diastolic dysfunction     Chronic anticoagulation (Eliquis)     Severe malnutrition     Lactic acidosis     PAF (paroxysmal atrial fibrillation)     Wound with tunneling     Pulmonary hypertension (WHO Group 2)     Late onset Alzheimer's disease without behavioral disturbance      Plan/Recommendations     Admit to the intensive care unit  High flow nasal cannula  Diuresis  Follow-up electrolytes and renal function  Replace electrolytes as needed  Follow blood sugars and address with sliding scale insulin  Follow-up cultures  Antibiotics to cover previously cultured E. coli, sensitive to ceftriaxone  Add doxycycline for possible community-acquired respiratory tract infection  Wound care consultation for tunneling back wound    Goals of care discussed with the patient at the bedside.  He does not desire extensive resuscitative measures in the event of a cardiopulmonary arrest.    Critical Care time spent in direct patient care: 38 minutes (excluding procedure time, if applicable) including high complexity decision making to assess, manipulate, and support vital organ system failure in this individual who has impairment of one or more vital organ systems such that there is a high probability of imminent or life threatening deterioration in the patient’s condition.    ELIZABETH Murray MD  Pulmonary and Critical Care Medicine     CC: Justin Brumfield MD    Electronically signed by Horace Murray MD at 07/10/23 2121       Current Facility-Administered Medications   Medication Dose Route Frequency Provider Last Rate Last Admin    acetaminophen (TYLENOL) tablet 650 mg  650 mg Oral Q4H PRN Case, Azalia V., DO   650 mg at 07/15/23 0146    amiodarone (PACERONE) tablet 200 mg  200 mg Oral TID Graeme Bass MD   200 mg at 07/18/23 0908    apixaban (ELIQUIS) tablet 2.5 mg  2.5 mg Oral Q12H Case, Azalia V., DO   2.5 mg at 07/18/23 0908    donepezil (ARICEPT) tablet 5 mg  5 mg Oral Nightly  Case, Azalia V., DO   5 mg at 07/17/23 2108    famotidine (PEPCID) tablet 20 mg  20 mg Oral Daily Elana Ortiz, PharmD   20 mg at 07/18/23 0909    furosemide (LASIX) tablet 40 mg  40 mg Oral Daily Case, Azalia V., DO   40 mg at 07/18/23 0909    LORazepam (ATIVAN) tablet 0.25 mg  0.25 mg Oral BID Case, Azalia V., DO   0.25 mg at 07/18/23 0908    Magnesium Standard Dose Replacement - Follow Nurse / BPA Driven Protocol   Does not apply PRN Shara Stein, DNP, APRN        metoprolol tartrate (LOPRESSOR) half tablet 12.5 mg  12.5 mg Oral Q12H Graeme Bass MD   12.5 mg at 07/18/23 0909    nystatin (MYCOSTATIN) powder   Topical Q12H Case, Azalia V., DO   Given at 07/18/23 0909    Potassium Replacement - Follow Nurse / BPA Driven Protocol   Does not apply PRN Case, Azalia V., DO        sodium chloride 0.9 % flush 10 mL  10 mL Intravenous PRN Case, Azalia V., DO   10 mL at 07/17/23 2109        Physician Progress Notes (last 72 hours)        Ryann Valerio PA-C at 07/18/23 0823            [facility]  PROGRESS NOTE    Date of Admission: 7/10/2023  Date of Service: 07/18/23    Primary Care Physician: Justin Brumfield MD    Chief Complaint: follow up atrial fibrillation, elevated troponin    Problem List:   Acute respiratory failure with hypoxia    Late onset Alzheimer's disease without behavioral disturbance    Pulmonary hypertension (WHO Group 2)    Lactic acidosis    Wound with tunneling    PAF (paroxysmal atrial fibrillation)    Acute UTI (urinary tract infection)    Sepsis without acute organ dysfunction    Severe malnutrition    Diastolic dysfunction    Acute on chronic heart failure with preserved ejection fraction (HFpEF)    Chronic anticoagulation (Eliquis)    Moderate Malnutrition (HCC)      Subjective      No acute events overnight.  Patient does respond to questions appropriately with one-word answers.  Only oriented to self.  Denies any chest pain.   " at bedside and states heart rate did not get above 75 to his knowledge.  Is maintaining sinus bradycardia.  Now off IV amiodarone.    Objective   Vitals: /56 (BP Location: Left arm, Patient Position: Lying)   Pulse 52   Temp 97.1 °F (36.2 °C) (Axillary)   Resp 18   Ht 157.5 cm (62\")   Wt 46.9 kg (103 lb 6.3 oz)   SpO2 100%   BMI 18.91 kg/m²     Physical Exam:  GENERAL: ill appearing, in no acute distress.   HEART: Regular rhythm, normal rate, and no murmurs, gallops, or rubs.   LUNGS: Clear to auscultation bilaterally. No wheezing, rales or rhonchi.   EXTREMITIES: No clubbing, cyanosis, or edema noted.     Results:  Results from last 7 days   Lab Units 07/15/23  1044 07/13/23  1004   WBC 10*3/mm3 6.17 10.27   HEMOGLOBIN g/dL 8.1* 8.6*   HEMATOCRIT % 26.2* 27.0*   PLATELETS 10*3/mm3 288 279       Results from last 7 days   Lab Units 07/15/23  0209 07/13/23  0550 07/12/23  1432 07/12/23  0445   SODIUM mmol/L 143 138  --  138   POTASSIUM mmol/L 4.2 4.5 4.6 3.3*   CHLORIDE mmol/L 103 103  --  101   CO2 mmol/L 21.0* 23.0  --  25.0   BUN mg/dL 21 16  --  14   CREATININE mg/dL 1.33* 0.65  --  0.66   GLUCOSE mg/dL 112* 106*  --  108*        Lab Results   Component Value Date    AST 21 07/15/2023    ALT 15 07/15/2023                           Results from last 7 days   Lab Units 07/13/23  0550   CK TOTAL U/L 99                 Intake/Output Summary (Last 24 hours) at 7/18/2023 0823  Last data filed at 7/18/2023 0620  Gross per 24 hour   Intake 354 ml   Output 700 ml   Net -346 ml         I personally reviewed the patient's EKG/Telemetry data    Radiology Data:   Results for orders placed during the hospital encounter of 05/07/23    Adult Transthoracic Echo Complete w/ Color, Spectral and Contrast if Necessary Per Protocol    Interpretation Summary    Left ventricular systolic function is normal. Left ventricular ejection fraction appears to be 61 - 65%.    Left ventricular diastolic function is " consistent with (grade II w/high LAP) pseudonormalization.    Left atrial volume is severely increased.    The right atrial cavity is moderately  dilated.    There is calcification of the aortic valve.    Moderate tricuspid valve regurgitation is present.    Estimated right ventricular systolic pressure from tricuspid regurgitation is mildly elevated (35-45 mmHg). Calculated right ventricular systolic pressure from tricuspid regurgitation is 41 mmHg.    Mild pulmonary hypertension is present.      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  Continue Eliquis 2.5 mg twice daily for stroke prophylaxis.  Continue metoprolol 12.5 mg twice daily for heart rate control.  Not much more to add from a cardiac standpoint.  We will sign off and be available to see on an as-needed basis.      Ryann Valerio PA-C          Electronically signed by Ryann Valerio PA-C at 07/18/23 1108       Hamzah Charlton MD at 07/17/23 1292              INFECTIOUS DISEASE f/u     Laura Wallace  1940  [id number]    Date of Consult: 7/15/2023    Admission Date: 7/10/2023      Requesting Provider: Malena Rowe MD  Evaluating Physician: Jeffry Okeefe MD/Dr. Hamzah Charlton    Reason for Consultation: fever, sepsis    History of present illness:    Patient is a 83 y.o. female, known to Dr. Hamzah Charlton, with h/o Alzheimer's disease, bedbound,  PAF/amiodarone/Eliquis, pulmonary hypertension, chronic anemia, sacral decubitus ulcer/chronic tunneling back wound, and recent pulmonary nodule/cystic thyroid mass who was admitted to [facility] from 5/7-5/12 for fevers and diaphoresis.  We were asked to see for UTI and sacral decubitus ulcer with tunneling back wound.  The decubitus ulcer did not appear to be actively infection, but she did have E.coli UTI and was treated with IV Rocephin.  She was discharged on nitrofurantoin until 5/21.      She returns to [facility] ED on 7/10 for acute hypoxic respiratory failure an decompensated diastolic heart failure.  She was placed in ICU for NRB/HFNC.  She was transferred to telemetry on 7/14 on 2L O2NC. She has been on Rocephin and doxycycline to cover for UTI and possible pneumonia.  She had multiple episode of tachycardia, HTN, muscle rigidity, hectic fevers, and diaphoresis which her  states have been ongoing and intermittent for last 6 months with suspected serotonin syndrome.  Her Fluoxetine was stopped on 7/14.  She had another episode on 7/14 and was given a dose of cyproheptadine. She was seen by cardiology on 7/15 and started on amiodarone drip.  She had some lengthened QT interval on admission and QTc stays close to 500.  Her CXR on 7/14 showed increasing bilateral airspace opacities concerning for multifocal pneumonia.   Her creatinine went from 0.65 on 7/13 to 1.33 on 7/15.  Her WBC is WNL.  Blood cultures are negative to date.  COVID-19/Flu PCR was negative.  She remains on Rocephin and doxycycline.  ID was asked to evaluate and manage her antibiotic therapy.     7/16 has been considering comfort measures palliative medicine to be consulted currently afebrile    7/17/23: History reviewed.  The patient is a poor historian in the setting of her dementia.  Her  is at bedside today.  Nursing was packing her sacral ulcer during my exam.  The patient's respiratory status has improved and she is now breathing well  on room air.  Fevers have resolved.  She does have a slight erythematous skin reaction around her sacral decubitus ulcer in the distribution of her bandage.  Past Medical History:   Diagnosis Date    Anxiety 2022    Late onset Alzheimer's disease without behavioral disturbance 2018    Paroxysmal atrial fibrillation with rapid ventricular response 2022    Pulmonary hypertension 2022       Past Surgical History:   Procedure Laterality Date    APPENDECTOMY  1960    VERTEBROPLASTY      L123, fusion       Family History   Problem Relation Age of Onset    Heart attack Father     Colon cancer Brother        Social History     Socioeconomic History    Marital status:    Tobacco Use    Smoking status: Former     Packs/day: 1.00     Years: 30.00     Pack years: 30.00     Types: Cigarettes     Start date: 1960     Quit date: 1993     Years since quittin.    Smokeless tobacco: Never   Vaping Use    Vaping Use: Never used   Substance and Sexual Activity    Alcohol use: Not Currently    Drug use: No    Sexual activity: Defer       Allergies   Allergen Reactions    Codeine Nausea And Vomiting    Serotonin Reuptake Inhibitors (Ssris) Other (See Comments)     Serotonin syndrome         Medication:    Current Facility-Administered Medications:     acetaminophen (TYLENOL) tablet 650 mg, 650 mg, Oral, Q4H PRN, Case, Azalia V., DO, 650 mg at 07/15/23 0146    amiodarone (PACERONE) tablet 200 mg, 200 mg, Oral, TID, Graeme Bass MD, 200 mg at 23    apixaban (ELIQUIS) tablet 2.5 mg, 2.5 mg, Oral, Q12H, Case, Azalia V., DO, 2.5 mg at 23    donepezil (ARICEPT) tablet 5 mg, 5 mg, Oral, Nightly, Case, Azalia V., DO, 5 mg at 23    doxycycline (MONODOX) capsule 100 mg, 100 mg, Oral, Q12H, Miguel Ángel Murray PA, 100 mg at 23    famotidine (PEPCID) tablet 20 mg, 20 mg, Oral, Daily, Elana Ortiz, PharmD, 20 mg at 23 0956    furosemide (LASIX) tablet 40  mg, 40 mg, Oral, Daily, Case, Azalia V., DO, 40 mg at 23 0956    LORazepam (ATIVAN) tablet 0.25 mg, 0.25 mg, Oral, BID, Case, Azalia V., DO, 0.25 mg at 23    Magnesium Standard Dose Replacement - Follow Nurse / BPA Driven Protocol, , Does not apply, PRN, Shara Stein, DNP, APRN    metoprolol tartrate (LOPRESSOR) half tablet 12.5 mg, 12.5 mg, Oral, Q12H, Graeme Bass MD, 12.5 mg at 23    nystatin (MYCOSTATIN) powder, , Topical, Q12H, Case, Azalia V., DO, Given at 23    Potassium Replacement - Follow Nurse / BPA Driven Protocol, , Does not apply, PRN, Case, Azalia V., DO    sodium chloride 0.9 % flush 10 mL, 10 mL, Intravenous, PRN, Case, Azalia V., DO, 10 mL at 23    Antibiotics:  Anti-Infectives (From admission, onward)      Ordered     Dose/Rate Route Frequency Start Stop    07/15/23 1346  doxycycline (MONODOX) capsule 100 mg        Ordering Provider: Miguel Ángel Murray PA    100 mg Oral Every 12 Hours Scheduled 07/15/23 2100 07/22/23 2059    07/10/23 2112  cefTRIAXone (ROCEPHIN) 1 g/100 mL 0.9% NS (MBP)        Ordering Provider: Azalia Elmore V., DO    1 g  over 30 Minutes Intravenous Every 24 Hours 23 1800 23 1823    07/10/23 182  cefTRIAXone (ROCEPHIN) 1 g/100 mL 0.9% NS (MBP)        Ordering Provider: Demian Andrade MD    1 g  over 30 Minutes Intravenous Once 07/10/23 1844 07/10/23 1945    07/10/23 182  AZITHROMYCIN 500 MG/250 ML 0.9% NS IVPB (vial-mate)        Ordering Provider: Demian Andrade MD    500 mg  over 60 Minutes Intravenous Once 07/10/23 1844 07/10/23 2107              Review of Systems:  Unable to obtain d/t dementia      Physical Exam:   Vital Signs  Temp (24hrs), Av.3 °F (36.8 °C), Min:97 °F (36.1 °C), Max:99.9 °F (37.7 °C)    Temp  Min: 97 °F (36.1 °C)  Max: 99.9 °F (37.7 °C)  BP  Min: 109/58  Max: 154/70  Pulse  Min: 53  Max: 85  Resp  Min: 16  Max: 18  SpO2  Min: 94 %  Max: 100 %    GENERAL: Awake  and alert, in no acute distress. Chronically ill appearing.  Does not follow directions.  No interaction.  Dementia  HEENT: Normocephalic, atraumatic.  No external oral lesions noted  LUNGS: Clear to auscultation bilaterally.  Nonlabored breathing on room air  ABDOMEN: Nondistended  EXT:  No cellulitic change noted  :  With Contreras catheter.  MSK: No joint effusions or erythema  SKIN: No generalized rashes noted.  Has not also the tunnels down to at least muscle layer.  No visible bone on exam.  No active drainage noted.  Does have some slight skin discoloration/erythema around her wound that is in the distribution of her recent bandaging and may represent a Local skin reaction/Allergy.  PSYCHIATRIC: Unable to assess secondary to dementia.      7/11/23 5/8/23    Laboratory Data    Results from last 7 days   Lab Units 07/15/23  1044 07/13/23  1004 07/11/23  0550   WBC 10*3/mm3 6.17 10.27 7.58   HEMOGLOBIN g/dL 8.1* 8.6* 8.3*   HEMATOCRIT % 26.2* 27.0* 27.0*   PLATELETS 10*3/mm3 288 279 250     Results from last 7 days   Lab Units 07/15/23  0209   SODIUM mmol/L 143   POTASSIUM mmol/L 4.2   CHLORIDE mmol/L 103   CO2 mmol/L 21.0*   BUN mg/dL 21   CREATININE mg/dL 1.33*   GLUCOSE mg/dL 112*   CALCIUM mg/dL 9.3     Results from last 7 days   Lab Units 07/15/23  0209   ALK PHOS U/L 114   BILIRUBIN mg/dL 0.2   ALT (SGPT) U/L 15   AST (SGOT) U/L 21             Results from last 7 days   Lab Units 07/15/23  1044   LACTATE mmol/L 0.7     Results from last 7 days   Lab Units 07/13/23  0550   CK TOTAL U/L 99         Estimated Creatinine Clearance: 24.6 mL/min (A) (by C-G formula based on SCr of 1.33 mg/dL (H)).      Microbiology:  Microbiology Results (last 10 days)       Procedure Component Value - Date/Time    MRSA Screen, PCR (Inpatient) - Swab, Nares [[id number]]  (Normal) Collected: 07/15/23 1117    Lab Status: Final result Specimen: Swab from Nares Updated: 07/15/23 1247     MRSA PCR Negative    Narrative:      The  negative predictive value of this diagnostic test is high and should only be used to consider de-escalating anti-MRSA therapy. A positive result may indicate colonization with MRSA and must be correlated clinically.  MRSA Negative    S. Pneumo Ag Urine or CSF - Urine, Urine, Clean Catch [[id number]]  (Normal) Collected: 07/15/23 1116    Lab Status: Final result Specimen: Urine, Clean Catch Updated: 07/15/23 2051     Strep Pneumo Ag Negative    Legionella Antigen, Urine - Urine, Urine, Clean Catch [[id number]]  (Normal) Collected: 07/15/23 1116    Lab Status: Final result Specimen: Urine, Clean Catch Updated: 07/15/23 2051     LEGIONELLA ANTIGEN, URINE Negative    Blood Culture - Blood, Arm, Left [[id number]]  (Normal) Collected: 07/10/23 1859    Lab Status: Final result Specimen: Blood from Arm, Left Updated: 07/15/23 2000     Blood Culture No growth at 5 days    Blood Culture - Blood, Arm, Left [[id number]]  (Normal) Collected: 07/10/23 1852    Lab Status: Final result Specimen: Blood from Arm, Left Updated: 07/15/23 2000     Blood Culture No growth at 5 days    COVID PRE-OP / PRE-PROCEDURE SCREENING ORDER (NO ISOLATION) - Swab, Nasopharynx [[id number]]  (Normal) Collected: 07/10/23 1824    Lab Status: Final result Specimen: Swab from Nasopharynx Updated: 07/10/23 1936    Narrative:      The following orders were created for panel order COVID PRE-OP / PRE-PROCEDURE SCREENING ORDER (NO ISOLATION) - Swab, Nasopharynx.  Procedure                               Abnormality         Status                     ---------                               -----------         ------                     COVID-19 and FLU A/B PCR...[[id number]]  Normal              Final result                 Please view results for these tests on the individual orders.    COVID-19 and FLU A/B PCR - Swab, Nasopharynx [[id number]]  (Normal) Collected: 07/10/23 1824    Lab Status: Final result Specimen: Swab from Nasopharynx Updated: 07/10/23 1936      COVID19 Not Detected     Influenza A PCR Not Detected     Influenza B PCR Not Detected    Narrative:      Fact sheet for providers: https://www.fda.gov/media/880028/download    Fact sheet for patients: https://www.fda.gov/media/568394/download    Test performed by PCR.                  Radiology:  Imaging Results (Last 72 Hours)       ** No results found for the last 72 hours. **              Impression:   - Hectic fevers, may be associated with serotonin syndrome.-Resolved  - Suspected Serotonin syndrome. Fluoxetine was stopped 7/13 after her dose on that day.  Continues to spike high fevers up to 101-103.  Given dose of cyproheptadine on 7/15.-Resolved  - Bilateral pulmonary infiltrates, aspiration vs pneumonia vs edema.-Seems to be improving  - Acute hypoxic respiratory failure/d/t volume overload, now Improved and on room air  - Acute renal failure on 7/15.  Cr 1.33. 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  - Paroxysmal atrial fibrillation/amiodarone/Eliquis  - Alzheimer's dementia  - Debilitated/bedbound  - Pulmonary nodule/cystic thyroid mass seen on CT scan in 5/2923    PLAN/RECOMMENDATIONS:   Thank you for asking us to see Laura Wallace, I recommend the following:  - Blood cultures-No growth  - Urine strep pneumo and urine Legionella antigens were negative.  - Can complete last day of doxycycline and Rocephin  - Continue O2 support As needed.  Currently improved to room air  - Wound care consultation    Fevers have resolved.  Still has a wound over her sacral region that is chronic.  Appears to maybe have some reaction to the dressing and could consider a different dressing.    I discussed with the patient's  at bedside today    Discussed with nursing staff today.    Hamzah Charlton MD  7/17/2023  21:15 EDT                    Electronically signed by  "Hamzah Charlton MD at 07/17/23 2121       Ryann Valerio PA-C at 07/17/23 0905            [facility]  PROGRESS NOTE    Date of Admission: 7/10/2023  Date of Service: 07/17/23    Primary Care Physician: Justin Brumfield MD    Chief Complaint: follow up atrial fibrillation, elevated troponin    Problem List:   Acute respiratory failure with hypoxia    Late onset Alzheimer's disease without behavioral disturbance    Pulmonary hypertension (WHO Group 2)    Lactic acidosis    Wound with tunneling    PAF (paroxysmal atrial fibrillation)    Acute UTI (urinary tract infection)    Sepsis without acute organ dysfunction    Severe malnutrition    Diastolic dysfunction    Acute on chronic heart failure with preserved ejection fraction (HFpEF)    Chronic anticoagulation (Eliquis)    Moderate Malnutrition (HCC)      Subjective      No acute events overnight. Did have a 10 second run of afib RVR while I was in the room but quickly converted back to sinus bradycardia. Remains on amiodarone drip. She did not answer when asked questions.  at bedside and provided information and feedback.     Objective   Vitals: /63 (BP Location: Left arm, Patient Position: Lying)   Pulse 67   Temp 97.5 °F (36.4 °C) (Axillary)   Resp 16   Ht 157.5 cm (62\")   Wt 46.9 kg (103 lb 6.3 oz)   SpO2 98%   BMI 18.91 kg/m²     Physical Exam:  GENERAL: ill appearing, in no acute distress.   HEART: Regular rhythm, normal rate, and no murmurs, gallops, or rubs.   LUNGS: Clear to auscultation bilaterally. No wheezing, rales or rhonchi.   EXTREMITIES: No clubbing, cyanosis, or edema noted.     Results:  Results from last 7 days   Lab Units 07/15/23  1044 07/13/23  1004 07/11/23  0550   WBC 10*3/mm3 6.17 10.27 7.58   HEMOGLOBIN g/dL 8.1* 8.6* 8.3*   HEMATOCRIT % 26.2* 27.0* 27.0*   PLATELETS 10*3/mm3 288 279 250     Results from last 7 days   Lab Units 07/15/23  0209 07/13/23  0550 07/12/23  1432 " 07/12/23  0445   SODIUM mmol/L 143 138  --  138   POTASSIUM mmol/L 4.2 4.5 4.6 3.3*   CHLORIDE mmol/L 103 103  --  101   CO2 mmol/L 21.0* 23.0  --  25.0   BUN mg/dL 21 16  --  14   CREATININE mg/dL 1.33* 0.65  --  0.66   GLUCOSE mg/dL 112* 106*  --  108*      Lab Results   Component Value Date    AST 21 07/15/2023    ALT 15 07/15/2023             Results from last 7 days   Lab Units 07/11/23  0550   TSH uIU/mL 3.890             Results from last 7 days   Lab Units 07/13/23  0550 07/10/23  2055 07/10/23  1810   CK TOTAL U/L 99  --   --    HSTROP T ng/L  --  57* 64*     Results from last 7 days   Lab Units 07/10/23  1810   PROBNP pg/mL 6,389.0*         Intake/Output Summary (Last 24 hours) at 7/17/2023 0905  Last data filed at 7/17/2023 0415  Gross per 24 hour   Intake 100 ml   Output 600 ml   Net -500 ml       I personally reviewed the patient's EKG/Telemetry data    Radiology Data:   Results for orders placed during the hospital encounter of 05/07/23    Adult Transthoracic Echo Complete w/ Color, Spectral and Contrast if Necessary Per Protocol    Interpretation Summary    Left ventricular systolic function is normal. Left ventricular ejection fraction appears to be 61 - 65%.    Left ventricular diastolic function is consistent with (grade II w/high LAP) pseudonormalization.    Left atrial volume is severely increased.    The right atrial cavity is moderately  dilated.    There is calcification of the aortic valve.    Moderate tricuspid valve regurgitation is present.    Estimated right ventricular systolic pressure from tricuspid regurgitation is mildly elevated (35-45 mmHg). Calculated right ventricular systolic pressure from tricuspid regurgitation is 41 mmHg.    Mild pulmonary hypertension is present.      Current Medications:  amiodarone, 200 mg, Oral, TID  apixaban, 2.5 mg, Oral, Q12H  cefTRIAXone, 1 g, Intravenous, Q24H  donepezil, 5 mg, Oral, Nightly  doxycycline, 100 mg, Oral, Q12H  famotidine, 20 mg, Oral,  Daily  furosemide, 40 mg, Oral, Daily  LORazepam, 0.25 mg, Oral, BID  metoprolol tartrate, 12.5 mg, Oral, Q12H  nystatin, , Topical, Q12H      amiodarone, 1 mg/min, Last Rate: 1 mg/min (23 0611)        Assessment:  Paroxysmal atrial fibrillation, converted to sinus with IV amiodarone, has been anticoagulated with Eliquis  Respiratory failure with hypoxia   Sepsis, ID consulted  She does have chronic tunneling back wound that is followed by   Suspected serotonin syndrome  Alzheimer's dementia    Plan:  Continue oral amiodarone as well as amio drip per protocol.   Continue metoprolol 12.5mg BID for HR control.  Continue Eliquis 2.5mg BID for stroke prophylaxis.  Await palliate care meeting. Further recommendations to follow.     Ryann Valerio PA-C          Electronically signed by Ryann Valerio PA-C at 23 1322       Malena Rowe MD at 23 0731              [facility] Medicine Services  PROGRESS NOTE    Patient Name: Laura Wallace  : 1940  MRN: [id number]    Date of Admission: 7/10/2023  Primary Care Physician: Justin Brumfield MD    Subjective   Subjective     CC:  pna    HPI:   - Patient is an 83-year-old who was admitted to the intensive care unit with acute respiratory failure with hypoxia thought secondary to combination of heart failure as well as community-acquired pneumonia.  There is also concern for suspected serotonin syndrome. Pt  and nursing report and episde again this am with shaking, tremors, fever, tachycardia and tachypnea. Discussed serotonin syndrome with  and he is agreeable to trial of cyproheptadine.     7/15 -patient received cyproheptadine yesterday times multiple doses without much improvement.  She continued to be febrile and tachycardic.  She went into A-fib with RVR.  Ultimately responded to Cardizem drip.  Still having spells of shaking.  Unclear etiology of persistent fevers so will consult ID.   Cardiology consulted for A-fib with RVR    7/16 -cardiology transitioned patient to amiodarone for control of her A-fib with improvement although she did have a couple of episodes through the night of shaking and tachycardia.  Tmax was 100.  Unsure if the fevers were related to serotonin syndrome but would avoid SSRIs in the future.  ID note reviewed and appreciate their recommendations, continued work-up.  She remains on doxycycline and Rocephin.   at bedside today requested palliative care consult.  He is considering options for transition to comfort care.    7/17 - afebrile now more than 24 hours. Did not change abx so serotonin syndrome may have contributed. Palliative care consult requested and they will see her today. Still on amiodarone drip.  Patient was able to answer questions with one-word answers today.  When asked open ended questions she did not respond.     ROS:  Gen-no fevers, chills in the past 24 hours  CV- A-fib better controlled  Resp-positive cough, dyspnea  GI-no reports of nausea vomiting or diarrhea       Objective   Objective     Vital Signs:   Temp:  [98.1 °F (36.7 °C)-99.6 °F (37.6 °C)] 98.6 °F (37 °C)  Heart Rate:  [] 68  Resp:  [16-18] 16  BP: (108-160)/(50-67) 132/63  Flow (L/min):  [2] 2     Physical Exam:  Constitutional: No acute distress, awake, responds with 1-2 words  HENT: NCAT, mucous membranes moist; nystagmus noted  Respiratory: Decreased breath sounds bilaterally, respiratory effort normal   Cardiovascular: Irregular   Gastrointestinal: Positive bowel sounds, soft, nontender, nondistended  Musculoskeletal: No bilateral ankle edema; decorticate posturing noted  Psychiatric: eyes open, flat  Neurologic: generally weak, decorticate posturing noted  Skin: pale      Results Reviewed:  LAB RESULTS:      Lab 07/15/23  1044 07/15/23  0209 07/13/23  1004 07/11/23  0550 07/10/23  2055 07/10/23  1810   WBC 6.17  --  10.27 7.58  --  10.10   HEMOGLOBIN 8.1*  --  8.6* 8.3*  --   8.8*   HEMATOCRIT 26.2*  --  27.0* 27.0*  --  28.2*   PLATELETS 288  --  279 250  --  274   NEUTROS ABS 4.10  --  8.11*  --   --  7.83*   IMMATURE GRANS (ABS) 0.03  --  0.04  --   --  0.05   LYMPHS ABS 1.08  --  1.07  --   --  1.02   MONOS ABS 0.72  --  0.89  --   --  1.15*   EOS ABS 0.23  --  0.14  --   --  0.04   MCV 83.4  --  82.6 83.1  --  83.7   PROCALCITONIN  --  0.74*  --   --   --   --    LACTATE 0.7  --   --  0.8 0.7 2.2*           Lab 07/15/23  0209 07/13/23  0550 07/12/23  1432 07/12/23  0445 07/11/23  1742 07/11/23  0550 07/10/23  1810   SODIUM 143 138  --  138  --  141 139   POTASSIUM 4.2 4.5 4.6 3.3* 3.5 2.6* 3.3*   CHLORIDE 103 103  --  101  --  99 101   CO2 21.0* 23.0  --  25.0  --  28.0 23.0   ANION GAP 19.0* 12.0  --  12.0  --  14.0 15.0   BUN 21 16  --  14  --  11 13   CREATININE 1.33* 0.65  --  0.66  --  0.62 0.63   EGFR 40.3* 88.6  --  88.3  --  89.6 89.3   GLUCOSE 112* 106*  --  108*  --  100* 102*   CALCIUM 9.3 8.8  --  8.7  --  8.6 8.6   IONIZED CALCIUM  --   --   --   --   --  1.19  --    MAGNESIUM 1.7 1.6  --  1.8  --  1.6  --    PHOSPHORUS  --  3.4  --  3.4  --  2.9  --    TSH  --   --   --   --   --  3.890  --            Lab 07/15/23  0209 07/11/23  0550 07/10/23  1810   TOTAL PROTEIN 6.7 6.6 6.5   ALBUMIN 3.2* 3.2* 3.2*   GLOBULIN 3.5 3.4 3.3   ALT (SGPT) 15 16 15   AST (SGOT) 21 24 26   BILIRUBIN 0.2 0.8 0.6   ALK PHOS 114 122* 123*           Lab 07/10/23  2055 07/10/23  1810   PROBNP  --  6,389.0*   HSTROP T 57* 64*               Lab 07/16/23  0957   IRON 11*   IRON SATURATION (TSAT) 3*   TIBC 325   TRANSFERRIN 218         Lab 07/10/23  1847   PH, ARTERIAL 7.520*   PCO2, ARTERIAL 33.0*   PO2 ART 61.5*   FIO2 36   HCO3 ART 26.9*   BASE EXCESS ART 3.9*   CARBOXYHEMOGLOBIN 1.6       Brief Urine Lab Results  (Last result in the past 365 days)        Color   Clarity   Blood   Leuk Est   Nitrite   Protein   CREAT   Urine HCG        07/11/23 0059 Yellow   Clear   Small (1+)   Negative    Negative   Negative                   Microbiology Results Abnormal       Procedure Component Value - Date/Time    Legionella Antigen, Urine - Urine, Urine, Clean Catch [[id number]]  (Normal) Collected: 07/15/23 1116    Lab Status: Final result Specimen: Urine, Clean Catch Updated: 07/15/23 2051     LEGIONELLA ANTIGEN, URINE Negative    S. Pneumo Ag Urine or CSF - Urine, Urine, Clean Catch [[id number]]  (Normal) Collected: 07/15/23 1116    Lab Status: Final result Specimen: Urine, Clean Catch Updated: 07/15/23 2051     Strep Pneumo Ag Negative    Blood Culture - Blood, Arm, Left [[id number]]  (Normal) Collected: 07/10/23 1852    Lab Status: Final result Specimen: Blood from Arm, Left Updated: 07/15/23 2000     Blood Culture No growth at 5 days    Blood Culture - Blood, Arm, Left [[id number]]  (Normal) Collected: 07/10/23 1859    Lab Status: Final result Specimen: Blood from Arm, Left Updated: 07/15/23 2000     Blood Culture No growth at 5 days    MRSA Screen, PCR (Inpatient) - Swab, Nares [[id number]]  (Normal) Collected: 07/15/23 1117    Lab Status: Final result Specimen: Swab from Nares Updated: 07/15/23 1247     MRSA PCR Negative    Narrative:      The negative predictive value of this diagnostic test is high and should only be used to consider de-escalating anti-MRSA therapy. A positive result may indicate colonization with MRSA and must be correlated clinically.  MRSA Negative    COVID PRE-OP / PRE-PROCEDURE SCREENING ORDER (NO ISOLATION) - Swab, Nasopharynx [[id number]]  (Normal) Collected: 07/10/23 1824    Lab Status: Final result Specimen: Swab from Nasopharynx Updated: 07/10/23 1936    Narrative:      The following orders were created for panel order COVID PRE-OP / PRE-PROCEDURE SCREENING ORDER (NO ISOLATION) - Swab, Nasopharynx.  Procedure                               Abnormality         Status                     ---------                               -----------         ------                     COVID-19  and FLU A/B PCR...[[id number]]  Normal              Final result                 Please view results for these tests on the individual orders.    COVID-19 and FLU A/B PCR - Swab, Nasopharynx [[id number]]  (Normal) Collected: 07/10/23 1824    Lab Status: Final result Specimen: Swab from Nasopharynx Updated: 07/10/23 1936     COVID19 Not Detected     Influenza A PCR Not Detected     Influenza B PCR Not Detected    Narrative:      Fact sheet for providers: https://www.fda.gov/media/310407/download    Fact sheet for patients: https://www.fda.gov/media/782036/download    Test performed by PCR.            No radiology results from the last 24 hrs    Results for orders placed during the hospital encounter of 05/07/23    Adult Transthoracic Echo Complete w/ Color, Spectral and Contrast if Necessary Per Protocol    Interpretation Summary    Left ventricular systolic function is normal. Left ventricular ejection fraction appears to be 61 - 65%.    Left ventricular diastolic function is consistent with (grade II w/high LAP) pseudonormalization.    Left atrial volume is severely increased.    The right atrial cavity is moderately  dilated.    There is calcification of the aortic valve.    Moderate tricuspid valve regurgitation is present.    Estimated right ventricular systolic pressure from tricuspid regurgitation is mildly elevated (35-45 mmHg). Calculated right ventricular systolic pressure from tricuspid regurgitation is 41 mmHg.    Mild pulmonary hypertension is present.      Current medications:  Scheduled Meds:amiodarone, 200 mg, Oral, TID  apixaban, 2.5 mg, Oral, Q12H  cefTRIAXone, 1 g, Intravenous, Q24H  donepezil, 5 mg, Oral, Nightly  doxycycline, 100 mg, Oral, Q12H  famotidine, 20 mg, Oral, Daily  furosemide, 40 mg, Oral, Daily  LORazepam, 0.25 mg, Oral, BID  metoprolol tartrate, 12.5 mg, Oral, Q12H  nystatin, , Topical, Q12H      Continuous Infusions:amiodarone, 1 mg/min, Last Rate: 1 mg/min (07/17/23 0611)    PRN  Meds:.  acetaminophen    Magnesium Standard Dose Replacement - Follow Nurse / BPA Driven Protocol    metoprolol tartrate    Potassium Replacement - Follow Nurse / BPA Driven Protocol    sodium chloride    Assessment & Plan   Assessment & Plan     Active Hospital Problems    Diagnosis  POA    **Acute respiratory failure with hypoxia [J96.01]  Yes    Moderate Malnutrition (HCC) [E44.0]  Yes    Diastolic dysfunction [I51.89]  Yes    Acute on chronic heart failure with preserved ejection fraction (HFpEF) [I50.33]  Yes    Chronic anticoagulation (Eliquis) [Z79.01]  Not Applicable    Severe malnutrition [E43]  Yes    Sepsis without acute organ dysfunction [A41.9]  Yes    Acute UTI (urinary tract infection) [N39.0]  Yes    Lactic acidosis [E87.20]  Yes    PAF (paroxysmal atrial fibrillation) [I48.0]  Yes    Wound with tunneling [T14.8XXA]  Yes    Pulmonary hypertension (WHO Group 2) [I27.20]  Yes    Late onset Alzheimer's disease without behavioral disturbance [G30.1, F02.80]  Yes      Resolved Hospital Problems   No resolved problems to display.        Brief Hospital Course to date:  Laura Wallace is a 83 y.o. female with past medical history of paroxysmal A-fib, systolic congestive heart failure, Alzheimer's disease, chronic tunneling back wound followed at , pulmonary hypertension who presented with acute respiratory failure with hypoxia thought secondary to exacerbation of systolic congestive heart failure as well as community-acquired pneumonia.  She required high flow nasal cannula and BiPAP in the intensive care unit.  She was transferred to the floor on 7/13/2023.  Hospitalist service assumed care on 7/14/2023.  Secondary to her sinus tachycardia and fevers there was concern for suspected serotonin syndrome.  SSRI was discontinued on 7/13/2023 but she did receive a dose that morning.  She received a trial regimen of cyproheptadine with subsequent improvement and is now afebrile.  She developed A-fib with RVR  on 7/14/2023.    Failure to thrive  End-of-life care  -Requested palliative care consult 7/16/2023  -Discussed with patient's  regarding CODE STATUS and interventions  -Counseled it appears she is dying and would at least qualify for outpatient hospice    Acute respiratory failure with hypoxia  Community-acquired pneumonia  Systolic congestive heart failure with exacerbation  -conitnue o2 to maintain sats >90  -repeat cxr 7/14/2023 shows increasing opacities concerning for multifocal pneumonia  -Currently on Rocephin and doxycycline  -Requested ID consult 7/15/2023-appreciate their recs  -Legionella neg, strep pneumo neg, MRSA screen negative  -Lactic acid 0.7  -Procalcitonin 0.74    Sepsis with fever tachycardia, improved  -Multiple etiologies possible including multifocal pneumonia, chronic tunneling back wound  -ID consult 7/15/2023, appreciate their recs  - Clinically sepsis improving, afebrile, improved heart rate    Suspected serotonin syndrome  -Discontinued SSRI on 7/13/2023 with last dose 7/13/2023 AM  -Trial of cyproheptadine given symptoms , however most symptoms should resolve within 24 to 48 hours after discontinuation of SSRI  - Patient was treated with cyproheptadine on 7/14/2023, on that day she did continue to have fever and tachycardia but subsequently fever and tachycardia have resolved  -Unclear if fever and tachycardia are related to serotonin syndrome versus infection versus uncontrolled A-fib  -Discontinue SSRI, will list as an allergy    Alzheimer's dementia    Chronic tunneling back wound  -Follows at   - continue gotti catheter for local wound care  -Possibly etiology of her sepsis, fever tachycardia    Paroxysmal A-fib  Chronic anticoagulation with Eliquis  -Started on diltiazem drip on 7/14/2023, transitioned to amiodarone per cardiology on 7/15/2023  -Cardiology consulted and following, seen by Dr. Bass    Generalized weakness  PT and OT consults if clinically  improves      Expected Discharge Location and Transportation: Long-term care versus home vs home with hospice, medical transport as she will need a stretcher  Expected Discharge   Expected Discharge Date: 7/18/2023; Expected Discharge Time:      DVT prophylaxis:  Medical and mechanical DVT prophylaxis orders are present.     AM-PAC 6 Clicks Score (PT): 6 (07/16/23 1138)    CODE STATUS:   Code Status and Medical Interventions:   Ordered at: 07/10/23 210     Medical Intervention Limits:    NO intubation (DNI)    NO cardioversion     Code Status (Patient has no pulse and is not breathing):    No CPR (Do Not Attempt to Resuscitate)     Medical Interventions (Patient has pulse or is breathing):    Limited Support     Comments:    Discussed with      Release to patient:    Routine Release       Malena Rowe MD  07/17/23        Electronically signed by Malena Rowe MD at 07/17/23 1032       Jeffry Okeefe MD at 07/16/23 1439              INFECTIOUS DISEASE f/u     Laura ANNETTE [place]  1940  [id number]    Date of Consult: 7/15/2023    Admission Date: 7/10/2023      Requesting Provider: Malena Rowe MD  Evaluating Physician: Jeffry Okeefe MD    Reason for Consultation: fever, sepsis    History of present illness:    Patient is a 83 y.o. female, known to Dr. Hamzah Charlton, with h/o Alzheimer's disease, bedbound, PAF/amiodarone/Eliquis, pulmonary hypertension, chronic anemia, sacral decubitus ulcer/chronic tunneling back wound, and recent pulmonary nodule/cystic thyroid mass who was admitted to [facility] from 5/7-5/12 for fevers and diaphoresis.  We were asked to see for UTI and sacral decubitus ulcer with tunneling back wound.  The decubitus ulcer did not appear to be actively infection, but she did have E.coli UTI and was treated with IV Rocephin.  She was discharged on nitrofurantoin until 5/21.      She returns to [facility] ED on 7/10 for acute hypoxic respiratory failure an decompensated diastolic heart  failure.  She was placed in ICU for NRB/HFNC.  She was transferred to telemetry on  on 2L O2NC. She has been on Rocephin and doxycycline to cover for UTI and possible pneumonia.  She had multiple episode of tachycardia, HTN, muscle rigidity, hectic fevers, and diaphoresis which her  states have been ongoing and intermittent for last 6 months with suspected serotonin syndrome.  Her Fluoxetine was stopped on .  She had another episode on  and was given a dose of cyproheptadine. She was seen by cardiology on 7/15 and started on amiodarone drip.  She had some lengthened QT interval on admission and QTc stays close to 500.  Her CXR on  showed increasing bilateral airspace opacities concerning for multifocal pneumonia.   Her creatinine went from 0.65 on  to 1.33 on 7/15.  Her WBC is WNL.  Blood cultures are negative to date.  COVID-19/Flu PCR was negative.  She remains on Rocephin and doxycycline.  ID was asked to evaluate and manage her antibiotic therapy.      has been considering comfort measures palliative medicine to be consulted currently afebrile  Past Medical History:   Diagnosis Date    Anxiety 2022    Late onset Alzheimer's disease without behavioral disturbance 2018    Paroxysmal atrial fibrillation with rapid ventricular response 2022    Pulmonary hypertension 2022       Past Surgical History:   Procedure Laterality Date    APPENDECTOMY      VERTEBROPLASTY      L123, fusion       Family History   Problem Relation Age of Onset    Heart attack Father     Colon cancer Brother        Social History     Socioeconomic History    Marital status:    Tobacco Use    Smoking status: Former     Packs/day: 1.00     Years: 30.00     Pack years: 30.00     Types: Cigarettes     Start date: 1960     Quit date: 1993     Years since quittin.5    Smokeless tobacco: Never   Vaping Use    Vaping Use: Never used   Substance and Sexual Activity    Alcohol use:  Not Currently    Drug use: No    Sexual activity: Defer       Allergies   Allergen Reactions    Codeine Nausea And Vomiting    Serotonin Reuptake Inhibitors (Ssris) Other (See Comments)     Serotonin syndrome         Medication:    Current Facility-Administered Medications:     acetaminophen (TYLENOL) tablet 650 mg, 650 mg, Oral, Q4H PRN, Case, Azalia V., DO, 650 mg at 07/15/23 0146    amiodarone (PACERONE) tablet 200 mg, 200 mg, Oral, TID, Graeme Bass MD, 200 mg at 07/16/23 1111    amiodarone 360 mg in 200 mL D5W infusion, 1 mg/min, Intravenous, Continuous, Graeme Bass MD, Last Rate: 33.3 mL/hr at 07/16/23 1220, 1 mg/min at 07/16/23 1220    apixaban (ELIQUIS) tablet 2.5 mg, 2.5 mg, Oral, Q12H, Case, Azalia V., DO, 2.5 mg at 07/16/23 1111    cefTRIAXone (ROCEPHIN) 1 g/100 mL 0.9% NS (MBP), 1 g, Intravenous, Q24H, Case, Azalia V., DO, 1 g at 07/15/23 1708    donepezil (ARICEPT) tablet 5 mg, 5 mg, Oral, Nightly, Case, Azalia V., DO, 5 mg at 07/15/23 2051    doxycycline (MONODOX) capsule 100 mg, 100 mg, Oral, Q12H, Miguel Ángel Murray PA, 100 mg at 07/16/23 1111    famotidine (PEPCID) tablet 20 mg, 20 mg, Oral, Daily, Elana Ortiz, PharmD, 20 mg at 07/16/23 1111    furosemide (LASIX) tablet 40 mg, 40 mg, Oral, Daily, Case, Azalia V., DO, 40 mg at 07/16/23 1111    LORazepam (ATIVAN) tablet 0.25 mg, 0.25 mg, Oral, BID, Case, Azalia V., DO, 0.25 mg at 07/16/23 1111    Magnesium Standard Dose Replacement - Follow Nurse / BPA Driven Protocol, , Does not apply, PRN, Shara Stein, DNP, APRN    metoprolol tartrate (LOPRESSOR) half tablet 12.5 mg, 12.5 mg, Oral, Q12H, Graeme Bass MD, 12.5 mg at 07/16/23 1112    metoprolol tartrate (LOPRESSOR) injection 5 mg, 5 mg, Intravenous, Q6H PRN, Sebastian Marie MD    nystatin (MYCOSTATIN) powder, , Topical, Q12H, Azalia Elmore V., DO, Given at 07/16/23 1112    Potassium Replacement - Follow Nurse / BPA Driven Protocol, , Does not apply, PRN, Case, Azalia  V., DO    sodium chloride 0.9 % flush 10 mL, 10 mL, Intravenous, PRN, Ramírez Azalia V., DO, 10 mL at 07/15/23 2058    Antibiotics:  Anti-Infectives (From admission, onward)      Ordered     Dose/Rate Route Frequency Start Stop    07/15/23 1346  doxycycline (MONODOX) capsule 100 mg        Ordering Provider: Miguel Ángel Murray PA    100 mg Oral Every 12 Hours Scheduled 07/15/23 2100 07/22/23 2059    07/10/23 2112  cefTRIAXone (ROCEPHIN) 1 g/100 mL 0.9% NS (MBP)        Ordering Provider: Azalia Elmore V., DO    1 g  over 30 Minutes Intravenous Every 24 Hours 23 1800 23 1759    07/10/23 182  cefTRIAXone (ROCEPHIN) 1 g/100 mL 0.9% NS (MBP)        Ordering Provider: Demian Andrade MD    1 g  over 30 Minutes Intravenous Once 07/10/23 1844 07/10/23 1945    07/10/23 1828  AZITHROMYCIN 500 MG/250 ML 0.9% NS IVPB (vial-mate)        Ordering Provider: Demian Andrade MD    500 mg  over 60 Minutes Intravenous Once 07/10/23 1844 07/10/23 2107              Review of Systems:  Unable to obtain d/t dementia      Physical Exam:   Vital Signs  Temp (24hrs), Av.6 °F (37 °C), Min:97 °F (36.1 °C), Max:99.2 °F (37.3 °C)    Temp  Min: 97 °F (36.1 °C)  Max: 99.2 °F (37.3 °C)  BP  Min: 117/65  Max: 151/82  Pulse  Min: 67  Max: 110  Resp  Min: 18  Max: 22  SpO2  Min: 92 %  Max: 100 %    GENERAL: Awake and alert, in no acute distress. Chronically ill appearing.  Does not follow directions.  No interaction.  Dementia  HEENT: Normocephalic, atraumatic.  PERRL. EOMI. No conjunctival injection.       LUNGS: Symmetrical inspiration bilaterally  ABDOMEN: Soft, nondistended.  EXT:  No cyanosis, clubbing or edema. No cord.  :  With Contreras catheter.  MSK: No joint effusions or erythema  SKIN: Unable to assess secondary to dementia.  PSYCHIATRIC: Unable to assess secondary to dementia.      23    Laboratory Data    Results from last 7 days   Lab Units 07/15/23  1044 23  1004 23  0550   WBC  10*3/mm3 6.17 10.27 7.58   HEMOGLOBIN g/dL 8.1* 8.6* 8.3*   HEMATOCRIT % 26.2* 27.0* 27.0*   PLATELETS 10*3/mm3 288 279 250       Results from last 7 days   Lab Units 07/15/23  0209   SODIUM mmol/L 143   POTASSIUM mmol/L 4.2   CHLORIDE mmol/L 103   CO2 mmol/L 21.0*   BUN mg/dL 21   CREATININE mg/dL 1.33*   GLUCOSE mg/dL 112*   CALCIUM mg/dL 9.3       Results from last 7 days   Lab Units 07/15/23  0209   ALK PHOS U/L 114   BILIRUBIN mg/dL 0.2   ALT (SGPT) U/L 15   AST (SGOT) U/L 21               Results from last 7 days   Lab Units 07/15/23  1044   LACTATE mmol/L 0.7       Results from last 7 days   Lab Units 07/13/23  0550   CK TOTAL U/L 99           Estimated Creatinine Clearance: 24.6 mL/min (A) (by C-G formula based on SCr of 1.33 mg/dL (H)).      Microbiology:  Microbiology Results (last 10 days)       Procedure Component Value - Date/Time    MRSA Screen, PCR (Inpatient) - Swab, Nares [[id number]]  (Normal) Collected: 07/15/23 1117    Lab Status: Final result Specimen: Swab from Nares Updated: 07/15/23 1247     MRSA PCR Negative    Narrative:      The negative predictive value of this diagnostic test is high and should only be used to consider de-escalating anti-MRSA therapy. A positive result may indicate colonization with MRSA and must be correlated clinically.  MRSA Negative    S. Pneumo Ag Urine or CSF - Urine, Urine, Clean Catch [[id number]]  (Normal) Collected: 07/15/23 1116    Lab Status: Final result Specimen: Urine, Clean Catch Updated: 07/15/23 2051     Strep Pneumo Ag Negative    Legionella Antigen, Urine - Urine, Urine, Clean Catch [[id number]]  (Normal) Collected: 07/15/23 1116    Lab Status: Final result Specimen: Urine, Clean Catch Updated: 07/15/23 2051     LEGIONELLA ANTIGEN, URINE Negative    Blood Culture - Blood, Arm, Left [[id number]]  (Normal) Collected: 07/10/23 1859    Lab Status: Final result Specimen: Blood from Arm, Left Updated: 07/15/23 2000     Blood Culture No growth at 5 days    Blood  Culture - Blood, Arm, Left [[id number]]  (Normal) Collected: 07/10/23 1852    Lab Status: Final result Specimen: Blood from Arm, Left Updated: 07/15/23 2000     Blood Culture No growth at 5 days    COVID PRE-OP / PRE-PROCEDURE SCREENING ORDER (NO ISOLATION) - Swab, Nasopharynx [[id number]]  (Normal) Collected: 07/10/23 1824    Lab Status: Final result Specimen: Swab from Nasopharynx Updated: 07/10/23 1936    Narrative:      The following orders were created for panel order COVID PRE-OP / PRE-PROCEDURE SCREENING ORDER (NO ISOLATION) - Swab, Nasopharynx.  Procedure                               Abnormality         Status                     ---------                               -----------         ------                     COVID-19 and FLU A/B PCR...[[id number]]  Normal              Final result                 Please view results for these tests on the individual orders.    COVID-19 and FLU A/B PCR - Swab, Nasopharynx [[id number]]  (Normal) Collected: 07/10/23 1824    Lab Status: Final result Specimen: Swab from Nasopharynx Updated: 07/10/23 1936     COVID19 Not Detected     Influenza A PCR Not Detected     Influenza B PCR Not Detected    Narrative:      Fact sheet for providers: https://www.fda.gov/media/129227/download    Fact sheet for patients: https://www.fda.gov/media/605526/download    Test performed by PCR.                  Radiology:  Imaging Results (Last 72 Hours)       Procedure Component Value Units Date/Time    XR Chest 1 View [[id number]] Collected: 07/14/23 0943     Updated: 07/14/23 0947    Narrative:      XR CHEST 1 VW    Date of Exam: 7/14/2023 9:20 AM EDT    Indication: pna, tachycardia    Comparison: 7/11/2023    Findings:  There are increasing bilateral, left greater than right groundglass opacities. Cardiac mediastinal contours are within normal limits. Vascular calcifications again identified. Regional skeleton is unremarkable.      Impression:      Impression:    1. Increasing bilateral  airspace opacities concerning for multifocal pneumonia. Pulmonary edema less favored.      Electronically Signed: Guanaco Luca    7/14/2023 9:44 AM EDT    Workstation ID: [id number]              Impression:   - Hectic fevers, may be associated with serotonin syndrome.  - Suspected Serotonin syndrome. Fluoxetine was stopped 7/13 after her dose on that day.  Continues to spike high fevers up to 101-103.  Given dose of cyproheptadine on 7/15.  - Bilateral pulmonary infiltrates, aspiration vs pneumonia vs edema.  - Acute hypoxic respiratory failure/d/t volume overload, now on 2L O2.   - Acute renal failure on 7/15.  Cr 1.33. 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  - Paroxysmal atrial fibrillation/amiodarone/Eliquis  - Alzheimer's dementia  - Debilitated/bedbound  - Pulmonary nodule/cystic thyroid mass seen on CT scan in 5/2923    PLAN/RECOMMENDATIONS:   Thank you for asking us to see Laura Wallace, I recommend the following:  - Follow Blood cultures, MRSA screen, UAg for Strep pneumo and Legionella.  - Continue doxycycline but change to oral  - Continue Rocephin for now.  - Continue O2 support        D/w Dr. Hwang,    Cont abx; guarded overall prognosis is reasonable considering palliation    Fever is better currently    Will ask Dr. POPEYE Charlton to resume care tomorrow    Jeffry Okeefe MD  7/16/2023  14:39 EDT                    Electronically signed by Jeffry Okeefe MD at 07/16/23 1440       Graeme Bass MD at 07/16/23 0806          [facility]  IP Progress Note      Chief Complaint/Reason for service: #1 PAF #2 chronically elevated troponins    Subjective   Subjective: The patient's  tells me that he was feeding her last night at 6:00 and she had an episode where her heart rate went up she got  anxious and sweaty.  She had another episode around 9:00.  These episodes were not near as profound as to when she had previously    Past medical, surgical, social and family history reviewed in the patient's electronic medical record.    Objective     Vital Sign Min/Max for last 24 hours  Temp  Min: 97 °F (36.1 °C)  Max: 99.2 °F (37.3 °C)   BP  Min: 117/65  Max: 151/82   Pulse  Min: 67  Max: 110   Resp  Min: 16  Max: 22   SpO2  Min: 96 %  Max: 100 %   Flow (L/min)  Min: 2  Max: 3      Intake/Output Summary (Last 24 hours) at 7/16/2023 0807  Last data filed at 7/16/2023 0502  Gross per 24 hour   Intake 525 ml   Output 525 ml   Net 0 ml             Current Facility-Administered Medications:     acetaminophen (TYLENOL) tablet 650 mg, 650 mg, Oral, Q4H PRN, Case, Azalia V., DO, 650 mg at 07/15/23 0146    amiodarone 360 mg in 200 mL D5W infusion, 1 mg/min, Intravenous, Continuous, Graeme Bass MD, Last Rate: 33.3 mL/hr at 07/16/23 0522, 1 mg/min at 07/16/23 0522    apixaban (ELIQUIS) tablet 2.5 mg, 2.5 mg, Oral, Q12H, Case, Azalia V., DO, 2.5 mg at 07/15/23 2051    cefTRIAXone (ROCEPHIN) 1 g/100 mL 0.9% NS (MBP), 1 g, Intravenous, Q24H, Case, Azalia V., DO, 1 g at 07/15/23 1708    donepezil (ARICEPT) tablet 5 mg, 5 mg, Oral, Nightly, Case, Azalia V., DO, 5 mg at 07/15/23 2051    doxycycline (MONODOX) capsule 100 mg, 100 mg, Oral, Q12H, Miguel Ángel Murray PA, 100 mg at 07/15/23 2051    famotidine (PEPCID) tablet 20 mg, 20 mg, Oral, Daily, Elana Ortiz, PharmD, 20 mg at 07/15/23 0850    furosemide (LASIX) tablet 40 mg, 40 mg, Oral, Daily, Case, Azalia V., DO, 40 mg at 07/15/23 0850    LORazepam (ATIVAN) tablet 0.25 mg, 0.25 mg, Oral, BID, Case, Azalia V., DO, 0.25 mg at 07/15/23 2050    Magnesium Standard Dose Replacement - Follow Nurse / BPA Driven Protocol, , Does not apply, PRN, Shara Stein, DNP, APRN    metoprolol tartrate (LOPRESSOR) injection 5 mg, 5 mg, Intravenous, Q6H PRN, Sebastian Marie MD     nystatin (MYCOSTATIN) powder, , Topical, Q12H, Case, Azalia V., DO, Given at 07/15/23 2051    Potassium Replacement - Follow Nurse / BPA Driven Protocol, , Does not apply, PRN, Case, Azalia V., DO    sodium chloride 0.9 % flush 10 mL, 10 mL, Intravenous, PRN, Case, Azalia V., DO, 10 mL at 07/15/23 2058    Physical Exam: General elderly frail-appearing female not dyspneic tachypneic current heart rate 75        HEENT: No JVP       Respiratory: Clear anteriorly       Cardiovascular: Regular rate and rhythm and no edema palpation                 Neuro: Cannot assess due to patient sleeping       Skin: Warm and dry no edema palpation       Psych: Cannot assess due to patient sleeping    Results Review: Today's EKG is pending.  Output exceeds intake by 4.9 L.  Heart rate 67-89.  Blood pressures good.  Hemoglobin 8.1.    Radiology Results:  Imaging Results (Last 72 Hours)       Procedure Component Value Units Date/Time    XR Chest 1 View [[id number]] Collected: 07/14/23 0943     Updated: 07/14/23 0947    Narrative:      XR CHEST 1 VW    Date of Exam: 7/14/2023 9:20 AM EDT    Indication: pna, tachycardia    Comparison: 7/11/2023    Findings:  There are increasing bilateral, left greater than right groundglass opacities. Cardiac mediastinal contours are within normal limits. Vascular calcifications again identified. Regional skeleton is unremarkable.      Impression:      Impression:    1. Increasing bilateral airspace opacities concerning for multifocal pneumonia. Pulmonary edema less favored.      Electronically Signed: Guanaco Segovia    7/14/2023 9:44 AM EDT    Workstation ID: [id number]            EKG: Sinus rhythm    ECHO: Preserved EF    Tele: No recurrent A-fib    Assessment   Assessment/Plan: A-fib RVR-the patient converted with IV amiodarone.  The  reports 2 minor episodes of tachycardia and her being anxious and diaphoretic.  I will continue IV amiodarone at 1 mg/min.  I will also start her on p.o.  amiodarone.  Continue anticoagulation    Graeme Bass MD  23  08:07 EDT      Electronically signed by Graeme Bass MD at 23 0815       Malena Rowe MD at 23 0732              [facility]  PROGRESS NOTE    Patient Name: Laura Wallace  : 1940  MRN: [id number]    Date of Admission: 7/10/2023  Primary Care Physician: Justin Brumfield MD    Subjective   Subjective     CC:  pna    HPI:   - Patient is an 83-year-old who was admitted to the intensive care unit with acute respiratory failure with hypoxia thought secondary to combination of heart failure as well as community-acquired pneumonia.  There is also concern for suspected serotonin syndrome. Pt  and nursing report and episde again this am with shaking, tremors, fever, tachycardia and tachypnea. Discussed serotonin syndrome with  and he is agreeable to trial of cyproheptadine.     7/15 -patient received cyproheptadine yesterday times multiple doses without much improvement.  She continued to be febrile and tachycardic.  She went into A-fib with RVR.  Ultimately responded to Cardizem drip.  Still having spells of shaking.  Unclear etiology of persistent fevers so will consult ID.  Cardiology consulted for A-fib with RVR     -cardiology transitioned patient to amiodarone for control of her A-fib with improvement although she did have a couple of episodes through the night of shaking and tachycardia.  Tmax was 100.  Unsure if the fevers were related to serotonin syndrome but would avoid SSRIs in the future.  ID note reviewed and appreciate their recommendations, continued work-up.  She remains on doxycycline and Rocephin.   at bedside today requested palliative care consult.  He is considering options for transition to comfort care.    ROS:  Gen-no fevers, chills in the past 24 hours  CV- A-fib better controlled  Resp-positive cough, dyspnea  GI-no  reports of nausea vomiting or diarrhea       Objective   Objective     Vital Signs:   Temp:  [97 °F (36.1 °C)-99.2 °F (37.3 °C)] 98.7 °F (37.1 °C)  Heart Rate:  [] 89  Resp:  [16-22] 18  BP: (117-151)/(57-82) 125/62  Flow (L/min):  [2-3] 2     Physical Exam:  Constitutional: No acute distress, awake, not able to verbally respond to questions  HENT: NCAT, mucous membranes moist  Respiratory: Decreased breath sounds bilaterally, respiratory effort normal   Cardiovascular: Irregular but heart rate 78  Gastrointestinal: Positive bowel sounds, soft, nontender, nondistended  Musculoskeletal: No bilateral ankle edema  Psychiatric: eyes open, blinks but does not verbally respond  Neurologic: generally weak, minimally responsive  Skin: pale      Results Reviewed:  LAB RESULTS:      Lab 07/15/23  1044 07/15/23  0209 07/13/23  1004 07/11/23  0550 07/10/23  2055 07/10/23  1810   WBC 6.17  --  10.27 7.58  --  10.10   HEMOGLOBIN 8.1*  --  8.6* 8.3*  --  8.8*   HEMATOCRIT 26.2*  --  27.0* 27.0*  --  28.2*   PLATELETS 288  --  279 250  --  274   NEUTROS ABS 4.10  --  8.11*  --   --  7.83*   IMMATURE GRANS (ABS) 0.03  --  0.04  --   --  0.05   LYMPHS ABS 1.08  --  1.07  --   --  1.02   MONOS ABS 0.72  --  0.89  --   --  1.15*   EOS ABS 0.23  --  0.14  --   --  0.04   MCV 83.4  --  82.6 83.1  --  83.7   PROCALCITONIN  --  0.74*  --   --   --   --    LACTATE 0.7  --   --  0.8 0.7 2.2*         Lab 07/15/23  0209 07/13/23  0550 07/12/23  1432 07/12/23  0445 07/11/23  1742 07/11/23  0550 07/10/23  1810   SODIUM 143 138  --  138  --  141 139   POTASSIUM 4.2 4.5 4.6 3.3* 3.5 2.6* 3.3*   CHLORIDE 103 103  --  101  --  99 101   CO2 21.0* 23.0  --  25.0  --  28.0 23.0   ANION GAP 19.0* 12.0  --  12.0  --  14.0 15.0   BUN 21 16  --  14  --  11 13   CREATININE 1.33* 0.65  --  0.66  --  0.62 0.63   EGFR 40.3* 88.6  --  88.3  --  89.6 89.3   GLUCOSE 112* 106*  --  108*  --  100* 102*   CALCIUM 9.3 8.8  --  8.7  --  8.6 8.6   IONIZED CALCIUM   --   --   --   --   --  1.19  --    MAGNESIUM 1.7 1.6  --  1.8  --  1.6  --    PHOSPHORUS  --  3.4  --  3.4  --  2.9  --    TSH  --   --   --   --   --  3.890  --          Lab 07/15/23  0209 07/11/23  0550 07/10/23  1810   TOTAL PROTEIN 6.7 6.6 6.5   ALBUMIN 3.2* 3.2* 3.2*   GLOBULIN 3.5 3.4 3.3   ALT (SGPT) 15 16 15   AST (SGOT) 21 24 26   BILIRUBIN 0.2 0.8 0.6   ALK PHOS 114 122* 123*         Lab 07/10/23  2055 07/10/23  1810   PROBNP  --  6,389.0*   HSTROP T 57* 64*                 Lab 07/10/23  1847   PH, ARTERIAL 7.520*   PCO2, ARTERIAL 33.0*   PO2 ART 61.5*   FIO2 36   HCO3 ART 26.9*   BASE EXCESS ART 3.9*   CARBOXYHEMOGLOBIN 1.6     Brief Urine Lab Results  (Last result in the past 365 days)        Color   Clarity   Blood   Leuk Est   Nitrite   Protein   CREAT   Urine HCG        07/11/23 0059 Yellow   Clear   Small (1+)   Negative   Negative   Negative                   Microbiology Results Abnormal       Procedure Component Value - Date/Time    Legionella Antigen, Urine - Urine, Urine, Clean Catch [[id number]]  (Normal) Collected: 07/15/23 1116    Lab Status: Final result Specimen: Urine, Clean Catch Updated: 07/15/23 2051     LEGIONELLA ANTIGEN, URINE Negative    S. Pneumo Ag Urine or CSF - Urine, Urine, Clean Catch [[id number]]  (Normal) Collected: 07/15/23 1116    Lab Status: Final result Specimen: Urine, Clean Catch Updated: 07/15/23 2051     Strep Pneumo Ag Negative    Blood Culture - Blood, Arm, Left [[id number]]  (Normal) Collected: 07/10/23 1852    Lab Status: Final result Specimen: Blood from Arm, Left Updated: 07/15/23 2000     Blood Culture No growth at 5 days    Blood Culture - Blood, Arm, Left [[id number]]  (Normal) Collected: 07/10/23 1859    Lab Status: Final result Specimen: Blood from Arm, Left Updated: 07/15/23 2000     Blood Culture No growth at 5 days    MRSA Screen, PCR (Inpatient) - Swab, Nares [[id number]]  (Normal) Collected: 07/15/23 1117    Lab Status: Final result Specimen: Swab from  [place] Updated: 07/15/23 1247     MRSA PCR Negative    Narrative:      The negative predictive value of this diagnostic test is high and should only be used to consider de-escalating anti-MRSA therapy. A positive result may indicate colonization with MRSA and must be correlated clinically.  MRSA Negative    COVID PRE-OP / PRE-PROCEDURE SCREENING ORDER (NO ISOLATION) - Swab, Nasopharynx [[id number]]  (Normal) Collected: 07/10/23 1824    Lab Status: Final result Specimen: Swab from Nasopharynx Updated: 07/10/23 1936    Narrative:      The following orders were created for panel order COVID PRE-OP / PRE-PROCEDURE SCREENING ORDER (NO ISOLATION) - Swab, Nasopharynx.  Procedure                               Abnormality         Status                     ---------                               -----------         ------                     COVID-19 and FLU A/B PCR...[[id number]]  Normal              Final result                 Please view results for these tests on the individual orders.    COVID-19 and FLU A/B PCR - Swab, Nasopharynx [[id number]]  (Normal) Collected: 07/10/23 1824    Lab Status: Final result Specimen: Swab from Nasopharynx Updated: 07/10/23 1936     COVID19 Not Detected     Influenza A PCR Not Detected     Influenza B PCR Not Detected    Narrative:      Fact sheet for providers: https://www.fda.gov/media/085381/download    Fact sheet for patients: https://www.fda.gov/media/173287/download    Test performed by PCR.            EEG    Result Date: 7/14/2023  Reason for referral: 83 y.o.female with shaking spells, consideration of seizures Technical Summary:  A 19 channel digital EEG was performed using the international 10-20 placement system, including eye leads and EKG leads. Duration: 20 minutes Findings: The patient is awake.  The background shows diffuse low to medium amplitude 5-10 Hz intermixed theta and alpha activity which is present symmetrically over both hemispheres.  4 Hz generalized delta is  intermixed at times.  At the beginning of the study rapid eye movements are noted with attendant artifact.  As a study proceeds, a slight mouth tremor is noted, without EEG correlate.  Later.  Right hand and left hand tremoring is noted, again without EEG correlate.  Sleep is not seen.  No focal features or epileptiform activity are present.  Photic stimulation does not change the background.  Hyperventilation is not performed. Video: Available Technical quality: Superior SUMMARY: Intermittent generalized slow No focal features or epileptiform activity are seen Episodes of eye movement and hand tremoring have no EEG correlate     Impression: Diffuse cerebral dysfunction of exceedingly mild degree, nonspecific No evidence for epilepsy is present This report is transcribed using the Dragon dictation system.      XR Chest 1 View    Result Date: 7/14/2023  XR CHEST 1 VW Date of Exam: 7/14/2023 9:20 AM EDT Indication: pna, tachycardia Comparison: 7/11/2023 Findings: There are increasing bilateral, left greater than right groundglass opacities. Cardiac mediastinal contours are within normal limits. Vascular calcifications again identified. Regional skeleton is unremarkable.     Impression: Impression: 1. Increasing bilateral airspace opacities concerning for multifocal pneumonia. Pulmonary edema less favored. Electronically Signed: Guanaco Segovia  7/14/2023 9:44 AM EDT  Workstation ID: [id number]     Results for orders placed during the hospital encounter of 05/07/23    Adult Transthoracic Echo Complete w/ Color, Spectral and Contrast if Necessary Per Protocol    Interpretation Summary    Left ventricular systolic function is normal. Left ventricular ejection fraction appears to be 61 - 65%.    Left ventricular diastolic function is consistent with (grade II w/high LAP) pseudonormalization.    Left atrial volume is severely increased.    The right atrial cavity is moderately  dilated.    There is calcification of the aortic  valve.    Moderate tricuspid valve regurgitation is present.    Estimated right ventricular systolic pressure from tricuspid regurgitation is mildly elevated (35-45 mmHg). Calculated right ventricular systolic pressure from tricuspid regurgitation is 41 mmHg.    Mild pulmonary hypertension is present.      Current medications:  Scheduled Meds:apixaban, 2.5 mg, Oral, Q12H  cefTRIAXone, 1 g, Intravenous, Q24H  donepezil, 5 mg, Oral, Nightly  doxycycline, 100 mg, Oral, Q12H  famotidine, 20 mg, Oral, Daily  furosemide, 40 mg, Oral, Daily  LORazepam, 0.25 mg, Oral, BID  nystatin, , Topical, Q12H      Continuous Infusions:amiodarone, 1 mg/min, Last Rate: 1 mg/min (07/16/23 0522)    PRN Meds:.  acetaminophen    Magnesium Standard Dose Replacement - Follow Nurse / BPA Driven Protocol    metoprolol tartrate    Potassium Replacement - Follow Nurse / BPA Driven Protocol    sodium chloride    Assessment & Plan   Assessment & Plan     Active Hospital Problems    Diagnosis  POA    **Acute respiratory failure with hypoxia [J96.01]  Yes    Moderate Malnutrition (HCC) [E44.0]  Yes    Diastolic dysfunction [I51.89]  Yes    Acute on chronic heart failure with preserved ejection fraction (HFpEF) [I50.33]  Yes    Chronic anticoagulation (Eliquis) [Z79.01]  Not Applicable    Severe malnutrition [E43]  Yes    Sepsis without acute organ dysfunction [A41.9]  Yes    Acute UTI (urinary tract infection) [N39.0]  Yes    Lactic acidosis [E87.20]  Yes    PAF (paroxysmal atrial fibrillation) [I48.0]  Yes    Wound with tunneling [T14.8XXA]  Yes    Pulmonary hypertension (WHO Group 2) [I27.20]  Yes    Late onset Alzheimer's disease without behavioral disturbance [G30.1, F02.80]  Yes      Resolved Hospital Problems   No resolved problems to display.        Brief Hospital Course to date:  Laura Wallace is a 83 y.o. female with past medical history of paroxysmal A-fib, systolic congestive heart failure, Alzheimer's disease, chronic tunneling back  wound followed at , pulmonary hypertension who presented with acute respiratory failure with hypoxia thought secondary to exacerbation of systolic congestive heart failure as well as community-acquired pneumonia.  She required high flow nasal cannula and BiPAP in the intensive care unit.  She was transferred to the floor on 7/13/2023.  Hospitalist service assumed care on 7/14/2023.  Secondary to her sinus tachycardia and fevers there was concern for suspected serotonin syndrome.  SSRI was discontinued on 7/13/2023 but she did receive a dose that morning.  She received a trial regimen of cyproheptadine with eventual improvement.  She developed A-fib with RVR on 7/14/2023.    Failure to thrive  End-of-life care  -Requested palliative care consult 7/16/2023  -Discussed with patient's  regarding CODE STATUS and interventions  -Counseled it appears she is dying and would at least qualify for outpatient hospice    Acute respiratory failure with hypoxia  Community-acquired pneumonia  Systolic congestive heart failure with exacerbation  -conitnue o2 to maintain sats >90  -repeat cxr 7/14/2023 shows increasing opacities concerning for multifocal pneumonia  -Currently on Rocephin and doxycycline  -Requested ID consult 7/15/2023-appreciate their recs  -Legionella, strep pneumo, MRSA screen pending  -Lactic acid 0.7  -Procalcitonin 0.74    Sepsis with fever tachycardia  -Multiple etiologies possible including multifocal pneumonia, chronic tunneling back wound  -ID consult 7/15/2023, appreciate their recs  - Clinically sepsis improving, afebrile, improved heart rate    Suspected serotonin syndrome  -Discontinued SSRI on 7/13/2023 with last dose 7/13/2023 AM  -Trial of cyproheptadine given symptoms , however most symptoms should resolve within 24 to 48 hours after discontinuation of SSRI  - Patient was treated with cyproheptadine on 7/14/2023, on that day she did continue to have fever and tachycardia but subsequently  fever and tachycardia have resolved  -Unclear if fever and tachycardia are related to serotonin syndrome versus infection versus uncontrolled A-fib  -Discontinue SSRI, will list as an allergy    Alzheimer's dementia    Chronic tunneling back wound  -Follows at   - continue gotti catheter for local wound care  -Possibly etiology of her sepsis, fever tachycardia    Paroxysmal A-fib  Chronic anticoagulation with Eliquis  -Started on diltiazem drip on 7/14/2023, transition to amiodarone per cardiology on 7/15/2023  -Cardiology consulted and following, seen by Dr. Bass    Generalized weakness  PT and OT consults if clinically improves      Expected Discharge Location and Transportation: Long-term care versus home, private vehicle  Expected Discharge   Expected Discharge Date: 7/18/2023; Expected Discharge Time:      DVT prophylaxis:  Medical and mechanical DVT prophylaxis orders are present.          CODE STATUS:   Code Status and Medical Interventions:   Ordered at: 07/10/23 2107     Medical Intervention Limits:    NO intubation (DNI)    NO cardioversion     Code Status (Patient has no pulse and is not breathing):    No CPR (Do Not Attempt to Resuscitate)     Medical Interventions (Patient has pulse or is breathing):    Limited Support     Comments:    Discussed with      Release to patient:    Routine Release       Malena Rowe MD  07/16/23        Electronically signed by Malena Rowe MD at 07/17/23 0736          Consult Notes (last 72 hours)        Angela Bautista MD at 07/17/23 1815        Consult Orders    1. Inpatient Palliative Care MD Consult [[id number]] ordered by Malena Rowe MD at 07/16/23 1222                 Justin Brumfield MD - PCP  Consulting physician: Dr. Malena Rowe    Chief Complaint   Patient presents with    Shortness of Breath       Reason for consult: GOC/ACP, per 's request    HPI:   82yo F with Alzheimer dementia, PAF, CHF, pulmonary HTN, and chronic  tunneling sacral decubitus wound.  Lives with  and has paid caregivers.  Dependent for all ADLs.  Bedbound with BLE contractures.  Incontinent.  Alert to self only.  Speaks, will reply with one word answers when addressed.      Hospitalized at [facility] 5/7-5/12/23 due to wound and E coli UTI.  Back 7/10 with acute hypoxic respiratory failure from PNA and CHF exacerbation. Received ICU care with use of HFNC and BiPAP.    Fluoxetine dc'ed and treated with Cyproheptadine due to concern for serotonin syndrome.  ID involved for abx management, and cards following due to CHF and AF with RVR.      Patient comfy at time of Palliative RN visit earlier today.   had stepped out to the cafeteria for a meal.     Meds reviewed.  Riperdone given x1 on 7/13.  Toradol given x1 on 7/15.    Pain assesment: none    Dyspnea: none    N/V: none    PPS: 30%      Past Medical History:   Diagnosis Date    Anxiety 9/23/2022    Late onset Alzheimer's disease without behavioral disturbance 5/25/2018    Paroxysmal atrial fibrillation with rapid ventricular response 4/23/2022    Pulmonary hypertension 9/20/2022     Past Surgical History:   Procedure Laterality Date    APPENDECTOMY  1960    VERTEBROPLASTY      L123, fusion     Current Code Status       Date Active Code Status Order ID Comments User Context       7/10/2023 2107 No CPR (Do Not Attempt to Resuscitate) [id number]  Horace Murray MD ED        Question Answer    Code Status (Patient has no pulse and is not breathing) No CPR (Do Not Attempt to Resuscitate)    Medical Interventions (Patient has pulse or is breathing) Limited Support    Medical Intervention Limits: NO intubation (DNI)     NO cardioversion    Comments Discussed with     Release to patient Routine Release                  Scheduled Meds:amiodarone, 200 mg, Oral, TID  apixaban, 2.5 mg, Oral, Q12H  donepezil, 5 mg, Oral, Nightly  doxycycline, 100 mg, Oral, Q12H  famotidine, 20 mg, Oral,  "Daily  furosemide, 40 mg, Oral, Daily  LORazepam, 0.25 mg, Oral, BID  metoprolol tartrate, 12.5 mg, Oral, Q12H  nystatin, , Topical, Q12H      PRN Meds:.  acetaminophen    Magnesium Standard Dose Replacement - Follow Nurse / BPA Driven Protocol    Potassium Replacement - Follow Nurse / BPA Driven Protocol    sodium chloride      Allergies   Allergen Reactions    Codeine Nausea And Vomiting    Serotonin Reuptake Inhibitors (Ssris) Other (See Comments)     Serotonin syndrome     Family History   Problem Relation Age of Onset    Heart attack Father     Colon cancer Brother      Social History     Socioeconomic History    Marital status:    Tobacco Use    Smoking status: Former     Packs/day: 1.00     Years: 30.     Pack years: 30.00     Types: Cigarettes     Start date: 1960     Quit date: 1993     Years since quittin.5    Smokeless tobacco: Never   Vaping Use    Vaping Use: Never used   Substance and Sexual Activity    Alcohol use: Not Currently    Drug use: No    Sexual activity: Defer     Review of Systems - per HPI and PMH      /65 (BP Location: Left arm, Patient Position: Lying)   Pulse 71   Temp 99.6 °F (37.6 °C) (Oral)   Resp 18   Ht 157.5 cm (62\")   Wt 46.9 kg (103 lb 6.3 oz)   SpO2 94%   BMI 18.91 kg/m²     Intake/Output Summary (Last 24 hours) at 2023  Last data filed at 2023  Gross per 24 hour   Intake 354 ml   Output 950 ml   Net -596 ml     Physical Exam:  Frail, elderly F, sitting up in bed  Appears chronically ill  NAD, quite pleasant  Taking bites of food offered by   Head NC/AT  Trachea midline  Respirations even and unlabored   Heart RRR  Very pale  Skin fragile, arm wrapped  Contreras anchored  Awake, alert, swallowing bites of food w/o difficulty as  feeds her  Says very few words but smiling      Results from last 7 days   Lab Units 07/15/23  1044   WBC 10*3/mm3 6.17   HEMOGLOBIN g/dL 8.1*   HEMATOCRIT % 26.2*   PLATELETS 10*3/mm3 288 "     Results from last 7 days   Lab Units 07/15/23  0209   SODIUM mmol/L 143   POTASSIUM mmol/L 4.2   CHLORIDE mmol/L 103   CO2 mmol/L 21.0*   BUN mg/dL 21   CREATININE mg/dL 1.33*   CALCIUM mg/dL 9.3   BILIRUBIN mg/dL 0.2   ALK PHOS U/L 114   ALT (SGPT) U/L 15   AST (SGOT) U/L 21   GLUCOSE mg/dL 112*     Results from last 7 days   Lab Units 07/15/23  0209   SODIUM mmol/L 143   POTASSIUM mmol/L 4.2   CHLORIDE mmol/L 103   CO2 mmol/L 21.0*   BUN mg/dL 21   CREATININE mg/dL 1.33*   GLUCOSE mg/dL 112*   CALCIUM mg/dL 9.3         Impression:   84yo F with advanced dementia and chronic tunneling sacral decubitus wound.  Acutely hospitalized with respiratory failure from PNA and CHF exacerbation., as well as suspected serotonin syndrome.     Symptoms:  Debilty  Encephalopathy  Skin failure    Plan:   -Visited patient with  Horace present at bedside feeding patient dinner.(Patient eating very well.)  -Introduced myself as Palliative Care physician and confirmed Mr. Wallace's request for Palliative Care consultation.  While he agreed, he requested I return in the morning to speak further.  -Palliative Team will follow for GOC and ACP needs.  Will revisit tomorrow.       Time: Time approx. 30 min. - includes chart review, F2F visit time, care coordination with Palliative Team and [facility] staff, as well as documentation       Angela Bautista MD  07/17/23                Electronically signed by Angela Bautista MD at 07/18/23 025

## 2023-07-18 NOTE — PROGRESS NOTES
[facility] Medicine Services  PROGRESS NOTE    Patient Name: Laura Wallace  : 1940  MRN: [id number]    Date of Admission: 7/10/2023  Primary Care Physician: Justin Brumfield MD    Subjective   Subjective     CC:  Resp failure     HPI:  No acute events. Overall remains the same. Will open eyes. Denies pain.  at bedside.     ROS:  Unable to obtain due to mental status      Objective   Objective     Vital Signs:   Temp:  [97 °F (36.1 °C)-99.9 °F (37.7 °C)] 97.9 °F (36.6 °C)  Heart Rate:  [49-85] 51  Resp:  [16-18] 18  BP: (135-155)/(50-96) 135/50  Flow (L/min):  [2] 2     Physical Exam:  Constitutional: frail  HENT: NCAT, mucous membranes moist  Respiratory: diminished; poor effort  Cardiovascular: RRR, no murmurs, rubs, or gallops  Gastrointestinal: Positive bowel sounds, soft, nontender, nondistended  Musculoskeletal: No bilateral ankle edema  Psychiatric: calm  Neurologic: strength symmetric in all extremities, Cranial Nerves grossly intact to confrontation, speech clear  Skin: No rashes    Results Reviewed:  LAB RESULTS:      Lab 07/15/23  1044 07/15/23  0209 23  1004   WBC 6.17  --  10.27   HEMOGLOBIN 8.1*  --  8.6*   HEMATOCRIT 26.2*  --  27.0*   PLATELETS 288  --  279   NEUTROS ABS 4.10  --  8.11*   IMMATURE GRANS (ABS) 0.03  --  0.04   LYMPHS ABS 1.08  --  1.07   MONOS ABS 0.72  --  0.89   EOS ABS 0.23  --  0.14   MCV 83.4  --  82.6   PROCALCITONIN  --  0.74*  --    LACTATE 0.7  --   --          Lab 07/15/23  0209 23  0550 23  1432 23  0445 23  1742   SODIUM 143 138  --  138  --    POTASSIUM 4.2 4.5 4.6 3.3* 3.5   CHLORIDE 103 103  --  101  --    CO2 21.0* 23.0  --  25.0  --    ANION GAP 19.0* 12.0  --  12.0  --    BUN 21 16  --  14  --    CREATININE 1.33* 0.65  --  0.66  --    EGFR 40.3* 88.6  --  88.3  --    GLUCOSE 112* 106*  --  108*  --    CALCIUM 9.3 8.8  --  8.7  --    MAGNESIUM 1.7 1.6  --  1.8  --    PHOSPHORUS  --  3.4  --   3.4  --          Lab 07/15/23  0209   TOTAL PROTEIN 6.7   ALBUMIN 3.2*   GLOBULIN 3.5   ALT (SGPT) 15   AST (SGOT) 21   BILIRUBIN 0.2   ALK PHOS 114                 Lab 07/16/23  0957   IRON 11*   IRON SATURATION (TSAT) 3*   TIBC 325   TRANSFERRIN 218         Brief Urine Lab Results  (Last result in the past 365 days)        Color   Clarity   Blood   Leuk Est   Nitrite   Protein   CREAT   Urine HCG        07/11/23 0059 Yellow   Clear   Small (1+)   Negative   Negative   Negative                   Microbiology Results Abnormal       Procedure Component Value - Date/Time    Legionella Antigen, Urine - Urine, Urine, Clean Catch [[id number]]  (Normal) Collected: 07/15/23 1116    Lab Status: Final result Specimen: Urine, Clean Catch Updated: 07/15/23 2051     LEGIONELLA ANTIGEN, URINE Negative    S. Pneumo Ag Urine or CSF - Urine, Urine, Clean Catch [[id number]]  (Normal) Collected: 07/15/23 1116    Lab Status: Final result Specimen: Urine, Clean Catch Updated: 07/15/23 2051     Strep Pneumo Ag Negative    Blood Culture - Blood, Arm, Left [[id number]]  (Normal) Collected: 07/10/23 1852    Lab Status: Final result Specimen: Blood from Arm, Left Updated: 07/15/23 2000     Blood Culture No growth at 5 days    Blood Culture - Blood, Arm, Left [[id number]]  (Normal) Collected: 07/10/23 1859    Lab Status: Final result Specimen: Blood from Arm, Left Updated: 07/15/23 2000     Blood Culture No growth at 5 days    MRSA Screen, PCR (Inpatient) - Swab, Nares [[id number]]  (Normal) Collected: 07/15/23 1117    Lab Status: Final result Specimen: Swab from Nares Updated: 07/15/23 1247     MRSA PCR Negative    Narrative:      The negative predictive value of this diagnostic test is high and should only be used to consider de-escalating anti-MRSA therapy. A positive result may indicate colonization with MRSA and must be correlated clinically.  MRSA Negative    COVID PRE-OP / PRE-PROCEDURE SCREENING ORDER (NO ISOLATION) - Swab, Nasopharynx  [[id number]]  (Normal) Collected: 07/10/23 1824    Lab Status: Final result Specimen: Swab from Nasopharynx Updated: 07/10/23 1936    Narrative:      The following orders were created for panel order COVID PRE-OP / PRE-PROCEDURE SCREENING ORDER (NO ISOLATION) - Swab, Nasopharynx.  Procedure                               Abnormality         Status                     ---------                               -----------         ------                     COVID-19 and FLU A/B PCR...[[id number]]  Normal              Final result                 Please view results for these tests on the individual orders.    COVID-19 and FLU A/B PCR - Swab, Nasopharynx [[id number]]  (Normal) Collected: 07/10/23 1824    Lab Status: Final result Specimen: Swab from Nasopharynx Updated: 07/10/23 1936     COVID19 Not Detected     Influenza A PCR Not Detected     Influenza B PCR Not Detected    Narrative:      Fact sheet for providers: https://www.fda.gov/media/410962/download    Fact sheet for patients: https://www.fda.gov/media/969121/download    Test performed by PCR.            No radiology results from the last 24 hrs    Results for orders placed during the hospital encounter of 05/07/23    Adult Transthoracic Echo Complete w/ Color, Spectral and Contrast if Necessary Per Protocol    Interpretation Summary    Left ventricular systolic function is normal. Left ventricular ejection fraction appears to be 61 - 65%.    Left ventricular diastolic function is consistent with (grade II w/high LAP) pseudonormalization.    Left atrial volume is severely increased.    The right atrial cavity is moderately  dilated.    There is calcification of the aortic valve.    Moderate tricuspid valve regurgitation is present.    Estimated right ventricular systolic pressure from tricuspid regurgitation is mildly elevated (35-45 mmHg). Calculated right ventricular systolic pressure from tricuspid regurgitation is 41 mmHg.    Mild pulmonary hypertension is  present.      Current medications:  Scheduled Meds:amiodarone, 200 mg, Oral, TID  apixaban, 2.5 mg, Oral, Q12H  donepezil, 5 mg, Oral, Nightly  famotidine, 20 mg, Oral, Daily  furosemide, 40 mg, Oral, Daily  LORazepam, 0.25 mg, Oral, BID  metoprolol tartrate, 12.5 mg, Oral, Q12H  nystatin, , Topical, Q12H      Continuous Infusions:   PRN Meds:.  acetaminophen    Magnesium Low Dose Replacement - Follow Nurse / BPA Driven Protocol    Potassium Replacement - Follow Nurse / BPA Driven Protocol    sodium chloride    Assessment & Plan   Assessment & Plan     Active Hospital Problems    Diagnosis  POA    **Acute respiratory failure with hypoxia [J96.01]  Yes    Moderate Malnutrition (HCC) [E44.0]  Yes    Diastolic dysfunction [I51.89]  Yes    Acute on chronic heart failure with preserved ejection fraction (HFpEF) [I50.33]  Yes    Chronic anticoagulation (Eliquis) [Z79.01]  Not Applicable    Severe malnutrition [E43]  Yes    Sepsis without acute organ dysfunction [A41.9]  Yes    Acute UTI (urinary tract infection) [N39.0]  Yes    Lactic acidosis [E87.20]  Yes    PAF (paroxysmal atrial fibrillation) [I48.0]  Yes    Wound with tunneling [T14.8XXA]  Yes    Pulmonary hypertension (WHO Group 2) [I27.20]  Yes    Late onset Alzheimer's disease without behavioral disturbance [G30.1, F02.80]  Yes      Resolved Hospital Problems   No resolved problems to display.        Brief Hospital Course to date:  Laura Wallace is a 83 y.o. female with past medical history of paroxysmal A-fib, systolic congestive heart failure, Alzheimer's disease, chronic tunneling back wound followed at , pulmonary hypertension who presented with acute respiratory failure with hypoxia thought secondary to exacerbation of systolic congestive heart failure as well as community-acquired pneumonia.  She required high flow nasal cannula and BiPAP in the intensive care unit.  She was transferred to the floor on 7/13/2023.  Hospitalist service assumed care on  7/14/2023.  Secondary to her sinus tachycardia and fevers there was concern for suspected serotonin syndrome.  SSRI was discontinued on 7/13/2023 but she did receive a dose that morning.  She received a trial regimen of cyproheptadine with subsequent improvement and is now afebrile.  She developed A-fib with RVR on 7/14/2023.     Failure to thrive  End-of-life care  -Discussed with patient's  regarding CODE STATUS and interventions  -Counseled it appears she is dying and would at least qualify for outpatient hospice  - Palliative care consulted; Outpatient hospice consulted      Acute respiratory failure with hypoxia  Community-acquired pneumonia  Systolic congestive heart failure with exacerbation  -conitnue o2 to maintain sats >90  -repeat cxr 7/14/2023 shows increasing opacities concerning for multifocal pneumonia  -Legionella neg, strep pneumo neg, MRSA screen negative  - ID consulted -- appreciate recs. Completed course of rocephin and doxy     Sepsis with fever tachycardia, improved  -Multiple etiologies possible including multifocal pneumonia, chronic tunneling back wound  -ID consult 7/15/2023, appreciate their recs  - Clinically sepsis improving, afebrile, improved heart rate     Suspected serotonin syndrome  -Discontinued SSRI on 7/13/2023 with last dose 7/13/2023 AM  -Trial of cyproheptadine given symptoms , however most symptoms should resolve within 24 to 48 hours after discontinuation of SSRI  - Patient was treated with cyproheptadine on 7/14/2023, on that day she did continue to have fever and tachycardia but subsequently fever and tachycardia have resolved  -Discontinue SSRI, will list as an allergy     Alzheimer's dementia     Chronic tunneling back wound  -Follows at   - continue gotti catheter for local wound care  - continue local wound care     Paroxysmal A-fib  Chronic anticoagulation with Eliquis  -Started on diltiazem drip on 7/14/2023, transitioned to amiodarone per cardiology on  7/15/2023  -Cardiology consulted -- continue amiodarone per protocol; continue eliquis 2.5 mg daily; continue metoprolol 12.5 mg BID     Generalized weakness  PT and OT consults if clinically improves       Expected Discharge Location and Transportation: D; possibly home hospice  Expected Discharge   Expected Discharge Date: 7/18/2023; Expected Discharge Time:      DVT prophylaxis:  Medical and mechanical DVT prophylaxis orders are present.     AM-PAC 6 Clicks Score (PT): 6 (07/16/23 1718)    CODE STATUS:   Code Status and Medical Interventions:   Ordered at: 07/10/23 8389     Medical Intervention Limits:    NO intubation (DNI)    NO cardioversion     Code Status (Patient has no pulse and is not breathing):    No CPR (Do Not Attempt to Resuscitate)     Medical Interventions (Patient has pulse or is breathing):    Limited Support     Comments:    Discussed with      Release to patient:    Routine Release       Norma Leger DO  07/18/23

## 2023-07-18 NOTE — PROGRESS NOTES
"  [facility]  PROGRESS NOTE    Date of Admission: 7/10/2023  Date of Service: 07/18/23    Primary Care Physician: Justin Brumfield MD    Chief Complaint: follow up atrial fibrillation, elevated troponin    Problem List:   Acute respiratory failure with hypoxia    Late onset Alzheimer's disease without behavioral disturbance    Pulmonary hypertension (WHO Group 2)    Lactic acidosis    Wound with tunneling    PAF (paroxysmal atrial fibrillation)    Acute UTI (urinary tract infection)    Sepsis without acute organ dysfunction    Severe malnutrition    Diastolic dysfunction    Acute on chronic heart failure with preserved ejection fraction (HFpEF)    Chronic anticoagulation (Eliquis)    Moderate Malnutrition (HCC)      Subjective      No acute events overnight.  Patient does respond to questions appropriately with one-word answers.  Only oriented to self.  Denies any chest pain.   at bedside and states heart rate did not get above 75 to his knowledge.  Is maintaining sinus bradycardia.  Now off IV amiodarone.    Objective   Vitals: /56 (BP Location: Left arm, Patient Position: Lying)   Pulse 52   Temp 97.1 °F (36.2 °C) (Axillary)   Resp 18   Ht 157.5 cm (62\")   Wt 46.9 kg (103 lb 6.3 oz)   SpO2 100%   BMI 18.91 kg/m²     Physical Exam:  GENERAL: ill appearing, in no acute distress.   HEART: Regular rhythm, normal rate, and no murmurs, gallops, or rubs.   LUNGS: Clear to auscultation bilaterally. No wheezing, rales or rhonchi.   EXTREMITIES: No clubbing, cyanosis, or edema noted.     Results:  Results from last 7 days   Lab Units 07/15/23  1044 07/13/23  1004   WBC 10*3/mm3 6.17 10.27   HEMOGLOBIN g/dL 8.1* 8.6*   HEMATOCRIT % 26.2* 27.0*   PLATELETS 10*3/mm3 288 279       Results from last 7 days   Lab Units 07/15/23  0209 07/13/23  0550 07/12/23  1432 07/12/23  0445   SODIUM mmol/L 143 138  --  138   POTASSIUM mmol/L 4.2 4.5 4.6 3.3*   CHLORIDE mmol/L 103 " 103  --  101   CO2 mmol/L 21.0* 23.0  --  25.0   BUN mg/dL 21 16  --  14   CREATININE mg/dL 1.33* 0.65  --  0.66   GLUCOSE mg/dL 112* 106*  --  108*        Lab Results   Component Value Date    AST 21 07/15/2023    ALT 15 07/15/2023                           Results from last 7 days   Lab Units 07/13/23  0550   CK TOTAL U/L 99                 Intake/Output Summary (Last 24 hours) at 7/18/2023 0823  Last data filed at 7/18/2023 0620  Gross per 24 hour   Intake 354 ml   Output 700 ml   Net -346 ml         I personally reviewed the patient's EKG/Telemetry data    Radiology Data:   Results for orders placed during the hospital encounter of 05/07/23    Adult Transthoracic Echo Complete w/ Color, Spectral and Contrast if Necessary Per Protocol    Interpretation Summary    Left ventricular systolic function is normal. Left ventricular ejection fraction appears to be 61 - 65%.    Left ventricular diastolic function is consistent with (grade II w/high LAP) pseudonormalization.    Left atrial volume is severely increased.    The right atrial cavity is moderately  dilated.    There is calcification of the aortic valve.    Moderate tricuspid valve regurgitation is present.    Estimated right ventricular systolic pressure from tricuspid regurgitation is mildly elevated (35-45 mmHg). Calculated right ventricular systolic pressure from tricuspid regurgitation is 41 mmHg.    Mild pulmonary hypertension is present.      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  Continue Eliquis 2.5 mg twice daily for stroke prophylaxis.  Continue metoprolol 12.5 mg twice daily for heart rate control.  Not much more to add from a cardiac standpoint.  We will sign off and be available to see on an as-needed basis.      Ryann Valerio PA-C

## 2023-07-18 NOTE — PROGRESS NOTES
Continued Stay Note  [facility]     Patient Name: Laura Wallace  MRN: [id number]  Today's Date: 7/18/2023    Admit Date: 7/10/2023    Plan: Home with [facility] tomorrow 7/19   Discharge Plan       Row Name 07/18/23 1522       Plan    Plan Home with [facility] tomorrow 7/19    Plan Comments Pt was approved for hospice services by Hospice provider Noris Cool DO with Dx Alzheimer's dementia, related dx CHF. Reviewed this information with pt's  who would like to proceed with discharging home with hospice. Home tomorrow 7/19 to [id number] [id number]. Will transport via personal vehicle, declined ambulance transport at this time. DME to be delivered/exchanged 7/20, currently have all DME needs at home.  states they have all supplies needed at this time, home hospice RN will need to assess need for wound care supplies, please send what is being used at the hospital currently. Contact:  Horace: [phone]. Provider to send 5 day supply of ativan, should have all other meds at home. Family to call 24hr# for admission on arrival home. Hospice liaison will follow up tomorrow to confirm discharge plan. Please call [phone] if assistance is needed.                   Discharge Codes    No documentation.                 Expected Discharge Date and Time       Expected Discharge Date Expected Discharge Time    Jul 20, 2023               Jossue Woodard RN

## 2023-07-18 NOTE — CONSULTS
Continued Stay Note  [facility]     Patient Name: Laura Wallace  MRN: [id number]  Today's Date: 7/18/2023    Admit Date: 7/10/2023    Plan: ongoing   Discharge Plan       Row Name 07/18/23 1403       Plan    Plan Comments Hospice referral received, chart reviewed, and visit made. Spoke with pt's  Horace. Reviewed hospice plan of care and goals of care. Discussed that hospice is a comfort focused plan of care and no desire for further aggressive treatment. Overview of hospice services provided. Reviewed when to call hospice instead of calling 911, discussed how hospice can assist with comfort focused symptom management at home without need for trips to the hospital for aggressive life prolonging interventions. Also discussed inpatient hospice level of care if symptoms are unable to be managed in home setting and still pursuing comfort only interventions. Mr Wallace is interested in initiating hospice referral. Plan is to review pt with hospice provider to ensure she would be appropriate for hospice services and at that point Mr Wallace will give final decision about pursuing hospice at home. Hospice liaison will continue to follow and update notes with hospice approval status and additional discharge planning information. Please call [phone] with hospice questions/needs.                   Discharge Codes    No documentation.                 Expected Discharge Date and Time       Expected Discharge Date Expected Discharge Time    Jul 18, 2023               Jossue Woodard RN

## 2023-07-18 NOTE — NURSING NOTE
"                             Wound, Ostomy and Continence (WOC) Note    Reason for WOC Consultation:  follow up to Tunneling wound on back     Patient awake.  Family/support person at bedside.   Subjective: Patient spouse states,\" we have been seeing wound care at .\"    Skin/Wound Assessment:     Wound Type: Pressure injury - stalled  Location: Superior sacral spine  Measurements: 0.5 x 0.5 x 0.3 cm tunnels approximately 4.5 cm towards 6:00  Wound Bed: dermis and moist  Wound Edges: Irregular, Open, and Rolled/Closed  Periwound Skin: macerated and redness , suspect yeast dermatitis--much improved  Drainage Characteristics/Odor: none  Drainage Amount: none  Pain: No   Care provided: The wound and periwound was cleansed with Vashe moistened 4 x 4 gauze.  Then loosely packed with Vashe moistened strip gauze into the wound, antifungal powder, barrier spray and covered with an Optifoam dressing. Incontinence care completed.      Summary and Recommendation(s):     -Practice pressure injury prevention protocol.  -Switched to 1/4 inch iodoform strip gauze (using 1 inch which is a little too thick for small wound)  -add in barrier spray after antifungal powder to periwound  -Loosely pack wound with Vashe moistened packing strip gauze.  -Will order nystatin for suspected yeast dermatitis at periwound skin.  - Refer to wound care instructions in the \"Orders\" tab  - Specialty support surface in place: Foam Mattress with Waffle Overlay-in talks for palliative, will hold off on bed for now, waffle doing well per nursing       Pressure Injury Prevention Measures (initiate for a Waldo Scale Score of <18):     Most recent Waldo Scale score:  Sensory Perception: 2-->very limited  Moisture: 3-->occasionally moist  Activity: 1-->bedfast  Mobility: 2-->very limited  Nutrition: 2-->probably inadequate  Friction and Shear: 1-->problem  Waldo Score: 11 (07/17/23 2106)     -Turn q 2 hr. using an offloading foam wedge, keep heels " elevated and offloaded with offloading heel boots.    -Raise knee-gatch before elevating HOB to reduce shearing   -Follow C.A.R.E protocol if medical devices (Bipap, gotti, Ng tube, etc) are being used.  -Apply moisture barrier cream to bottom BID & PRN, if incontinent.  -Clean skin gently with no-rinse PH-balanced foam cleanser and a soft, disposable cloth (barrier wipes-blue pack).   -Reduce layers under patient (one sheet as drawsheet and two incontinence pads)    Thank you for consulting the WOC Nurse.  WOC Team will follow.  Please re consult if the wound(s) worsens.

## 2023-07-18 NOTE — PLAN OF CARE
Goal Outcome Evaluation:  Plan of Care Reviewed With: patient    Progress: no change  Outcome Evaluation: Patient is alert to self, more talkative this shift. VSS. NSR on tele. On room air most of the night, placed on 2L nasal cannula this AM while sleeping. No complaints of pain. Wound dressing changed per orders. Continue plan of care.

## 2023-07-18 NOTE — PROGRESS NOTES
INFECTIOUS DISEASE f/u     Laura Wallace  1940  [id number]    Date of Consult: 7/15/2023    Admission Date: 7/10/2023      Requesting Provider: Malena Rowe MD  Evaluating Physician: Jeffry Okeefe MD/Dr. Hamzah Charlton    Reason for Consultation: fever, sepsis    History of present illness:    Patient is a 83 y.o. female, known to Dr. Hamzah Charlton, with h/o Alzheimer's disease, bedbound, PAF/amiodarone/Eliquis, pulmonary hypertension, chronic anemia, sacral decubitus ulcer/chronic tunneling back wound, and recent pulmonary nodule/cystic thyroid mass who was admitted to [facility] from 5/7-5/12 for fevers and diaphoresis.  We were asked to see for UTI and sacral decubitus ulcer with tunneling back wound.  The decubitus ulcer did not appear to be actively infection, but she did have E.coli UTI and was treated with IV Rocephin.  She was discharged on nitrofurantoin until 5/21.      She returns to [facility] ED on 7/10 for acute hypoxic respiratory failure an decompensated diastolic heart failure.  She was placed in ICU for NRB/HFNC.  She was transferred to telemetry on 7/14 on 2L O2NC. She has been on Rocephin and doxycycline to cover for UTI and possible pneumonia.  She had multiple episode of tachycardia, HTN, muscle rigidity, hectic fevers, and diaphoresis which her  states have been ongoing and intermittent for last 6 months with suspected serotonin syndrome.  Her Fluoxetine was stopped on 7/14.  She had another episode on 7/14 and was given a dose of cyproheptadine. She was seen by cardiology on 7/15 and started on amiodarone drip.  She had some lengthened QT interval on admission and QTc stays close to 500.  Her CXR on 7/14 showed increasing bilateral airspace opacities concerning for multifocal pneumonia.   Her creatinine went from 0.65 on 7/13 to 1.33 on 7/15.  Her WBC is WNL.  Blood cultures are negative to date.  COVID-19/Flu PCR was negative.  She remains on Rocephin and doxycycline.  ID was asked  to evaluate and manage her antibiotic therapy.      has been considering comfort measures palliative medicine to be consulted currently afebrile    23: History reviewed.  The patient is a poor historian in the setting of her dementia.  Her  is at bedside today.  Nursing was packing her sacral ulcer during my exam.  The patient's respiratory status has improved and she is now breathing well on room air.  Fevers have resolved.  She does have a slight erythematous skin reaction around her sacral decubitus ulcer in the distribution of her bandage.    23: No history available from the patient in the setting of her severe dementia.  She is not having any fevers.  SPO2 has been in the mid 90s on room air per nursing staff.  Patient's  is meeting with hospice liaison and considering home hospice soon.    Past Medical History:   Diagnosis Date    Anxiety 2022    Late onset Alzheimer's disease without behavioral disturbance 2018    Paroxysmal atrial fibrillation with rapid ventricular response 2022    Pulmonary hypertension 2022       Past Surgical History:   Procedure Laterality Date    APPENDECTOMY      VERTEBROPLASTY      L123, fusion       Family History   Problem Relation Age of Onset    Heart attack Father     Colon cancer Brother        Social History     Socioeconomic History    Marital status:    Tobacco Use    Smoking status: Former     Packs/day: 1.00     Years: 30.00     Pack years: 30.00     Types: Cigarettes     Start date: 1960     Quit date: 1993     Years since quittin.5    Smokeless tobacco: Never   Vaping Use    Vaping Use: Never used   Substance and Sexual Activity    Alcohol use: Not Currently    Drug use: No    Sexual activity: Defer       Allergies   Allergen Reactions    Codeine Nausea And Vomiting    Serotonin Reuptake Inhibitors (Ssris) Other (See Comments)     Serotonin syndrome         Medication:    Current  Facility-Administered Medications:     acetaminophen (TYLENOL) tablet 650 mg, 650 mg, Oral, Q4H PRN, Ramírez Azalia V., DO, 650 mg at 07/15/23 0146    amiodarone (PACERONE) tablet 200 mg, 200 mg, Oral, TID, Graeme Bass MD, 200 mg at 07/18/23 1750    apixaban (ELIQUIS) tablet 2.5 mg, 2.5 mg, Oral, Q12H, Case Azalia V., DO, 2.5 mg at 07/18/23 0908    donepezil (ARICEPT) tablet 5 mg, 5 mg, Oral, Nightly, Case, Azalia V., DO, 5 mg at 07/17/23 2108    famotidine (PEPCID) tablet 20 mg, 20 mg, Oral, Daily, Elana Ortiz, PharmD, 20 mg at 07/18/23 0909    furosemide (LASIX) tablet 40 mg, 40 mg, Oral, Daily, Azalia Elmore V., DO, 40 mg at 07/18/23 0909    LORazepam (ATIVAN) tablet 0.25 mg, 0.25 mg, Oral, BID, Case, Azalia V., DO, 0.25 mg at 07/18/23 0908    Magnesium Low Dose Replacement - Follow Nurse / BPA Driven Protocol, , Does not apply, PRN, Norma Leger, DO    metoprolol tartrate (LOPRESSOR) half tablet 12.5 mg, 12.5 mg, Oral, Q12H, Graeme Bass MD, 12.5 mg at 07/18/23 0909    nystatin (MYCOSTATIN) powder, , Topical, Q12H, Ramírez Azalia V., DO, Given at 07/18/23 0909    Potassium Replacement - Follow Nurse / BPA Driven Protocol, , Does not apply, PRN, Sussy Elmoresa V., DO    sodium chloride 0.9 % flush 10 mL, 10 mL, Intravenous, PRN, Ramírez Azalia V., DO, 10 mL at 07/17/23 2109    Antibiotics:  Anti-Infectives (From admission, onward)      Ordered     Dose/Rate Route Frequency Start Stop    07/10/23 2112  cefTRIAXone (ROCEPHIN) 1 g/100 mL 0.9% NS (MBP)        Ordering Provider: Sussy Elmoresa V., DO    1 g  over 30 Minutes Intravenous Every 24 Hours 07/11/23 1800 07/17/23 1823    07/10/23 1828  cefTRIAXone (ROCEPHIN) 1 g/100 mL 0.9% NS (MBP)        Ordering Provider: Demian Andrade MD    1 g  over 30 Minutes Intravenous Once 07/10/23 1844 07/10/23 1945    07/10/23 1828  AZITHROMYCIN 500 MG/250 ML 0.9% NS IVPB (vial-mate)        Ordering Provider: Demian Andrade MD    500 mg  over 60  Minutes Intravenous Once 07/10/23 1844 07/10/23 2107              Review of Systems:  Unable to obtain d/t dementia      Physical Exam:   Vital Signs  Temp (24hrs), Av.9 °F (36.6 °C), Min:97 °F (36.1 °C), Max:99.6 °F (37.6 °C)    Temp  Min: 97 °F (36.1 °C)  Max: 99.6 °F (37.6 °C)  BP  Min: 97/53  Max: 155/56  Pulse  Min: 49  Max: 77  Resp  Min: 18  Max: 18  SpO2  Min: 91 %  Max: 100 %    GENERAL: Awake and alert, in no acute distress. Chronically ill appearing.  Does not follow directions.  No interaction.  Dementia  HEENT: Normocephalic, atraumatic.  No external oral lesions noted  LUNGS: Clear to auscultation bilaterally.  Nonlabored breathing on room air  ABDOMEN: Nondistended  EXT:  No cellulitic change noted  :  With Contreras catheter.  MSK: No joint effusions or erythema  SKIN: Generalized rashes noted.  Did not visualize her sacral decub his ulcer today  PSYCHIATRIC: Unable to assess secondary to dementia.      23    Laboratory Data    Results from last 7 days   Lab Units 07/15/23  1044 23  1004   WBC 10*3/mm3 6.17 10.27   HEMOGLOBIN g/dL 8.1* 8.6*   HEMATOCRIT % 26.2* 27.0*   PLATELETS 10*3/mm3 288 279     Results from last 7 days   Lab Units 07/15/23  0209   SODIUM mmol/L 143   POTASSIUM mmol/L 4.2   CHLORIDE mmol/L 103   CO2 mmol/L 21.0*   BUN mg/dL 21   CREATININE mg/dL 1.33*   GLUCOSE mg/dL 112*   CALCIUM mg/dL 9.3     Results from last 7 days   Lab Units 07/15/23  0209   ALK PHOS U/L 114   BILIRUBIN mg/dL 0.2   ALT (SGPT) U/L 15   AST (SGOT) U/L 21             Results from last 7 days   Lab Units 07/15/23  1044   LACTATE mmol/L 0.7     Results from last 7 days   Lab Units 23  0550   CK TOTAL U/L 99         Estimated Creatinine Clearance: 24.6 mL/min (A) (by C-G formula based on SCr of 1.33 mg/dL (H)).      Microbiology:  Microbiology Results (last 10 days)       Procedure Component Value - Date/Time    MRSA Screen, PCR (Inpatient) - Swab, Nares [[id number]]  (Normal)  Collected: 07/15/23 1117    Lab Status: Final result Specimen: Swab from Nares Updated: 07/15/23 1247     MRSA PCR Negative    Narrative:      The negative predictive value of this diagnostic test is high and should only be used to consider de-escalating anti-MRSA therapy. A positive result may indicate colonization with MRSA and must be correlated clinically.  MRSA Negative    S. Pneumo Ag Urine or CSF - Urine, Urine, Clean Catch [[id number]]  (Normal) Collected: 07/15/23 1116    Lab Status: Final result Specimen: Urine, Clean Catch Updated: 07/15/23 2051     Strep Pneumo Ag Negative    Legionella Antigen, Urine - Urine, Urine, Clean Catch [[id number]]  (Normal) Collected: 07/15/23 1116    Lab Status: Final result Specimen: Urine, Clean Catch Updated: 07/15/23 2051     LEGIONELLA ANTIGEN, URINE Negative    Blood Culture - Blood, Arm, Left [[id number]]  (Normal) Collected: 07/10/23 1859    Lab Status: Final result Specimen: Blood from Arm, Left Updated: 07/15/23 2000     Blood Culture No growth at 5 days    Blood Culture - Blood, Arm, Left [[id number]]  (Normal) Collected: 07/10/23 1852    Lab Status: Final result Specimen: Blood from Arm, Left Updated: 07/15/23 2000     Blood Culture No growth at 5 days    COVID PRE-OP / PRE-PROCEDURE SCREENING ORDER (NO ISOLATION) - Swab, Nasopharynx [[id number]]  (Normal) Collected: 07/10/23 1824    Lab Status: Final result Specimen: Swab from Nasopharynx Updated: 07/10/23 1936    Narrative:      The following orders were created for panel order COVID PRE-OP / PRE-PROCEDURE SCREENING ORDER (NO ISOLATION) - Swab, Nasopharynx.  Procedure                               Abnormality         Status                     ---------                               -----------         ------                     COVID-19 and FLU A/B PCR...[[id number]]  Normal              Final result                 Please view results for these tests on the individual orders.    COVID-19 and FLU A/B PCR - Swab,  Nasopharynx [[id number]]  (Normal) Collected: 07/10/23 1824    Lab Status: Final result Specimen: Swab from Nasopharynx Updated: 07/10/23 1936     COVID19 Not Detected     Influenza A PCR Not Detected     Influenza B PCR Not Detected    Narrative:      Fact sheet for providers: https://www.fda.gov/media/399044/download    Fact sheet for patients: https://www.fda.gov/media/012586/download    Test performed by PCR.                  Radiology:  Imaging Results (Last 72 Hours)       ** No results found for the last 72 hours. **              Impression:   - Hectic fevers, may be associated with serotonin syndrome.-Resolved  - Suspected Serotonin syndrome. Fluoxetine was stopped 7/13 after her dose on that day.  Continues to spike high fevers up to 101-103.  Given dose of cyproheptadine on 7/15.-Resolved  - Bilateral pulmonary infiltrates, aspiration vs pneumonia vs edema.-Seems to be improving  - Acute hypoxic respiratory failure/d/t volume overload, now Improved and on room air  - Acute renal failure on 7/15.  Cr 1.33. 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  - Paroxysmal atrial fibrillation/amiodarone/Eliquis  - Alzheimer's dementia  - Debilitated/bedbound  - Pulmonary nodule/cystic thyroid mass seen on CT scan in 5/2923    PLAN/RECOMMENDATIONS:   Thank you for asking us to see Laura Wallace, I recommend the following:    - Status post a course of Rocephin and doxycycline.  Continue off antibiotics for now  - Continue O2 support As needed.  Currently improved to room air  - Continue wound care    Patient's  is meeting with hospice team.  Possibly home with home hospice soon.    Fevers have resolved. Respiratory status seems to be okay off of antibiotics    I discussed with nursing staff today      Hamzah Charlton MD  7/18/2023  18:07 EDT

## 2023-07-18 NOTE — CASE MANAGEMENT/SOCIAL WORK
Continued Stay Note  [facility]     Patient Name: Laura Wallace  MRN: [id number]  Today's Date: 7/18/2023    Admit Date: 7/10/2023    Plan: ongoing   Discharge Plan       Row Name 07/18/23 1452       Plan    Plan ongoing    Plan Comments CM following this patient today acknowledges hopice notes placed today. CM following.    Final Discharge Disposition Code 30 - still a patient      Row Name 07/18/23 6621       Plan    Plan Comments Hospice referral received, chart reviewed, and visit made. Spoke with pt's  Horace. Reviewed hospice plan of care and goals of care. Discussed that hospice is a comfort focused plan of care and no desire for further aggressive treatment. Overview of hospice services provided. Reviewed when to call hospice instead of calling 911, discussed how hospice can assist with comfort focused symptom management at home without need for trips to the hopsital for aggressive life prolonging interventions. Also discussed inpatient hospice level of care if symptoms are unable to be managed in home setting and still pursuing comfort only interventions. Mr Wallace is interested in initiating hospice referral. Plan is to review pt with hospice provider to ensure she would be appropriate for hospice services and at that point Mr Wallace will give final decision about pursuing hospice at home. Hospice liaison will continue to follow and update notes with hospice approval status and additional discharge planning information. Please call [phone] with hospice questions/needs.                   Discharge Codes    No documentation.                 Expected Discharge Date and Time       Expected Discharge Date Expected Discharge Time    Jul 18, 2023               Jessica Vasques RN

## 2023-07-18 NOTE — PROGRESS NOTES
INFECTIOUS DISEASE f/u     Laura Wallace  1940  [id number]    Date of Consult: 7/15/2023    Admission Date: 7/10/2023      Requesting Provider: Malena Rowe MD  Evaluating Physician: Jeffry Okeefe MD/Dr. Hamzah Charlton    Reason for Consultation: fever, sepsis    History of present illness:    Patient is a 83 y.o. female, known to Dr. Hamazh Charlton, with h/o Alzheimer's disease, bedbound, PAF/amiodarone/Eliquis, pulmonary hypertension, chronic anemia, sacral decubitus ulcer/chronic tunneling back wound, and recent pulmonary nodule/cystic thyroid mass who was admitted to [facility] from 5/7-5/12 for fevers and diaphoresis.  We were asked to see for UTI and sacral decubitus ulcer with tunneling back wound.  The decubitus ulcer did not appear to be actively infection, but she did have E.coli UTI and was treated with IV Rocephin.  She was discharged on nitrofurantoin until 5/21.      She returns to [facility] ED on 7/10 for acute hypoxic respiratory failure an decompensated diastolic heart failure.  She was placed in ICU for NRB/HFNC.  She was transferred to telemetry on 7/14 on 2L O2NC. She has been on Rocephin and doxycycline to cover for UTI and possible pneumonia.  She had multiple episode of tachycardia, HTN, muscle rigidity, hectic fevers, and diaphoresis which her  states have been ongoing and intermittent for last 6 months with suspected serotonin syndrome.  Her Fluoxetine was stopped on 7/14.  She had another episode on 7/14 and was given a dose of cyproheptadine. She was seen by cardiology on 7/15 and started on amiodarone drip.  She had some lengthened QT interval on admission and QTc stays close to 500.  Her CXR on 7/14 showed increasing bilateral airspace opacities concerning for multifocal pneumonia.   Her creatinine went from 0.65 on 7/13 to 1.33 on 7/15.  Her WBC is WNL.  Blood cultures are negative to date.  COVID-19/Flu PCR was negative.  She remains on Rocephin and doxycycline.  ID was asked  to evaluate and manage her antibiotic therapy.      has been considering comfort measures palliative medicine to be consulted currently afebrile    23: History reviewed.  The patient is a poor historian in the setting of her dementia.  Her  is at bedside today.  Nursing was packing her sacral ulcer during my exam.  The patient's respiratory status has improved and she is now breathing well on room air.  Fevers have resolved.  She does have a slight erythematous skin reaction around her sacral decubitus ulcer in the distribution of her bandage.  Past Medical History:   Diagnosis Date    Anxiety 2022    Late onset Alzheimer's disease without behavioral disturbance 2018    Paroxysmal atrial fibrillation with rapid ventricular response 2022    Pulmonary hypertension 2022       Past Surgical History:   Procedure Laterality Date    APPENDECTOMY  1960    VERTEBROPLASTY      L123, fusion       Family History   Problem Relation Age of Onset    Heart attack Father     Colon cancer Brother        Social History     Socioeconomic History    Marital status:    Tobacco Use    Smoking status: Former     Packs/day: 1.00     Years: 30.00     Pack years: 30.00     Types: Cigarettes     Start date: 1960     Quit date: 1993     Years since quittin.5    Smokeless tobacco: Never   Vaping Use    Vaping Use: Never used   Substance and Sexual Activity    Alcohol use: Not Currently    Drug use: No    Sexual activity: Defer       Allergies   Allergen Reactions    Codeine Nausea And Vomiting    Serotonin Reuptake Inhibitors (Ssris) Other (See Comments)     Serotonin syndrome         Medication:    Current Facility-Administered Medications:     acetaminophen (TYLENOL) tablet 650 mg, 650 mg, Oral, Q4H PRN, Azalia Elmore DO, 650 mg at 07/15/23 0146    amiodarone (PACERONE) tablet 200 mg, 200 mg, Oral, TID, Graeme aBss MD, 200 mg at 23 2114    apixaban (ELIQUIS) tablet 2.5  mg, 2.5 mg, Oral, Q12H, Case, Azalia V., DO, 2.5 mg at 07/17/23 2108    donepezil (ARICEPT) tablet 5 mg, 5 mg, Oral, Nightly, Case, Azalia V., DO, 5 mg at 07/17/23 2108    doxycycline (MONODOX) capsule 100 mg, 100 mg, Oral, Q12H, Miguel Ángel Murray PA, 100 mg at 07/17/23 2108    famotidine (PEPCID) tablet 20 mg, 20 mg, Oral, Daily, Elana Ortiz, PharmD, 20 mg at 07/17/23 0956    furosemide (LASIX) tablet 40 mg, 40 mg, Oral, Daily, Case, Azalia V., DO, 40 mg at 07/17/23 0956    LORazepam (ATIVAN) tablet 0.25 mg, 0.25 mg, Oral, BID, Case, Azalia V., DO, 0.25 mg at 07/17/23 2106    Magnesium Standard Dose Replacement - Follow Nurse / BPA Driven Protocol, , Does not apply, PRN, Shara Stein, DNP, APRN    metoprolol tartrate (LOPRESSOR) half tablet 12.5 mg, 12.5 mg, Oral, Q12H, Graeme Bass MD, 12.5 mg at 07/17/23 2109    nystatin (MYCOSTATIN) powder, , Topical, Q12H, Case, Azalia V., DO, Given at 07/17/23 2106    Potassium Replacement - Follow Nurse / BPA Driven Protocol, , Does not apply, PRN, Ramírez Azalia V., DO    sodium chloride 0.9 % flush 10 mL, 10 mL, Intravenous, PRN, Case, Azalia V., DO, 10 mL at 07/17/23 2109    Antibiotics:  Anti-Infectives (From admission, onward)      Ordered     Dose/Rate Route Frequency Start Stop    07/15/23 1346  doxycycline (MONODOX) capsule 100 mg        Ordering Provider: Miguel Ángel Murray PA    100 mg Oral Every 12 Hours Scheduled 07/15/23 2100 07/22/23 2059    07/10/23 2112  cefTRIAXone (ROCEPHIN) 1 g/100 mL 0.9% NS (MBP)        Ordering Provider: Azalia Elmore V., DO    1 g  over 30 Minutes Intravenous Every 24 Hours 07/11/23 1800 07/17/23 1823    07/10/23 1828  cefTRIAXone (ROCEPHIN) 1 g/100 mL 0.9% NS (MBP)        Ordering Provider: Demian Andrade MD    1 g  over 30 Minutes Intravenous Once 07/10/23 1844 07/10/23 1945    07/10/23 1828  AZITHROMYCIN 500 MG/250 ML 0.9% NS IVPB (vial-mate)        Ordering Provider: Demian Andrade MD    500 mg  over 60  Minutes Intravenous Once 07/10/23 1844 07/10/23 2107              Review of Systems:  Unable to obtain d/t dementia      Physical Exam:   Vital Signs  Temp (24hrs), Av.3 °F (36.8 °C), Min:97 °F (36.1 °C), Max:99.9 °F (37.7 °C)    Temp  Min: 97 °F (36.1 °C)  Max: 99.9 °F (37.7 °C)  BP  Min: 109/58  Max: 154/70  Pulse  Min: 53  Max: 85  Resp  Min: 16  Max: 18  SpO2  Min: 94 %  Max: 100 %    GENERAL: Awake and alert, in no acute distress. Chronically ill appearing.  Does not follow directions.  No interaction.  Dementia  HEENT: Normocephalic, atraumatic.  No external oral lesions noted  LUNGS: Clear to auscultation bilaterally.  Nonlabored breathing on room air  ABDOMEN: Nondistended  EXT:  No cellulitic change noted  :  With Contreras catheter.  MSK: No joint effusions or erythema  SKIN: No generalized rashes noted.  Has not also the tunnels down to at least muscle layer.  No visible bone on exam.  No active drainage noted.  Does have some slight skin discoloration/erythema around her wound that is in the distribution of her recent bandaging and may represent a Local skin reaction/Allergy.  PSYCHIATRIC: Unable to assess secondary to dementia.      23    Laboratory Data    Results from last 7 days   Lab Units 07/15/23  1044 23  1004 23  0550   WBC 10*3/mm3 6.17 10.27 7.58   HEMOGLOBIN g/dL 8.1* 8.6* 8.3*   HEMATOCRIT % 26.2* 27.0* 27.0*   PLATELETS 10*3/mm3 288 279 250     Results from last 7 days   Lab Units 07/15/23  0209   SODIUM mmol/L 143   POTASSIUM mmol/L 4.2   CHLORIDE mmol/L 103   CO2 mmol/L 21.0*   BUN mg/dL 21   CREATININE mg/dL 1.33*   GLUCOSE mg/dL 112*   CALCIUM mg/dL 9.3     Results from last 7 days   Lab Units 07/15/23  0209   ALK PHOS U/L 114   BILIRUBIN mg/dL 0.2   ALT (SGPT) U/L 15   AST (SGOT) U/L 21             Results from last 7 days   Lab Units 07/15/23  1044   LACTATE mmol/L 0.7     Results from last 7 days   Lab Units 23  0550   CK TOTAL U/L 99          Estimated Creatinine Clearance: 24.6 mL/min (A) (by C-G formula based on SCr of 1.33 mg/dL (H)).      Microbiology:  Microbiology Results (last 10 days)       Procedure Component Value - Date/Time    MRSA Screen, PCR (Inpatient) - Swab, Nares [[id number]]  (Normal) Collected: 07/15/23 1117    Lab Status: Final result Specimen: Swab from Nares Updated: 07/15/23 1247     MRSA PCR Negative    Narrative:      The negative predictive value of this diagnostic test is high and should only be used to consider de-escalating anti-MRSA therapy. A positive result may indicate colonization with MRSA and must be correlated clinically.  MRSA Negative    S. Pneumo Ag Urine or CSF - Urine, Urine, Clean Catch [[id number]]  (Normal) Collected: 07/15/23 1116    Lab Status: Final result Specimen: Urine, Clean Catch Updated: 07/15/23 2051     Strep Pneumo Ag Negative    Legionella Antigen, Urine - Urine, Urine, Clean Catch [[id number]]  (Normal) Collected: 07/15/23 1116    Lab Status: Final result Specimen: Urine, Clean Catch Updated: 07/15/23 2051     LEGIONELLA ANTIGEN, URINE Negative    Blood Culture - Blood, Arm, Left [[id number]]  (Normal) Collected: 07/10/23 1859    Lab Status: Final result Specimen: Blood from Arm, Left Updated: 07/15/23 2000     Blood Culture No growth at 5 days    Blood Culture - Blood, Arm, Left [[id number]]  (Normal) Collected: 07/10/23 1852    Lab Status: Final result Specimen: Blood from Arm, Left Updated: 07/15/23 2000     Blood Culture No growth at 5 days    COVID PRE-OP / PRE-PROCEDURE SCREENING ORDER (NO ISOLATION) - Swab, Nasopharynx [[id number]]  (Normal) Collected: 07/10/23 1824    Lab Status: Final result Specimen: Swab from Nasopharynx Updated: 07/10/23 1936    Narrative:      The following orders were created for panel order COVID PRE-OP / PRE-PROCEDURE SCREENING ORDER (NO ISOLATION) - Swab, Nasopharynx.  Procedure                               Abnormality         Status                     ---------                                -----------         ------                     COVID-19 and FLU A/B PCR...[[id number]]  Normal              Final result                 Please view results for these tests on the individual orders.    COVID-19 and FLU A/B PCR - Swab, Nasopharynx [[id number]]  (Normal) Collected: 07/10/23 1824    Lab Status: Final result Specimen: Swab from Nasopharynx Updated: 07/10/23 1936     COVID19 Not Detected     Influenza A PCR Not Detected     Influenza B PCR Not Detected    Narrative:      Fact sheet for providers: https://www.fda.gov/media/277161/download    Fact sheet for patients: https://www.fda.gov/media/224607/download    Test performed by PCR.                  Radiology:  Imaging Results (Last 72 Hours)       ** No results found for the last 72 hours. **              Impression:   - Hectic fevers, may be associated with serotonin syndrome.-Resolved  - Suspected Serotonin syndrome. Fluoxetine was stopped 7/13 after her dose on that day.  Continues to spike high fevers up to 101-103.  Given dose of cyproheptadine on 7/15.-Resolved  - Bilateral pulmonary infiltrates, aspiration vs pneumonia vs edema.-Seems to be improving  - Acute hypoxic respiratory failure/d/t volume overload, now Improved and on room air  - Acute renal failure on 7/15.  Cr 1.33. 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  - Paroxysmal atrial fibrillation/amiodarone/Eliquis  - Alzheimer's dementia  - Debilitated/bedbound  - Pulmonary nodule/cystic thyroid mass seen on CT scan in 5/2923    PLAN/RECOMMENDATIONS:   Thank you for asking us to see Laura Wallace, I recommend the following:  - Blood cultures-No growth  - Urine strep pneumo and urine Legionella antigens were negative.  - Can complete last day of doxycycline and Rocephin  - Continue O2 support As needed.  Currently  improved to room air  - Wound care consultation    Fevers have resolved.  Still has a wound over her sacral region that is chronic.  Appears to maybe have some reaction to the dressing and could consider a different dressing.    I discussed with the patient's  at bedside today    Discussed with nursing staff today.    Hamzah Charlton MD  7/17/2023  21:15 EDT

## 2023-07-18 NOTE — PLAN OF CARE
Goal Outcome Evaluation:  Plan of Care Reviewed With: spouse        Progress: no change  Outcome Evaluation: Pt briefly opened eyes to voice X 2 during Palliative RN encounter, answered a couple questions before dozing back off; appeared to recognize her spouse when he spoke with her. Discussion with spouse about home care options, including Hospice services. Spouse agreeable to Hospice consult for assessment for eligibility and services review; consult placed. Per documentation, spouse has decided to discharge home with hospice services, Liaison making arrangements.    1300 Palliative IDT meeting:  MD, RN, SW, , Volunteer  After hours, weekends and holidays, contact Palliative Provider by calling [phone]     Problem: Palliative Care  Goal: Enhanced Quality of Life  Outcome: Ongoing, Progressing  Intervention: Promote Advance Care Planning  Flowsheets (Taken 7/18/2023 1522)  Life Transition/Adjustment: (Hospice consult) other (see comments)  Intervention: Maximize Comfort  Flowsheets (Taken 7/18/2023 1522)  Pain Management Interventions: pain management plan reviewed with patient/caregiver  Intervention: Optimize Function  Flowsheets (Taken 7/18/2023 1522)  Sensory Stimulation Regulation: quiet environment promoted  Sleep/Rest Enhancement: family presence promoted  Intervention: Optimize Psychosocial Wellbeing  Flowsheets (Taken 7/18/2023 1522)  Supportive Measures: decision-making supported  Family/Support System Care:   caregiver stress acknowledged   involvement promoted   presence promoted   self-care encouraged   support provided

## 2023-07-19 VITALS
TEMPERATURE: 97.5 F | HEART RATE: 50 BPM | OXYGEN SATURATION: 99 % | HEIGHT: 62 IN | WEIGHT: 103.4 LBS | RESPIRATION RATE: 18 BRPM | BODY MASS INDEX: 19.03 KG/M2 | DIASTOLIC BLOOD PRESSURE: 98 MMHG | SYSTOLIC BLOOD PRESSURE: 135 MMHG

## 2023-07-19 PROBLEM — A41.9 SEPSIS WITHOUT ACUTE ORGAN DYSFUNCTION: Status: RESOLVED | Noted: 2023-05-08 | Resolved: 2023-07-19

## 2023-07-19 PROBLEM — E87.20 LACTIC ACIDOSIS: Status: RESOLVED | Noted: 2023-05-07 | Resolved: 2023-07-19

## 2023-07-19 PROBLEM — N39.0 ACUTE UTI (URINARY TRACT INFECTION): Status: RESOLVED | Noted: 2023-05-07 | Resolved: 2023-07-19

## 2023-07-19 PROBLEM — J96.01 ACUTE RESPIRATORY FAILURE WITH HYPOXIA: Status: RESOLVED | Noted: 2022-09-18 | Resolved: 2023-07-19

## 2023-07-19 PROCEDURE — 99239 HOSP IP/OBS DSCHRG MGMT >30: CPT | Performed by: INTERNAL MEDICINE

## 2023-07-19 RX ORDER — AMIODARONE HYDROCHLORIDE 200 MG/1
200 TABLET ORAL 2 TIMES DAILY
Qty: 60 TABLET | Refills: 0 | Status: SHIPPED | OUTPATIENT
Start: 2023-07-19 | End: 2023-08-18

## 2023-07-19 RX ORDER — LORAZEPAM 0.5 MG/1
0.25 TABLET ORAL 2 TIMES DAILY
Qty: 6 TABLET | Refills: 0 | Status: SHIPPED | OUTPATIENT
Start: 2023-07-19 | End: 2023-07-25

## 2023-07-19 RX ORDER — FUROSEMIDE 40 MG/1
40 TABLET ORAL DAILY
Qty: 30 TABLET | Refills: 0 | Status: SHIPPED | OUTPATIENT
Start: 2023-07-20

## 2023-07-19 RX ADMIN — APIXABAN 2.5 MG: 2.5 TABLET, FILM COATED ORAL at 08:57

## 2023-07-19 RX ADMIN — LORAZEPAM 0.25 MG: 0.5 TABLET ORAL at 08:56

## 2023-07-19 RX ADMIN — FAMOTIDINE 20 MG: 20 TABLET ORAL at 08:57

## 2023-07-19 RX ADMIN — NYSTATIN 1 APPLICATION: 100000 POWDER TOPICAL at 08:58

## 2023-07-19 RX ADMIN — FUROSEMIDE 40 MG: 40 TABLET ORAL at 08:56

## 2023-07-19 NOTE — DISCHARGE SUMMARY
[facility]  DISCHARGE SUMMARY    Patient Name: Laura Wallace  : 1940  MRN: [id number]    Date of Admission: 7/10/2023  6:02 PM  Date of Discharge:  23  Primary Care Physician: Justin Brumfield MD    Consults       Date and Time Order Name Status Description    2023 12:22 PM Inpatient Palliative Care MD Consult Completed     7/15/2023  8:37 AM Inpatient Cardiology Consult Completed     7/15/2023  8:37 AM Inpatient Infectious Diseases Consult Completed             Hospital Course     Presenting Problem: dyspnea     Active Hospital Problems    Diagnosis  POA    Moderate Malnutrition (HCC) [E44.0]  Yes    Diastolic dysfunction [I51.89]  Yes    Acute on chronic heart failure with preserved ejection fraction (HFpEF) [I50.33]  Yes    Chronic anticoagulation (Eliquis) [Z79.01]  Not Applicable    Severe malnutrition [E43]  Yes    PAF (paroxysmal atrial fibrillation) [I48.0]  Yes    Wound with tunneling [T14.8XXA]  Yes    Pulmonary hypertension (WHO Group 2) [I27.20]  Yes    Late onset Alzheimer's disease without behavioral disturbance [G30.1, F02.80]  Yes      Resolved Hospital Problems    Diagnosis Date Resolved POA    **Acute respiratory failure with hypoxia [J96.01] 2023 Yes    Sepsis without acute organ dysfunction [A41.9] 2023 Yes    Acute UTI (urinary tract infection) [N39.0] 2023 Yes    Lactic acidosis [E87.20] 2023 Yes          Hospital Course:  Laura Wallace is a 83 y.o. female with past medical history of paroxysmal A-fib, systolic congestive heart failure, Alzheimer's disease, chronic tunneling back wound followed at , pulmonary hypertension who presented with acute respiratory failure with hypoxia thought secondary to exacerbation of systolic congestive heart failure as well as community-acquired pneumonia.  She required high flow nasal cannula and BiPAP in the intensive care unit.  She was transferred to the floor  on 7/13/2023.  Hospitalist service assumed care on 7/14/2023.  Secondary to her sinus tachycardia and fevers there was concern for suspected serotonin syndrome.  SSRI was discontinued on 7/13/2023 but she did receive a dose that morning.  She received a trial regimen of cyproheptadine with subsequent improvement and is now afebrile.  She developed A-fib with RVR on 7/14/2023.     Failure to thrive  End-of-life care  -Discussed with patient's  regarding CODE STATUS and interventions.  Palliative and hospice care consulted.  After discussion with  decision was made to be discharged with home hospice.     Acute respiratory failure with hypoxia  Community-acquired pneumonia  Systolic congestive heart failure with exacerbation  -repeat cxr 7/14/2023 shows increasing opacities concerning for multifocal pneumonia  -Legionella neg, strep pneumo neg, MRSA screen negative  - ID consulted -- appreciate recs.  Patient completed course of rocephin and doxy      Sepsis with fever tachycardia, improved  -Multiple etiologies possible including multifocal pneumonia, chronic tunneling back wound  -ID consult 7/15/2023, appreciate their recs  - Clinically sepsis improving, afebrile, improved heart rate.  Patient completed course of antibiotics for pneumonia.     Suspected serotonin syndrome  -Discontinued SSRI on 7/13/2023 with last dose 7/13/2023 AM  -Trial of cyproheptadine given symptoms , however most symptoms should resolve within 24 to 48 hours after discontinuation of SSRI  - Patient was treated with cyproheptadine on 7/14/2023, on that day she did continue to have fever and tachycardia but subsequently fever and tachycardia have resolved  -Discontinue SSRI, will list as an allergy     Alzheimer's dementia   -Continue Aricept     Chronic tunneling back wound  -Follows at   - continue gotti catheter for local wound care     Paroxysmal A-fib  Chronic anticoagulation with Eliquis  -Started on diltiazem drip on  7/14/2023, transitioned to amiodarone per cardiology on 7/15/2023  -Cardiology consulted --continue amiodarone twice daily, Eliquis, metoprolol.     Generalized weakness  PT and OT consults if clinically improves    Discharge Follow Up Recommendations for outpatient labs/diagnostics:  PCP Dr. Brumfield as needed  Home hospice    Day of Discharge     HPI:   No acute events.  Patient resting and did not wake her.   at bedside.  States that they would like to go home with hospice and like to take her home today.  Reviewed plans for medication changes.    Review of Systems  Unable to obtain -did not wake patient    Vital Signs:   Temp:  [96.8 °F (36 °C)-98.7 °F (37.1 °C)] 96.8 °F (36 °C)  Heart Rate:  [50-74] 50  Resp:  [16-18] 18  BP: ()/(49-88) 147/56  Flow (L/min):  [2] 2      Physical Exam:  Constitutional: No acute distress, resting comfortably  HENT: NCAT  Respiratory: Clear to auscultation bilaterally, respiratory effort normal   Cardiovascular: rate controlled on tele   Musculoskeletal: No bilateral ankle edema  Psychiatric: calm  Neurologic: strength symmetric in all extremities, Cranial Nerves grossly intact to confrontation  Skin: No rashes      Pertinent  and/or Most Recent Results     LAB RESULTS:      Lab 07/15/23  1044 07/15/23  0209 07/13/23  1004   WBC 6.17  --  10.27   HEMOGLOBIN 8.1*  --  8.6*   HEMATOCRIT 26.2*  --  27.0*   PLATELETS 288  --  279   NEUTROS ABS 4.10  --  8.11*   IMMATURE GRANS (ABS) 0.03  --  0.04   LYMPHS ABS 1.08  --  1.07   MONOS ABS 0.72  --  0.89   EOS ABS 0.23  --  0.14   MCV 83.4  --  82.6   PROCALCITONIN  --  0.74*  --    LACTATE 0.7  --   --          Lab 07/15/23  0209 07/13/23  0550 07/12/23  1432   SODIUM 143 138  --    POTASSIUM 4.2 4.5 4.6   CHLORIDE 103 103  --    CO2 21.0* 23.0  --    ANION GAP 19.0* 12.0  --    BUN 21 16  --    CREATININE 1.33* 0.65  --    EGFR 40.3* 88.6  --    GLUCOSE 112* 106*  --    CALCIUM 9.3 8.8  --    MAGNESIUM 1.7 1.6  --     PHOSPHORUS  --  3.4  --          Lab 07/15/23  0209   TOTAL PROTEIN 6.7   ALBUMIN 3.2*   GLOBULIN 3.5   ALT (SGPT) 15   AST (SGOT) 21   BILIRUBIN 0.2   ALK PHOS 114                 Lab 07/16/23  0957   IRON 11*   IRON SATURATION (TSAT) 3*   TIBC 325   TRANSFERRIN 218         Brief Urine Lab Results  (Last result in the past 365 days)        Color   Clarity   Blood   Leuk Est   Nitrite   Protein   CREAT   Urine HCG        07/11/23 0059 Yellow   Clear   Small (1+)   Negative   Negative   Negative                 Microbiology Results (last 10 days)       Procedure Component Value - Date/Time    MRSA Screen, PCR (Inpatient) - Swab, Nares [[id number]]  (Normal) Collected: 07/15/23 1117    Lab Status: Final result Specimen: Swab from Nares Updated: 07/15/23 1247     MRSA PCR Negative    Narrative:      The negative predictive value of this diagnostic test is high and should only be used to consider de-escalating anti-MRSA therapy. A positive result may indicate colonization with MRSA and must be correlated clinically.  MRSA Negative    S. Pneumo Ag Urine or CSF - Urine, Urine, Clean Catch [[id number]]  (Normal) Collected: 07/15/23 1116    Lab Status: Final result Specimen: Urine, Clean Catch Updated: 07/15/23 2051     Strep Pneumo Ag Negative    Legionella Antigen, Urine - Urine, Urine, Clean Catch [[id number]]  (Normal) Collected: 07/15/23 1116    Lab Status: Final result Specimen: Urine, Clean Catch Updated: 07/15/23 2051     LEGIONELLA ANTIGEN, URINE Negative    Blood Culture - Blood, Arm, Left [[id number]]  (Normal) Collected: 07/10/23 1859    Lab Status: Final result Specimen: Blood from Arm, Left Updated: 07/15/23 2000     Blood Culture No growth at 5 days    Blood Culture - Blood, Arm, Left [[id number]]  (Normal) Collected: 07/10/23 1852    Lab Status: Final result Specimen: Blood from Arm, Left Updated: 07/15/23 2000     Blood Culture No growth at 5 days    COVID PRE-OP / PRE-PROCEDURE SCREENING ORDER (NO ISOLATION)  - Swab, Nasopharynx [[id number]]  (Normal) Collected: 07/10/23 1824    Lab Status: Final result Specimen: Swab from Nasopharynx Updated: 07/10/23 1936    Narrative:      The following orders were created for panel order COVID PRE-OP / PRE-PROCEDURE SCREENING ORDER (NO ISOLATION) - Swab, Nasopharynx.  Procedure                               Abnormality         Status                     ---------                               -----------         ------                     COVID-19 and FLU A/B PCR...[[id number]]  Normal              Final result                 Please view results for these tests on the individual orders.    COVID-19 and FLU A/B PCR - Swab, Nasopharynx [[id number]]  (Normal) Collected: 07/10/23 1824    Lab Status: Final result Specimen: Swab from Nasopharynx Updated: 07/10/23 1936     COVID19 Not Detected     Influenza A PCR Not Detected     Influenza B PCR Not Detected    Narrative:      Fact sheet for providers: https://www.fda.gov/media/682041/download    Fact sheet for patients: https://www.fda.gov/media/759271/download    Test performed by PCR.            EEG    Result Date: 7/14/2023  Reason for referral: 83 y.o.female with shaking spells, consideration of seizures Technical Summary:  A 19 channel digital EEG was performed using the international 10-20 placement system, including eye leads and EKG leads. Duration: 20 minutes Findings: The patient is awake.  The background shows diffuse low to medium amplitude 5-10 Hz intermixed theta and alpha activity which is present symmetrically over both hemispheres.  4 Hz generalized delta is intermixed at times.  At the beginning of the study rapid eye movements are noted with attendant artifact.  As a study proceeds, a slight mouth tremor is noted, without EEG correlate.  Later.  Right hand and left hand tremoring is noted, again without EEG correlate.  Sleep is not seen.  No focal features or epileptiform activity are present.  Photic stimulation does not  change the background.  Hyperventilation is not performed. Video: Available Technical quality: Superior SUMMARY: Intermittent generalized slow No focal features or epileptiform activity are seen Episodes of eye movement and hand tremoring have no EEG correlate     Diffuse cerebral dysfunction of exceedingly mild degree, nonspecific No evidence for epilepsy is present This report is transcribed using the Dragon dictation system.      XR Chest 1 View    Result Date: 7/14/2023  XR CHEST 1 VW Date of Exam: 7/14/2023 9:20 AM EDT Indication: pna, tachycardia Comparison: 7/11/2023 Findings: There are increasing bilateral, left greater than right groundglass opacities. Cardiac mediastinal contours are within normal limits. Vascular calcifications again identified. Regional skeleton is unremarkable.     Impression: 1. Increasing bilateral airspace opacities concerning for multifocal pneumonia. Pulmonary edema less favored. Electronically Signed: Guanaco Segovia  7/14/2023 9:44 AM EDT  Workstation ID: [id number]    XR Chest 1 View    Result Date: 7/11/2023  XR CHEST 1 VW Date of Exam: 7/11/2023 4:05 AM EDT Indication: Resp Distress Comparison: 7/10/2023 Findings: The trachea is midline. There is stable appearance of the heart. Pulmonary vasculature is indistinct secondary to persistent diffuse reticular and airspace opacities. There are no definite pleural effusions. Chronic appearing fracture deformity of the left humeral head and neck.     Impression: Stable diffuse bilateral perihilar reticular airspace disease may represent pulmonary edema or multifocal pneumonia. Electronically Signed: Robert Stein  7/11/2023 7:38 AM EDT  Workstation ID: [id number]    XR Chest 1 View    Result Date: 7/10/2023  XR CHEST 1 VW Date of Exam: 7/10/2023 6:13 PM EDT Indication: SOA triage protocol Comparison: Chest x-ray 5/7/2023 Findings: Increased fine reticular opacities in a bilateral perihilar distribution compared to prior chest x-ray. Vague  increased density in the lower lobes may reflect artifact from overlying breast prostheses. No pleural effusion or pneumothorax. There is a chronic appearing fracture of the proximal left humerus. There is partial visualization of lumbar spinal fusion hardware. Stable cardiomediastinal silhouette within normal limits.     Impression: New and increased bilateral perihilar interstitial opacities/reticular markings suspicious for atypical/viral infection or possibly interstitial edema. Electronically Signed: Demian Machado  7/10/2023 7:08 PM EDT  Workstation ID: [id number]     Results for orders placed during the hospital encounter of 09/06/22    Duplex venous lower extremity left CAR    Interpretation Summary  · The left lower extremity venous duplex scan is negative for DVT and SVT.      Results for orders placed during the hospital encounter of 09/06/22    Duplex venous lower extremity left CAR    Interpretation Summary  · The left lower extremity venous duplex scan is negative for DVT and SVT.      Results for orders placed during the hospital encounter of 05/07/23    Adult Transthoracic Echo Complete w/ Color, Spectral and Contrast if Necessary Per Protocol    Interpretation Summary    Left ventricular systolic function is normal. Left ventricular ejection fraction appears to be 61 - 65%.    Left ventricular diastolic function is consistent with (grade II w/high LAP) pseudonormalization.    Left atrial volume is severely increased.    The right atrial cavity is moderately  dilated.    There is calcification of the aortic valve.    Moderate tricuspid valve regurgitation is present.    Estimated right ventricular systolic pressure from tricuspid regurgitation is mildly elevated (35-45 mmHg). Calculated right ventricular systolic pressure from tricuspid regurgitation is 41 mmHg.    Mild pulmonary hypertension is present.      Plan for Follow-up of Pending Labs/Results:     Discharge Details        Discharge Medications         New Medications        Instructions Start Date   amiodarone 200 MG tablet  Commonly known as: PACERONE   200 mg, Oral, 2 Times Daily      furosemide 40 MG tablet  Commonly known as: LASIX   40 mg, Oral, Daily   Start Date: July 20, 2023            Changes to Medications        Instructions Start Date   metoprolol tartrate 25 MG tablet  Commonly known as: LOPRESSOR  What changed: how much to take   12.5 mg, Oral, Every 12 Hours Scheduled             Continue These Medications        Instructions Start Date   acetaminophen 325 MG tablet  Commonly known as: TYLENOL   650 mg, Oral, Every 4 Hours PRN      apixaban 2.5 MG tablet tablet  Commonly known as: ELIQUIS   2.5 mg, Oral, Every 12 Hours Scheduled      donepezil 5 MG tablet  Commonly known as: ARICEPT   5 mg, Oral, Nightly      famotidine 20 MG tablet  Commonly known as: PEPCID   20 mg, Oral, 2 Times Daily      LORazepam 0.5 MG tablet  Commonly known as: ATIVAN   0.25 mg, Oral, 2 Times Daily      melatonin 5 MG tablet tablet   5 mg, Oral, Nightly      pantoprazole 40 MG EC tablet  Commonly known as: PROTONIX   40 mg, Oral, Daily      vitamin D3 125 MCG (5000 UT) capsule capsule   5,000 Units, Oral, Daily, 125 mcg capsule             Stop These Medications      amLODIPine 10 MG tablet  Commonly known as: NORVASC     meropenem 250 mg in sterile water (preservative free) 5 mL     multivitamin with minerals tablet tablet     risperiDONE 0.5 MG tablet  Commonly known as: risperDAL              Allergies   Allergen Reactions    Codeine Nausea And Vomiting    Serotonin Reuptake Inhibitors (Ssris) Other (See Comments)     Serotonin syndrome         Discharge Disposition:  Hospice/Home    Diet:  Hospital:  Diet Order   Procedures    Diet: Regular/House Diet; Texture: Soft to Chew (NDD 3); Soft to Chew: Whole Meat; Fluid Consistency: Nectar Thick       Activity:  Activity Instructions       Activity as Tolerated              Restrictions or Other Recommendations:          CODE STATUS:    Code Status and Medical Interventions:   Ordered at: 07/10/23 2107     Medical Intervention Limits:    NO intubation (DNI)    NO cardioversion     Code Status (Patient has no pulse and is not breathing):    No CPR (Do Not Attempt to Resuscitate)     Medical Interventions (Patient has pulse or is breathing):    Limited Support     Comments:    Discussed with      Release to patient:    Routine Release       No future appointments.    Additional Instructions for the Follow-ups that You Need to Schedule       Ambulatory Referral to Home Health   As directed      Face to Face Visit Date: 7/14/2023    Follow-up provider for Plan of Care?: I treated the patient in an acute care facility and will not continue treatment after discharge.    Follow-up provider: JUSTIN TABOR [[id number]]    Reason/Clinical Findings: S/P hospital stay    Describe mobility limitations that make leaving home difficult: Impaired gait, balance, endurance, and mobility    Nursing/Therapeutic Services Requested: Skilled Nursing Physical Therapy Occupational Therapy    Skilled nursing orders: CHF management    PT orders: Therapeutic exercise Gait Training Transfer training    Weight Bearing Status: As Tolerated    Occupational orders: Activities of daily living Energy conservation Strengthening    Frequency: 1 Week 1         Discharge Follow-up with PCP   As directed       Currently Documented PCP:    Justin Tabor MD    PCP Phone Number:    [phone]     Follow Up Details: PCP Dr. Tabor as needed                       Norma Leger DO  07/19/23      Time Spent on Discharge:  I spent  35  minutes on this discharge activity which included: face-to-face encounter with the patient, reviewing the data in the system, coordination of the care with the nursing staff as well as consultants, documentation, and entering orders.

## 2023-07-19 NOTE — PROGRESS NOTES
Continued Stay Note  [facility]     Patient Name: Laura Wallace  MRN: [id number]  Today's Date: 7/19/2023    Admit Date: 7/10/2023    Plan: Home with [facility]   Discharge Plan       Row Name 07/19/23 1531       Plan    Plan Home with [facility]    Final Discharge Disposition Code 50 - home with hospice    Final Note Telephone call received from pt's spouse expressing concerns with being able to transport pt via private vehicle. Spouse stated pt is unable to bend enough to sit up straight making it difficult to sit in a w/c or get in/out of a car. Ambulance transportation arranged by [facility], with [facility] at 1500 today. Notified pt's spouse and care team of transportation time. Visit made to spouse, aides present working with pt. Pt's spouse present, spouse signed the EMS/DNR form, form placed on pt's chartlet. Spouse has the hospice 24 hr number to call to initiate hospice services when pt arrives home. Please call [phone] if can be of further assistance.      Row Name 07/19/23 1355       Plan    Final Discharge Disposition Code 50 - home with hospice                   Discharge Codes    No documentation.                 Expected Discharge Date and Time       Expected Discharge Date Expected Discharge Time    Jul 19, 2023               Charlene Kuhn RN

## 2023-07-20 LAB
QT INTERVAL: 396 MS
QTC INTERVAL: 513 MS

## 2023-07-27 LAB
QT INTERVAL: 300 MS
QT INTERVAL: 502 MS
QTC INTERVAL: 497 MS
QTC INTERVAL: 502 MS

## (undated) PROCEDURE — 3E03329 INTRODUCTION OF OTHER ANTI-INFECTIVE INTO PERIPHERAL VEIN, PERCUTANEOUS APPROACH: ICD-10-PCS | Performed by: FAMILY MEDICINE